# Patient Record
Sex: FEMALE | Race: BLACK OR AFRICAN AMERICAN | NOT HISPANIC OR LATINO | Employment: UNEMPLOYED | URBAN - METROPOLITAN AREA
[De-identification: names, ages, dates, MRNs, and addresses within clinical notes are randomized per-mention and may not be internally consistent; named-entity substitution may affect disease eponyms.]

---

## 2017-03-16 ENCOUNTER — LAB VISIT (OUTPATIENT)
Dept: LAB | Facility: OTHER | Age: 67
End: 2017-03-16
Attending: INTERNAL MEDICINE
Payer: MEDICARE

## 2017-03-16 DIAGNOSIS — E55.9 VITAMIN D DEFICIENCY: ICD-10-CM

## 2017-03-16 DIAGNOSIS — M85.80 OSTEOPENIA: ICD-10-CM

## 2017-03-16 LAB — 25(OH)D3+25(OH)D2 SERPL-MCNC: 30 NG/ML

## 2017-03-16 PROCEDURE — 36415 COLL VENOUS BLD VENIPUNCTURE: CPT

## 2017-03-16 PROCEDURE — 82306 VITAMIN D 25 HYDROXY: CPT

## 2017-04-27 RX ORDER — AMLODIPINE BESYLATE 5 MG/1
5 TABLET ORAL DAILY
Qty: 90 TABLET | Refills: 1 | Status: SHIPPED | OUTPATIENT
Start: 2017-04-27 | End: 2017-04-28 | Stop reason: SDUPTHER

## 2017-04-27 NOTE — TELEPHONE ENCOUNTER
----- Message from Kay Johns sent at 4/27/2017  9:34 AM CDT -----  Contact: Self  X  1st Request  _  2nd Request  _  3rd Request    Please refill the medication(s) listed below. Please call the patient when the prescription(s) is ready for  at the phone number [phone] .    Medication #1 amlodipine (NORVASC) 5 MG tablet    Preferred Pharmacy: [facility] at [phone]

## 2017-04-28 RX ORDER — AMLODIPINE BESYLATE 5 MG/1
5 TABLET ORAL DAILY
Qty: 90 TABLET | Refills: 1 | Status: SHIPPED | OUTPATIENT
Start: 2017-04-28 | End: 2017-10-20 | Stop reason: SDUPTHER

## 2017-05-15 ENCOUNTER — OFFICE VISIT (OUTPATIENT)
Dept: INTERNAL MEDICINE | Facility: CLINIC | Age: 67
End: 2017-05-15
Payer: MEDICARE

## 2017-05-15 ENCOUNTER — LAB VISIT (OUTPATIENT)
Dept: LAB | Facility: OTHER | Age: 67
End: 2017-05-15
Attending: INTERNAL MEDICINE
Payer: MEDICARE

## 2017-05-15 VITALS
HEART RATE: 93 BPM | OXYGEN SATURATION: 97 % | WEIGHT: 142.63 LBS | BODY MASS INDEX: 26.25 KG/M2 | HEIGHT: 62 IN | DIASTOLIC BLOOD PRESSURE: 80 MMHG | SYSTOLIC BLOOD PRESSURE: 132 MMHG

## 2017-05-15 DIAGNOSIS — I10 ESSENTIAL HYPERTENSION: Chronic | ICD-10-CM

## 2017-05-15 DIAGNOSIS — I10 ESSENTIAL HYPERTENSION: Primary | Chronic | ICD-10-CM

## 2017-05-15 LAB
ANION GAP SERPL CALC-SCNC: 12 MMOL/L
BUN SERPL-MCNC: 13 MG/DL
CALCIUM SERPL-MCNC: 9.7 MG/DL
CHLORIDE SERPL-SCNC: 108 MMOL/L
CO2 SERPL-SCNC: 24 MMOL/L
CREAT SERPL-MCNC: 0.8 MG/DL
EST. GFR  (AFRICAN AMERICAN): >60 ML/MIN/1.73 M^2
EST. GFR  (NON AFRICAN AMERICAN): >60 ML/MIN/1.73 M^2
GLUCOSE SERPL-MCNC: 89 MG/DL
POTASSIUM SERPL-SCNC: 3.5 MMOL/L
SODIUM SERPL-SCNC: 144 MMOL/L

## 2017-05-15 PROCEDURE — 80048 BASIC METABOLIC PNL TOTAL CA: CPT

## 2017-05-15 PROCEDURE — 36415 COLL VENOUS BLD VENIPUNCTURE: CPT

## 2017-05-15 PROCEDURE — 99999 PR PBB SHADOW E&M-EST. PATIENT-LVL III: CPT | Mod: PBBFAC,,, | Performed by: INTERNAL MEDICINE

## 2017-05-15 PROCEDURE — 99213 OFFICE O/P EST LOW 20 MIN: CPT | Mod: S$PBB,,, | Performed by: INTERNAL MEDICINE

## 2017-05-15 NOTE — MR AVS SNAPSHOT
"    [facility]  [address]  [place] [address]  Phone: [phone]  Fax: [phone]                  Jessica Yin Sherif   5/15/2017 9:00 AM   Office Visit    Description:  Female : 1950   Provider:  Isac Solares MD   Department:  [facility]           Reason for Visit     Hypertension           Diagnoses this Visit        Comments    Essential hypertension    -  Primary            To Do List           Future Appointments        Provider Department Dept Phone    5/15/2017 10:30 AM LAB, SAME DAY [facility] [phone]      Goals (5 Years of Data)     None      Follow-Up and Disposition     Return in about 1 year (around 5/15/2018).      [facility]     [facility] Nurse Care Line -  Assistance  Unless otherwise directed by your provider, please contact [facility], our nurse care line that is available for  assistance.     Registered nurses in the [facility] provide: appointment scheduling, clinical advisement, health education, and other advisory services.  Call: [phone] (toll free)               Medications                Verify that the below list of medications is an accurate representation of the medications you are currently taking.  If none reported, the list may be blank. If incorrect, please contact your healthcare provider. Carry this list with you in case of emergency.           Current Medications     amlodipine (NORVASC) 5 MG tablet Take 1 tablet (5 mg total) by mouth once daily.    multivitamin (ONE DAILY MULTIVITAMIN) per tablet Take 1 tablet by mouth once daily.    cholecalciferol, vitamin D3, 50,000 unit capsule Take 1 capsule (50,000 Units total) by mouth once a week.           Clinical Reference Information           Your Vitals Were     BP Pulse Height Weight SpO2 BMI    132/80 93 5' 2" (1.575 m) 64.7 kg (142 lb 10.2 oz) 97% 26.09 kg/m2      Blood Pressure          Most " Recent Value    BP  132/80      Allergies as of 5/15/2017     Gramicidin D    Hydrochlorothiazide    Lisinopril    Promethazine    Neosporin [Neomycin-bacitracnzn-polymyxnb]    Polymyxin [Bacitracin-polymyxin B]      Immunizations Administered on Date of Encounter - 5/15/2017     None      Orders Placed During Today's Visit     Future Labs/Procedures Expected by Expires    Basic metabolic panel  5/15/2017 7/14/2018      Language Assistance Services     ATTENTION: Language assistance services are available, free of charge. Please call [phone].      ATENCIÓN: Si habla español, tiene a gutierrez disposición servicios gratuitos de asistencia lingüística. Llame al [phone].     CHÚ Ý: N?u b?n nói Ti?ng Vi?t, có các d?ch v? h? tr? ngôn ng? mi?n phí dành cho b?n. G?i s? [phone].         [facility] complies with applicable Federal civil rights laws and does not discriminate on the basis of race, color, national origin, age, disability, or sex.

## 2017-05-15 NOTE — PROGRESS NOTES
"Subjective:       Patient ID: Jessica Gorman is a 66 y.o. female.    Chief Complaint: Hypertension    HPI Comments:   Pt here for f/u HTN.    She is feeling well.  No new c/o.    She is not monitoring her BP on her own.         Hypertension       Review of Systems   Constitutional: Negative.    HENT: Negative.    Eyes: Negative.    Respiratory: Negative.        Objective:       Vitals:    05/15/17 0906   BP: 132/80   Pulse: 93   SpO2: 97%   Weight: 64.7 kg (142 lb 10.2 oz)   Height: 5' 2" (1.575 m)     Physical Exam   Constitutional: She is oriented to person, place, and time. She appears well-developed and well-nourished. No distress.   HENT:   Head: Normocephalic and atraumatic.   Right Ear: Tympanic membrane, external ear and ear canal normal.   Left Ear: Tympanic membrane, external ear and ear canal normal.   Mouth/Throat: Uvula is midline, oropharynx is clear and moist and mucous membranes are normal. No oropharyngeal exudate or posterior oropharyngeal erythema.   Eyes: Conjunctivae and EOM are normal. Pupils are equal, round, and reactive to light.   Neck: Normal range of motion. Neck supple.   Cardiovascular: Normal rate, regular rhythm and normal heart sounds.  Exam reveals no gallop and no friction rub.    No murmur heard.  Pulmonary/Chest: Effort normal and breath sounds normal. No respiratory distress. She has no wheezes. She has no rhonchi. She has no rales.   Lymphadenopathy:     She has no cervical adenopathy.   Neurological: She is alert and oriented to person, place, and time.   Skin: She is not diaphoretic.       Assessment:       1. Essential hypertension        Plan:           HTN - At goal.  Cont current tx.  Chk Cr and lytes.    HM - Pt declined all vaccines.        "

## 2017-06-06 ENCOUNTER — LAB VISIT (OUTPATIENT)
Dept: LAB | Facility: HOSPITAL | Age: 67
End: 2017-06-06
Attending: OBSTETRICS & GYNECOLOGY
Payer: MEDICARE

## 2017-06-06 DIAGNOSIS — C54.1 ENDOMETRIAL CARCINOMA: ICD-10-CM

## 2017-06-06 LAB — CANCER AG125 SERPL-ACNC: 12 U/ML

## 2017-06-06 PROCEDURE — 86304 IMMUNOASSAY TUMOR CA 125: CPT

## 2017-06-06 PROCEDURE — 36415 COLL VENOUS BLD VENIPUNCTURE: CPT

## 2017-06-07 ENCOUNTER — OFFICE VISIT (OUTPATIENT)
Dept: GYNECOLOGIC ONCOLOGY | Facility: CLINIC | Age: 67
End: 2017-06-07
Payer: MEDICARE

## 2017-06-07 VITALS
WEIGHT: 145.06 LBS | BODY MASS INDEX: 26.69 KG/M2 | DIASTOLIC BLOOD PRESSURE: 80 MMHG | HEIGHT: 62 IN | HEART RATE: 101 BPM | SYSTOLIC BLOOD PRESSURE: 146 MMHG

## 2017-06-07 DIAGNOSIS — C54.1 ENDOMETRIAL CARCINOMA: Primary | ICD-10-CM

## 2017-06-07 DIAGNOSIS — Z12.31 SCREENING MAMMOGRAM, ENCOUNTER FOR: ICD-10-CM

## 2017-06-07 PROCEDURE — 1126F AMNT PAIN NOTED NONE PRSNT: CPT | Mod: ,,, | Performed by: OBSTETRICS & GYNECOLOGY

## 2017-06-07 PROCEDURE — 99214 OFFICE O/P EST MOD 30 MIN: CPT | Mod: S$PBB,,, | Performed by: OBSTETRICS & GYNECOLOGY

## 2017-06-07 PROCEDURE — 1159F MED LIST DOCD IN RCRD: CPT | Mod: ,,, | Performed by: OBSTETRICS & GYNECOLOGY

## 2017-06-07 PROCEDURE — 99999 PR PBB SHADOW E&M-EST. PATIENT-LVL III: CPT | Mod: PBBFAC,,, | Performed by: OBSTETRICS & GYNECOLOGY

## 2017-06-07 PROCEDURE — 99213 OFFICE O/P EST LOW 20 MIN: CPT | Mod: PBBFAC | Performed by: OBSTETRICS & GYNECOLOGY

## 2017-06-07 NOTE — PROGRESS NOTES
Subjective:       Patient ID: Jessica Gorman is a 66 y.o. female.    Chief Complaint: Endometrial Cancer (6 mth )    HPI     Patient comes in today for her WWE and follow up for UPSC of the uterus.     is 12.       Denies vaginal bleeding.      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    CT scan of the abdomen and pelvis after surgery showed: 1.Postsurgical changes seen in the pelvis from hysterectomy and bilateral salpingo-oophorectomy    2.A fluid collection is seen posterior to the cervix and likely represents a seroma. However, abscess cannot be ruled out.    3.Enlarged left inguinal lymph node identified    Ultrasound of this inguinal node was done and showed:    Limited sonographic imaging was performed in an area of a palpable abnormality within the patient's left inguinal region. Within this region, a lymph node is seen that measures 2.4 x 0.9 x 2.8 cm. While enlarged, this lymph node does   demonstrate a fatty hilum and is otherwise normal in morphology.    The patient reports that this palpable abnormality has been getting smaller over time. I discussed with the patient the need to biopsy this lesion to fully exclude any recurrent malignancy however at this time the patient deferred a biopsy attempt.    If this lesion does not resolve, a ultrasound guided biopsy could be rescheduled.        Patient completed vaginal cuff radiation.    She completed 6 cycles of taxol and carboplatin in May 2015.    June 2015: CT scan at completion of treatment shows no evidence for disease. No adenopathy.  was 7.  "   Strong family history of breast cancer on mother's side. Patient's daughter has been tested for BRCA and is negative.           Review of Systems   Constitutional: Negative for chills, fatigue and fever.   Respiratory: Negative for cough, shortness of breath and wheezing.    Cardiovascular: Negative for chest pain, palpitations and leg swelling.   Gastrointestinal: Negative for abdominal pain, constipation, diarrhea, nausea and vomiting.   Genitourinary: Negative for difficulty urinating, dysuria, frequency, genital sores, hematuria, urgency, vaginal bleeding, vaginal discharge and vaginal pain.   Neurological: Negative for weakness.   Hematological: Negative for adenopathy. Does not bruise/bleed easily.   Psychiatric/Behavioral: The patient is not nervous/anxious.        Objective:     BP (!) 146/80   Pulse 101   Ht 5' 2" (1.575 m)   Wt 65.8 kg (145 lb 1 oz)   BMI 26.53 kg/m²     Physical Exam   Constitutional: She is oriented to person, place, and time. She appears well-developed and well-nourished.   HENT:   Head: Normocephalic and atraumatic.   Eyes: No scleral icterus.   Neck: Neck supple. No tracheal deviation present. No thyroid mass and no thyromegaly present.   Cardiovascular: Normal rate and regular rhythm.    Pulmonary/Chest: Effort normal and breath sounds normal. She has no wheezes.   Abdominal: Soft. She exhibits no distension and no mass. There is no hepatosplenomegaly. There is no tenderness. There is no rebound and no guarding.   Genitourinary:   Genitourinary Comments: Bimanual exam:  Vulva: no lesions. Normal appearance  Urethra: Normal size and location. No lesions  Bladder: No masses or tenderness.  Vagina: normal mucosa. No lesion  Cervix: absent.   Uterus: absent.  Adnexa: no masses.  Rectovaginal: Not performed     Musculoskeletal: She exhibits no edema or tenderness.   Lymphadenopathy:     She has no cervical adenopathy.     She has no axillary adenopathy.        Right: No inguinal " and no supraclavicular adenopathy present.        Left: No inguinal and no supraclavicular adenopathy present.   Neurological: She is alert and oriented to person, place, and time.   Skin: Skin is warm and dry.   Psychiatric: She has a normal mood and affect. Her behavior is normal. Judgment and thought content normal.       Assessment:       1. Endometrial carcinoma, serous    2. Screening mammogram, encounter for        Plan:   Endometrial carcinoma, serous   YESENIA   RTC 6 months  -     ; Future; Expected date: 12/07/2017    Screening mammogram, encounter for  -     Mammo Digital Screening Bilat with CAD; Future; Expected date: 09/27/2017

## 2017-07-05 ENCOUNTER — OFFICE VISIT (OUTPATIENT)
Dept: INTERNAL MEDICINE | Facility: CLINIC | Age: 67
End: 2017-07-05
Payer: MEDICARE

## 2017-07-05 ENCOUNTER — HOSPITAL ENCOUNTER (OUTPATIENT)
Dept: RADIOLOGY | Facility: OTHER | Age: 67
Discharge: HOME | End: 2017-07-05
Attending: INTERNAL MEDICINE
Payer: MEDICARE

## 2017-07-05 VITALS
HEART RATE: 109 BPM | HEIGHT: 62 IN | WEIGHT: 144.19 LBS | BODY MASS INDEX: 26.53 KG/M2 | SYSTOLIC BLOOD PRESSURE: 126 MMHG | OXYGEN SATURATION: 98 % | DIASTOLIC BLOOD PRESSURE: 82 MMHG

## 2017-07-05 DIAGNOSIS — R06.02 SHORTNESS OF BREATH: Primary | ICD-10-CM

## 2017-07-05 DIAGNOSIS — R06.02 SHORTNESS OF BREATH: ICD-10-CM

## 2017-07-05 PROCEDURE — 1126F AMNT PAIN NOTED NONE PRSNT: CPT | Mod: ,,, | Performed by: INTERNAL MEDICINE

## 2017-07-05 PROCEDURE — 1159F MED LIST DOCD IN RCRD: CPT | Mod: ,,, | Performed by: INTERNAL MEDICINE

## 2017-07-05 PROCEDURE — 93010 ELECTROCARDIOGRAM REPORT: CPT | Mod: ,,, | Performed by: INTERNAL MEDICINE

## 2017-07-05 PROCEDURE — 93005 ELECTROCARDIOGRAM TRACING: CPT | Mod: PBBFAC | Performed by: INTERNAL MEDICINE

## 2017-07-05 PROCEDURE — 71020 XR CHEST PA AND LATERAL: CPT | Mod: 26,,, | Performed by: RADIOLOGY

## 2017-07-05 PROCEDURE — 99213 OFFICE O/P EST LOW 20 MIN: CPT | Mod: S$PBB,,, | Performed by: INTERNAL MEDICINE

## 2017-07-05 PROCEDURE — 99999 PR PBB SHADOW E&M-EST. PATIENT-LVL III: CPT | Mod: PBBFAC,,, | Performed by: INTERNAL MEDICINE

## 2017-07-05 PROCEDURE — 71020 XR CHEST PA AND LATERAL: CPT | Mod: TC

## 2017-07-05 NOTE — PROGRESS NOTES
Subjective:       Patient ID: Jessica Gorman is a 66 y.o. female.    Chief Complaint: Cough (x 30 years) and Shortness of Breath    Pt c/o difficulty catching breath last night that lasted about a minute. She was able to speak and grab her daughter but then it resolved. She was sitting at the time watching tv. No cp/palpitations. She has had a chronic cough for 30 years but was not having coughing episode at the time. This has happened in the past but she doesn't feel it has lasted as long as it did last night. She feels normal today (as she did after the short period last night) and has no other c/o. No leg swelling/pain. No h/o asthma. Lifelong non-smoker. She has been able to exert herself and go upstairs without issues.       Review of Systems   Constitutional: Negative for fever and unexpected weight change.   HENT: Negative for ear pain, rhinorrhea and sore throat.    Respiratory: Negative for cough and shortness of breath.    Cardiovascular: Negative for chest pain.       Objective:      Physical Exam   Constitutional: She is oriented to person, place, and time. She appears well-developed and well-nourished.   HENT:   Right Ear: Tympanic membrane, external ear and ear canal normal.   Left Ear: Tympanic membrane, external ear and ear canal normal.   Nose: No mucosal edema or rhinorrhea.   Mouth/Throat: No oropharyngeal exudate or posterior oropharyngeal erythema.   Neck: Neck supple. No thyromegaly present.   Cardiovascular: Normal rate, regular rhythm and normal heart sounds.    Pulmonary/Chest: Effort normal and breath sounds normal.   Lymphadenopathy:     She has no cervical adenopathy.   Neurological: She is alert and oriented to person, place, and time.   Psychiatric: She has a normal mood and affect.       Assessment:       1. Shortness of breath        Plan:       1. No apparent concerning cause but check CXR considering CA history and EKG  2. EKG--nsr  3. ED and rtc prompts d/w pt and she  understands

## 2017-07-10 DIAGNOSIS — Z12.11 COLON CANCER SCREENING: ICD-10-CM

## 2017-08-04 DIAGNOSIS — Z12.11 COLON CANCER SCREENING: ICD-10-CM

## 2017-10-02 ENCOUNTER — HOSPITAL ENCOUNTER (OUTPATIENT)
Dept: RADIOLOGY | Facility: HOSPITAL | Age: 67
Discharge: HOME | End: 2017-10-02
Attending: OBSTETRICS & GYNECOLOGY
Payer: MEDICARE

## 2017-10-02 DIAGNOSIS — Z12.31 SCREENING MAMMOGRAM, ENCOUNTER FOR: ICD-10-CM

## 2017-10-02 PROCEDURE — 77067 SCR MAMMO BI INCL CAD: CPT | Mod: TC

## 2017-10-02 PROCEDURE — 77067 SCR MAMMO BI INCL CAD: CPT | Mod: 26,,, | Performed by: RADIOLOGY

## 2017-10-20 RX ORDER — AMLODIPINE BESYLATE 5 MG/1
5 TABLET ORAL DAILY
Qty: 90 TABLET | Refills: 1 | Status: SHIPPED | OUTPATIENT
Start: 2017-10-20 | End: 2018-04-21 | Stop reason: SDUPTHER

## 2017-10-20 NOTE — TELEPHONE ENCOUNTER
----- Message from Delaney Ozuna sent at 10/20/2017  2:29 PM CDT -----      _  1st Request  _  2nd Request  _  3rd Request    Please refill the medication(s) listed below. Please call the patient when the prescription(s) is ready for  at this phone number          amlodipine (NORVASC) 5 MG tablet  Medication #1          Preferred Pharmacy:[facility]

## 2017-12-20 ENCOUNTER — LAB VISIT (OUTPATIENT)
Dept: LAB | Facility: HOSPITAL | Age: 67
End: 2017-12-20
Attending: OBSTETRICS & GYNECOLOGY
Payer: MEDICARE

## 2017-12-20 DIAGNOSIS — C54.1 ENDOMETRIAL CARCINOMA: ICD-10-CM

## 2017-12-20 LAB — CANCER AG125 SERPL-ACNC: 12 U/ML

## 2017-12-20 PROCEDURE — 36415 COLL VENOUS BLD VENIPUNCTURE: CPT

## 2017-12-20 PROCEDURE — 86304 IMMUNOASSAY TUMOR CA 125: CPT

## 2017-12-21 ENCOUNTER — OFFICE VISIT (OUTPATIENT)
Dept: GYNECOLOGIC ONCOLOGY | Facility: CLINIC | Age: 67
End: 2017-12-21
Payer: MEDICARE

## 2017-12-21 VITALS
HEART RATE: 104 BPM | SYSTOLIC BLOOD PRESSURE: 138 MMHG | BODY MASS INDEX: 25.79 KG/M2 | DIASTOLIC BLOOD PRESSURE: 72 MMHG | WEIGHT: 141 LBS

## 2017-12-21 DIAGNOSIS — C54.1 ENDOMETRIAL CARCINOMA: Primary | ICD-10-CM

## 2017-12-21 PROCEDURE — 99213 OFFICE O/P EST LOW 20 MIN: CPT | Mod: PBBFAC | Performed by: OBSTETRICS & GYNECOLOGY

## 2017-12-21 PROCEDURE — 99999 PR PBB SHADOW E&M-EST. PATIENT-LVL III: CPT | Mod: PBBFAC,,, | Performed by: OBSTETRICS & GYNECOLOGY

## 2017-12-21 PROCEDURE — 99214 OFFICE O/P EST MOD 30 MIN: CPT | Mod: S$PBB,,, | Performed by: OBSTETRICS & GYNECOLOGY

## 2017-12-21 NOTE — PROGRESS NOTES
Subjective:       Patient ID: Jessica Gorman is a 67 y.o. female.    Chief Complaint: Endometrial Cancer (follow up )    HPI     Patient comes in today for follow up for UPSC of the uterus.     is 12.       Denies vaginal bleeding.      Mammogram: Oct  2, 2017: normal   Colonscopy: 2004: normal   Sigmoidoscopy: 2015: normal.      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    CT scan of the abdomen and pelvis after surgery showed: 1.Postsurgical changes seen in the pelvis from hysterectomy and bilateral salpingo-oophorectomy    2.A fluid collection is seen posterior to the cervix and likely represents a seroma. However, abscess cannot be ruled out.    3.Enlarged left inguinal lymph node identified    Ultrasound of this inguinal node was done and showed:    Limited sonographic imaging was performed in an area of a palpable abnormality within the patient's left inguinal region. Within this region, a lymph node is seen that measures 2.4 x 0.9 x 2.8 cm. While enlarged, this lymph node does   demonstrate a fatty hilum and is otherwise normal in morphology.    The patient reports that this palpable abnormality has been getting smaller over time. I discussed with the patient the need to biopsy this lesion to fully exclude any recurrent malignancy however at this time the patient deferred a biopsy attempt.    If this lesion does not resolve, a ultrasound guided biopsy could be rescheduled.        Patient completed vaginal cuff radiation.    She completed 6 cycles of taxol and carboplatin in May 2015.    June 2015: CT scan at completion of treatment shows no  evidence for disease. No adenopathy.  was 7.    Strong family history of breast cancer on mother's side. Patient's daughter has been tested for BRCA and is negative.              Review of Systems   Constitutional: Negative for chills, fatigue and fever.   Respiratory: Negative for cough, shortness of breath and wheezing.    Cardiovascular: Negative for chest pain, palpitations and leg swelling.   Gastrointestinal: Negative for abdominal pain, constipation, diarrhea, nausea and vomiting.   Genitourinary: Negative for difficulty urinating, dysuria, frequency, genital sores, hematuria, urgency, vaginal bleeding, vaginal discharge and vaginal pain.   Neurological: Negative for weakness.   Hematological: Negative for adenopathy. Does not bruise/bleed easily.   Psychiatric/Behavioral: The patient is not nervous/anxious.        Objective:   /72   Pulse 104   Wt 64 kg (141 lb)   BMI 25.79 kg/m²      Physical Exam   Constitutional: She is oriented to person, place, and time. She appears well-developed and well-nourished.   HENT:   Head: Normocephalic and atraumatic.   Eyes: No scleral icterus.   Neck: Neck supple. No tracheal deviation present. No thyroid mass and no thyromegaly present.   Cardiovascular: Normal rate and regular rhythm.    Pulmonary/Chest: Effort normal and breath sounds normal. She has no wheezes.   Abdominal: Soft. She exhibits no distension and no mass. There is no hepatosplenomegaly. There is no tenderness. There is no rebound and no guarding.   Genitourinary:   Genitourinary Comments: Bimanual exam:  Vulva: no lesions. Normal appearance  Urethra: Normal size and location. No lesions  Bladder: No masses or tenderness.  Vagina: normal mucosa. No lesion  Cervix: absent.   Uterus: absent.  Adnexa: no masses.  Rectovaginal: Not performed     Musculoskeletal: She exhibits no edema or tenderness.   Lymphadenopathy:     She has no cervical adenopathy.     She has no axillary adenopathy.         Right: No inguinal and no supraclavicular adenopathy present.        Left: No inguinal and no supraclavicular adenopathy present.   Neurological: She is alert and oriented to person, place, and time.   Skin: Skin is warm and dry.   Psychiatric: She has a normal mood and affect. Her behavior is normal. Judgment and thought content normal.       Assessment:       1. Endometrial carcinoma, serous        Plan:   Endometrial carcinoma, serous   YESENIA   RTC 6 months  -     ; Future; Expected date: 06/21/2018

## 2018-04-20 RX ORDER — AMLODIPINE BESYLATE 5 MG/1
5 TABLET ORAL DAILY
Qty: 90 TABLET | Refills: 1 | OUTPATIENT
Start: 2018-04-20

## 2018-04-20 NOTE — TELEPHONE ENCOUNTER
----- Message from Joseline Roa sent at 4/20/2018 10:13 AM CDT -----  Contact: SARAH MAGANA [[id number]]  Can the clinic reply in [facility]: No      Please refill the medication(s) listed below. Please call the patient when the prescription(s) is ready for  at the phone number ([phone]) Needs authorization.    Medication #1  amlodipine (NORVASC) 5 MG tablet 90 tablet     Medication #2      Preferred Pharmacy:

## 2018-04-23 RX ORDER — AMLODIPINE BESYLATE 5 MG/1
TABLET ORAL
Qty: 90 TABLET | Refills: 0 | Status: SHIPPED | OUTPATIENT
Start: 2018-04-23 | End: 2018-04-27

## 2018-04-25 NOTE — PROGRESS NOTES
Subjective:       Patient ID: Jessica Gorman is a 67 y.o. female with a PMH significant for HTN, Abnormal PAP, Endometrial Cancer, Osteopenia, FH of Breast Cancer who presents today to establish care.  Patient previously followed by Dr. Solares and last seen on 5/15/2017.    Chief Complaint: Establish Care; Hypertension; and Arm Pain (under both arms)    HPI Complains of intermittent pain in her axillary areas - none for the past 2 weeks.  Occasional joint aches and pains, but nothing specific.  Patient denies f/c, n/v/d.  No chest pain or SOB.  No abdominal pain or dysuria.  No headaches or change in vision.  No dizziness.  No significant  weight gain or weight loss.  Remaining ROS negative.    Review of Systems   Constitutional: Negative for appetite change, chills, diaphoresis, fatigue, fever and unexpected weight change.   HENT: Negative for ear pain, hearing loss, sinus pain, tinnitus, trouble swallowing and voice change.    Eyes: Negative for photophobia, pain and visual disturbance.   Respiratory: Negative for chest tightness, shortness of breath and wheezing.    Cardiovascular: Negative for chest pain, palpitations and leg swelling.   Gastrointestinal: Negative for abdominal pain, blood in stool, constipation, diarrhea, nausea and vomiting.   Endocrine: Negative for cold intolerance, heat intolerance, polydipsia, polyphagia and polyuria.   Genitourinary: Negative for decreased urine volume, difficulty urinating, dysuria, flank pain, hematuria, pelvic pain, vaginal bleeding, vaginal discharge and vaginal pain.   Musculoskeletal: Positive for arthralgias. Negative for gait problem, joint swelling, myalgias and neck pain.   Skin: Negative for rash.   Neurological: Negative for dizziness, tremors, syncope, weakness, numbness and headaches.   Hematological: Does not bruise/bleed easily.   Psychiatric/Behavioral: Negative for agitation, confusion and sleep disturbance. The patient is not nervous/anxious.         Objective:      Physical Exam   Constitutional: She is oriented to person, place, and time. She appears well-developed and well-nourished. No distress.   HENT:   Head: Normocephalic and atraumatic.   Nose: Nose normal.   Mouth/Throat: Oropharynx is clear and moist.   Eyes: Conjunctivae and EOM are normal. Pupils are equal, round, and reactive to light. No scleral icterus.   Neck: Normal range of motion. Neck supple. No JVD present. No thyromegaly present.   Cardiovascular: Normal rate, regular rhythm and intact distal pulses.  Exam reveals no gallop and no friction rub.    No murmur heard.  Pulmonary/Chest: Effort normal and breath sounds normal. No respiratory distress. She has no wheezes. She has no rales.   Abdominal: Soft. Bowel sounds are normal. She exhibits no distension. There is no tenderness. There is no rebound and no guarding.   Musculoskeletal: Normal range of motion. She exhibits no edema.   Lymphadenopathy:     She has no cervical adenopathy.   Neurological: She is alert and oriented to person, place, and time. No cranial nerve deficit or sensory deficit.   Skin: Skin is warm and dry. No rash noted. No erythema.   Large left upper back lipoma - soft and nontender   Psychiatric: She has a normal mood and affect. Her behavior is normal. Thought content normal.       Assessment:       1. Encounter for wellness examination in adult    2. FH: breast cancer in relative when <45 years old    3. Endometrial carcinoma, serous    4. Essential hypertension    5. Colon cancer screening    6. Osteoporosis, unspecified osteoporosis type, unspecified pathological fracture presence        Plan:   -Adult Wellness Exam - blood pressure a little higher and exam was stable.  I will order routine fasting labs.    -Cards - HTN - on Amlodipine, but has not taken them yet this morning as she takes it at 11am.  She does not take her BP at home, but occasionally checks it at the pharmacy and it runs high.   I will increase  her Amlodipine from 5mg to 10mg (she will take two of the 5mg pills until her bottle is complete, as she just refilled a 90 day supply of the 5mg pills).  Last EKG done 7/2017 and was normal.  Last lipids done 5/2016 - , TG 42, HDL 92, LDL 98.6.  -Onc - Endometrial Cancer (12/2014 stage 1A) - S/p VILLA with BSO.  S/p Vaginal Cuff radiation and completed 6 cycles of Taxol and Carboplatin in 5/2014.  She was last seen by GYN Onc on 12/21/2017.  Follow up in 6/2018.  -Endocrine - Vitamin D Insufficient - level in 11/2016 was 24.  S/p Vitamin D treatment, but now on MVI with D3.  -GYN - Last Mammo was in 6/2017 and negative.  Last DXA was in 5/2016 and showed Osteopenia of Hips.    -Derm - Large Left Upper Back Lipoma - since 200 per patient.  Declining intervention.  No pain.  -HCM - We discussed Flu and Tdap (declined in 5/2017 vaccinations.  Doesn't want it again).  We discussed Shingles (declined today) and Pneumococcal (declined) vaccinations.  We discussed colon cancer screening - had it in 2004 and normal per patient.  Declining repeat at this time, as her sister had an adverse event with her procedure).  Agrees to a FITKIT today.  HCV Ab negative in 11/2014.

## 2018-04-27 ENCOUNTER — OFFICE VISIT (OUTPATIENT)
Dept: INTERNAL MEDICINE | Facility: CLINIC | Age: 68
End: 2018-04-27
Payer: MEDICARE

## 2018-04-27 ENCOUNTER — PATIENT MESSAGE (OUTPATIENT)
Dept: INTERNAL MEDICINE | Facility: CLINIC | Age: 68
End: 2018-04-27

## 2018-04-27 ENCOUNTER — HOSPITAL ENCOUNTER (OUTPATIENT)
Dept: RADIOLOGY | Facility: OTHER | Age: 68
Discharge: HOME | End: 2018-04-27
Attending: INTERNAL MEDICINE
Payer: MEDICARE

## 2018-04-27 VITALS
WEIGHT: 140.19 LBS | HEIGHT: 62 IN | SYSTOLIC BLOOD PRESSURE: 140 MMHG | BODY MASS INDEX: 25.8 KG/M2 | OXYGEN SATURATION: 99 % | TEMPERATURE: 98 F | DIASTOLIC BLOOD PRESSURE: 90 MMHG | HEART RATE: 90 BPM

## 2018-04-27 DIAGNOSIS — M81.0 OSTEOPOROSIS, UNSPECIFIED OSTEOPOROSIS TYPE, UNSPECIFIED PATHOLOGICAL FRACTURE PRESENCE: ICD-10-CM

## 2018-04-27 DIAGNOSIS — Z00.00 ENCOUNTER FOR WELLNESS EXAMINATION IN ADULT: Primary | ICD-10-CM

## 2018-04-27 DIAGNOSIS — C54.1 ENDOMETRIAL CARCINOMA: ICD-10-CM

## 2018-04-27 DIAGNOSIS — I10 ESSENTIAL HYPERTENSION: Chronic | ICD-10-CM

## 2018-04-27 DIAGNOSIS — Z80.3 FH: BREAST CANCER IN RELATIVE WHEN <45 YEARS OLD: ICD-10-CM

## 2018-04-27 DIAGNOSIS — Z12.11 COLON CANCER SCREENING: ICD-10-CM

## 2018-04-27 DIAGNOSIS — D17.79 LIPOMA OF OTHER SPECIFIED SITES: ICD-10-CM

## 2018-04-27 PROCEDURE — 99214 OFFICE O/P EST MOD 30 MIN: CPT | Mod: S$PBB,,, | Performed by: INTERNAL MEDICINE

## 2018-04-27 PROCEDURE — 99999 PR PBB SHADOW E&M-EST. PATIENT-LVL IV: CPT | Mod: PBBFAC,,, | Performed by: INTERNAL MEDICINE

## 2018-04-27 PROCEDURE — 77080 DXA BONE DENSITY AXIAL: CPT | Mod: 26,,, | Performed by: RADIOLOGY

## 2018-04-27 PROCEDURE — 77080 DXA BONE DENSITY AXIAL: CPT | Mod: TC

## 2018-04-27 PROCEDURE — 99214 OFFICE O/P EST MOD 30 MIN: CPT | Mod: PBBFAC,25 | Performed by: INTERNAL MEDICINE

## 2018-04-27 RX ORDER — AMLODIPINE BESYLATE 10 MG/1
10 TABLET ORAL DAILY
Qty: 90 TABLET | Refills: 1
Start: 2018-04-27 | End: 2018-06-08 | Stop reason: SDUPTHER

## 2018-04-27 NOTE — PATIENT INSTRUCTIONS
Your blood pressure is a little high today.  I will increase your Amlodipine from 5mg once a day to 10mg once a day.  You can take two of the 5mg pills until you finish your current bottle of medication, then I will send in the new prescription for the 10mg pills once you are ready for a refill.  Please monitor at home with a digital blood pressure cuff when sitting and rested for at least 15 minutes or longer.  Record your values and bring these with you on your return.  I would like you to see the nurse in 2 weeks for a blood pressure check.  Target blood pressure is less than 130/80.    Your exam was overall normal today.  I will order routine fasting labs today - at least 6-8 hours of fasting.  I will order a repeat Bone Density test.  We will talk about vaccinations again at your next follow up.  I will send you home with a FITKIT today.  Return in 3 months - sooner if needed.  Please come at least 15-20 minutes before your scheduled appointment time.

## 2018-04-30 ENCOUNTER — PATIENT MESSAGE (OUTPATIENT)
Dept: INTERNAL MEDICINE | Facility: CLINIC | Age: 68
End: 2018-04-30

## 2018-04-30 ENCOUNTER — TELEPHONE (OUTPATIENT)
Dept: INTERNAL MEDICINE | Facility: CLINIC | Age: 68
End: 2018-04-30

## 2018-04-30 NOTE — TELEPHONE ENCOUNTER
----- Message from Rico Amaro MD sent at 4/30/2018 11:22 AM CDT -----  Contact: patient  Can you ask her how many doses she took till now?  Did she take her blood pressure when she felt dizzy?  Would ask her to continue to take for now if if she feels light-headed again to check her blood pressure and heart rate at that time.  ----- Message -----  From: Cortez Matt MA  Sent: 4/30/2018   9:28 AM  To: Rico Amaro MD        ----- Message -----  From: Stef Dixon  Sent: 4/30/2018   9:09 AM  To: Prem Gómez Staff              Name of Who is Calling: Jessica      What is the request in detail: patient is requesting a call back in reference to a question she has about Rx amLODIPine (NORVASC) 10 MG tablet.  Patient stated she was feeling lightheaded after she started the increased dosage.      Can the clinic reply by [facility]: no      What Number to Call Back if not in [facility]: [phone]

## 2018-04-30 NOTE — TELEPHONE ENCOUNTER
Spoke with pt and she had a verbal understanding that Dr. Amaro doubled her doses from 5 mg to 10 mg amLODIPine (NORVASC). Dr Amaro asked: how many doses she took till now?  Did she take her blood pressure when she felt dizzy?  Would ask her to continue to take for now if if she feels light-headed again to check her blood pressure and heart rate at that time. Pt said she ate at 8 am and took her medicine later on that morning at 11 am, pt didn't take her pressure or pulse at that time. Dr. Amaro advised pt to continue to take 10 mg of the Norvasc and to take her pressure and pulse if the lighted ness continue. 4/30 JOSHUA

## 2018-05-03 ENCOUNTER — PATIENT MESSAGE (OUTPATIENT)
Dept: INTERNAL MEDICINE | Facility: CLINIC | Age: 68
End: 2018-05-03

## 2018-05-14 ENCOUNTER — CLINICAL SUPPORT (OUTPATIENT)
Dept: INTERNAL MEDICINE | Facility: CLINIC | Age: 68
End: 2018-05-14
Payer: MEDICARE

## 2018-05-14 ENCOUNTER — DOCUMENTATION ONLY (OUTPATIENT)
Dept: INTERNAL MEDICINE | Facility: CLINIC | Age: 68
End: 2018-05-14

## 2018-05-14 VITALS — OXYGEN SATURATION: 97 % | SYSTOLIC BLOOD PRESSURE: 126 MMHG | DIASTOLIC BLOOD PRESSURE: 68 MMHG | HEART RATE: 106 BPM

## 2018-05-14 PROCEDURE — 99212 OFFICE O/P EST SF 10 MIN: CPT | Mod: PBBFAC

## 2018-05-14 PROCEDURE — 99999 PR PBB SHADOW E&M-EST. PATIENT-LVL II: CPT | Mod: PBBFAC,,,

## 2018-05-14 NOTE — PROGRESS NOTES
Jessica Gorman 67 y.o. female is here today for Blood Pressure check.   History of HTN yes.    Review of patient's allergies indicates:   Allergen Reactions    Gramicidin d Shortness Of Breath    Hydrochlorothiazide Other (See Comments)     Burning and tingling sensation throughoutt her body    Lisinopril Shortness Of Breath    Promethazine Shortness Of Breath and Nausea And Vomiting    Neosporin [neomycin-bacitracnzn-polymyxnb] Swelling    Polymyxin [bacitracin-polymyxin b] Swelling     Creatinine   Date Value Ref Range Status   04/27/2018 0.8 0.5 - 1.4 mg/dL Final     Sodium   Date Value Ref Range Status   04/27/2018 140 136 - 145 mmol/L Final     Potassium   Date Value Ref Range Status   04/27/2018 3.7 3.5 - 5.1 mmol/L Final   ]  Patient verifies taking blood pressure medications on a regular bases at the same time of the day.     Current Outpatient Prescriptions:     amLODIPine (NORVASC) 10 MG tablet, Take 1 tablet (10 mg total) by mouth once daily., Disp: 90 tablet, Rfl: 1    multivitamin (ONE DAILY MULTIVITAMIN) per tablet, Take 1 tablet by mouth once daily., Disp: , Rfl:   Does patient have record of home blood pressure readings yes. Readings have been averaging 120-135/70-90's.   Last dose of blood pressure medication was taken at [facility].  Patient is asymptomatic.   Complains of no issues.    BP: 126/68 , Pulse: 106.  Dr. Amaro informed of pt concerns with elevated pulses. PCP advised, pt should continue monitoring BP and rtc in 2 wks with PCP.   Pt  States verbal understanding. Scheduled accordingly. Patient has no further questions or concerns.

## 2018-05-14 NOTE — PROGRESS NOTES
FitKit was given to patient on 5/14/2018 3:33 PM   Pt states she dropped previous kit in toilet.

## 2018-05-22 ENCOUNTER — PATIENT MESSAGE (OUTPATIENT)
Dept: INTERNAL MEDICINE | Facility: CLINIC | Age: 68
End: 2018-05-22

## 2018-05-22 ENCOUNTER — LAB VISIT (OUTPATIENT)
Dept: LAB | Facility: HOSPITAL | Age: 68
End: 2018-05-22
Attending: INTERNAL MEDICINE
Payer: MEDICARE

## 2018-05-22 DIAGNOSIS — Z12.11 COLON CANCER SCREENING: ICD-10-CM

## 2018-05-22 LAB
HEMOCCULT STL QL IA: NEGATIVE
HEMOCCULT STL QL IA: NEGATIVE

## 2018-05-22 PROCEDURE — 82274 ASSAY TEST FOR BLOOD FECAL: CPT

## 2018-05-30 NOTE — PROGRESS NOTES
Subjective:       Patient ID: Jessica Gorman is a 67 y.o. female with a PMH significant for HTN, Abnormal PAP, Endometrial Cancer, Osteopenia, FH of Breast Cancer who was seen initially by me on 4/27/2018.  Patient previously followed by Dr. Solares..    Chief Complaint: Follow-up and Hypertension    HPI     Patients presents for 2 week f/u HTN. Amlodipine was increased to 10mg at last visit. She is compliant with this medication and dosage and is without adverse effects. Her blood pressure is controlled. She has brought with her home BP logs: 110-128/70-83. She denies CP/SOB/dizziness/vision changes.     Patient denies f/c, n/v/d.  No chest pain or SOB.  No abdominal pain or dysuria.  No headaches or change in vision.  No dizziness.  No significant  weight gain or weight loss.  Remaining ROS negative.    Review of Systems   Constitutional: Negative for appetite change, chills, diaphoresis, fatigue, fever and unexpected weight change.   HENT: Negative for ear pain, hearing loss, sinus pain, tinnitus, trouble swallowing and voice change.    Eyes: Negative for photophobia, pain and visual disturbance.   Respiratory: Negative for chest tightness, shortness of breath and wheezing.    Cardiovascular: Negative for chest pain, palpitations and leg swelling.   Gastrointestinal: Negative for abdominal pain, blood in stool, constipation, diarrhea, nausea and vomiting.   Endocrine: Negative for cold intolerance, heat intolerance, polydipsia, polyphagia and polyuria.   Genitourinary: Negative for decreased urine volume, difficulty urinating, dysuria, flank pain, hematuria, pelvic pain, vaginal bleeding, vaginal discharge and vaginal pain.   Musculoskeletal: Positive for arthralgias. Negative for gait problem, joint swelling, myalgias and neck pain.   Skin: Negative for rash.   Neurological: Negative for dizziness, tremors, syncope, weakness, numbness and headaches.   Hematological: Does not bruise/bleed easily.    Psychiatric/Behavioral: Negative for agitation, confusion and sleep disturbance. The patient is not nervous/anxious.        Objective:      Physical Exam   Constitutional: She is oriented to person, place, and time. She appears well-developed and well-nourished. No distress.   HENT:   Head: Normocephalic and atraumatic.   Nose: Nose normal.   Mouth/Throat: Oropharynx is clear and moist.   Eyes: Conjunctivae and EOM are normal. Pupils are equal, round, and reactive to light. No scleral icterus.   Neck: Normal range of motion. Neck supple. No JVD present. No thyromegaly present.   Cardiovascular: Normal rate, regular rhythm and intact distal pulses.  Exam reveals no gallop and no friction rub.    No murmur heard.  Pulmonary/Chest: Effort normal and breath sounds normal. No respiratory distress. She has no wheezes. She has no rales.   Musculoskeletal: Normal range of motion. She exhibits no edema.   Lymphadenopathy:     She has no cervical adenopathy.   Neurological: She is alert and oriented to person, place, and time.   Skin: Skin is warm and dry. No rash noted. No erythema.   Large left upper back lipoma - soft and nontender   Psychiatric: She has a normal mood and affect. Her behavior is normal. Thought content normal.       Assessment:       1. Essential hypertension    2. Osteoporosis without current pathological fracture, unspecified osteoporosis type        Plan:   -Today's Visit -   -Cards - HTN - on Amlodipine, but has not taken last visit She does not take her BP at home, but occasionally checks it at the pharmacy and it runs high.   I increased her Amlodipine from 5mg to 10mg  Last visit (she will take two of the 5mg pills until her bottle is complete, as she just refilled a 90 day supply of the 5mg pills). She has home BP logs with her today and they are controlled (see above).  BP today is 130/80. Will continue current med-amlodipine 10mg daily. Last EKG done 7/2017 and was normal.  Last lipids in  4/2018 was , TG 72, HDL 89, .6.  10 year CHD risk is 7% with a comparative risk of 13%.  -Onc - Endometrial Cancer (12/2014 stage 1A) - S/p VILLA with BSO.  S/p Vaginal Cuff radiation and completed 6 cycles of Taxol and Carboplatin in 5/2014.  She was last seen by GYN Onc on 12/21/2017.  Follow up on 6/22/2018.  -Endocrine - Vitamin D Insufficient - level in 11/2016 was 24.  S/p Vitamin D treatment, but now on MVI with D3.  Repeat level in 4/2018 was 37.  TSH normal in 4/2018.  -GYN - Last Mammo was in 6/2017 and negative.  Last DXA was in 5/2016 and showed Osteopenia of Hips and repeat 4/27/2018 showed Osteoporosis - discussed treatment options and she is not sure she wants treatment.  Risks vs benefits of treatment discussed at length with her. Education from [facility] attached to the AVS.  -Derm - Large Left Upper Back Lipoma - since 200 per patient.  Declining intervention.  No pain.  -HCM - We discussed Flu and Tdap (declined in 5/2017 vaccinations.  Doesn't want it again).  We discussed Shingles (declined) and Pneumococcal (declined) vaccinations.  We discussed colon cancer screening - had it in 2004 and normal per patient.  Declining repeat at this time, as her sister had an adverse event with her procedure). FITKIT in 5/2018 was negative.  HCV Ab negative in 11/2014.  -Follow up in 6 months.    Addendum:  Patient seen and examined.  Discussed with Viviana Ng NP.  Agree with assessment and plan as above.  Patient  is a 67 y.o. female with a PMH significant for HTN, Abnormal PAP, Endometrial Cancer, Osteopenia, FH of Breast Cancer.  Home BP readings were good and will continue current medications.  We reviewed her recent DXA showing osteoporosis - she is nervous about taking a bisphosphonate and declining at this time.  Will discuss again further at follow up visit.  Follow up in 6 months.

## 2018-06-01 ENCOUNTER — OFFICE VISIT (OUTPATIENT)
Dept: INTERNAL MEDICINE | Facility: CLINIC | Age: 68
End: 2018-06-01
Payer: MEDICARE

## 2018-06-01 VITALS
HEIGHT: 62 IN | DIASTOLIC BLOOD PRESSURE: 80 MMHG | OXYGEN SATURATION: 99 % | WEIGHT: 140.19 LBS | HEART RATE: 89 BPM | BODY MASS INDEX: 25.8 KG/M2 | SYSTOLIC BLOOD PRESSURE: 130 MMHG | TEMPERATURE: 98 F

## 2018-06-01 DIAGNOSIS — M81.0 OSTEOPOROSIS WITHOUT CURRENT PATHOLOGICAL FRACTURE, UNSPECIFIED OSTEOPOROSIS TYPE: ICD-10-CM

## 2018-06-01 DIAGNOSIS — I10 ESSENTIAL HYPERTENSION: Primary | Chronic | ICD-10-CM

## 2018-06-01 PROCEDURE — 99999 PR PBB SHADOW E&M-EST. PATIENT-LVL III: CPT | Mod: PBBFAC,,, | Performed by: INTERNAL MEDICINE

## 2018-06-01 PROCEDURE — 99213 OFFICE O/P EST LOW 20 MIN: CPT | Mod: PBBFAC | Performed by: INTERNAL MEDICINE

## 2018-06-01 PROCEDURE — 99214 OFFICE O/P EST MOD 30 MIN: CPT | Mod: S$PBB,,, | Performed by: INTERNAL MEDICINE

## 2018-06-01 NOTE — PATIENT INSTRUCTIONS
What Is Osteoporosis?  Osteoporosis is a disease that weakens the bones. Weakened bones are more likely to fracture (break). Osteoporosis affects men and women, but postmenopausal women are most at risk. To help prevent osteoporosis, you need to exercise and nourish your bones throughout your life.    Childhood  The body builds the most bone during these years. That's why boys and girls need foods rich in calcium. They also need plenty of exercise. A healthy diet and exercise helps bones grow strong.  Young adulthood to age 30  During young adulthood, bones become their strongest. This is called peak bone mass. The same good habits that kept bones healthy in childhood help keep bone healthy in adulthood.  Age 30 to menopause  Bone mass declines slightly during these years. Your body makes just enough new bone to maintain peak bone mass. To keep your bones at their peak mass, be sure to exercise and get plenty of calcium.  After menopause  Menopause is when a woman stops having monthly periods. After menopause, the body makes less estrogen (female hormone). This increases bone loss. At this point, treatment may be needed to reduce the risk for fracture. Exercise and calcium can also help keep your bones strong.  Later in life  In later years, both men and women need to take extra care of their bones. By this point, the body loses more bone than it makes. If too much bone is lost, you may be at risk for fractures. With age, the quality and quantity of bone declines. You can lessen bone loss by staying active and increasing your calcium intake. Calcium supplements and other osteoporosis treatments do have risks, so talk to your healthcare provider if you have concerns. If you have osteoporosis, you can also learn ways to increase everyday safety.  Date Last Reviewed: 10/17/2015  ©2007 [employer] [address] [place] [address], [place] [address]. All Rights reserved. This information is not intended as a  substitute for professional medical care. Always follow your healthcare providers instructions.      Osteoporosis: Understanding Bone Loss     A balanced system keeps building and resorbing bone.   The body has a natural system for maintaining bone. Understanding this system can help you learn how to maintain your bones.  A balanced system supports the body  The body is always losing (resorbing) and making bone. This process is called remodeling. Bone-resorbing cells take bone apart. They do this so the minerals can be used to repair an injury or make new bone. Bone-making cells form new bone using calcium and other minerals. These minerals come from the food you eat. When this bone-making system is in balance, the same amount of bone is built and resorbed.        An unbalanced system cant give support     An unblalanced system builds too little bone and resorbs too much.   Changes in hormone levels, activity, medications, or diet can affect the bone-making system. When the system gets out of balance, the amount of bone lost is greater than the amount of bone made. This can cause osteopenia (when bone starts to become less dense). Left untreated, bone loss gets worse, leading to osteoporosis. Weak bones cant support the body. In fact, they can fracture just from the weight of your body. This often happens in vertebrae (bones of the spine). When vertebrae fracture, parts of the spine compress. This causes the back to bend or hump over, and it can also cause back pain.  Date Last Reviewed: 10/11/2015  © 0205-7328 [employer]. [address] [place] [address], [place] [address]. All rights reserved. This information is not intended as a substitute for professional medical care. Always follow your healthcare professional's instructions.                Osteoporosis Medications: Bisphosphonates  Depending on your needs, your healthcare provider may prescribe medicines to prevent or treat  osteoporosis.    Bisphosphonates  Several medicines make up the class of drugs known as bisphosphonates. Bisphosphonates are the most common type of medicine used to help prevent and treat bone loss. Bisphosphonates are given in pill or injectable form as an IV infusion. They must be taken exactly as directed. Benefits may include:  · Reducing bone loss  · Increasing bone density in the hip and spine  · Reducing risk of fractures in the spine, hip, and wrist  Side effects may include:  · Heartburn  · Nausea  · Abdominal pain  · Bone or muscle pain  Taking bisphosphonates pills  Always read medicine information closely. Certain bisphosphonates must be taken:  · On an empty stomach.  · With a full glass of water (8 oz) first thing in the morning.  · At least 30 minutes to one hour before any food, drink, or other medications.  · While sitting or standing. You should not lie down for at least 30 minutes after taking the medicine.  Newer medicines can be taken weekly or monthly. Talk with your doctor to find out which one is right for you.   Date Last Reviewed: 10/11/2015  © 9586-0696 [employer]. [address] [place] [address]. All rights reserved. This information is not intended as a substitute for professional medical care. Always follow your healthcare professional's instructions.        Living with Osteoporosis: Preventing Fractures  If you have osteoporosis, you can do a lot to reduce its effect on your life. Knowing how to prevent fractures and spinal curvature can help you live more comfortably and safely with this disease.    Reducing your risk for fractures  The most common fracture sites in people with osteoporosis are the wrist, spine, and hip. These fractures are often caused by accidents and falls. All fractures are painful and may limit what you can do. But hip fractures are very serious. They need surgery, and it can take months to recover. To reduce your risk for  fractures:  · Get regular exercise. Try walking, swimming, or weight training.  · Eat foods that are rich in calcium, or take calcium supplements.  · Make your home safe to help avoid accidents.  · Take extra precautions not to fall in risky areas, such as icy sidewalks.  Understanding spinal fractures  Your spine is made up of many bones called vertebrae. Osteoporosis can cause the vertebrae in your spine to collapse. As a result, your upper back may arch forward, creating a curvature. Spine fractures may also result from back strain and bad posture. You will also lose height. Your lower spine must then adjust to keep your body balanced. This can cause back pain. To prevent or lessen these spinal changes:  · Practice good posture.  · Use proper techniques if you need to lift heavy objects.  · Do back exercises to help your posture.  · Lie on your back when you have pain.  · Ask your healthcare provider about these and other ways to help your spine.  Date Last Reviewed: 10/17/2015  © 4324-5504 [employer]. [address] [place] [address], [place] [address]. All rights reserved. This information is not intended as a substitute for professional medical care. Always follow your healthcare professional's instructions.        Living with Osteoporosis: Regular Exercise  If you have osteoporosis, exercise is vital for your health. It can prevent bone fractures and spine changes. It will slow bone loss. Exercise will strengthen your body. It can also be fun. A variety of exercises is best. See below for exercises that can help you. Before you start, though, talk to your healthcare provider to be sure these exercises are right for you.    Resistance exercises. These build muscle strength and maintain bone mass. They also make you less prone to injury. Exercises include lifting small weights, doing push-ups and sit-ups, using elastic exercise bands, and using weight machines.  Weight-bearing activities. These help your  whole body. They also help you maintain bone mass. Activities include walking, dancing, and housework.  Nonweight-bearing exercises. These help prevent back strain and pain. They do this by building the trunk and leg muscles. Exercises that help with flexibility can prevent falls. Examples include swimming, water exercise, and stretching.  Staying safe  Here are tips to stay safe:   · Always check with your healthcare provider before starting any new exercise program.  · Use weights only as instructed.  · Stop any exercise that causes pain.   Date Last Reviewed: 10/17/2015  © 6653-7055 [employer]. [address] [place] [address]. All rights reserved. This information is not intended as a substitute for professional medical care. Always follow your healthcare professional's instructions.

## 2018-06-08 DIAGNOSIS — I10 ESSENTIAL HYPERTENSION: Chronic | ICD-10-CM

## 2018-06-08 RX ORDER — AMLODIPINE BESYLATE 10 MG/1
10 TABLET ORAL DAILY
Qty: 90 TABLET | Refills: 1
Start: 2018-06-08 | End: 2018-06-12 | Stop reason: SDUPTHER

## 2018-06-08 NOTE — TELEPHONE ENCOUNTER
----- Message from Crhisten Hill sent at 6/8/2018 10:41 AM CDT -----  Contact: pt  Can the clinic reply in [facility]: yes      Please refill the medication(s) listed below. The patient can be reached at this phone number [phone]) once it is called into the pharmacy.      Medication #1amLODIPine (NORVASC) 10 MG tablet       Medication #2      Preferred Pharmacy:[facility]

## 2018-06-11 RX ORDER — AMLODIPINE BESYLATE 5 MG/1
TABLET ORAL
Qty: 90 TABLET | Refills: 0 | Status: SHIPPED | OUTPATIENT
Start: 2018-06-11 | End: 2018-06-12

## 2018-06-12 ENCOUNTER — TELEPHONE (OUTPATIENT)
Dept: INTERNAL MEDICINE | Facility: CLINIC | Age: 68
End: 2018-06-12

## 2018-06-12 DIAGNOSIS — I10 ESSENTIAL HYPERTENSION: Chronic | ICD-10-CM

## 2018-06-12 RX ORDER — AMLODIPINE BESYLATE 10 MG/1
10 TABLET ORAL DAILY
Qty: 90 TABLET | Refills: 1
Start: 2018-06-12 | End: 2018-12-10 | Stop reason: SDUPTHER

## 2018-06-12 NOTE — TELEPHONE ENCOUNTER
Spoke with patient and she stated that pharmacy is wondering where all of her pills have gone because they have no documentation that she was supposed to start taking 2 of the 5mg Amlodipine to where she is taking 10mg daily. She is having trouble getting refill.      ----- Message from Joseline Roa sent at 6/12/2018 10:07 AM CDT -----  Contact: SARAH MAGANA [[id number]]            Name of Who is Calling: ASRAH MAGANA [[id number]]    What is the request in detail: Patient stated that the insurance will not authorize the dose change, because they need a new prescription with the changes. Please call if any questions.       Can the clinic reply by [facility]: No      [facility]: [phone]

## 2018-06-12 NOTE — TELEPHONE ENCOUNTER
On 4/27/2018 I advised patient to double her 5mg pills of Amlodipine to equal 10mg daily until her bottle was completed, then to start the 10mg pill of Amlodipine.

## 2018-06-14 ENCOUNTER — TELEPHONE (OUTPATIENT)
Dept: INTERNAL MEDICINE | Facility: CLINIC | Age: 68
End: 2018-06-14

## 2018-06-28 ENCOUNTER — LAB VISIT (OUTPATIENT)
Dept: LAB | Facility: HOSPITAL | Age: 68
End: 2018-06-28
Attending: OBSTETRICS & GYNECOLOGY
Payer: MEDICARE

## 2018-06-28 DIAGNOSIS — C54.1 ENDOMETRIAL CARCINOMA: ICD-10-CM

## 2018-06-28 LAB — CANCER AG125 SERPL-ACNC: 13 U/ML

## 2018-06-28 PROCEDURE — 36415 COLL VENOUS BLD VENIPUNCTURE: CPT

## 2018-06-28 PROCEDURE — 86304 IMMUNOASSAY TUMOR CA 125: CPT

## 2018-06-29 ENCOUNTER — OFFICE VISIT (OUTPATIENT)
Dept: GYNECOLOGIC ONCOLOGY | Facility: CLINIC | Age: 68
End: 2018-06-29
Payer: MEDICARE

## 2018-06-29 VITALS
DIASTOLIC BLOOD PRESSURE: 67 MMHG | SYSTOLIC BLOOD PRESSURE: 125 MMHG | HEIGHT: 62 IN | WEIGHT: 140.19 LBS | BODY MASS INDEX: 25.8 KG/M2 | HEART RATE: 100 BPM

## 2018-06-29 DIAGNOSIS — Z12.89 ENCOUNTER FOR PELVIC SCREENING FOR CANCER: ICD-10-CM

## 2018-06-29 DIAGNOSIS — C54.1 ENDOMETRIAL CARCINOMA: Primary | ICD-10-CM

## 2018-06-29 DIAGNOSIS — Z01.419 WELL WOMAN EXAM WITH ROUTINE GYNECOLOGICAL EXAM: ICD-10-CM

## 2018-06-29 DIAGNOSIS — Z12.31 SCREENING MAMMOGRAM, ENCOUNTER FOR: ICD-10-CM

## 2018-06-29 PROCEDURE — 99213 OFFICE O/P EST LOW 20 MIN: CPT | Mod: PBBFAC | Performed by: OBSTETRICS & GYNECOLOGY

## 2018-06-29 PROCEDURE — 99999 PR PBB SHADOW E&M-EST. PATIENT-LVL III: CPT | Mod: PBBFAC,,, | Performed by: OBSTETRICS & GYNECOLOGY

## 2018-06-29 PROCEDURE — G0101 CA SCREEN;PELVIC/BREAST EXAM: HCPCS | Mod: S$PBB,,, | Performed by: OBSTETRICS & GYNECOLOGY

## 2018-06-29 PROCEDURE — G0101 CA SCREEN;PELVIC/BREAST EXAM: HCPCS | Mod: PBBFAC | Performed by: OBSTETRICS & GYNECOLOGY

## 2018-06-29 NOTE — PROGRESS NOTES
Subjective:       Patient ID: Jessica Gorman is a 67 y.o. female.    Chief Complaint: Endometrial Cancer; Follow-up (6 mth); and Well Woman    HPI     Patient comes in today for her WWE and follow up for UPSC of the uterus.     is 13.       Denies vaginal bleeding.      Mammogram: Oct  2, 2017: normal   Colonscopy: 2004: normal   Sigmoidoscopy: 2015: normal.       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    CT scan of the abdomen and pelvis after surgery showed: 1.Postsurgical changes seen in the pelvis from hysterectomy and bilateral salpingo-oophorectomy    2.A fluid collection is seen posterior to the cervix and likely represents a seroma. However, abscess cannot be ruled out.    3.Enlarged left inguinal lymph node identified    Ultrasound of this inguinal node was done and showed:    Limited sonographic imaging was performed in an area of a palpable abnormality within the patient's left inguinal region. Within this region, a lymph node is seen that measures 2.4 x 0.9 x 2.8 cm. While enlarged, this lymph node does   demonstrate a fatty hilum and is otherwise normal in morphology.    The patient reports that this palpable abnormality has been getting smaller over time. I discussed with the patient the need to biopsy this lesion to fully exclude any recurrent malignancy however at this time the patient deferred a biopsy attempt.    If this lesion does not resolve, a ultrasound guided biopsy could be rescheduled.        Patient completed vaginal cuff radiation.    She completed 6 cycles of taxol and carboplatin in May 2015.    June 2015: CT scan at  "completion of treatment shows no evidence for disease. No adenopathy.  was 7.    Strong family history of breast cancer on mother's side. Patient's daughter has been tested for BRCA and is negative.                 Review of Systems   Constitutional: Negative for chills, fatigue and fever.   Respiratory: Negative for cough, shortness of breath and wheezing.    Cardiovascular: Negative for chest pain, palpitations and leg swelling.   Gastrointestinal: Negative for abdominal pain, constipation, diarrhea, nausea and vomiting.   Genitourinary: Negative for difficulty urinating, dysuria, frequency, genital sores, hematuria, urgency, vaginal bleeding, vaginal discharge and vaginal pain.   Neurological: Negative for weakness.   Hematological: Negative for adenopathy. Does not bruise/bleed easily.   Psychiatric/Behavioral: The patient is not nervous/anxious.        Objective:   /67   Pulse 100   Ht 5' 2" (1.575 m)   Wt 63.6 kg (140 lb 3.4 oz)   BMI 25.65 kg/m²      Physical Exam   Constitutional: She is oriented to person, place, and time. She appears well-developed and well-nourished.   HENT:   Head: Normocephalic and atraumatic.   Eyes: No scleral icterus.   Neck: No tracheal deviation present. No thyroid mass and no thyromegaly present.   Cardiovascular: Normal rate and regular rhythm.    Pulmonary/Chest: Effort normal and breath sounds normal. She has no wheezes. Right breast exhibits no mass, no nipple discharge, no skin change and no tenderness. Left breast exhibits no mass, no nipple discharge, no skin change and no tenderness.   Abdominal: She exhibits no distension and no mass. There is no hepatosplenomegaly. There is no tenderness. There is no rebound and no guarding.   Genitourinary:   Genitourinary Comments: Bimanual exam:  Vulva: no lesions. Normal appearance  Urethra: Normal size and location. No lesions  Bladder: No masses or tenderness.  Vagina: normal mucosa. No lesion  Cervix: absent. "   Uterus: absent.  Adnexa: no masses.  Rectovaginal: No posterior cul de sac thickening or nodularity.  Rectal: no masses. Nontender. Normal tone.      Musculoskeletal: She exhibits no edema or tenderness.   Lymphadenopathy:     She has no cervical adenopathy.     She has no axillary adenopathy.        Right: No inguinal and no supraclavicular adenopathy present.        Left: No inguinal and no supraclavicular adenopathy present.   Neurological: She is alert and oriented to person, place, and time.   Skin: Skin is warm and dry. No rash noted.   Psychiatric: She has a normal mood and affect. Her behavior is normal. Judgment and thought content normal.       Assessment:       1. Endometrial carcinoma, serous    2. Well woman exam with routine gynecological exam    3. Encounter for pelvic screening for cancer    4. Screening mammogram, encounter for        Plan:   Endometrial carcinoma, serous   YESENIA   RTC 6 months  -     ; Future; Expected date: 06/29/2018    Well woman exam with routine gynecological exam  Counseling time of 10 minutes discussing calcium, vitamin D and exercise. Questions answered.     Encounter for pelvic screening for cancer    Screening mammogram, encounter for  -     Mammo Digital Screening Bilat with CAD; Future; Expected date: 10/03/2018

## 2018-07-30 ENCOUNTER — TELEPHONE (OUTPATIENT)
Dept: PRIMARY CARE CLINIC | Facility: CLINIC | Age: 68
End: 2018-07-30

## 2018-08-27 ENCOUNTER — TELEPHONE (OUTPATIENT)
Dept: GYNECOLOGIC ONCOLOGY | Facility: CLINIC | Age: 68
End: 2018-08-27

## 2018-09-04 ENCOUNTER — TELEPHONE (OUTPATIENT)
Dept: GYNECOLOGIC ONCOLOGY | Facility: CLINIC | Age: 68
End: 2018-09-04

## 2018-09-05 ENCOUNTER — TELEPHONE (OUTPATIENT)
Dept: GYNECOLOGIC ONCOLOGY | Facility: CLINIC | Age: 68
End: 2018-09-05

## 2018-09-05 ENCOUNTER — LAB VISIT (OUTPATIENT)
Dept: LAB | Facility: HOSPITAL | Age: 68
End: 2018-09-05
Attending: OBSTETRICS & GYNECOLOGY
Payer: MEDICARE

## 2018-09-05 ENCOUNTER — OFFICE VISIT (OUTPATIENT)
Dept: GYNECOLOGIC ONCOLOGY | Facility: CLINIC | Age: 68
End: 2018-09-05
Payer: MEDICARE

## 2018-09-05 VITALS
DIASTOLIC BLOOD PRESSURE: 81 MMHG | SYSTOLIC BLOOD PRESSURE: 155 MMHG | BODY MASS INDEX: 26.16 KG/M2 | WEIGHT: 143 LBS | HEART RATE: 110 BPM

## 2018-09-05 DIAGNOSIS — C54.1 ENDOMETRIAL CARCINOMA: Primary | ICD-10-CM

## 2018-09-05 DIAGNOSIS — R59.0 LOCALIZED ENLARGED LYMPH NODES: ICD-10-CM

## 2018-09-05 DIAGNOSIS — R97.1 ELEVATED CANCER ANTIGEN 125 (CA-125): ICD-10-CM

## 2018-09-05 DIAGNOSIS — C54.1 ENDOMETRIAL CARCINOMA: ICD-10-CM

## 2018-09-05 LAB — CANCER AG125 SERPL-ACNC: 47 U/ML

## 2018-09-05 PROCEDURE — 99999 PR PBB SHADOW E&M-EST. PATIENT-LVL III: CPT | Mod: PBBFAC,,, | Performed by: OBSTETRICS & GYNECOLOGY

## 2018-09-05 PROCEDURE — 86304 IMMUNOASSAY TUMOR CA 125: CPT

## 2018-09-05 PROCEDURE — 99214 OFFICE O/P EST MOD 30 MIN: CPT | Mod: S$PBB,,, | Performed by: OBSTETRICS & GYNECOLOGY

## 2018-09-05 PROCEDURE — 99213 OFFICE O/P EST LOW 20 MIN: CPT | Mod: PBBFAC | Performed by: OBSTETRICS & GYNECOLOGY

## 2018-09-05 PROCEDURE — 36415 COLL VENOUS BLD VENIPUNCTURE: CPT

## 2018-09-05 NOTE — TELEPHONE ENCOUNTER
----- Message from Jair Watt MD sent at 9/5/2018 11:48 AM CDT -----  Please schedule for CT scan of CAP. Order placed.

## 2018-09-05 NOTE — PROGRESS NOTES
Subjective:       Patient ID: Jessica Gorman is a 68 y.o. female.    Chief Complaint: Endometrial Cancer (pt c/o swelling in groin)    HPI     Patient comes in today for intermittent/problem visit. UPSC of the uterus. For the last few weeks has noticed swollen area in L groin that comes and goes, not painful, noted when standing or moving around. Doesn't feel it today.  Last  in June was 13.       Denies vaginal bleeding.      Mammogram: Oct  2, 2017: normal CBE: June 2018.   Colonscopy: 2004: normal   Sigmoidoscopy: 2015: normal.       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    CT scan of the abdomen and pelvis after surgery showed: 1.Postsurgical changes seen in the pelvis from hysterectomy and bilateral salpingo-oophorectomy    2.A fluid collection is seen posterior to the cervix and likely represents a seroma. However, abscess cannot be ruled out.    3.Enlarged left inguinal lymph node identified    Ultrasound of this inguinal node was done and showed:    Limited sonographic imaging was performed in an area of a palpable abnormality within the patient's left inguinal region. Within this region, a lymph node is seen that measures 2.4 x 0.9 x 2.8 cm. While enlarged, this lymph node does   demonstrate a fatty hilum and is otherwise normal in morphology.    The patient reports that this palpable abnormality has been getting smaller over time. I discussed with the patient the need to biopsy this lesion to fully exclude any recurrent malignancy however at this time the patient deferred a biopsy attempt.    If this lesion does not resolve, a  ultrasound guided biopsy could be rescheduled.        Patient completed vaginal cuff radiation.    She completed 6 cycles of taxol and carboplatin in May 2015.    June 2015: CT scan at completion of treatment shows no evidence for disease. No adenopathy.  was 7.    Strong family history of breast cancer on mother's side. Patient's daughter has been tested for BRCA and is negative.              Review of Systems   Constitutional: Negative for appetite change, chills, diaphoresis, fatigue, fever and unexpected weight change.   HENT: Negative for mouth sores and tinnitus.    Respiratory: Negative for cough, chest tightness, shortness of breath and wheezing.    Cardiovascular: Negative for chest pain, palpitations and leg swelling.   Gastrointestinal: Negative for abdominal distention, abdominal pain, blood in stool, constipation, diarrhea, nausea and vomiting.   Genitourinary: Negative for difficulty urinating, dysuria, flank pain, frequency, hematuria, pelvic pain, vaginal bleeding, vaginal discharge and vaginal pain.   Musculoskeletal: Negative for arthralgias and back pain.   Skin: Negative for color change and rash.   Neurological: Negative for dizziness, weakness, numbness and headaches.   Hematological: Positive for adenopathy (? L groin).   Psychiatric/Behavioral: Negative for confusion and sleep disturbance. The patient is not nervous/anxious.        Objective:   BP (!) 155/81   Pulse 110   Wt 64.9 kg (143 lb)   BMI 26.16 kg/m²      Physical Exam   Constitutional: She is oriented to person, place, and time. She appears well-developed and well-nourished. No distress.   Neck: Normal range of motion.   Pulmonary/Chest: Effort normal. No respiratory distress.   Abdominal: Soft. She exhibits no distension. There is no tenderness. There is no guarding.   Musculoskeletal: Normal range of motion. She exhibits no edema.   Lymphadenopathy:        Right: No inguinal adenopathy present.        Left: No inguinal  adenopathy present.   Neurological: She is alert and oriented to person, place, and time.   Skin: Skin is warm and dry. No rash noted. She is not diaphoretic.   Psychiatric: She has a normal mood and affect. Her behavior is normal.   Nursing note and vitals reviewed.      Assessment:       1. Endometrial carcinoma, serous    2. Localized enlarged lymph nodes         Plan:   Endometrial carcinoma, serous  -     ; Future; Expected date: 09/05/2018  -     US Extremity Non Vascular Complete Left; Future; Expected date: 09/05/2018    Localized enlarged lymph nodes   -     US Extremity Non Vascular Complete Left; Future; Expected date: 09/05/2018    - suspect possible inguinal hernia due to what patient describes as intermittent swelling  - obtain  and L groin US as above  - f/u with results  - RTC as scheduled 12/2018 or sooner for worsening/persistent swelling/adenopathy/pain at site

## 2018-09-10 ENCOUNTER — HOSPITAL ENCOUNTER (OUTPATIENT)
Dept: RADIOLOGY | Facility: HOSPITAL | Age: 68
Discharge: HOME | End: 2018-09-10
Attending: OBSTETRICS & GYNECOLOGY
Payer: MEDICARE

## 2018-09-10 DIAGNOSIS — C54.1 ENDOMETRIAL CARCINOMA: ICD-10-CM

## 2018-09-10 DIAGNOSIS — R97.1 ELEVATED CANCER ANTIGEN 125 (CA-125): ICD-10-CM

## 2018-09-10 DIAGNOSIS — R59.0 LOCALIZED ENLARGED LYMPH NODES: ICD-10-CM

## 2018-09-10 PROCEDURE — 71260 CT THORAX DX C+: CPT | Mod: 26,,, | Performed by: INTERNAL MEDICINE

## 2018-09-10 PROCEDURE — 74177 CT ABD & PELVIS W/CONTRAST: CPT | Mod: TC

## 2018-09-10 PROCEDURE — 74177 CT ABD & PELVIS W/CONTRAST: CPT | Mod: 26,,, | Performed by: INTERNAL MEDICINE

## 2018-09-10 PROCEDURE — 76882 US LMTD JT/FCL EVL NVASC XTR: CPT | Mod: TC

## 2018-09-10 PROCEDURE — 76882 US LMTD JT/FCL EVL NVASC XTR: CPT | Mod: 26,,, | Performed by: RADIOLOGY

## 2018-09-10 PROCEDURE — 25500020 PHARM REV CODE 255: Performed by: OBSTETRICS & GYNECOLOGY

## 2018-09-10 PROCEDURE — 71260 CT THORAX DX C+: CPT | Mod: TC

## 2018-09-10 RX ADMIN — IOHEXOL 15 ML: 350 INJECTION, SOLUTION INTRAVENOUS at 04:09

## 2018-09-10 RX ADMIN — IOHEXOL 75 ML: 350 INJECTION, SOLUTION INTRAVENOUS at 04:09

## 2018-09-12 ENCOUNTER — TELEPHONE (OUTPATIENT)
Dept: GYNECOLOGIC ONCOLOGY | Facility: CLINIC | Age: 68
End: 2018-09-12

## 2018-09-12 DIAGNOSIS — C54.1 ENDOMETRIAL CARCINOMA: Primary | ICD-10-CM

## 2018-09-12 NOTE — TELEPHONE ENCOUNTER
Returned call to patients daughter and reviewed results of  and CT scan, need to resume chemotherapy and why. Questions answered but will review with Dr. Watt to follow up with family member when he returns.    ----- Message from Ella Macdonald MA sent at 9/12/2018  3:52 PM CDT -----  Contact: [phone]  appt set for 9/26 @9:45 am.  Thanks Ella   ----- Message -----  From: Kali Jeffrey  Sent: 9/12/2018   9:14 AM  To: Shukri LUEVANO Staff    Please call Alanna Gorman daughter she would like more information on the diagnoses that Dr. Watt gave her mother on yesterday. Thanks

## 2018-09-13 ENCOUNTER — TELEPHONE (OUTPATIENT)
Dept: GYNECOLOGIC ONCOLOGY | Facility: CLINIC | Age: 68
End: 2018-09-13

## 2018-09-13 DIAGNOSIS — C55 MALIGNANT NEOPLASM OF UTERUS, UNSPECIFIED SITE: ICD-10-CM

## 2018-09-13 DIAGNOSIS — Z85.42 HISTORY OF UTERINE CANCER: Primary | ICD-10-CM

## 2018-09-14 ENCOUNTER — TELEPHONE (OUTPATIENT)
Dept: GYNECOLOGIC ONCOLOGY | Facility: CLINIC | Age: 68
End: 2018-09-14

## 2018-09-14 NOTE — TELEPHONE ENCOUNTER
Called patient informing her that her chemo was approved I offered to scheduled patient to start chemotherapy on 9/26 after visit with Dr. Watt and signing consents. Patient states she would like to wait to start chemotherapy until she comes in to speak with Dr. Watt. Patient is scheduled on 9/24 for visit with Dr. Watt. Chemo treatment also mailed to patient. Patient voiced understanding. Ella

## 2018-09-25 ENCOUNTER — TELEPHONE (OUTPATIENT)
Dept: GYNECOLOGIC ONCOLOGY | Facility: CLINIC | Age: 68
End: 2018-09-25

## 2018-09-26 ENCOUNTER — OFFICE VISIT (OUTPATIENT)
Dept: GYNECOLOGIC ONCOLOGY | Facility: CLINIC | Age: 68
End: 2018-09-26
Payer: MEDICARE

## 2018-09-26 VITALS
DIASTOLIC BLOOD PRESSURE: 73 MMHG | HEIGHT: 62 IN | WEIGHT: 143.75 LBS | SYSTOLIC BLOOD PRESSURE: 135 MMHG | HEART RATE: 104 BPM | BODY MASS INDEX: 26.45 KG/M2

## 2018-09-26 DIAGNOSIS — C54.1 ENDOMETRIAL CARCINOMA: Primary | ICD-10-CM

## 2018-09-26 PROCEDURE — 99214 OFFICE O/P EST MOD 30 MIN: CPT | Mod: S$PBB,,, | Performed by: OBSTETRICS & GYNECOLOGY

## 2018-09-26 PROCEDURE — 99213 OFFICE O/P EST LOW 20 MIN: CPT | Mod: PBBFAC | Performed by: OBSTETRICS & GYNECOLOGY

## 2018-09-26 PROCEDURE — 99999 PR PBB SHADOW E&M-EST. PATIENT-LVL III: CPT | Mod: PBBFAC,,, | Performed by: OBSTETRICS & GYNECOLOGY

## 2018-09-26 NOTE — PROGRESS NOTES
Subjective:       Patient ID: Jessica Gorman is a 68 y.o. female.    Chief Complaint: Endometrial Cancer (Sign chemo consents/C1 Carbo/Taxol) and Chemotherapy    HPI   Patient comes in today to sign consent to start taxol and carboplatin for recurrent UPS of the uterus.   CT scan findings discussed with patient and daughter.       Mammogram: Oct  2, 2017: normal CBE: June 2018.   Colonscopy: 2004: normal   Sigmoidoscopy: 2015: normal.       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    CT scan of the abdomen and pelvis after surgery showed: 1.Postsurgical changes seen in the pelvis from hysterectomy and bilateral salpingo-oophorectomy    2.A fluid collection is seen posterior to the cervix and likely represents a seroma. However, abscess cannot be ruled out.    3.Enlarged left inguinal lymph node identified    Ultrasound of this inguinal node was done and showed:    Limited sonographic imaging was performed in an area of a palpable abnormality within the patient's left inguinal region. Within this region, a lymph node is seen that measures 2.4 x 0.9 x 2.8 cm. While enlarged, this lymph node does   demonstrate a fatty hilum and is otherwise normal in morphology.    The patient reports that this palpable abnormality has been getting smaller over time. I discussed with the patient the need to biopsy this lesion to fully exclude any recurrent malignancy however at this time the patient deferred a biopsy attempt.    If this lesion does not resolve, a ultrasound guided biopsy could be rescheduled.        Patient completed vaginal cuff radiation.    She completed  "6 cycles of taxol and carboplatin in May 2015.    June 2015: CT scan at completion of treatment shows no evidence for disease. No adenopathy.  was 7.      Sept 2018:  was 47. CT scan of CAP showed numerous bilateral pulmonary nodules and enlarged peripancreatic and gonzalo hepatis lymph nodes with soft tissue infiltration of the mesentery at the region.    3.4 cm left renal mass along the lower pole of the left kidney measures greater than water density.  Differential would include solid renal mass including primary renal neoplasm or metastatic lesion versus is a complex cyst.        Strong family history of breast cancer on mother's side. Patient's daughter has been tested for BRCA and is negative.              Review of Systems   Constitutional: Negative for chills, fatigue and fever.   Respiratory: Negative for cough, shortness of breath and wheezing.    Cardiovascular: Negative for chest pain, palpitations and leg swelling.   Gastrointestinal: Negative for abdominal pain, constipation, diarrhea, nausea and vomiting.   Genitourinary: Negative for difficulty urinating, dysuria, frequency, genital sores, hematuria, urgency, vaginal bleeding, vaginal discharge and vaginal pain.   Musculoskeletal: Negative for gait problem.   Neurological: Negative for weakness and numbness.   Hematological: Negative for adenopathy. Does not bruise/bleed easily.   Psychiatric/Behavioral: The patient is not nervous/anxious.        Objective:   /73   Pulse 104   Ht 5' 2" (1.575 m)   Wt 65.2 kg (143 lb 11.8 oz)   BMI 26.29 kg/m²      Physical Exam   Constitutional: She is oriented to person, place, and time. She appears well-developed and well-nourished.   HENT:   Head: Normocephalic and atraumatic.   Eyes: No scleral icterus.   Neck: No tracheal deviation present. No thyromegaly present.   Cardiovascular: Normal rate and regular rhythm.   Pulmonary/Chest: Effort normal and breath sounds normal.   Abdominal: Soft. " She exhibits no distension and no mass. There is no tenderness. There is no rebound and no guarding. No hernia.   Genitourinary:   Genitourinary Comments: Not performed.    Musculoskeletal: She exhibits no edema or tenderness.   Lymphadenopathy:     She has no cervical adenopathy.   Neurological: She is alert and oriented to person, place, and time.   Skin: Skin is warm and dry.   Psychiatric: She has a normal mood and affect. Her behavior is normal. Judgment and thought content normal.       Assessment:       1. Endometrial carcinoma, serous        Plan:   Endometrial carcinoma, serous  Proceed with cycle 1 of taxol and carboplatin.RTC in 21 days.

## 2018-09-27 ENCOUNTER — PATIENT MESSAGE (OUTPATIENT)
Dept: GYNECOLOGIC ONCOLOGY | Facility: CLINIC | Age: 68
End: 2018-09-27

## 2018-10-01 ENCOUNTER — LAB VISIT (OUTPATIENT)
Dept: LAB | Facility: HOSPITAL | Age: 68
End: 2018-10-01
Attending: OBSTETRICS & GYNECOLOGY
Payer: MEDICARE

## 2018-10-01 ENCOUNTER — TELEPHONE (OUTPATIENT)
Dept: GYNECOLOGIC ONCOLOGY | Facility: CLINIC | Age: 68
End: 2018-10-01

## 2018-10-01 DIAGNOSIS — C54.1 ENDOMETRIAL CARCINOMA: Primary | ICD-10-CM

## 2018-10-01 DIAGNOSIS — Z85.42 HISTORY OF UTERINE CANCER: ICD-10-CM

## 2018-10-01 LAB
ANION GAP SERPL CALC-SCNC: 9 MMOL/L
BUN SERPL-MCNC: 13 MG/DL
CALCIUM SERPL-MCNC: 10.1 MG/DL
CHLORIDE SERPL-SCNC: 108 MMOL/L
CO2 SERPL-SCNC: 25 MMOL/L
CREAT SERPL-MCNC: 0.8 MG/DL
ERYTHROCYTE [DISTWIDTH] IN BLOOD BY AUTOMATED COUNT: 14.6 %
EST. GFR  (AFRICAN AMERICAN): >60 ML/MIN/1.73 M^2
EST. GFR  (NON AFRICAN AMERICAN): >60 ML/MIN/1.73 M^2
GLUCOSE SERPL-MCNC: 91 MG/DL
HCT VFR BLD AUTO: 35.6 %
HGB BLD-MCNC: 11.5 G/DL
IMM GRANULOCYTES # BLD AUTO: 0.01 K/UL
MCH RBC QN AUTO: 28 PG
MCHC RBC AUTO-ENTMCNC: 32.3 G/DL
MCV RBC AUTO: 87 FL
NEUTROPHILS # BLD AUTO: 3.5 K/UL
PLATELET # BLD AUTO: 294 K/UL
PMV BLD AUTO: 10.7 FL
POTASSIUM SERPL-SCNC: 4 MMOL/L
RBC # BLD AUTO: 4.11 M/UL
SODIUM SERPL-SCNC: 142 MMOL/L
WBC # BLD AUTO: 5.45 K/UL

## 2018-10-01 PROCEDURE — 85027 COMPLETE CBC AUTOMATED: CPT

## 2018-10-01 PROCEDURE — 36415 COLL VENOUS BLD VENIPUNCTURE: CPT

## 2018-10-01 PROCEDURE — 80048 BASIC METABOLIC PNL TOTAL CA: CPT

## 2018-10-01 RX ORDER — HEPARIN 100 UNIT/ML
500 SYRINGE INTRAVENOUS
Status: CANCELLED | OUTPATIENT
Start: 2018-10-02

## 2018-10-01 RX ORDER — SODIUM CHLORIDE 0.9 % (FLUSH) 0.9 %
10 SYRINGE (ML) INJECTION
Status: CANCELLED | OUTPATIENT
Start: 2018-10-02

## 2018-10-01 RX ORDER — EPINEPHRINE 0.3 MG/.3ML
0.3 INJECTION SUBCUTANEOUS ONCE AS NEEDED
Status: CANCELLED | OUTPATIENT
Start: 2018-10-02 | End: 2018-10-02

## 2018-10-01 RX ORDER — DIPHENHYDRAMINE HYDROCHLORIDE 50 MG/ML
50 INJECTION INTRAMUSCULAR; INTRAVENOUS ONCE AS NEEDED
Status: CANCELLED | OUTPATIENT
Start: 2018-10-02 | End: 2018-10-02

## 2018-10-01 RX ORDER — FAMOTIDINE 10 MG/ML
20 INJECTION INTRAVENOUS
Status: CANCELLED | OUTPATIENT
Start: 2018-10-02

## 2018-10-02 ENCOUNTER — INFUSION (OUTPATIENT)
Dept: INFUSION THERAPY | Facility: HOSPITAL | Age: 68
End: 2018-10-02
Attending: OBSTETRICS & GYNECOLOGY
Payer: MEDICARE

## 2018-10-02 VITALS
WEIGHT: 143.75 LBS | HEIGHT: 62 IN | DIASTOLIC BLOOD PRESSURE: 64 MMHG | RESPIRATION RATE: 18 BRPM | SYSTOLIC BLOOD PRESSURE: 110 MMHG | HEART RATE: 92 BPM | BODY MASS INDEX: 26.45 KG/M2

## 2018-10-02 DIAGNOSIS — C54.1 ENDOMETRIAL CARCINOMA: Primary | ICD-10-CM

## 2018-10-02 PROCEDURE — 25000003 PHARM REV CODE 250: Performed by: OBSTETRICS & GYNECOLOGY

## 2018-10-02 PROCEDURE — S0028 INJECTION, FAMOTIDINE, 20 MG: HCPCS | Performed by: OBSTETRICS & GYNECOLOGY

## 2018-10-02 PROCEDURE — 96361 HYDRATE IV INFUSION ADD-ON: CPT

## 2018-10-02 PROCEDURE — 96413 CHEMO IV INFUSION 1 HR: CPT

## 2018-10-02 PROCEDURE — 96415 CHEMO IV INFUSION ADDL HR: CPT

## 2018-10-02 PROCEDURE — 96367 TX/PROPH/DG ADDL SEQ IV INF: CPT

## 2018-10-02 PROCEDURE — 63600175 PHARM REV CODE 636 W HCPCS: Performed by: OBSTETRICS & GYNECOLOGY

## 2018-10-02 PROCEDURE — 96375 TX/PRO/DX INJ NEW DRUG ADDON: CPT

## 2018-10-02 PROCEDURE — A4216 STERILE WATER/SALINE, 10 ML: HCPCS | Performed by: OBSTETRICS & GYNECOLOGY

## 2018-10-02 PROCEDURE — 96417 CHEMO IV INFUS EACH ADDL SEQ: CPT

## 2018-10-02 RX ORDER — HEPARIN 100 UNIT/ML
500 SYRINGE INTRAVENOUS
Status: DISCONTINUED | OUTPATIENT
Start: 2018-10-02 | End: 2018-10-02 | Stop reason: HOSPADM

## 2018-10-02 RX ORDER — SODIUM CHLORIDE 0.9 % (FLUSH) 0.9 %
10 SYRINGE (ML) INJECTION
Status: DISCONTINUED | OUTPATIENT
Start: 2018-10-02 | End: 2018-10-02 | Stop reason: HOSPADM

## 2018-10-02 RX ORDER — DIPHENHYDRAMINE HCL 50 MG
50 CAPSULE ORAL ONCE
Status: COMPLETED | OUTPATIENT
Start: 2018-10-02 | End: 2018-10-02

## 2018-10-02 RX ORDER — DIPHENHYDRAMINE HYDROCHLORIDE 50 MG/ML
50 INJECTION INTRAMUSCULAR; INTRAVENOUS ONCE AS NEEDED
Status: DISCONTINUED | OUTPATIENT
Start: 2018-10-02 | End: 2018-10-02 | Stop reason: HOSPADM

## 2018-10-02 RX ORDER — EPINEPHRINE 0.3 MG/.3ML
0.3 INJECTION SUBCUTANEOUS ONCE AS NEEDED
Status: DISCONTINUED | OUTPATIENT
Start: 2018-10-02 | End: 2018-10-02 | Stop reason: HOSPADM

## 2018-10-02 RX ORDER — FAMOTIDINE 10 MG/ML
20 INJECTION INTRAVENOUS
Status: COMPLETED | OUTPATIENT
Start: 2018-10-02 | End: 2018-10-02

## 2018-10-02 RX ADMIN — Medication 10 ML: at 04:10

## 2018-10-02 RX ADMIN — SODIUM CHLORIDE: 0.9 INJECTION, SOLUTION INTRAVENOUS at 03:10

## 2018-10-02 RX ADMIN — PACLITAXEL 228 MG: 6 INJECTION, SOLUTION INTRAVENOUS at 12:10

## 2018-10-02 RX ADMIN — DIPHENHYDRAMINE HYDROCHLORIDE 50 MG: 50 CAPSULE ORAL at 11:10

## 2018-10-02 RX ADMIN — CARBOPLATIN 470 MG: 10 INJECTION, SOLUTION INTRAVENOUS at 03:10

## 2018-10-02 RX ADMIN — DEXAMETHASONE SODIUM PHOSPHATE: 4 INJECTION, SOLUTION INTRA-ARTICULAR; INTRALESIONAL; INTRAMUSCULAR; INTRAVENOUS; SOFT TISSUE at 11:10

## 2018-10-02 RX ADMIN — FAMOTIDINE 20 MG: 10 INJECTION, SOLUTION INTRAVENOUS at 11:10

## 2018-10-02 NOTE — PLAN OF CARE
Problem: Patient Care Overview (Adult)  Goal: Plan of Care Review  Outcome: Ongoing (interventions implemented as appropriate)  Pt tolerated D1C1 Taxol/carbo/500cc NS without complications. VSS. No s/s of reaction. Instructed to contact MD with any questions. PIV removed and AVS given to patient.

## 2018-10-07 ENCOUNTER — HOSPITAL ENCOUNTER (EMERGENCY)
Facility: HOSPITAL | Age: 68
Discharge: HOME | End: 2018-10-07
Attending: EMERGENCY MEDICINE
Payer: MEDICARE

## 2018-10-07 VITALS
SYSTOLIC BLOOD PRESSURE: 133 MMHG | DIASTOLIC BLOOD PRESSURE: 71 MMHG | HEIGHT: 62 IN | TEMPERATURE: 98 F | HEART RATE: 112 BPM | BODY MASS INDEX: 25.76 KG/M2 | OXYGEN SATURATION: 95 % | WEIGHT: 140 LBS | RESPIRATION RATE: 18 BRPM

## 2018-10-07 DIAGNOSIS — K62.5 RECTAL BLEEDING: Primary | ICD-10-CM

## 2018-10-07 DIAGNOSIS — K64.9 HEMORRHOIDS, UNSPECIFIED HEMORRHOID TYPE: ICD-10-CM

## 2018-10-07 LAB
ABO + RH BLD: NORMAL
ANION GAP SERPL CALC-SCNC: 12 MMOL/L
BASOPHILS # BLD AUTO: 0.02 K/UL
BASOPHILS NFR BLD: 0.4 %
BLD GP AB SCN CELLS X3 SERPL QL: NORMAL
BUN SERPL-MCNC: 14 MG/DL
CALCIUM SERPL-MCNC: 10.2 MG/DL
CHLORIDE SERPL-SCNC: 104 MMOL/L
CO2 SERPL-SCNC: 22 MMOL/L
CREAT SERPL-MCNC: 0.8 MG/DL
DIFFERENTIAL METHOD: ABNORMAL
EOSINOPHIL # BLD AUTO: 0.1 K/UL
EOSINOPHIL NFR BLD: 1.8 %
ERYTHROCYTE [DISTWIDTH] IN BLOOD BY AUTOMATED COUNT: 14.2 %
EST. GFR  (AFRICAN AMERICAN): >60 ML/MIN/1.73 M^2
EST. GFR  (NON AFRICAN AMERICAN): >60 ML/MIN/1.73 M^2
GLUCOSE SERPL-MCNC: 93 MG/DL
HCT VFR BLD AUTO: 33.9 %
HGB BLD-MCNC: 11.1 G/DL
IMM GRANULOCYTES # BLD AUTO: 0.02 K/UL
IMM GRANULOCYTES NFR BLD AUTO: 0.4 %
LYMPHOCYTES # BLD AUTO: 1.3 K/UL
LYMPHOCYTES NFR BLD: 23.8 %
MCH RBC QN AUTO: 28.3 PG
MCHC RBC AUTO-ENTMCNC: 32.7 G/DL
MCV RBC AUTO: 87 FL
MONOCYTES # BLD AUTO: 0.2 K/UL
MONOCYTES NFR BLD: 4.4 %
NEUTROPHILS # BLD AUTO: 3.7 K/UL
NEUTROPHILS NFR BLD: 69.2 %
NRBC BLD-RTO: 0 /100 WBC
PLATELET # BLD AUTO: 230 K/UL
PMV BLD AUTO: 10.6 FL
POTASSIUM SERPL-SCNC: 3.8 MMOL/L
RBC # BLD AUTO: 3.92 M/UL
SODIUM SERPL-SCNC: 138 MMOL/L
WBC # BLD AUTO: 5.41 K/UL

## 2018-10-07 PROCEDURE — 99283 EMERGENCY DEPT VISIT LOW MDM: CPT | Mod: ,,, | Performed by: EMERGENCY MEDICINE

## 2018-10-07 PROCEDURE — 25000003 PHARM REV CODE 250: Performed by: EMERGENCY MEDICINE

## 2018-10-07 PROCEDURE — 99284 EMERGENCY DEPT VISIT MOD MDM: CPT | Mod: 25

## 2018-10-07 PROCEDURE — 86850 RBC ANTIBODY SCREEN: CPT

## 2018-10-07 PROCEDURE — 80048 BASIC METABOLIC PNL TOTAL CA: CPT

## 2018-10-07 PROCEDURE — 96360 HYDRATION IV INFUSION INIT: CPT

## 2018-10-07 PROCEDURE — 85025 COMPLETE CBC W/AUTO DIFF WBC: CPT

## 2018-10-07 PROCEDURE — 96361 HYDRATE IV INFUSION ADD-ON: CPT

## 2018-10-07 RX ADMIN — SODIUM CHLORIDE 1000 ML: 0.9 INJECTION, SOLUTION INTRAVENOUS at 02:10

## 2018-10-07 NOTE — ED NOTES
Patient resting in stretcher and is in NAD at this time. Pt is awake alert and oriented x4, VSS, respirations even and unlabored. Pt denies pain at this time. Pt updated on POC. Bed low and locked with side rails up x2, call bell in pt reach. Pt voices no needs at this time.

## 2018-10-07 NOTE — ED NOTES
Patient identifiers verified and correct for Jessica Gorman.   LOC: The patient is awake, alert and aware of environment with an appropriate affect, the patient is oriented x 3 and speaking appropriately.   APPEARANCE: Patient appears comfortable and in no acute distress, patient is clean and well groomed.  SKIN: The skin is warm and dry, color consistent with ethnicity, patient has normal skin turgor and moist mucus membranes, skin intact, no breakdown or bruising noted.   MUSCULOSKELETAL: Patient moving all extremities spontaneously, no swelling noted.   RESPIRATORY: Airway is open and patent, respirations are spontaneous, patient has a normal effort and rate, no accessory muscle use noted, pt placed on continuous pulse ox with O2 sats noted at 98% on room air.  CARDIAC: Pt placed on cardiac monitor. Patient has a normal rate and regular rhythm, no edema noted, capillary refill < 3 seconds.   GASTRO: Soft and non tender to palpation, no distention noted. Pt states bowel movements have been regular. Noticed bright red blood after BM today. Reports nausea   : Pt denies any pain or frequency with urination.  NEURO: Pt opens eyes spontaneously, behavior appropriate to situation, follows commands, facial expression symmetrical, bilateral hand grasp equal and even, purposeful motor response noted, normal sensation in all extremities when touched with a finger.

## 2018-10-07 NOTE — ED PROVIDER NOTES
Encounter Date: 10/7/2018       History     Chief Complaint   Patient presents with    Rectal Bleeding     States that she is a cancer patient who recently started chemo 10/2/20.  States that she had blood in the toilet after her BM today.     BRIGHT RED BLOOD AFTER WIPING ANUS, HISTORY EXTERNAL HEMORRHOIDS      The history is provided by the patient.   GI Problem   The other symptoms of the illness do not include fever or melena.   Associated with: NON DRINKER. The patient states that she believes she is currently not pregnant. Risk factors for an acute abdominal problem include immunosuppression and being elderly. Additional symptoms associated with the illness include constipation. Symptoms associated with the illness do not include back pain.     Review of patient's allergies indicates:   Allergen Reactions    Gramicidin d Shortness Of Breath    Hydrochlorothiazide Other (See Comments)     Burning and tingling sensation throughoutt her body    Lisinopril Shortness Of Breath    Promethazine Shortness Of Breath and Nausea And Vomiting    Neosporin [neomycin-bacitracnzn-polymyxnb] Swelling    Polymyxin [bacitracin-polymyxin b] Swelling     Past Medical History:   Diagnosis Date    Abnormal Pap smear     1984    Cancer     Chemotherapy-induced neutropenia 4/1/2015    Elevated cancer antigen 125 (CA-125) 9/5/2018    Encounter for blood transfusion     FH: breast cancer in relative when <45 years old 3/17/2016    Hypertension     Other screening mammogram 9/17/2015    Postmenopausal bleeding     Rash 9/17/2015    Uterine cancer     Weight loss 12/17/2015    Well woman exam with routine gynecological exam 9/14/2016     Past Surgical History:   Procedure Laterality Date    COLPOSCOPY      DILATION AND CURETTAGE OF UTERUS  10/17/14    DILATION-CURETTAGE OF UTERUS (D AND C)  10/17/2014    [facility]    EXPLORATORY-LAPAROTOMY N/A 12/4/2014    Performed by Jair LUEVANO  MD Shukri at [facility] OR    HYSTERECTOMY  12/4/2014    radiacl hysterectomy    HYSTERECTOMY-TOTAL-ABDOMINAL AND BSO N/A 12/4/2014    [facility]formed by [facility]att [facility] at [facility] OR    LYMPHADENECTOMY-PELVIC Bilateral 12/4/2014    [facility]formed [facility]att [facility] at [facility] OR    GA REMOVAL OF OVARY/TUBE(S)       Family History   Problem Relation Age of Onset    Hypertension Mother     Cataracts Mother     No Known Problems Father     Hypertension Brother     Hypertension Brother     Cancer Son     Cataracts Brother     Breast cancer Cousin         maternal cousin. early 30's when diagnosed.     Breast cancer Other         sister's child. diagnosed in her 30s.     Breast cancer Maternal Grandmother     Breast cancer Maternal Aunt     Cataracts Maternal Aunt     Lung cancer Maternal Uncle     Colon cancer Neg Hx     Ovarian cancer Neg Hx     Uterine cancer Neg Hx      Social History     Tobacco Use    Smoking status: Never Smoker    Smokeless tobacco: Never Used   Substance Use Topics    Alcohol use: No    Drug use: No     Review of Systems   Constitutional: Negative for fever.   Gastrointestinal: Positive for blood in stool and constipation. Negative for abdominal pain and melena.   Musculoskeletal: Negative for back pain.   All other systems reviewed and are negative.      Physical Exam     Initial Vitals [10/07/18 1331]   BP Pulse Resp Temp SpO2   (!) 140/72 (!) 119 18 98.2 °F (36.8 °C) 98 %      MAP       --         Physical Exam    Vitals reviewed.  Constitutional: She appears well-developed.   HENT:   Head: Atraumatic.   Eyes: EOM are normal.   Neck: Normal range of motion. Neck supple. No tracheal deviation present. No JVD present.   Cardiovascular:   TACHYCARDIA   Pulmonary/Chest: No respiratory distress.   Abdominal: Soft. Bowel sounds are normal. There is no tenderness. There is no rebound and no guarding.   NO PULSATILE MASS   Musculoskeletal: Normal range of motion.   Neurological: She is  alert and oriented to person, place, and time.   Skin: Skin is warm and dry.   Psychiatric: She has a normal mood and affect.         ED Course   Procedures  Labs Reviewed   CBC W/ AUTO DIFFERENTIAL   BASIC METABOLIC PANEL   TYPE & SCREEN          Imaging Results    None          Medical Decision Making:   Clinical Tests:   Lab Tests: Reviewed  ED Management:  1620 NO NEW COMPLAINTS RESTING COMFORTABLY.  TOLERATING P.O..  ACTING NORMAL PER FAMILY BEDSIDE.  WANTS TO GO HOME  NO EPISODES OF RECTAL BLEEDING EMERGENCY DEPARTMENT, LIKELY SECONDARY HISTORY OF HEMORRHOIDS.  SINUS 96    NO SIGNS OF ACUTE SURGICAL ABDOMEN                      Clinical Impression:    RECTAL BLEEDING      Disposition:   Disposition: Discharged  Condition: Stable                        Blade MELLYLaureen Soliman,   10/07/18 4611

## 2018-10-07 NOTE — ED TRIAGE NOTES
Pt reports rectal bleeding around noon today after having a BM. Pt states the bleeding was bright red. She denies straining while having the BM, but does have hemorrhoids. States that she is a cancer patient who recently started chemo 10/2/20. No abdominal pain but reports nausea and weakness. Pt also reports dizziness 2 days ago.

## 2018-10-08 ENCOUNTER — HOSPITAL ENCOUNTER (OUTPATIENT)
Dept: RADIOLOGY | Facility: HOSPITAL | Age: 68
Discharge: HOME | End: 2018-10-08
Attending: OBSTETRICS & GYNECOLOGY
Payer: MEDICARE

## 2018-10-08 DIAGNOSIS — Z12.31 SCREENING MAMMOGRAM, ENCOUNTER FOR: ICD-10-CM

## 2018-10-08 PROCEDURE — 77067 SCR MAMMO BI INCL CAD: CPT | Mod: TC

## 2018-10-08 PROCEDURE — 77063 BREAST TOMOSYNTHESIS BI: CPT | Mod: TC

## 2018-10-08 PROCEDURE — 77063 BREAST TOMOSYNTHESIS BI: CPT | Mod: 26,,, | Performed by: RADIOLOGY

## 2018-10-08 PROCEDURE — 77067 SCR MAMMO BI INCL CAD: CPT | Mod: 26,,, | Performed by: RADIOLOGY

## 2018-10-18 ENCOUNTER — TELEPHONE (OUTPATIENT)
Dept: GYNECOLOGIC ONCOLOGY | Facility: CLINIC | Age: 68
End: 2018-10-18

## 2018-10-19 ENCOUNTER — OFFICE VISIT (OUTPATIENT)
Dept: GYNECOLOGIC ONCOLOGY | Facility: CLINIC | Age: 68
End: 2018-10-19
Payer: MEDICARE

## 2018-10-19 ENCOUNTER — LAB VISIT (OUTPATIENT)
Dept: LAB | Facility: HOSPITAL | Age: 68
End: 2018-10-19
Attending: OBSTETRICS & GYNECOLOGY
Payer: MEDICARE

## 2018-10-19 VITALS
HEIGHT: 62 IN | DIASTOLIC BLOOD PRESSURE: 80 MMHG | WEIGHT: 143.94 LBS | HEART RATE: 119 BPM | SYSTOLIC BLOOD PRESSURE: 143 MMHG | BODY MASS INDEX: 26.49 KG/M2

## 2018-10-19 DIAGNOSIS — Z85.42 HISTORY OF UTERINE CANCER: ICD-10-CM

## 2018-10-19 DIAGNOSIS — C54.1 ENDOMETRIAL CARCINOMA: Primary | ICD-10-CM

## 2018-10-19 DIAGNOSIS — Z51.11 CHEMOTHERAPY MANAGEMENT, ENCOUNTER FOR: ICD-10-CM

## 2018-10-19 LAB
ANION GAP SERPL CALC-SCNC: 9 MMOL/L
BUN SERPL-MCNC: 17 MG/DL
CALCIUM SERPL-MCNC: 10.5 MG/DL
CHLORIDE SERPL-SCNC: 105 MMOL/L
CO2 SERPL-SCNC: 26 MMOL/L
CREAT SERPL-MCNC: 0.8 MG/DL
ERYTHROCYTE [DISTWIDTH] IN BLOOD BY AUTOMATED COUNT: 15.1 %
EST. GFR  (AFRICAN AMERICAN): >60 ML/MIN/1.73 M^2
EST. GFR  (NON AFRICAN AMERICAN): >60 ML/MIN/1.73 M^2
GLUCOSE SERPL-MCNC: 115 MG/DL
HCT VFR BLD AUTO: 36.3 %
HGB BLD-MCNC: 11.5 G/DL
IMM GRANULOCYTES # BLD AUTO: 0.01 K/UL
MCH RBC QN AUTO: 28.3 PG
MCHC RBC AUTO-ENTMCNC: 31.7 G/DL
MCV RBC AUTO: 89 FL
NEUTROPHILS # BLD AUTO: 2.2 K/UL
PLATELET # BLD AUTO: 265 K/UL
PMV BLD AUTO: 9.3 FL
POTASSIUM SERPL-SCNC: 4 MMOL/L
RBC # BLD AUTO: 4.06 M/UL
SODIUM SERPL-SCNC: 140 MMOL/L
WBC # BLD AUTO: 4.36 K/UL

## 2018-10-19 PROCEDURE — 99214 OFFICE O/P EST MOD 30 MIN: CPT | Mod: S$PBB,,, | Performed by: OBSTETRICS & GYNECOLOGY

## 2018-10-19 PROCEDURE — 85027 COMPLETE CBC AUTOMATED: CPT

## 2018-10-19 PROCEDURE — 99212 OFFICE O/P EST SF 10 MIN: CPT | Mod: PBBFAC | Performed by: OBSTETRICS & GYNECOLOGY

## 2018-10-19 PROCEDURE — 99999 PR PBB SHADOW E&M-EST. PATIENT-LVL II: CPT | Mod: PBBFAC,,, | Performed by: OBSTETRICS & GYNECOLOGY

## 2018-10-19 PROCEDURE — 36415 COLL VENOUS BLD VENIPUNCTURE: CPT

## 2018-10-19 PROCEDURE — 80048 BASIC METABOLIC PNL TOTAL CA: CPT

## 2018-10-19 RX ORDER — EPINEPHRINE 0.3 MG/.3ML
0.3 INJECTION SUBCUTANEOUS ONCE AS NEEDED
Status: CANCELLED | OUTPATIENT
Start: 2018-10-23 | End: 2018-10-23

## 2018-10-19 RX ORDER — HEPARIN 100 UNIT/ML
500 SYRINGE INTRAVENOUS
Status: CANCELLED | OUTPATIENT
Start: 2018-10-23

## 2018-10-19 RX ORDER — DIPHENHYDRAMINE HYDROCHLORIDE 50 MG/ML
50 INJECTION INTRAMUSCULAR; INTRAVENOUS ONCE AS NEEDED
Status: CANCELLED | OUTPATIENT
Start: 2018-10-23 | End: 2018-10-23

## 2018-10-19 RX ORDER — FAMOTIDINE 10 MG/ML
20 INJECTION INTRAVENOUS
Status: CANCELLED | OUTPATIENT
Start: 2018-10-23

## 2018-10-19 RX ORDER — SODIUM CHLORIDE 0.9 % (FLUSH) 0.9 %
10 SYRINGE (ML) INJECTION
Status: CANCELLED | OUTPATIENT
Start: 2018-10-23

## 2018-10-19 NOTE — PROGRESS NOTES
Subjective:       Patient ID: Jessica Gorman is a 68 y.o. female.    Chief Complaint: No chief complaint on file.    HPI   Patient comes in today for cycle 2 of taxol and carboplatin for recurrent USC.   Chemotherapy is scheduled for 10/23/2018.     Had hemorrhoidal bleeding. Went to the ER.     Had one day of bilateral jaw pain. And headache but each lasted only 5 minutes.     Chemotherapy labs have been reviewed and are appropriate for treatment.           Mammogram: Oct  2, 2017: normal CBE: June 2018.   Colonscopy: 2004: normal   Sigmoidoscopy: 2015: normal.       Her oncologic history is:    She was operated on by me at 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    CT scan of the abdomen and pelvis after surgery showed: 1.Postsurgical changes seen in the pelvis from hysterectomy and bilateral salpingo-oophorectomy    2.A fluid collection is seen posterior to the cervix and likely represents a seroma. However, abscess cannot be ruled out.    3.Enlarged left inguinal lymph node identified    Ultrasound of this inguinal node was done and showed:    Limited sonographic imaging was performed in an area of a palpable abnormality within the patient's left inguinal region. Within this region, a lymph node is seen that measures 2.4 x 0.9 x 2.8 cm. While enlarged, this lymph node does   demonstrate a fatty hilum and is otherwise normal in morphology.    The patient reports that this palpable abnormality has been getting smaller over time. I discussed with the patient the need to biopsy this lesion to fully exclude any recurrent malignancy however at this time the patient deferred a biopsy attempt.    If this lesion  "does not resolve, a ultrasound guided biopsy could be rescheduled.        Patient completed vaginal cuff radiation.    She completed 6 cycles of taxol and carboplatin in May 2015.    June 2015: CT scan at completion of treatment shows no evidence for disease. No adenopathy.  was 7.       Sept 2018:  was 47. CT scan of CAP showed numerous bilateral pulmonary nodules and enlarged peripancreatic and gonzalo hepatis lymph nodes with soft tissue infiltration of the mesentery at the region.    3.4 cm left renal mass along the lower pole of the left kidney measures greater than water density.  Differential would include solid renal mass including primary renal neoplasm or metastatic lesion versus is a complex cyst.         Strong family history of breast cancer on mother's side. Patient's daughter has been tested for BRCA and is negative.           Review of Systems   Constitutional: Negative for chills, fatigue and fever.   Respiratory: Negative for cough, shortness of breath and wheezing.    Cardiovascular: Negative for chest pain, palpitations and leg swelling.   Gastrointestinal: Negative for abdominal pain, constipation, diarrhea, nausea and vomiting.   Genitourinary: Negative for difficulty urinating, dysuria, frequency, genital sores, hematuria, urgency, vaginal bleeding, vaginal discharge and vaginal pain.   Musculoskeletal: Negative for gait problem.   Neurological: Negative for weakness and numbness.   Hematological: Negative for adenopathy. Does not bruise/bleed easily.   Psychiatric/Behavioral: The patient is not nervous/anxious.        Objective:   BP (!) 143/80   Pulse (!) 119   Ht 5' 2" (1.575 m)   Wt 65.3 kg (143 lb 15.4 oz)   BMI 26.33 kg/m²      Physical Exam   Constitutional: She is oriented to person, place, and time. She appears well-developed and well-nourished.   HENT:   Head: Normocephalic and atraumatic.   Eyes: No scleral icterus.   Neck: No tracheal deviation present. No thyromegaly " present.   Cardiovascular: Normal rate and regular rhythm.   Pulmonary/Chest: Effort normal and breath sounds normal.   Abdominal: Soft. She exhibits no distension and no mass. There is no tenderness. There is no rebound and no guarding. No hernia.   Genitourinary:   Genitourinary Comments: Not performed.    Musculoskeletal: She exhibits no edema or tenderness.   Lymphadenopathy:     She has no cervical adenopathy.   Neurological: She is alert and oriented to person, place, and time.   Skin: Skin is warm and dry.   Psychiatric: She has a normal mood and affect. Her behavior is normal. Judgment and thought content normal.       Assessment:       1. Endometrial carcinoma, serous    2. Chemotherapy management, encounter for        Plan:   Endometrial carcinoma, serous  Proceed with cycle 2  RTC in 21 days.   Chemotherapy management, encounter for

## 2018-10-23 ENCOUNTER — INFUSION (OUTPATIENT)
Dept: INFUSION THERAPY | Facility: HOSPITAL | Age: 68
End: 2018-10-23
Attending: OBSTETRICS & GYNECOLOGY
Payer: MEDICARE

## 2018-10-23 VITALS
DIASTOLIC BLOOD PRESSURE: 76 MMHG | BODY MASS INDEX: 26.31 KG/M2 | RESPIRATION RATE: 17 BRPM | HEART RATE: 90 BPM | SYSTOLIC BLOOD PRESSURE: 128 MMHG | TEMPERATURE: 98 F | WEIGHT: 143 LBS | HEIGHT: 62 IN

## 2018-10-23 DIAGNOSIS — C54.1 ENDOMETRIAL CARCINOMA: Primary | ICD-10-CM

## 2018-10-23 PROCEDURE — 96367 TX/PROPH/DG ADDL SEQ IV INF: CPT

## 2018-10-23 PROCEDURE — 96415 CHEMO IV INFUSION ADDL HR: CPT

## 2018-10-23 PROCEDURE — 25000003 PHARM REV CODE 250: Performed by: OBSTETRICS & GYNECOLOGY

## 2018-10-23 PROCEDURE — 96413 CHEMO IV INFUSION 1 HR: CPT

## 2018-10-23 PROCEDURE — S0028 INJECTION, FAMOTIDINE, 20 MG: HCPCS | Performed by: OBSTETRICS & GYNECOLOGY

## 2018-10-23 PROCEDURE — 96375 TX/PRO/DX INJ NEW DRUG ADDON: CPT

## 2018-10-23 PROCEDURE — 96417 CHEMO IV INFUS EACH ADDL SEQ: CPT

## 2018-10-23 PROCEDURE — 96361 HYDRATE IV INFUSION ADD-ON: CPT

## 2018-10-23 PROCEDURE — 63600175 PHARM REV CODE 636 W HCPCS: Performed by: OBSTETRICS & GYNECOLOGY

## 2018-10-23 RX ORDER — FAMOTIDINE 10 MG/ML
20 INJECTION INTRAVENOUS
Status: COMPLETED | OUTPATIENT
Start: 2018-10-23 | End: 2018-10-23

## 2018-10-23 RX ORDER — DIPHENHYDRAMINE HCL 50 MG
50 CAPSULE ORAL
Status: COMPLETED | OUTPATIENT
Start: 2018-10-23 | End: 2018-10-23

## 2018-10-23 RX ORDER — EPINEPHRINE 0.3 MG/.3ML
0.3 INJECTION SUBCUTANEOUS ONCE AS NEEDED
Status: DISCONTINUED | OUTPATIENT
Start: 2018-10-23 | End: 2018-10-23 | Stop reason: HOSPADM

## 2018-10-23 RX ORDER — DIPHENHYDRAMINE HYDROCHLORIDE 50 MG/ML
50 INJECTION INTRAMUSCULAR; INTRAVENOUS ONCE AS NEEDED
Status: DISCONTINUED | OUTPATIENT
Start: 2018-10-23 | End: 2018-10-23 | Stop reason: HOSPADM

## 2018-10-23 RX ORDER — HEPARIN 100 UNIT/ML
500 SYRINGE INTRAVENOUS
Status: DISCONTINUED | OUTPATIENT
Start: 2018-10-23 | End: 2018-10-23 | Stop reason: HOSPADM

## 2018-10-23 RX ORDER — SODIUM CHLORIDE 0.9 % (FLUSH) 0.9 %
10 SYRINGE (ML) INJECTION
Status: DISCONTINUED | OUTPATIENT
Start: 2018-10-23 | End: 2018-10-23 | Stop reason: HOSPADM

## 2018-10-23 RX ADMIN — DEXAMETHASONE SODIUM PHOSPHATE: 4 INJECTION, SOLUTION INTRA-ARTICULAR; INTRALESIONAL; INTRAMUSCULAR; INTRAVENOUS; SOFT TISSUE at 09:10

## 2018-10-23 RX ADMIN — DIPHENHYDRAMINE HYDROCHLORIDE 50 MG: 50 CAPSULE ORAL at 09:10

## 2018-10-23 RX ADMIN — FAMOTIDINE 20 MG: 10 INJECTION, SOLUTION INTRAVENOUS at 09:10

## 2018-10-23 RX ADMIN — HYDROCORTISONE SODIUM SUCCINATE 100 MG: 100 INJECTION, POWDER, FOR SOLUTION INTRAMUSCULAR; INTRAVENOUS at 10:10

## 2018-10-23 RX ADMIN — CARBOPLATIN 470 MG: 10 INJECTION, SOLUTION INTRAVENOUS at 01:10

## 2018-10-23 RX ADMIN — PACLITAXEL 252 MG: 6 INJECTION, SOLUTION INTRAVENOUS at 09:10

## 2018-10-23 RX ADMIN — SODIUM CHLORIDE: 0.9 INJECTION, SOLUTION INTRAVENOUS at 02:10

## 2018-10-23 NOTE — NURSING
1045 pt noted to be coughing and has funny feeling in throat, Taxol stopped, solucortef 100 mg IVP given, BP= 158/84 FH=455 pt with no other symtpoms    1050 pt states feeling better    1055 notified dr perry of symptoms and meds. Given, will rechallenge in 30 minutes

## 2018-10-23 NOTE — PLAN OF CARE
Problem: Chemotherapy Effects (Adult)  Goal: Signs and Symptoms of Listed Potential Problems Will be Absent, Minimized or Managed (Chemotherapy Effects)  Signs and symptoms of listed potential problems will be absent, minimized or managed by discharge/transition of care (reference Chemotherapy Effects (Adult) CPG).  Outcome: Ongoing (interventions implemented as appropriate)  Pt here for taxol/carbo infusion, labs, hx, meds, allergies reviewed, pt with no complaints at this time, reclined in chair, warm blanket provided, continue to monitor

## 2018-10-23 NOTE — PLAN OF CARE
Problem: Patient Care Overview  Goal: Plan of Care Review  Outcome: Ongoing (interventions implemented as appropriate)  Pt tolerated taxol(once restarted see notes) and carbo without issue, no distress noted upon d/c to home with daughter, pt to rtc 11/13/18

## 2018-11-01 ENCOUNTER — TELEPHONE (OUTPATIENT)
Dept: GYNECOLOGIC ONCOLOGY | Facility: CLINIC | Age: 68
End: 2018-11-01

## 2018-11-08 NOTE — PROGRESS NOTES
"Subjective:       Patient ID: Jessica Gorman is a 68 y.o. female.    Chief Complaint: endometrial carcinoma, serous and Chemotherapy (carbo/ taxol C3)    HPI     Patient comes in today for cycle 3 of taxol and carboplatin for recurrent USC.     Developed a cough and "odd sensation" in throat when taxol started. Stopped and given solucortef and symtoms resolved. Taxol restarted with out difficulty.        Chemotherapy labs have been reviewed and are appropriate for treatment.               Mammogram: Oct  2, 2017: normal CBE: June 2018.   Colonscopy: 2004: normal   Sigmoidoscopy: 2015: normal.       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    CT scan of the abdomen and pelvis after surgery showed: 1.Postsurgical changes seen in the pelvis from hysterectomy and bilateral salpingo-oophorectomy    2.A fluid collection is seen posterior to the cervix and likely represents a seroma. However, abscess cannot be ruled out.    3.Enlarged left inguinal lymph node identified    Ultrasound of this inguinal node was done and showed:    Limited sonographic imaging was performed in an area of a palpable abnormality within the patient's left inguinal region. Within this region, a lymph node is seen that measures 2.4 x 0.9 x 2.8 cm. While enlarged, this lymph node does   demonstrate a fatty hilum and is otherwise normal in morphology.    The patient reports that this palpable abnormality has been getting smaller over time. I discussed with the patient the need to biopsy this lesion to fully exclude any recurrent malignancy however at this time the patient deferred a biopsy " "attempt.    If this lesion does not resolve, a ultrasound guided biopsy could be rescheduled.        Patient completed vaginal cuff radiation.    She completed 6 cycles of taxol and carboplatin in May 2015.    June 2015: CT scan at completion of treatment shows no evidence for disease. No adenopathy.  was 7.       Sept 2018:  was 47. CT scan of CAP showed numerous bilateral pulmonary nodules and enlarged peripancreatic and gonzalo hepatis lymph nodes with soft tissue infiltration of the mesentery at the region.    3.4 cm left renal mass along the lower pole of the left kidney measures greater than water density.  Differential would include solid renal mass including primary renal neoplasm or metastatic lesion versus is a complex cyst.         Strong family history of breast cancer on mother's side. Patient's daughter has been tested for BRCA and is negative.           Review of Systems   Constitutional: Negative for chills, fatigue and fever.   Respiratory: Negative for cough, shortness of breath and wheezing.    Cardiovascular: Negative for chest pain, palpitations and leg swelling.   Gastrointestinal: Negative for abdominal pain, constipation, diarrhea, nausea and vomiting.   Genitourinary: Negative for difficulty urinating, dysuria, frequency, genital sores, hematuria, urgency, vaginal bleeding, vaginal discharge and vaginal pain.   Musculoskeletal: Negative for gait problem.   Neurological: Negative for weakness and numbness.   Hematological: Negative for adenopathy. Does not bruise/bleed easily.   Psychiatric/Behavioral: The patient is not nervous/anxious.        Objective:   /78   Pulse 106   Ht 5' 2" (1.575 m)   Wt 63.5 kg (139 lb 15.9 oz)   BMI 25.60 kg/m²      Physical Exam   Constitutional: She is oriented to person, place, and time. She appears well-developed and well-nourished.   HENT:   Head: Normocephalic and atraumatic.   Eyes: No scleral icterus.   Neck: No tracheal deviation " present. No thyromegaly present.   Cardiovascular: Normal rate and regular rhythm.   Pulmonary/Chest: Effort normal and breath sounds normal.   Abdominal: Soft. She exhibits no distension and no mass. There is no tenderness. There is no rebound and no guarding. No hernia.   Genitourinary:   Genitourinary Comments: Not performed.    Musculoskeletal: She exhibits no edema or tenderness.   Lymphadenopathy:     She has no cervical adenopathy.   Neurological: She is alert and oriented to person, place, and time.   Skin: Skin is warm and dry.   Psychiatric: She has a normal mood and affect. Her behavior is normal. Judgment and thought content normal.       Assessment:       1. Endometrial carcinoma, serous    2. Chemotherapy management, encounter for        Plan:   Endometrial carcinoma, serous  Check labs on 11/12 and if appropriate then chemo on 11/13.   pretreat with additional steroids for 24 hours before.     -     ; Future; Expected date: 11/09/2018  -     CBC auto differential; Future; Expected date: 11/09/2018  -     Basic metabolic panel; Future; Expected date: 11/09/2018  -     dexamethasone (DECADRON) 4 MG Tab; Take 5 tablets every 6 hours for 24 hours prior to chemotherapy  Dispense: 20 tablet; Refill: 4    Chemotherapy management, encounter for

## 2018-11-09 ENCOUNTER — OFFICE VISIT (OUTPATIENT)
Dept: GYNECOLOGIC ONCOLOGY | Facility: CLINIC | Age: 68
End: 2018-11-09
Payer: MEDICARE

## 2018-11-09 VITALS
HEIGHT: 62 IN | BODY MASS INDEX: 25.76 KG/M2 | SYSTOLIC BLOOD PRESSURE: 129 MMHG | DIASTOLIC BLOOD PRESSURE: 78 MMHG | HEART RATE: 106 BPM | WEIGHT: 140 LBS

## 2018-11-09 DIAGNOSIS — Z51.11 CHEMOTHERAPY MANAGEMENT, ENCOUNTER FOR: ICD-10-CM

## 2018-11-09 DIAGNOSIS — C54.1 ENDOMETRIAL CARCINOMA: Primary | ICD-10-CM

## 2018-11-09 PROCEDURE — 99212 OFFICE O/P EST SF 10 MIN: CPT | Mod: PBBFAC | Performed by: OBSTETRICS & GYNECOLOGY

## 2018-11-09 PROCEDURE — 99999 PR PBB SHADOW E&M-EST. PATIENT-LVL II: CPT | Mod: PBBFAC,,, | Performed by: OBSTETRICS & GYNECOLOGY

## 2018-11-09 PROCEDURE — 99214 OFFICE O/P EST MOD 30 MIN: CPT | Mod: S$PBB,,, | Performed by: OBSTETRICS & GYNECOLOGY

## 2018-11-09 RX ORDER — DEXAMETHASONE 4 MG/1
TABLET ORAL
Qty: 20 TABLET | Refills: 4 | Status: SHIPPED | OUTPATIENT
Start: 2018-11-09 | End: 2019-02-27 | Stop reason: SDUPTHER

## 2018-11-12 ENCOUNTER — LAB VISIT (OUTPATIENT)
Dept: LAB | Facility: HOSPITAL | Age: 68
End: 2018-11-12
Attending: OBSTETRICS & GYNECOLOGY
Payer: MEDICARE

## 2018-11-12 DIAGNOSIS — Z85.42 HISTORY OF UTERINE CANCER: ICD-10-CM

## 2018-11-12 LAB
ANION GAP SERPL CALC-SCNC: 10 MMOL/L
BUN SERPL-MCNC: 16 MG/DL
CALCIUM SERPL-MCNC: 9.9 MG/DL
CHLORIDE SERPL-SCNC: 108 MMOL/L
CO2 SERPL-SCNC: 24 MMOL/L
CREAT SERPL-MCNC: 0.8 MG/DL
ERYTHROCYTE [DISTWIDTH] IN BLOOD BY AUTOMATED COUNT: 16.1 %
EST. GFR  (AFRICAN AMERICAN): >60 ML/MIN/1.73 M^2
EST. GFR  (NON AFRICAN AMERICAN): >60 ML/MIN/1.73 M^2
GLUCOSE SERPL-MCNC: 104 MG/DL
HCT VFR BLD AUTO: 32.3 %
HGB BLD-MCNC: 10.5 G/DL
IMM GRANULOCYTES # BLD AUTO: 0.01 K/UL
MCH RBC QN AUTO: 28.8 PG
MCHC RBC AUTO-ENTMCNC: 32.5 G/DL
MCV RBC AUTO: 89 FL
NEUTROPHILS # BLD AUTO: 1.8 K/UL
PLATELET # BLD AUTO: 235 K/UL
PMV BLD AUTO: 10 FL
POTASSIUM SERPL-SCNC: 3.9 MMOL/L
RBC # BLD AUTO: 3.64 M/UL
SODIUM SERPL-SCNC: 142 MMOL/L
WBC # BLD AUTO: 3.71 K/UL

## 2018-11-12 PROCEDURE — 85027 COMPLETE CBC AUTOMATED: CPT

## 2018-11-12 PROCEDURE — 80048 BASIC METABOLIC PNL TOTAL CA: CPT

## 2018-11-12 PROCEDURE — 36415 COLL VENOUS BLD VENIPUNCTURE: CPT

## 2018-11-12 RX ORDER — EPINEPHRINE 0.3 MG/.3ML
0.3 INJECTION SUBCUTANEOUS ONCE AS NEEDED
Status: CANCELLED | OUTPATIENT
Start: 2018-11-13 | End: 2018-11-13

## 2018-11-12 RX ORDER — FAMOTIDINE 10 MG/ML
20 INJECTION INTRAVENOUS
Status: CANCELLED | OUTPATIENT
Start: 2018-11-13

## 2018-11-12 RX ORDER — HEPARIN 100 UNIT/ML
500 SYRINGE INTRAVENOUS
Status: CANCELLED | OUTPATIENT
Start: 2018-11-13

## 2018-11-12 RX ORDER — SODIUM CHLORIDE 0.9 % (FLUSH) 0.9 %
10 SYRINGE (ML) INJECTION
Status: CANCELLED | OUTPATIENT
Start: 2018-11-13

## 2018-11-12 RX ORDER — DIPHENHYDRAMINE HYDROCHLORIDE 50 MG/ML
50 INJECTION INTRAMUSCULAR; INTRAVENOUS ONCE AS NEEDED
Status: CANCELLED | OUTPATIENT
Start: 2018-11-13 | End: 2018-11-13

## 2018-11-13 ENCOUNTER — INFUSION (OUTPATIENT)
Dept: INFUSION THERAPY | Facility: HOSPITAL | Age: 68
End: 2018-11-13
Attending: OBSTETRICS & GYNECOLOGY
Payer: MEDICARE

## 2018-11-13 VITALS
HEART RATE: 100 BPM | HEIGHT: 62 IN | RESPIRATION RATE: 18 BRPM | BODY MASS INDEX: 26.29 KG/M2 | TEMPERATURE: 98 F | SYSTOLIC BLOOD PRESSURE: 136 MMHG | OXYGEN SATURATION: 100 % | DIASTOLIC BLOOD PRESSURE: 78 MMHG | WEIGHT: 142.88 LBS

## 2018-11-13 DIAGNOSIS — C54.1 ENDOMETRIAL CARCINOMA: Primary | ICD-10-CM

## 2018-11-13 PROCEDURE — 96413 CHEMO IV INFUSION 1 HR: CPT

## 2018-11-13 PROCEDURE — 63600175 PHARM REV CODE 636 W HCPCS: Performed by: OBSTETRICS & GYNECOLOGY

## 2018-11-13 PROCEDURE — 96375 TX/PRO/DX INJ NEW DRUG ADDON: CPT

## 2018-11-13 PROCEDURE — 96367 TX/PROPH/DG ADDL SEQ IV INF: CPT

## 2018-11-13 PROCEDURE — 96417 CHEMO IV INFUS EACH ADDL SEQ: CPT

## 2018-11-13 PROCEDURE — 96415 CHEMO IV INFUSION ADDL HR: CPT

## 2018-11-13 PROCEDURE — S0028 INJECTION, FAMOTIDINE, 20 MG: HCPCS | Performed by: OBSTETRICS & GYNECOLOGY

## 2018-11-13 PROCEDURE — 96360 HYDRATION IV INFUSION INIT: CPT

## 2018-11-13 PROCEDURE — 25000003 PHARM REV CODE 250: Performed by: OBSTETRICS & GYNECOLOGY

## 2018-11-13 PROCEDURE — 96361 HYDRATE IV INFUSION ADD-ON: CPT

## 2018-11-13 RX ORDER — SODIUM CHLORIDE 0.9 % (FLUSH) 0.9 %
10 SYRINGE (ML) INJECTION
Status: DISCONTINUED | OUTPATIENT
Start: 2018-11-13 | End: 2018-11-13 | Stop reason: HOSPADM

## 2018-11-13 RX ORDER — HEPARIN 100 UNIT/ML
500 SYRINGE INTRAVENOUS
Status: DISCONTINUED | OUTPATIENT
Start: 2018-11-13 | End: 2018-11-13 | Stop reason: HOSPADM

## 2018-11-13 RX ORDER — EPINEPHRINE 0.3 MG/.3ML
0.3 INJECTION SUBCUTANEOUS ONCE AS NEEDED
Status: DISCONTINUED | OUTPATIENT
Start: 2018-11-13 | End: 2018-11-13 | Stop reason: HOSPADM

## 2018-11-13 RX ORDER — DIPHENHYDRAMINE HYDROCHLORIDE 50 MG/ML
50 INJECTION INTRAMUSCULAR; INTRAVENOUS ONCE AS NEEDED
Status: DISCONTINUED | OUTPATIENT
Start: 2018-11-13 | End: 2018-11-13 | Stop reason: HOSPADM

## 2018-11-13 RX ORDER — FAMOTIDINE 10 MG/ML
20 INJECTION INTRAVENOUS
Status: COMPLETED | OUTPATIENT
Start: 2018-11-13 | End: 2018-11-13

## 2018-11-13 RX ADMIN — PACLITAXEL 252 MG: 6 INJECTION, SOLUTION INTRAVENOUS at 11:11

## 2018-11-13 RX ADMIN — FAMOTIDINE 20 MG: 10 INJECTION, SOLUTION INTRAVENOUS at 10:11

## 2018-11-13 RX ADMIN — CARBOPLATIN 470 MG: 10 INJECTION, SOLUTION INTRAVENOUS at 02:11

## 2018-11-13 RX ADMIN — SODIUM CHLORIDE: 0.9 INJECTION, SOLUTION INTRAVENOUS at 03:11

## 2018-11-13 RX ADMIN — DIPHENHYDRAMINE HYDROCHLORIDE 50 MG: 50 INJECTION, SOLUTION INTRAMUSCULAR; INTRAVENOUS at 10:11

## 2018-11-13 RX ADMIN — DEXAMETHASONE SODIUM PHOSPHATE: 4 INJECTION, SOLUTION INTRA-ARTICULAR; INTRALESIONAL; INTRAMUSCULAR; INTRAVENOUS; SOFT TISSUE at 10:11

## 2018-11-13 NOTE — PLAN OF CARE
Problem: Patient Care Overview  Goal: Plan of Care Review  Outcome: Ongoing (interventions implemented as appropriate)  Pt completed chemo, tolerated well. Plan of care reviewed with Pt and Pt instructed to contact MD with any further concerns or questions,she verbalized understanding.Pt discharged home, AVS given to  Pt., and she ambulated off unit, accompanied by daughter

## 2018-11-13 NOTE — PLAN OF CARE
Problem: Chemotherapy Effects (Adult)  Goal: Signs and Symptoms of Listed Potential Problems Will be Absent, Minimized or Managed (Chemotherapy Effects)  Signs and symptoms of listed potential problems will be absent, minimized or managed by discharge/transition of care (reference Chemotherapy Effects (Adult) CPG).  Outcome: Ongoing (interventions implemented as appropriate)  Pt admitted for 3 of 6 cycles of Taxol/Carboplatin, ambulated onto uit unassisted, accompanied by daughter. Side effects and self-care tips reviewed. Pt stated  she experienced a scratchy throat last Taxol infusion and was administered Solu-cortef. Dr. Watt had pt take decadron 20mg q 6 hours 24 hours prior to chemo, which Pt stated she followed. Will monitor closely during infusion.

## 2018-11-26 ENCOUNTER — TELEPHONE (OUTPATIENT)
Dept: GYNECOLOGIC ONCOLOGY | Facility: CLINIC | Age: 68
End: 2018-11-26

## 2018-11-26 NOTE — TELEPHONE ENCOUNTER
Return call to patient inform her per Dr. Watt he will order a CT scan close to the end of her Chemo cycles. Inform patient that Dr. Watt has ordered a  with next chemo cycle on 12/4. Patient voiced understanding. KJ

## 2018-11-26 NOTE — TELEPHONE ENCOUNTER
Explained that I am using  to monitor for response.     ----- Message from Ella Macdonald MA sent at 11/26/2018 11:26 AM CST -----  Contact: SARAH MAGANA [[id number]]  Return call to patient, patient would like for you to call her today. Patient would like disclose on with the reason for call states she have numerous of questions. Please advise KJ   ----- Message -----  From: Glenis Clark  Sent: 11/26/2018  11:10 AM  To: Shukri LUEVANO Staff              Name of Who is Calling: SARAH MAGANA [[id number]]      What is the request in detail:  Patient called requesting a call. Patient refused to disclose as to what her call pertains to.  Please give a call back at your earliest convenience.       THANKS!       Can the clinic reply by [facility]:  No      Number to Call Back: SARAH MAGANA / ph# [phone]

## 2018-11-26 NOTE — TELEPHONE ENCOUNTER
----- Message from Jair Watt MD sent at 11/26/2018 10:33 AM CST -----  Contact: SARAH MAGANA [[id number]]  That is correct. Should have a  with next chemo labs.   ----- Message -----  From: Ella Macdonald MA  Sent: 11/26/2018   9:13 AM  To: Jair Watt MD    Spoke with patient regarding CT scan. No order placed for CT to be on patient. I inform patient that CT scans are normally done after the 6th cycle. Please advise thanks Ella   ----- Message -----  From: Tony Ford  Sent: 11/26/2018   8:34 AM  To: Shukri LUEVANO Staff              Name of Who is Calling: SARAH MAGANA [[id number]]      What is the request in detail: Patient would like to speak with staff in regards to a CT Scan she is suppose to have before her third treatment. Please advise and schedule      Can the clinic reply by [facility]: no      What Number [facility] Back [facility]: [phone]

## 2018-12-03 ENCOUNTER — LAB VISIT (OUTPATIENT)
Dept: LAB | Facility: HOSPITAL | Age: 68
End: 2018-12-03
Attending: OBSTETRICS & GYNECOLOGY
Payer: MEDICARE

## 2018-12-03 DIAGNOSIS — C54.1 ENDOMETRIAL CARCINOMA: ICD-10-CM

## 2018-12-03 LAB
ANION GAP SERPL CALC-SCNC: 8 MMOL/L
BASOPHILS # BLD AUTO: 0.03 K/UL
BASOPHILS NFR BLD: 0.6 %
BUN SERPL-MCNC: 15 MG/DL
CALCIUM SERPL-MCNC: 9.9 MG/DL
CANCER AG125 SERPL-ACNC: 21 U/ML
CHLORIDE SERPL-SCNC: 105 MMOL/L
CO2 SERPL-SCNC: 27 MMOL/L
CREAT SERPL-MCNC: 0.9 MG/DL
DIFFERENTIAL METHOD: ABNORMAL
EOSINOPHIL # BLD AUTO: 0.1 K/UL
EOSINOPHIL NFR BLD: 1.3 %
ERYTHROCYTE [DISTWIDTH] IN BLOOD BY AUTOMATED COUNT: 17 %
EST. GFR  (AFRICAN AMERICAN): >60 ML/MIN/1.73 M^2
EST. GFR  (NON AFRICAN AMERICAN): >60 ML/MIN/1.73 M^2
GLUCOSE SERPL-MCNC: 101 MG/DL
HCT VFR BLD AUTO: 35.1 %
HGB BLD-MCNC: 11.5 G/DL
IMM GRANULOCYTES # BLD AUTO: 0.03 K/UL
IMM GRANULOCYTES NFR BLD AUTO: 0.6 %
LYMPHOCYTES # BLD AUTO: 2 K/UL
LYMPHOCYTES NFR BLD: 41.9 %
MCH RBC QN AUTO: 28.8 PG
MCHC RBC AUTO-ENTMCNC: 32.8 G/DL
MCV RBC AUTO: 88 FL
MONOCYTES # BLD AUTO: 0.6 K/UL
MONOCYTES NFR BLD: 11.9 %
NEUTROPHILS # BLD AUTO: 2.1 K/UL
NEUTROPHILS NFR BLD: 43.7 %
NRBC BLD-RTO: 0 /100 WBC
PLATELET # BLD AUTO: 275 K/UL
PMV BLD AUTO: 9.6 FL
POTASSIUM SERPL-SCNC: 3.8 MMOL/L
RBC # BLD AUTO: 4 M/UL
SODIUM SERPL-SCNC: 140 MMOL/L
WBC # BLD AUTO: 4.8 K/UL

## 2018-12-03 PROCEDURE — 86304 IMMUNOASSAY TUMOR CA 125: CPT

## 2018-12-03 PROCEDURE — 80048 BASIC METABOLIC PNL TOTAL CA: CPT

## 2018-12-03 PROCEDURE — 36415 COLL VENOUS BLD VENIPUNCTURE: CPT

## 2018-12-03 PROCEDURE — 85025 COMPLETE CBC W/AUTO DIFF WBC: CPT

## 2018-12-03 NOTE — PROGRESS NOTES
"Subjective:       Patient ID: Jessica Gorman is a 68 y.o. female.    Chief Complaint: endometrial carcinoma, serous and Chemotherapy (Carbo/ Taxol C4)    HPI     Patient comes in today for cycle 4 of taxol and carboplatin for recurrent USC.      47>21(p 3rd cycle).     Developed a cough and "odd sensation" in throat when taxol started. Stopped and given solucortef and symtoms resolved. Taxol restarted with out difficulty. No problems with taxol since starting steroids.         Chemotherapy labs have been reviewed and are appropriate for treatment.               Mammogram: Oct  2, 2017: normal CBE: June 2018.   Colonscopy: 2004: normal   Sigmoidoscopy: 2015: normal.       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    CT scan of the abdomen and pelvis after surgery showed: 1.Postsurgical changes seen in the pelvis from hysterectomy and bilateral salpingo-oophorectomy    2.A fluid collection is seen posterior to the cervix and likely represents a seroma. However, abscess cannot be ruled out.    3.Enlarged left inguinal lymph node identified    Ultrasound of this inguinal node was done and showed:    Limited sonographic imaging was performed in an area of a palpable abnormality within the patient's left inguinal region. Within this region, a lymph node is seen that measures 2.4 x 0.9 x 2.8 cm. While enlarged, this lymph node does   demonstrate a fatty hilum and is otherwise normal in morphology.    The patient reports that this palpable abnormality has been getting smaller over time. I discussed with the patient the need to biopsy this lesion to fully " "exclude any recurrent malignancy however at this time the patient deferred a biopsy attempt.    If this lesion does not resolve, a ultrasound guided biopsy could be rescheduled.        Patient completed vaginal cuff radiation.    She completed 6 cycles of taxol and carboplatin in May 2015.    June 2015: CT scan at completion of treatment shows no evidence for disease. No adenopathy.  was 7.       Sept 2018:  was 47. CT scan of CAP showed numerous bilateral pulmonary nodules and enlarged peripancreatic and gonzalo hepatis lymph nodes with soft tissue infiltration of the mesentery at the region.    3.4 cm left renal mass along the lower pole of the left kidney measures greater than water density.  Differential would include solid renal mass including primary renal neoplasm or metastatic lesion versus is a complex cyst.         Strong family history of breast cancer on mother's side. Patient's daughter has been tested for BRCA and is negative. Patient has been tested and is BRCA negative.        Review of Systems   Constitutional: Negative for chills, fatigue and fever.   Respiratory: Negative for cough, shortness of breath and wheezing.    Cardiovascular: Negative for chest pain, palpitations and leg swelling.   Gastrointestinal: Negative for abdominal pain, constipation, diarrhea, nausea and vomiting.   Genitourinary: Negative for difficulty urinating, dysuria, frequency, genital sores, hematuria, urgency, vaginal bleeding, vaginal discharge and vaginal pain.   Musculoskeletal: Negative for gait problem.   Neurological: Negative for weakness and numbness.   Hematological: Negative for adenopathy. Does not bruise/bleed easily.   Psychiatric/Behavioral: The patient is not nervous/anxious.        Objective:   BP (!) 143/73   Pulse (!) 123   Ht 5' 2" (1.575 m)   Wt 64.3 kg (141 lb 12.1 oz)   BMI 25.93 kg/m²      Physical Exam   Constitutional: She is oriented to person, place, and time. She appears " well-developed and well-nourished.   HENT:   Head: Normocephalic and atraumatic.   Eyes: No scleral icterus.   Neck: No tracheal deviation present. No thyromegaly present.   Cardiovascular: Normal rate and regular rhythm.   Pulmonary/Chest: Effort normal and breath sounds normal.   Abdominal: Soft. She exhibits no distension and no mass. There is no tenderness. There is no rebound and no guarding. No hernia.   Genitourinary:   Genitourinary Comments: Not performed.    Musculoskeletal: She exhibits no edema or tenderness.   Lymphadenopathy:     She has no cervical adenopathy.   Neurological: She is alert and oriented to person, place, and time.   Skin: Skin is warm and dry.   Psychiatric: She has a normal mood and affect. Her behavior is normal. Judgment and thought content normal.       Assessment:       1. Endometrial carcinoma, serous    2. Chemotherapy management, encounter for        Plan:   Endometrial carcinoma, serous  Proceed with cycle 4   RTC in 28 days due to [place]   Chemotherapy management, encounter for

## 2018-12-04 ENCOUNTER — INFUSION (OUTPATIENT)
Dept: INFUSION THERAPY | Facility: HOSPITAL | Age: 68
End: 2018-12-04
Attending: OBSTETRICS & GYNECOLOGY
Payer: MEDICARE

## 2018-12-04 ENCOUNTER — OFFICE VISIT (OUTPATIENT)
Dept: GYNECOLOGIC ONCOLOGY | Facility: CLINIC | Age: 68
End: 2018-12-04
Payer: MEDICARE

## 2018-12-04 VITALS
BODY MASS INDEX: 25.95 KG/M2 | WEIGHT: 141 LBS | HEIGHT: 62 IN | HEART RATE: 88 BPM | DIASTOLIC BLOOD PRESSURE: 78 MMHG | SYSTOLIC BLOOD PRESSURE: 116 MMHG | RESPIRATION RATE: 17 BRPM | TEMPERATURE: 98 F

## 2018-12-04 VITALS
HEART RATE: 123 BPM | WEIGHT: 141.75 LBS | HEIGHT: 62 IN | SYSTOLIC BLOOD PRESSURE: 143 MMHG | BODY MASS INDEX: 26.09 KG/M2 | DIASTOLIC BLOOD PRESSURE: 73 MMHG

## 2018-12-04 DIAGNOSIS — C54.1 ENDOMETRIAL CARCINOMA: Primary | ICD-10-CM

## 2018-12-04 DIAGNOSIS — Z51.11 CHEMOTHERAPY MANAGEMENT, ENCOUNTER FOR: ICD-10-CM

## 2018-12-04 PROCEDURE — S0028 INJECTION, FAMOTIDINE, 20 MG: HCPCS | Performed by: OBSTETRICS & GYNECOLOGY

## 2018-12-04 PROCEDURE — 96415 CHEMO IV INFUSION ADDL HR: CPT

## 2018-12-04 PROCEDURE — 25000003 PHARM REV CODE 250: Performed by: OBSTETRICS & GYNECOLOGY

## 2018-12-04 PROCEDURE — 63600175 PHARM REV CODE 636 W HCPCS: Performed by: OBSTETRICS & GYNECOLOGY

## 2018-12-04 PROCEDURE — 99213 OFFICE O/P EST LOW 20 MIN: CPT | Mod: PBBFAC,25 | Performed by: OBSTETRICS & GYNECOLOGY

## 2018-12-04 PROCEDURE — 99214 OFFICE O/P EST MOD 30 MIN: CPT | Mod: S$PBB,,, | Performed by: OBSTETRICS & GYNECOLOGY

## 2018-12-04 PROCEDURE — 96413 CHEMO IV INFUSION 1 HR: CPT

## 2018-12-04 PROCEDURE — 96367 TX/PROPH/DG ADDL SEQ IV INF: CPT

## 2018-12-04 PROCEDURE — 96417 CHEMO IV INFUS EACH ADDL SEQ: CPT

## 2018-12-04 PROCEDURE — 99999 PR PBB SHADOW E&M-EST. PATIENT-LVL III: CPT | Mod: PBBFAC,,, | Performed by: OBSTETRICS & GYNECOLOGY

## 2018-12-04 PROCEDURE — 96375 TX/PRO/DX INJ NEW DRUG ADDON: CPT

## 2018-12-04 RX ORDER — DIPHENHYDRAMINE HYDROCHLORIDE 50 MG/ML
50 INJECTION INTRAMUSCULAR; INTRAVENOUS ONCE AS NEEDED
Status: CANCELLED | OUTPATIENT
Start: 2018-12-04 | End: 2018-12-04

## 2018-12-04 RX ORDER — EPINEPHRINE 0.3 MG/.3ML
0.3 INJECTION SUBCUTANEOUS ONCE AS NEEDED
Status: DISCONTINUED | OUTPATIENT
Start: 2018-12-04 | End: 2018-12-04 | Stop reason: HOSPADM

## 2018-12-04 RX ORDER — SODIUM CHLORIDE 0.9 % (FLUSH) 0.9 %
10 SYRINGE (ML) INJECTION
Status: DISCONTINUED | OUTPATIENT
Start: 2018-12-04 | End: 2018-12-04 | Stop reason: HOSPADM

## 2018-12-04 RX ORDER — FAMOTIDINE 10 MG/ML
20 INJECTION INTRAVENOUS
Status: COMPLETED | OUTPATIENT
Start: 2018-12-04 | End: 2018-12-04

## 2018-12-04 RX ORDER — HEPARIN 100 UNIT/ML
500 SYRINGE INTRAVENOUS
Status: DISCONTINUED | OUTPATIENT
Start: 2018-12-04 | End: 2018-12-04 | Stop reason: HOSPADM

## 2018-12-04 RX ORDER — DIPHENHYDRAMINE HYDROCHLORIDE 50 MG/ML
50 INJECTION INTRAMUSCULAR; INTRAVENOUS ONCE AS NEEDED
Status: COMPLETED | OUTPATIENT
Start: 2018-12-04 | End: 2018-12-04

## 2018-12-04 RX ORDER — SODIUM CHLORIDE 0.9 % (FLUSH) 0.9 %
10 SYRINGE (ML) INJECTION
Status: CANCELLED | OUTPATIENT
Start: 2018-12-04

## 2018-12-04 RX ORDER — EPINEPHRINE 0.3 MG/.3ML
0.3 INJECTION SUBCUTANEOUS ONCE AS NEEDED
Status: CANCELLED | OUTPATIENT
Start: 2018-12-04 | End: 2018-12-04

## 2018-12-04 RX ORDER — FAMOTIDINE 10 MG/ML
20 INJECTION INTRAVENOUS
Status: CANCELLED | OUTPATIENT
Start: 2018-12-04

## 2018-12-04 RX ORDER — HEPARIN 100 UNIT/ML
500 SYRINGE INTRAVENOUS
Status: CANCELLED | OUTPATIENT
Start: 2018-12-04

## 2018-12-04 RX ADMIN — FAMOTIDINE 20 MG: 10 INJECTION, SOLUTION INTRAVENOUS at 09:12

## 2018-12-04 RX ADMIN — DIPHENHYDRAMINE HYDROCHLORIDE 50 MG: 50 INJECTION, SOLUTION INTRAMUSCULAR; INTRAVENOUS at 10:12

## 2018-12-04 RX ADMIN — SODIUM CHLORIDE: 0.9 INJECTION, SOLUTION INTRAVENOUS at 09:12

## 2018-12-04 RX ADMIN — PACLITAXEL 252 MG: 6 INJECTION, SOLUTION INTRAVENOUS at 10:12

## 2018-12-04 RX ADMIN — DIPHENHYDRAMINE HYDROCHLORIDE 50 MG: 50 INJECTION, SOLUTION INTRAMUSCULAR; INTRAVENOUS at 01:12

## 2018-12-04 RX ADMIN — DEXAMETHASONE SODIUM PHOSPHATE: 4 INJECTION, SOLUTION INTRA-ARTICULAR; INTRALESIONAL; INTRAMUSCULAR; INTRAVENOUS; SOFT TISSUE at 09:12

## 2018-12-04 RX ADMIN — CARBOPLATIN 430 MG: 10 INJECTION, SOLUTION INTRAVENOUS at 01:12

## 2018-12-04 NOTE — NURSING
1345 pt c/o some itchng to arms and back no hives noted, carbo stopped, /74 HR 90 R 18   Benadryl 25 mg ivp given, will monitor

## 2018-12-04 NOTE — PLAN OF CARE
Problem: Patient Care Overview  Goal: Plan of Care Review  Outcome: Ongoing (interventions implemented as appropriate)  Pt tolerated taxol/carbo infusion without issue, pt to rtc 1/2/19, no distress noted upon d/c to home

## 2018-12-04 NOTE — PLAN OF CARE
Problem: Chemotherapy Effects (Adult)  Goal: Signs and Symptoms of Listed Potential Problems Will be Absent, Minimized or Managed (Chemotherapy Effects)  Signs and symptoms of listed potential problems will be absent, minimized or managed by discharge/transition of care (reference Chemotherapy Effects (Adult) CPG).  Outcome: Ongoing (interventions implemented as appropriate)  Pt here for taxol/carbo infusion D1C4, labs, hx, meds, allergies reviewed, pt with no complaints at this time, reclined in chair, continue to monitor

## 2018-12-07 NOTE — PROGRESS NOTES
Subjective:       Patient ID: Jessica Gorman is a 68 y.o. female with a PMH significant for HTN, Abnormal PAP, Endometrial Cancer, Osteopenia, FH of Breast Cancer who was seen initially by me on 4/27/2018 and for follow up on 6/12/2018.  Patient previously followed by Dr. Solares..    Chief Complaint: Follow-up    HPI  Patient is overall stable.  She was concerned that her Amlodipine was recalled - discussed thaat it is only in combination with Valsartan.  She is undergoing treatment for recurrent Endometrial Cancer.  Patient denies f/c, n/v/d.  No chest pain or SOB.  No abdominal pain or dysuria.  No headaches or change in vision.  No dizziness.  No significant  weight gain or weight loss.  Remaining ROS negative.    Review of Systems   Constitutional: Negative for appetite change, chills, diaphoresis, fatigue, fever and unexpected weight change.   HENT: Negative for ear pain, hearing loss, sinus pain, tinnitus, trouble swallowing and voice change.    Eyes: Negative for photophobia, pain and visual disturbance.   Respiratory: Negative for chest tightness, shortness of breath and wheezing.    Cardiovascular: Negative for chest pain, palpitations and leg swelling.   Gastrointestinal: Negative for abdominal pain, blood in stool, constipation, diarrhea, nausea and vomiting.   Endocrine: Negative for cold intolerance, heat intolerance, polydipsia, polyphagia and polyuria.   Genitourinary: Negative for decreased urine volume, difficulty urinating, dysuria, flank pain, hematuria, pelvic pain, vaginal bleeding, vaginal discharge and vaginal pain.   Musculoskeletal: Negative for arthralgias, gait problem, joint swelling, myalgias and neck pain.   Skin: Negative for rash.   Neurological: Negative for dizziness, tremors, syncope, weakness, numbness and headaches.   Hematological: Does not bruise/bleed easily.   Psychiatric/Behavioral: Negative for agitation, confusion and sleep disturbance. The patient is not  nervous/anxious.        Objective:      Physical Exam   Constitutional: She is oriented to person, place, and time. She appears well-developed and well-nourished. No distress.   HENT:   Head: Normocephalic and atraumatic.   Nose: Nose normal.   Mouth/Throat: Oropharynx is clear and moist.   Eyes: Conjunctivae and EOM are normal. Pupils are equal, round, and reactive to light. No scleral icterus.   Neck: Normal range of motion. Neck supple. No JVD present. No thyromegaly present.   Cardiovascular: Normal rate, regular rhythm and intact distal pulses. Exam reveals no gallop and no friction rub.   No murmur heard.  Pulmonary/Chest: Effort normal and breath sounds normal. No respiratory distress. She has no wheezes. She has no rales.   Musculoskeletal: Normal range of motion. She exhibits no edema.   Lymphadenopathy:     She has no cervical adenopathy.   Neurological: She is alert and oriented to person, place, and time.   Skin: Skin is warm and dry. No rash noted. No erythema.   Large left upper back lipoma - soft and nontender   Psychiatric: She has a normal mood and affect. Her behavior is normal. Thought content normal.       Assessment:       1. Essential hypertension    2. Endometrial carcinoma, serous    3. Pure hypercholesterolemia    4. Influenza vaccination declined    5. Lipoma of back    6. Vitamin D insufficiency        Plan:   -Today's Visit - patient is awake and alert.      -Cards - HTN - on Amlodipine, but has not taken last visit She does not take her BP at home, but occasionally checks it at the pharmacy and it runs high.   I increased her Amlodipine from 5mg to 10mg in 4/2018.  BP in 6/2018 was 130/80 and today is stable at 134/73.    Last EKG done 7/2017 and was normal.      Last lipids in 4/2018 was , TG 72, HDL 89, WUU335.6.  10 year CHD risk is 7% with a comparative risk of 13%.    -Onc - Endometrial Cancer (12/2014 stage 1A) - S/p VILLA with BSO.  S/p Vaginal Cuff radiation and completed 6  "cycles of Taxol and Carboplatin in 5/2014.     She was last seen by GYN Onc on 12/4/2018 - "Sept 2018:  was 47. CT scan of CAP showed numerous bilateral pulmonary nodules and enlarged peripancreatic and gonzalo hepatis lymph nodes with soft tissue infiltration of the mesentery at the region". "Patient comes in today for cycle 4 of taxol and carboplatin for recurrent USC.    47>21(p 3rd cycle).  Developed a cough and "odd sensation" in throat when taxol started. Stopped and given solucortef and symtoms resolved. Taxol restarted with out difficulty. No problems with taxol since starting steroids". Has GYN Onc follow up on 12/28/2018.    -Endocrine - Vitamin D Insufficient - level in 11/2016 was 24.  S/p Vitamin D treatment, but now on MVI with D3.  Repeat level in 4/2018 was 37.  TSH normal in 4/2018.    -GYN - Last Mammo was in 10/2018 and negative.  Last DXA was in 5/2016 and showed Osteopenia of Hips and repeat 4/27/2018 showed Osteoporosis - discussed treatment options and she is not sure she wants treatment.  Risks vs benefits of treatment discussed at length with her. Education from [facility] attached to the AVS.    -Derm - Large Left Upper Back Lipoma - since 2000 per patient.  Declining intervention.  No pain.    -HCM - We discussed Flu (declined) and Tdap (declined in 5/2017 vaccinations.  Doesn't want it again).  We discussed Shingles (declined) and Pneumococcal (declined) vaccinations.      We discussed colon cancer screening - had it in 2004 and normal per patient.  Declining repeat at this time, as her sister had an adverse event with her procedure). FITKIT in 5/2018 was negative.  HCV Ab negative in 11/2014.    -Follow up in 4 months and will check fasting labs at that time.    "

## 2018-12-10 ENCOUNTER — OFFICE VISIT (OUTPATIENT)
Dept: PRIMARY CARE CLINIC | Facility: CLINIC | Age: 68
End: 2018-12-10
Payer: MEDICARE

## 2018-12-10 VITALS
BODY MASS INDEX: 26.13 KG/M2 | HEART RATE: 90 BPM | DIASTOLIC BLOOD PRESSURE: 73 MMHG | OXYGEN SATURATION: 98 % | SYSTOLIC BLOOD PRESSURE: 134 MMHG | HEIGHT: 62 IN | WEIGHT: 142 LBS

## 2018-12-10 DIAGNOSIS — D17.1 LIPOMA OF BACK: ICD-10-CM

## 2018-12-10 DIAGNOSIS — Z28.21 INFLUENZA VACCINATION DECLINED: ICD-10-CM

## 2018-12-10 DIAGNOSIS — C54.1 ENDOMETRIAL CARCINOMA: ICD-10-CM

## 2018-12-10 DIAGNOSIS — E55.9 VITAMIN D INSUFFICIENCY: ICD-10-CM

## 2018-12-10 DIAGNOSIS — I10 ESSENTIAL HYPERTENSION: Primary | Chronic | ICD-10-CM

## 2018-12-10 DIAGNOSIS — I10 ESSENTIAL HYPERTENSION: Chronic | ICD-10-CM

## 2018-12-10 DIAGNOSIS — E78.00 PURE HYPERCHOLESTEROLEMIA: ICD-10-CM

## 2018-12-10 PROCEDURE — 99213 OFFICE O/P EST LOW 20 MIN: CPT | Mod: PBBFAC,PN | Performed by: INTERNAL MEDICINE

## 2018-12-10 PROCEDURE — 99999 PR PBB SHADOW E&M-EST. PATIENT-LVL III: CPT | Mod: PBBFAC,,, | Performed by: INTERNAL MEDICINE

## 2018-12-10 PROCEDURE — 99214 OFFICE O/P EST MOD 30 MIN: CPT | Mod: S$PBB,,, | Performed by: INTERNAL MEDICINE

## 2018-12-10 RX ORDER — AMLODIPINE BESYLATE 10 MG/1
TABLET ORAL
Qty: 90 TABLET | Refills: 0 | Status: SHIPPED | OUTPATIENT
Start: 2018-12-10 | End: 2019-03-10 | Stop reason: SDUPTHER

## 2018-12-10 NOTE — PATIENT INSTRUCTIONS
Your exam was overall normal today.    Your blood pressure was good.    Return in 4 months - sooner if needed.  Please come at least 15-20 minutes before your scheduled appointment time.  We will check fasting labs next visit.

## 2019-01-02 ENCOUNTER — INFUSION (OUTPATIENT)
Dept: INFUSION THERAPY | Facility: HOSPITAL | Age: 69
End: 2019-01-02
Attending: OBSTETRICS & GYNECOLOGY
Payer: MEDICARE

## 2019-01-02 ENCOUNTER — OFFICE VISIT (OUTPATIENT)
Dept: GYNECOLOGIC ONCOLOGY | Facility: CLINIC | Age: 69
End: 2019-01-02
Payer: MEDICARE

## 2019-01-02 VITALS
RESPIRATION RATE: 17 BRPM | HEART RATE: 87 BPM | WEIGHT: 142.63 LBS | DIASTOLIC BLOOD PRESSURE: 68 MMHG | TEMPERATURE: 98 F | SYSTOLIC BLOOD PRESSURE: 136 MMHG | BODY MASS INDEX: 26.25 KG/M2 | HEIGHT: 62 IN

## 2019-01-02 VITALS
HEART RATE: 113 BPM | DIASTOLIC BLOOD PRESSURE: 64 MMHG | BODY MASS INDEX: 26.25 KG/M2 | HEIGHT: 62 IN | WEIGHT: 142.63 LBS | SYSTOLIC BLOOD PRESSURE: 134 MMHG

## 2019-01-02 DIAGNOSIS — Z51.11 CHEMOTHERAPY MANAGEMENT, ENCOUNTER FOR: ICD-10-CM

## 2019-01-02 DIAGNOSIS — C54.1 ENDOMETRIAL CARCINOMA: Primary | ICD-10-CM

## 2019-01-02 PROCEDURE — 99999 PR PBB SHADOW E&M-EST. PATIENT-LVL III: CPT | Mod: PBBFAC,,, | Performed by: OBSTETRICS & GYNECOLOGY

## 2019-01-02 PROCEDURE — 25000003 PHARM REV CODE 250: Performed by: OBSTETRICS & GYNECOLOGY

## 2019-01-02 PROCEDURE — 99214 OFFICE O/P EST MOD 30 MIN: CPT | Mod: S$PBB,,, | Performed by: OBSTETRICS & GYNECOLOGY

## 2019-01-02 PROCEDURE — 96417 CHEMO IV INFUS EACH ADDL SEQ: CPT

## 2019-01-02 PROCEDURE — 99213 OFFICE O/P EST LOW 20 MIN: CPT | Mod: PBBFAC | Performed by: OBSTETRICS & GYNECOLOGY

## 2019-01-02 PROCEDURE — 63600175 PHARM REV CODE 636 W HCPCS: Performed by: OBSTETRICS & GYNECOLOGY

## 2019-01-02 PROCEDURE — S0028 INJECTION, FAMOTIDINE, 20 MG: HCPCS | Performed by: OBSTETRICS & GYNECOLOGY

## 2019-01-02 PROCEDURE — 99214 PR OFFICE/OUTPT VISIT, EST, LEVL IV, 30-39 MIN: ICD-10-PCS | Mod: S$PBB,,, | Performed by: OBSTETRICS & GYNECOLOGY

## 2019-01-02 PROCEDURE — 96413 CHEMO IV INFUSION 1 HR: CPT

## 2019-01-02 PROCEDURE — 96367 TX/PROPH/DG ADDL SEQ IV INF: CPT

## 2019-01-02 PROCEDURE — 99999 PR PBB SHADOW E&M-EST. PATIENT-LVL III: ICD-10-PCS | Mod: PBBFAC,,, | Performed by: OBSTETRICS & GYNECOLOGY

## 2019-01-02 PROCEDURE — 96415 CHEMO IV INFUSION ADDL HR: CPT

## 2019-01-02 PROCEDURE — 96375 TX/PRO/DX INJ NEW DRUG ADDON: CPT

## 2019-01-02 RX ORDER — EPINEPHRINE 0.3 MG/.3ML
0.3 INJECTION SUBCUTANEOUS ONCE AS NEEDED
Status: CANCELLED | OUTPATIENT
Start: 2019-01-02 | End: 2019-01-02

## 2019-01-02 RX ORDER — SODIUM CHLORIDE 0.9 % (FLUSH) 0.9 %
10 SYRINGE (ML) INJECTION
Status: DISCONTINUED | OUTPATIENT
Start: 2019-01-02 | End: 2019-01-02 | Stop reason: HOSPADM

## 2019-01-02 RX ORDER — FAMOTIDINE 10 MG/ML
20 INJECTION INTRAVENOUS
Status: COMPLETED | OUTPATIENT
Start: 2019-01-02 | End: 2019-01-02

## 2019-01-02 RX ORDER — HEPARIN 100 UNIT/ML
500 SYRINGE INTRAVENOUS
Status: DISCONTINUED | OUTPATIENT
Start: 2019-01-02 | End: 2019-01-02 | Stop reason: HOSPADM

## 2019-01-02 RX ORDER — FAMOTIDINE 10 MG/ML
20 INJECTION INTRAVENOUS
Status: CANCELLED | OUTPATIENT
Start: 2019-01-02

## 2019-01-02 RX ORDER — EPINEPHRINE 0.3 MG/.3ML
0.3 INJECTION SUBCUTANEOUS ONCE AS NEEDED
Status: DISCONTINUED | OUTPATIENT
Start: 2019-01-02 | End: 2019-01-02 | Stop reason: HOSPADM

## 2019-01-02 RX ORDER — DIPHENHYDRAMINE HYDROCHLORIDE 50 MG/ML
50 INJECTION INTRAMUSCULAR; INTRAVENOUS ONCE AS NEEDED
Status: DISCONTINUED | OUTPATIENT
Start: 2019-01-02 | End: 2019-01-02 | Stop reason: HOSPADM

## 2019-01-02 RX ORDER — DIPHENHYDRAMINE HYDROCHLORIDE 50 MG/ML
50 INJECTION INTRAMUSCULAR; INTRAVENOUS ONCE AS NEEDED
Status: CANCELLED | OUTPATIENT
Start: 2019-01-02 | End: 2019-01-02

## 2019-01-02 RX ORDER — SODIUM CHLORIDE 0.9 % (FLUSH) 0.9 %
10 SYRINGE (ML) INJECTION
Status: CANCELLED | OUTPATIENT
Start: 2019-01-02

## 2019-01-02 RX ORDER — HEPARIN 100 UNIT/ML
500 SYRINGE INTRAVENOUS
Status: CANCELLED | OUTPATIENT
Start: 2019-01-02

## 2019-01-02 RX ADMIN — DIPHENHYDRAMINE HYDROCHLORIDE 50 MG: 50 INJECTION, SOLUTION INTRAMUSCULAR; INTRAVENOUS at 10:01

## 2019-01-02 RX ADMIN — SODIUM CHLORIDE: 9 INJECTION, SOLUTION INTRAVENOUS at 10:01

## 2019-01-02 RX ADMIN — FAMOTIDINE 20 MG: 10 INJECTION, SOLUTION INTRAVENOUS at 10:01

## 2019-01-02 RX ADMIN — PACLITAXEL 252 MG: 6 INJECTION, SOLUTION INTRAVENOUS at 10:01

## 2019-01-02 RX ADMIN — DEXAMETHASONE SODIUM PHOSPHATE: 4 INJECTION, SOLUTION INTRA-ARTICULAR; INTRALESIONAL; INTRAMUSCULAR; INTRAVENOUS; SOFT TISSUE at 10:01

## 2019-01-02 RX ADMIN — CARBOPLATIN 470 MG: 10 INJECTION, SOLUTION INTRAVENOUS at 01:01

## 2019-01-02 NOTE — PLAN OF CARE
Problem: Adult Inpatient Plan of Care  Goal: Plan of Care Review  Outcome: Ongoing (interventions implemented as appropriate)  Pt tolerated Taxol and Carboplatin infusions well, with no complications. VS stable throughout infusions. Right hand PIV positive for blood return, SL and removed prior to discharge. Catheter tip intact. Pt discharged with no distress noted and ambulated independently out of clinic accompanied by daughter. RTC in three weeks.

## 2019-01-02 NOTE — PROGRESS NOTES
Subjective:       Patient ID: Jessica Gorman is a 68 y.o. female.    Chief Complaint: Endometrial carcinoma, serous and Chemotherapy (Carbo/Taxol C5)    HPI     Patient comes in today for cycle 5 of taxol and carboplatin for recurrent USC.       47>21(p 3rd cycle).    Had itching at end of carboplatin. Given additional benadryl and had no further problems.              Chemotherapy labs have been reviewed and are appropriate for treatment.               Mammogram: Oct  8, 2018: normal CBE: June 2018.   Colonscopy: 2004: normal   Sigmoidoscopy: 2015: normal.       Her oncologic history is:    She was operated on by me at 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    CT scan of the abdomen and pelvis after surgery showed: 1.Postsurgical changes seen in the pelvis from hysterectomy and bilateral salpingo-oophorectomy    2.A fluid collection is seen posterior to the cervix and likely represents a seroma. However, abscess cannot be ruled out.    3.Enlarged left inguinal lymph node identified    Ultrasound of this inguinal node was done and showed:    Limited sonographic imaging was performed in an area of a palpable abnormality within the patient's left inguinal region. Within this region, a lymph node is seen that measures 2.4 x 0.9 x 2.8 cm. While enlarged, this lymph node does   demonstrate a fatty hilum and is otherwise normal in morphology.    The patient reports that this palpable abnormality has been getting smaller over time. I discussed with the patient the need to biopsy this lesion to fully exclude any recurrent malignancy however at this time the patient deferred a biopsy attempt.    If this lesion  "does not resolve, a ultrasound guided biopsy could be rescheduled.        Patient completed vaginal cuff radiation.    She completed 6 cycles of taxol and carboplatin in May 2015.    June 2015: CT scan at completion of treatment shows no evidence for disease. No adenopathy.  was 7.       Sept 2018:  was 47. CT scan of CAP showed numerous bilateral pulmonary nodules and enlarged peripancreatic and gonzalo hepatis lymph nodes with soft tissue infiltration of the mesentery at the region.    3.4 cm left renal mass along the lower pole of the left kidney measures greater than water density.  Differential would include solid renal mass including primary renal neoplasm or metastatic lesion versus is a complex cyst.         Strong family history of breast cancer on mother's side. Patient's daughter has been tested for BRCA and is negative. Patient has been tested and is BRCA negative.           Review of Systems   Constitutional: Negative for chills, fatigue and fever.   Respiratory: Negative for cough, shortness of breath and wheezing.    Cardiovascular: Negative for chest pain, palpitations and leg swelling.   Gastrointestinal: Negative for abdominal pain, constipation, diarrhea, nausea and vomiting.   Genitourinary: Negative for difficulty urinating, dysuria, frequency, genital sores, hematuria, urgency, vaginal bleeding, vaginal discharge and vaginal pain.   Musculoskeletal: Negative for gait problem.   Neurological: Negative for weakness and numbness.   Hematological: Negative for adenopathy. Does not bruise/bleed easily.   Psychiatric/Behavioral: The patient is not nervous/anxious.        Objective:   /64   Pulse (!) 113   Ht 5' 2" (1.575 m)   Wt 64.7 kg (142 lb 10.2 oz)   BMI 26.09 kg/m²      Physical Exam   Constitutional: She is oriented to person, place, and time. She appears well-developed and well-nourished.   HENT:   Head: Normocephalic and atraumatic.   Eyes: No scleral icterus.   Neck: No " tracheal deviation present. No thyromegaly present.   Cardiovascular: Normal rate and regular rhythm.   Pulmonary/Chest: Effort normal and breath sounds normal.   Abdominal: Soft. She exhibits no distension and no mass. There is no tenderness. There is no rebound and no guarding. No hernia.   Genitourinary:   Genitourinary Comments: Not performed.    Musculoskeletal: She exhibits no edema or tenderness.   Lymphadenopathy:     She has no cervical adenopathy.   Neurological: She is alert and oriented to person, place, and time.   Skin: Skin is warm and dry.   Psychiatric: She has a normal mood and affect. Her behavior is normal. Judgment and thought content normal.       Assessment:       1. Endometrial carcinoma, serous    2. Chemotherapy management, encounter for        Plan:   Endometrial carcinoma, serous  Proceed with cycle 5.   Chemotherapy management, encounter for

## 2019-01-21 ENCOUNTER — LAB VISIT (OUTPATIENT)
Dept: LAB | Facility: HOSPITAL | Age: 69
End: 2019-01-21
Attending: OBSTETRICS & GYNECOLOGY
Payer: MEDICARE

## 2019-01-21 DIAGNOSIS — Z85.42 HISTORY OF UTERINE CANCER: ICD-10-CM

## 2019-01-21 LAB
ANION GAP SERPL CALC-SCNC: 8 MMOL/L
BUN SERPL-MCNC: 21 MG/DL
CALCIUM SERPL-MCNC: 8.9 MG/DL
CHLORIDE SERPL-SCNC: 106 MMOL/L
CO2 SERPL-SCNC: 25 MMOL/L
CREAT SERPL-MCNC: 0.8 MG/DL
ERYTHROCYTE [DISTWIDTH] IN BLOOD BY AUTOMATED COUNT: 16.1 %
EST. GFR  (AFRICAN AMERICAN): >60 ML/MIN/1.73 M^2
EST. GFR  (NON AFRICAN AMERICAN): >60 ML/MIN/1.73 M^2
GLUCOSE SERPL-MCNC: 94 MG/DL
HCT VFR BLD AUTO: 33.5 %
HGB BLD-MCNC: 10.6 G/DL
IMM GRANULOCYTES # BLD AUTO: 0.01 K/UL
MCH RBC QN AUTO: 29.4 PG
MCHC RBC AUTO-ENTMCNC: 31.6 G/DL
MCV RBC AUTO: 93 FL
NEUTROPHILS # BLD AUTO: 1.4 K/UL
PLATELET # BLD AUTO: 225 K/UL
PMV BLD AUTO: 9.8 FL
POTASSIUM SERPL-SCNC: 4.3 MMOL/L
RBC # BLD AUTO: 3.61 M/UL
SODIUM SERPL-SCNC: 139 MMOL/L
WBC # BLD AUTO: 4.02 K/UL

## 2019-01-21 PROCEDURE — 85027 COMPLETE CBC AUTOMATED: CPT

## 2019-01-21 PROCEDURE — 80048 BASIC METABOLIC PNL TOTAL CA: CPT

## 2019-01-21 PROCEDURE — 36415 COLL VENOUS BLD VENIPUNCTURE: CPT

## 2019-01-21 NOTE — PROGRESS NOTES
Subjective:       Patient ID: Jessica Gorman is a 68 y.o. female.    Chief Complaint: Endometrial carcinoma, serous and Chemotherapy (Carbo/ Taxol C6)    HPI   Patient comes in today for cycle 6 of taxol and carboplatin for recurrent USC.       47>21(p 3rd cycle).     Had itching at end of carboplatin with cycle 4. Given additional benadryl and had no further problems.       Chemotherapy labs have been reviewed and are appropriate for treatment. She has no complaints today. Reports occas nausea mostly controlled with diet.      Mammogram: Oct  8, 2018: normal CBE: June 2018.   Colonscopy: 2004: normal   Sigmoidoscopy: 2015: normal.       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    CT scan of the abdomen and pelvis after surgery showed: 1.Postsurgical changes seen in the pelvis from hysterectomy and bilateral salpingo-oophorectomy    2.A fluid collection is seen posterior to the cervix and likely represents a seroma. However, abscess cannot be ruled out.    3.Enlarged left inguinal lymph node identified    Ultrasound of this inguinal node was done and showed:    Limited sonographic imaging was performed in an area of a palpable abnormality within the patient's left inguinal region. Within this region, a lymph node is seen that measures 2.4 x 0.9 x 2.8 cm. While enlarged, this lymph node does   demonstrate a fatty hilum and is otherwise normal in morphology.    The patient reports that this palpable abnormality has been getting smaller over time. I discussed with the patient the need to biopsy this lesion to fully exclude any recurrent malignancy  "however at this time the patient deferred a biopsy attempt.    If this lesion does not resolve, a ultrasound guided biopsy could be rescheduled.        Patient completed vaginal cuff radiation.    She completed 6 cycles of taxol and carboplatin in May 2015.    June 2015: CT scan at completion of treatment shows no evidence for disease. No adenopathy.  was 7.       Sept 2018:  was 47. CT scan of CAP showed numerous bilateral pulmonary nodules and enlarged peripancreatic and gonzalo hepatis lymph nodes with soft tissue infiltration of the mesentery at the region.    3.4 cm left renal mass along the lower pole of the left kidney measures greater than water density.  Differential would include solid renal mass including primary renal neoplasm or metastatic lesion versus is a complex cyst.         Strong family history of breast cancer on mother's side. Patient's daughter has been tested for BRCA and is negative. Patient has been tested and is BRCA negative.           Review of Systems   Constitutional: Negative for chills, fatigue, fever and unexpected weight change.   Respiratory: Negative for cough, shortness of breath and wheezing.    Cardiovascular: Negative for chest pain, palpitations and leg swelling.   Gastrointestinal: Negative for abdominal pain, constipation, diarrhea, nausea (occas) and vomiting.   Genitourinary: Negative for difficulty urinating, dysuria, frequency, genital sores, hematuria, urgency, vaginal bleeding, vaginal discharge and vaginal pain.   Musculoskeletal: Negative for gait problem.   Skin: Negative for pallor.   Neurological: Negative for weakness and numbness.   Hematological: Negative for adenopathy. Does not bruise/bleed easily.   Psychiatric/Behavioral: The patient is not nervous/anxious.        Objective:   /74   Pulse (!) 127   Ht 5' 2" (1.575 m)   Wt 65.8 kg (145 lb 1 oz)   BMI 26.53 kg/m²      Physical Exam   Constitutional: She is oriented to person, place, and " time. She appears well-developed and well-nourished. No distress.   HENT:   Head: Normocephalic and atraumatic.   Eyes: No scleral icterus.   Neck: Normal range of motion. No tracheal deviation present. No thyromegaly present.   Cardiovascular: Regular rhythm and normal heart sounds.   tachycardic   Pulmonary/Chest: Effort normal and breath sounds normal. No respiratory distress. She has no wheezes.   Abdominal: Soft. She exhibits no distension. There is no tenderness.   Genitourinary:   Genitourinary Comments: Not performed.    Musculoskeletal: Normal range of motion. She exhibits no edema or tenderness.   Lymphadenopathy:     She has no cervical adenopathy.   Neurological: She is alert and oriented to person, place, and time.   Skin: Skin is warm and dry. No rash noted. No pallor.   Psychiatric: She has a normal mood and affect. Her behavior is normal. Judgment and thought content normal.       Assessment:       1. Endometrial carcinoma, serous    2. Chemotherapy management, encounter for    3. Chemotherapy-induced nausea        Plan:   Endometrial carcinoma, serous  Proceed with cycle 6     -     Basic metabolic panel; Future; Expected date: 01/22/2019  -     ; Future; Expected date: 01/22/2019  -     CBC Oncology; Standing  -     CT Abdomen Pelvis With Contrast; Future; Expected date: 01/22/2019  -     CT Chest With Contrast; Future; Expected date: 01/22/2019    Chemotherapy management, encounter for    Chemotherapy-induced nausea  -     ondansetron (ZOFRAN-ODT) 8 MG TbDL; Take 1 tablet (8 mg total) by mouth every 12 (twelve) hours as needed.  Dispense: 30 tablet; Refill: 1

## 2019-01-22 ENCOUNTER — OFFICE VISIT (OUTPATIENT)
Dept: GYNECOLOGIC ONCOLOGY | Facility: CLINIC | Age: 69
End: 2019-01-22
Payer: MEDICARE

## 2019-01-22 ENCOUNTER — INFUSION (OUTPATIENT)
Dept: INFUSION THERAPY | Facility: HOSPITAL | Age: 69
End: 2019-01-22
Attending: OBSTETRICS & GYNECOLOGY
Payer: MEDICARE

## 2019-01-22 VITALS
HEART RATE: 127 BPM | SYSTOLIC BLOOD PRESSURE: 135 MMHG | HEIGHT: 62 IN | BODY MASS INDEX: 26.69 KG/M2 | DIASTOLIC BLOOD PRESSURE: 74 MMHG | WEIGHT: 145.06 LBS

## 2019-01-22 VITALS
HEART RATE: 92 BPM | WEIGHT: 145.06 LBS | HEIGHT: 62 IN | TEMPERATURE: 98 F | DIASTOLIC BLOOD PRESSURE: 55 MMHG | BODY MASS INDEX: 26.69 KG/M2 | SYSTOLIC BLOOD PRESSURE: 114 MMHG | RESPIRATION RATE: 18 BRPM

## 2019-01-22 DIAGNOSIS — C54.1 ENDOMETRIAL CARCINOMA: Primary | ICD-10-CM

## 2019-01-22 DIAGNOSIS — R11.0 CHEMOTHERAPY-INDUCED NAUSEA: ICD-10-CM

## 2019-01-22 DIAGNOSIS — Z51.11 CHEMOTHERAPY MANAGEMENT, ENCOUNTER FOR: ICD-10-CM

## 2019-01-22 DIAGNOSIS — T45.1X5A CHEMOTHERAPY-INDUCED NAUSEA: ICD-10-CM

## 2019-01-22 PROCEDURE — 96367 TX/PROPH/DG ADDL SEQ IV INF: CPT

## 2019-01-22 PROCEDURE — 96413 CHEMO IV INFUSION 1 HR: CPT

## 2019-01-22 PROCEDURE — 96361 HYDRATE IV INFUSION ADD-ON: CPT

## 2019-01-22 PROCEDURE — 96360 HYDRATION IV INFUSION INIT: CPT

## 2019-01-22 PROCEDURE — 96417 CHEMO IV INFUS EACH ADDL SEQ: CPT

## 2019-01-22 PROCEDURE — 96375 TX/PRO/DX INJ NEW DRUG ADDON: CPT

## 2019-01-22 PROCEDURE — 25000003 PHARM REV CODE 250: Performed by: OBSTETRICS & GYNECOLOGY

## 2019-01-22 PROCEDURE — 99214 PR OFFICE/OUTPT VISIT, EST, LEVL IV, 30-39 MIN: ICD-10-PCS | Mod: S$PBB,,, | Performed by: OBSTETRICS & GYNECOLOGY

## 2019-01-22 PROCEDURE — 96415 CHEMO IV INFUSION ADDL HR: CPT

## 2019-01-22 PROCEDURE — 99999 PR PBB SHADOW E&M-EST. PATIENT-LVL III: ICD-10-PCS | Mod: PBBFAC,,, | Performed by: OBSTETRICS & GYNECOLOGY

## 2019-01-22 PROCEDURE — 63600175 PHARM REV CODE 636 W HCPCS: Mod: JG | Performed by: OBSTETRICS & GYNECOLOGY

## 2019-01-22 PROCEDURE — 99214 OFFICE O/P EST MOD 30 MIN: CPT | Mod: S$PBB,,, | Performed by: OBSTETRICS & GYNECOLOGY

## 2019-01-22 PROCEDURE — S0028 INJECTION, FAMOTIDINE, 20 MG: HCPCS | Performed by: OBSTETRICS & GYNECOLOGY

## 2019-01-22 PROCEDURE — 99999 PR PBB SHADOW E&M-EST. PATIENT-LVL III: CPT | Mod: PBBFAC,,, | Performed by: OBSTETRICS & GYNECOLOGY

## 2019-01-22 PROCEDURE — 99213 OFFICE O/P EST LOW 20 MIN: CPT | Mod: PBBFAC,25 | Performed by: OBSTETRICS & GYNECOLOGY

## 2019-01-22 RX ORDER — SODIUM CHLORIDE 0.9 % (FLUSH) 0.9 %
10 SYRINGE (ML) INJECTION
Status: CANCELLED | OUTPATIENT
Start: 2019-01-22

## 2019-01-22 RX ORDER — HEPARIN 100 UNIT/ML
500 SYRINGE INTRAVENOUS
Status: CANCELLED | OUTPATIENT
Start: 2019-01-22

## 2019-01-22 RX ORDER — DIPHENHYDRAMINE HYDROCHLORIDE 50 MG/ML
50 INJECTION INTRAMUSCULAR; INTRAVENOUS ONCE AS NEEDED
Status: CANCELLED | OUTPATIENT
Start: 2019-01-22 | End: 2019-01-22

## 2019-01-22 RX ORDER — EPINEPHRINE 0.3 MG/.3ML
0.3 INJECTION SUBCUTANEOUS ONCE AS NEEDED
Status: DISCONTINUED | OUTPATIENT
Start: 2019-01-22 | End: 2019-01-22 | Stop reason: HOSPADM

## 2019-01-22 RX ORDER — FAMOTIDINE 10 MG/ML
20 INJECTION INTRAVENOUS
Status: COMPLETED | OUTPATIENT
Start: 2019-01-22 | End: 2019-01-22

## 2019-01-22 RX ORDER — DIPHENHYDRAMINE HYDROCHLORIDE 50 MG/ML
50 INJECTION INTRAMUSCULAR; INTRAVENOUS ONCE AS NEEDED
Status: DISCONTINUED | OUTPATIENT
Start: 2019-01-22 | End: 2019-01-22 | Stop reason: HOSPADM

## 2019-01-22 RX ORDER — EPINEPHRINE 0.3 MG/.3ML
0.3 INJECTION SUBCUTANEOUS ONCE AS NEEDED
Status: CANCELLED | OUTPATIENT
Start: 2019-01-22 | End: 2019-01-22

## 2019-01-22 RX ORDER — ONDANSETRON 8 MG/1
8 TABLET, ORALLY DISINTEGRATING ORAL EVERY 12 HOURS PRN
Qty: 30 TABLET | Refills: 1 | Status: ON HOLD | OUTPATIENT
Start: 2019-01-22 | End: 2019-10-23 | Stop reason: SDUPTHER

## 2019-01-22 RX ORDER — FAMOTIDINE 10 MG/ML
20 INJECTION INTRAVENOUS
Status: CANCELLED | OUTPATIENT
Start: 2019-01-22

## 2019-01-22 RX ADMIN — SODIUM CHLORIDE: 0.9 INJECTION, SOLUTION INTRAVENOUS at 09:01

## 2019-01-22 RX ADMIN — DEXAMETHASONE SODIUM PHOSPHATE: 4 INJECTION, SOLUTION INTRA-ARTICULAR; INTRALESIONAL; INTRAMUSCULAR; INTRAVENOUS; SOFT TISSUE at 09:01

## 2019-01-22 RX ADMIN — FAMOTIDINE 20 MG: 10 INJECTION, SOLUTION INTRAVENOUS at 10:01

## 2019-01-22 RX ADMIN — DIPHENHYDRAMINE HYDROCHLORIDE 50 MG: 50 INJECTION, SOLUTION INTRAMUSCULAR; INTRAVENOUS at 09:01

## 2019-01-22 RX ADMIN — CARBOPLATIN 470 MG: 10 INJECTION, SOLUTION INTRAVENOUS at 01:01

## 2019-01-22 RX ADMIN — PACLITAXEL 252 MG: 6 INJECTION, SOLUTION INTRAVENOUS at 10:01

## 2019-01-22 NOTE — PLAN OF CARE
Problem: Adult Inpatient Plan of Care  Goal: Plan of Care Review  Outcome: Ongoing (interventions implemented as appropriate)  Tolerated infusion well.  No reactions noted.  Encouraged to increase fluid intake, verbalized understanding.  No questions or concerns at this time.  Ambulated of unit unassisted.

## 2019-01-22 NOTE — PLAN OF CARE
Problem: Nausea and Vomiting (Chemotherapy Effects)  Goal: Fluid and Electrolyte Balance  Outcome: Ongoing (interventions implemented as appropriate)  Patient here for Taxol/Carbo.  Assessment completed and labs reviewed.  VSS.  No needs at this time.  Chair reclined and blanket offered.  Will continue to monitor.

## 2019-02-12 ENCOUNTER — HOSPITAL ENCOUNTER (OUTPATIENT)
Dept: RADIOLOGY | Facility: HOSPITAL | Age: 69
Discharge: HOME | End: 2019-02-12
Attending: OBSTETRICS & GYNECOLOGY
Payer: MEDICARE

## 2019-02-12 ENCOUNTER — TELEPHONE (OUTPATIENT)
Dept: GYNECOLOGIC ONCOLOGY | Facility: CLINIC | Age: 69
End: 2019-02-12

## 2019-02-12 DIAGNOSIS — C54.1 ENDOMETRIAL CARCINOMA: ICD-10-CM

## 2019-02-12 PROCEDURE — 71260 CT CHEST ABDOMEN PELVIS WITH CONTRAST (XPD): ICD-10-PCS | Mod: 26,,, | Performed by: RADIOLOGY

## 2019-02-12 PROCEDURE — 74177 CT ABD & PELVIS W/CONTRAST: CPT | Mod: TC

## 2019-02-12 PROCEDURE — 71260 CT THORAX DX C+: CPT | Mod: 26,,, | Performed by: RADIOLOGY

## 2019-02-12 PROCEDURE — 74177 CT CHEST ABDOMEN PELVIS WITH CONTRAST (XPD): ICD-10-PCS | Mod: 26,,, | Performed by: RADIOLOGY

## 2019-02-12 PROCEDURE — 25500020 PHARM REV CODE 255: Performed by: OBSTETRICS & GYNECOLOGY

## 2019-02-12 PROCEDURE — 74177 CT ABD & PELVIS W/CONTRAST: CPT | Mod: 26,,, | Performed by: RADIOLOGY

## 2019-02-12 RX ADMIN — IOHEXOL 75 ML: 350 INJECTION, SOLUTION INTRAVENOUS at 01:02

## 2019-02-13 ENCOUNTER — OFFICE VISIT (OUTPATIENT)
Dept: GYNECOLOGIC ONCOLOGY | Facility: CLINIC | Age: 69
End: 2019-02-13
Payer: MEDICARE

## 2019-02-13 VITALS
SYSTOLIC BLOOD PRESSURE: 135 MMHG | BODY MASS INDEX: 25.84 KG/M2 | DIASTOLIC BLOOD PRESSURE: 65 MMHG | WEIGHT: 140.44 LBS | HEART RATE: 97 BPM | HEIGHT: 62 IN

## 2019-02-13 DIAGNOSIS — C54.1 ENDOMETRIAL CARCINOMA: Primary | ICD-10-CM

## 2019-02-13 DIAGNOSIS — Z51.11 CHEMOTHERAPY MANAGEMENT, ENCOUNTER FOR: ICD-10-CM

## 2019-02-13 PROCEDURE — 99214 OFFICE O/P EST MOD 30 MIN: CPT | Mod: S$PBB,,, | Performed by: OBSTETRICS & GYNECOLOGY

## 2019-02-13 PROCEDURE — 99213 OFFICE O/P EST LOW 20 MIN: CPT | Mod: PBBFAC | Performed by: OBSTETRICS & GYNECOLOGY

## 2019-02-13 PROCEDURE — 99999 PR PBB SHADOW E&M-EST. PATIENT-LVL III: CPT | Mod: PBBFAC,,, | Performed by: OBSTETRICS & GYNECOLOGY

## 2019-02-13 PROCEDURE — 99999 PR PBB SHADOW E&M-EST. PATIENT-LVL III: ICD-10-PCS | Mod: PBBFAC,,, | Performed by: OBSTETRICS & GYNECOLOGY

## 2019-02-13 PROCEDURE — 99214 PR OFFICE/OUTPT VISIT, EST, LEVL IV, 30-39 MIN: ICD-10-PCS | Mod: S$PBB,,, | Performed by: OBSTETRICS & GYNECOLOGY

## 2019-02-13 RX ORDER — FAMOTIDINE 10 MG/ML
20 INJECTION INTRAVENOUS
Status: CANCELLED | OUTPATIENT
Start: 2019-02-15

## 2019-02-13 RX ORDER — DIPHENHYDRAMINE HYDROCHLORIDE 50 MG/ML
50 INJECTION INTRAMUSCULAR; INTRAVENOUS ONCE AS NEEDED
Status: CANCELLED | OUTPATIENT
Start: 2019-02-15 | End: 2019-02-15

## 2019-02-13 RX ORDER — HEPARIN 100 UNIT/ML
500 SYRINGE INTRAVENOUS
Status: CANCELLED | OUTPATIENT
Start: 2019-02-15

## 2019-02-13 RX ORDER — SODIUM CHLORIDE 0.9 % (FLUSH) 0.9 %
10 SYRINGE (ML) INJECTION
Status: CANCELLED | OUTPATIENT
Start: 2019-02-15

## 2019-02-13 RX ORDER — EPINEPHRINE 0.3 MG/.3ML
0.3 INJECTION SUBCUTANEOUS ONCE AS NEEDED
Status: CANCELLED | OUTPATIENT
Start: 2019-02-15 | End: 2019-02-15

## 2019-02-13 NOTE — PROGRESS NOTES
Subjective:       Patient ID: Jessica Gorman is a 68 y.o. female.    Chief Complaint: Endometrial carcinoma, serous and Follow-up (CT results)    HPI   Patient comes in today after completing 6 cycles of taxol and carboplatin for recurrent UPS carcinoma.   Completion :16.  Completion CT scan:   Date of prior examination for comparison:   Lesion 1: Right lower lobe pulmonary nodule.  Resolved.  Prior measurement 1.6 cm.    Lesion 2: Left lower lobe pulmonary nodule.  Resolved.  Prior measurement 1.7 cm.    Lesion 3: Trevin hepatic lymph node.  1 cm. Series 2 image 99.  Prior measurement 4.4 cm.      Mammogram: Oct  8, 2018: normal CBE: June 2018.   Colonscopy: 2004: normal   Sigmoidoscopy: 2015: normal.       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    CT scan of the abdomen and pelvis after surgery showed: 1.Postsurgical changes seen in the pelvis from hysterectomy and bilateral salpingo-oophorectomy    2.A fluid collection is seen posterior to the cervix and likely represents a seroma. However, abscess cannot be ruled out.    3.Enlarged left inguinal lymph node identified    Ultrasound of this inguinal node was done and showed:    Limited sonographic imaging was performed in an area of a palpable abnormality within the patient's left inguinal region. Within this region, a lymph node is seen that measures 2.4 x 0.9 x 2.8 cm. While enlarged, this lymph node does   demonstrate a fatty hilum and is otherwise normal in morphology.    The patient reports that this palpable abnormality has been getting smaller over time. I discussed with the  patient the need to biopsy this lesion to fully exclude any recurrent malignancy however at this time the patient deferred a biopsy attempt.    If this lesion does not resolve, a ultrasound guided biopsy could be rescheduled.        Patient completed vaginal cuff radiation.    She completed 6 cycles of taxol and carboplatin in May 2015.    June 2015: CT scan at completion of treatment shows no evidence for disease. No adenopathy.  was 7.       Sept 2018:  was 47. CT scan of CAP showed numerous bilateral pulmonary nodules and enlarged peripancreatic and trevin hepatis lymph nodes with soft tissue infiltration of the mesentery at the region.    3.4 cm left renal mass along the lower pole of the left kidney measures greater than water density.  Differential would include solid renal mass including primary renal neoplasm or metastatic lesion versus is a complex cyst.     Jan 2019: completed 6 cycles of taxol and carboplatin.     Feb 2019:  Completion :16.  Completion CT scan:   Date of prior examination for comparison:   Lesion 1: Right lower lobe pulmonary nodule.  Resolved.  Prior measurement 1.6 cm.    Lesion 2: Left lower lobe pulmonary nodule.  Resolved.  Prior measurement 1.7 cm.    Lesion 3: Trevin hepatic lymph node.  1 cm. Series 2 image 99.  Prior measurement 4.4 cm.          Strong family history of breast cancer on mother's side. Patient's daughter has been tested for BRCA and is negative. Patient has been tested and is BRCA negative.              Review of Systems   Constitutional: Negative for chills, fatigue and fever.   Respiratory: Negative for cough, shortness of breath and wheezing.    Cardiovascular: Negative for chest pain, palpitations and leg swelling.   Gastrointestinal: Negative for abdominal pain, constipation, diarrhea, nausea and vomiting.   Genitourinary: Negative for difficulty urinating, dysuria, frequency, genital sores, hematuria, urgency, vaginal bleeding, vaginal  "discharge and vaginal pain.   Musculoskeletal: Negative for gait problem.   Neurological: Negative for weakness and numbness.   Hematological: Negative for adenopathy. Does not bruise/bleed easily.   Psychiatric/Behavioral: The patient is not nervous/anxious.        Objective:   /65   Pulse 97   Ht 5' 2" (1.575 m)   Wt 63.7 kg (140 lb 6.9 oz)   BMI 25.69 kg/m²      Physical Exam   Constitutional: She is oriented to person, place, and time. She appears well-developed and well-nourished.   HENT:   Head: Normocephalic and atraumatic.   Eyes: No scleral icterus.   Neck: No tracheal deviation present. No thyromegaly present.   Cardiovascular: Normal rate and regular rhythm.   Pulmonary/Chest: Effort normal and breath sounds normal.   Abdominal: Soft. She exhibits no distension and no mass. There is no tenderness. There is no rebound and no guarding. No hernia.   Genitourinary:   Genitourinary Comments: Not performed.    Musculoskeletal: She exhibits no edema or tenderness.   Lymphadenopathy:     She has no cervical adenopathy.   Neurological: She is alert and oriented to person, place, and time.   Skin: Skin is warm and dry.   Psychiatric: She has a normal mood and affect. Her behavior is normal. Judgment and thought content normal.       Assessment:       1. Endometrial carcinoma, serous    2. Chemotherapy management, encounter for        Plan:   Endometrial carcinoma, serous  Plan for 3 more cycles of taxol and carboplatin given that the gonzalo hepatitis node in 1 cm.   Will reassess after 3 more cycles and if node is 1 cm or less then will stop.   Discussed with patient and her daughters.   They voiced understanding.   Chemotherapy management, encounter for        "

## 2019-02-15 ENCOUNTER — INFUSION (OUTPATIENT)
Dept: INFUSION THERAPY | Facility: HOSPITAL | Age: 69
End: 2019-02-15
Attending: OBSTETRICS & GYNECOLOGY
Payer: MEDICARE

## 2019-02-15 VITALS
RESPIRATION RATE: 18 BRPM | HEIGHT: 62 IN | DIASTOLIC BLOOD PRESSURE: 70 MMHG | HEART RATE: 98 BPM | TEMPERATURE: 98 F | SYSTOLIC BLOOD PRESSURE: 115 MMHG | WEIGHT: 144.63 LBS | BODY MASS INDEX: 26.61 KG/M2

## 2019-02-15 DIAGNOSIS — C54.1 ENDOMETRIAL CARCINOMA: Primary | ICD-10-CM

## 2019-02-15 PROCEDURE — 96417 CHEMO IV INFUS EACH ADDL SEQ: CPT

## 2019-02-15 PROCEDURE — 63600175 PHARM REV CODE 636 W HCPCS: Performed by: OBSTETRICS & GYNECOLOGY

## 2019-02-15 PROCEDURE — 96413 CHEMO IV INFUSION 1 HR: CPT

## 2019-02-15 PROCEDURE — 96367 TX/PROPH/DG ADDL SEQ IV INF: CPT

## 2019-02-15 PROCEDURE — S0028 INJECTION, FAMOTIDINE, 20 MG: HCPCS | Performed by: OBSTETRICS & GYNECOLOGY

## 2019-02-15 PROCEDURE — 96375 TX/PRO/DX INJ NEW DRUG ADDON: CPT

## 2019-02-15 PROCEDURE — 25000003 PHARM REV CODE 250: Performed by: OBSTETRICS & GYNECOLOGY

## 2019-02-15 PROCEDURE — 96415 CHEMO IV INFUSION ADDL HR: CPT

## 2019-02-15 RX ORDER — EPINEPHRINE 0.3 MG/.3ML
0.3 INJECTION SUBCUTANEOUS ONCE AS NEEDED
Status: DISCONTINUED | OUTPATIENT
Start: 2019-02-15 | End: 2019-02-15 | Stop reason: HOSPADM

## 2019-02-15 RX ORDER — FAMOTIDINE 10 MG/ML
20 INJECTION INTRAVENOUS
Status: COMPLETED | OUTPATIENT
Start: 2019-02-15 | End: 2019-02-15

## 2019-02-15 RX ORDER — DIPHENHYDRAMINE HYDROCHLORIDE 50 MG/ML
50 INJECTION INTRAMUSCULAR; INTRAVENOUS ONCE AS NEEDED
Status: DISCONTINUED | OUTPATIENT
Start: 2019-02-15 | End: 2019-02-15 | Stop reason: HOSPADM

## 2019-02-15 RX ADMIN — FAMOTIDINE 20 MG: 10 INJECTION, SOLUTION INTRAVENOUS at 09:02

## 2019-02-15 RX ADMIN — DIPHENHYDRAMINE HYDROCHLORIDE 50 MG: 50 INJECTION, SOLUTION INTRAMUSCULAR; INTRAVENOUS at 09:02

## 2019-02-15 RX ADMIN — DEXAMETHASONE SODIUM PHOSPHATE: 4 INJECTION, SOLUTION INTRA-ARTICULAR; INTRALESIONAL; INTRAMUSCULAR; INTRAVENOUS; SOFT TISSUE at 10:02

## 2019-02-15 RX ADMIN — SODIUM CHLORIDE: 0.9 INJECTION, SOLUTION INTRAVENOUS at 09:02

## 2019-02-15 RX ADMIN — PACLITAXEL 252 MG: 6 INJECTION, SOLUTION INTRAVENOUS at 10:02

## 2019-02-15 RX ADMIN — CARBOPLATIN 470 MG: 10 INJECTION, SOLUTION INTRAVENOUS at 01:02

## 2019-02-15 NOTE — PLAN OF CARE
Problem: Adult Inpatient Plan of Care  Goal: Plan of Care Review  Outcome: Ongoing (interventions implemented as appropriate)  Pt tolerated  C7 taxol/carbo without adverse effects. VSS. Verbalized understanding of RTC date. DC ambulating independently.

## 2019-02-15 NOTE — PLAN OF CARE
Problem: Nausea and Vomiting (Chemotherapy Effects)  Goal: Fluid and Electrolyte Balance  Outcome: Ongoing (interventions implemented as appropriate)  Labs , hx, and medications reviewed. Assessment completed. Discussed plan of care with patient. Patient in agreement. VSS.  Chair reclined and warm blanket and snack offered. Will cont to monitor

## 2019-02-27 DIAGNOSIS — C54.1 ENDOMETRIAL CARCINOMA: ICD-10-CM

## 2019-02-27 RX ORDER — DEXAMETHASONE 4 MG/1
TABLET ORAL
Qty: 20 TABLET | Refills: 4 | Status: SHIPPED | OUTPATIENT
Start: 2019-02-27 | End: 2019-05-29

## 2019-03-06 ENCOUNTER — TELEPHONE (OUTPATIENT)
Dept: GYNECOLOGIC ONCOLOGY | Facility: CLINIC | Age: 69
End: 2019-03-06

## 2019-03-06 ENCOUNTER — LAB VISIT (OUTPATIENT)
Dept: LAB | Facility: HOSPITAL | Age: 69
End: 2019-03-06
Attending: OBSTETRICS & GYNECOLOGY
Payer: MEDICARE

## 2019-03-06 DIAGNOSIS — C54.1 ENDOMETRIAL CARCINOMA: ICD-10-CM

## 2019-03-06 LAB
ANION GAP SERPL CALC-SCNC: 8 MMOL/L
BUN SERPL-MCNC: 12 MG/DL
CALCIUM SERPL-MCNC: 9.8 MG/DL
CHLORIDE SERPL-SCNC: 105 MMOL/L
CO2 SERPL-SCNC: 27 MMOL/L
CREAT SERPL-MCNC: 0.8 MG/DL
ERYTHROCYTE [DISTWIDTH] IN BLOOD BY AUTOMATED COUNT: 15.3 %
EST. GFR  (AFRICAN AMERICAN): >60 ML/MIN/1.73 M^2
EST. GFR  (NON AFRICAN AMERICAN): >60 ML/MIN/1.73 M^2
GLUCOSE SERPL-MCNC: 89 MG/DL
HCT VFR BLD AUTO: 32.4 %
HGB BLD-MCNC: 10.3 G/DL
IMM GRANULOCYTES # BLD AUTO: 0.01 K/UL
MCH RBC QN AUTO: 30.4 PG
MCHC RBC AUTO-ENTMCNC: 31.8 G/DL
MCV RBC AUTO: 96 FL
NEUTROPHILS # BLD AUTO: 1.4 K/UL
PLATELET # BLD AUTO: 233 K/UL
PMV BLD AUTO: 9.3 FL
POTASSIUM SERPL-SCNC: 3.8 MMOL/L
RBC # BLD AUTO: 3.39 M/UL
SODIUM SERPL-SCNC: 140 MMOL/L
WBC # BLD AUTO: 3.47 K/UL

## 2019-03-06 PROCEDURE — 85027 COMPLETE CBC AUTOMATED: CPT

## 2019-03-06 PROCEDURE — 80048 BASIC METABOLIC PNL TOTAL CA: CPT

## 2019-03-06 PROCEDURE — 36415 COLL VENOUS BLD VENIPUNCTURE: CPT

## 2019-03-06 NOTE — TELEPHONE ENCOUNTER
Spoke with pt. Pt informed per Vesna Bales, CBC will need to be repeated Friday 3/8 at 8:30 before doctor appt. Pt voiced understanding

## 2019-03-06 NOTE — TELEPHONE ENCOUNTER
Spoke with pt. Pt informed she will receive chemo Friday 3/8/19 if counts are at levels to receive treatment. Pt voiced understanding and  confirmed if she is to take decadron as normal before chemo.

## 2019-03-06 NOTE — TELEPHONE ENCOUNTER
----- Message from Essence Goncalves sent at 3/6/2019  1:03 PM CST -----  Contact: pt   Name of Who is Calling: SARAH MAGANA [[id number]]    What is the request in detail: Patient is requesting a call back in regards to some question she have in regards to medication and Chemo....Please contact to further discuss and advise      Can the clinic reply by [facility]: No     What Number to Call Back if not in [facility]: [phone]

## 2019-03-06 NOTE — TELEPHONE ENCOUNTER
----- Message from Vesna Bales NP sent at 3/6/2019 12:08 PM CST -----  ANC 1400. Please schedule repeat CBC a.m. Fri 3/8 before she sees Dr. Watt

## 2019-03-07 ENCOUNTER — TELEPHONE (OUTPATIENT)
Dept: GYNECOLOGIC ONCOLOGY | Facility: CLINIC | Age: 69
End: 2019-03-07

## 2019-03-07 DIAGNOSIS — C54.1 ENDOMETRIAL CARCINOMA: Primary | ICD-10-CM

## 2019-03-08 ENCOUNTER — OFFICE VISIT (OUTPATIENT)
Dept: GYNECOLOGIC ONCOLOGY | Facility: CLINIC | Age: 69
End: 2019-03-08
Payer: MEDICARE

## 2019-03-08 ENCOUNTER — INFUSION (OUTPATIENT)
Dept: INFUSION THERAPY | Facility: HOSPITAL | Age: 69
End: 2019-03-08
Attending: OBSTETRICS & GYNECOLOGY
Payer: MEDICARE

## 2019-03-08 VITALS
HEART RATE: 102 BPM | DIASTOLIC BLOOD PRESSURE: 58 MMHG | SYSTOLIC BLOOD PRESSURE: 106 MMHG | RESPIRATION RATE: 18 BRPM | TEMPERATURE: 99 F

## 2019-03-08 VITALS
HEIGHT: 62 IN | DIASTOLIC BLOOD PRESSURE: 83 MMHG | BODY MASS INDEX: 25.4 KG/M2 | SYSTOLIC BLOOD PRESSURE: 140 MMHG | WEIGHT: 138 LBS | HEART RATE: 121 BPM

## 2019-03-08 DIAGNOSIS — C54.1 ENDOMETRIAL CARCINOMA: Primary | ICD-10-CM

## 2019-03-08 DIAGNOSIS — Z51.11 CHEMOTHERAPY MANAGEMENT, ENCOUNTER FOR: ICD-10-CM

## 2019-03-08 PROCEDURE — S0028 INJECTION, FAMOTIDINE, 20 MG: HCPCS | Performed by: OBSTETRICS & GYNECOLOGY

## 2019-03-08 PROCEDURE — 96413 CHEMO IV INFUSION 1 HR: CPT

## 2019-03-08 PROCEDURE — 96417 CHEMO IV INFUS EACH ADDL SEQ: CPT

## 2019-03-08 PROCEDURE — 96415 CHEMO IV INFUSION ADDL HR: CPT

## 2019-03-08 PROCEDURE — 96375 TX/PRO/DX INJ NEW DRUG ADDON: CPT

## 2019-03-08 PROCEDURE — 99999 PR PBB SHADOW E&M-EST. PATIENT-LVL III: CPT | Mod: PBBFAC,,, | Performed by: OBSTETRICS & GYNECOLOGY

## 2019-03-08 PROCEDURE — 63600175 PHARM REV CODE 636 W HCPCS: Performed by: OBSTETRICS & GYNECOLOGY

## 2019-03-08 PROCEDURE — 99214 OFFICE O/P EST MOD 30 MIN: CPT | Mod: S$PBB,,, | Performed by: OBSTETRICS & GYNECOLOGY

## 2019-03-08 PROCEDURE — 99999 PR PBB SHADOW E&M-EST. PATIENT-LVL III: ICD-10-PCS | Mod: PBBFAC,,, | Performed by: OBSTETRICS & GYNECOLOGY

## 2019-03-08 PROCEDURE — 25000003 PHARM REV CODE 250: Performed by: OBSTETRICS & GYNECOLOGY

## 2019-03-08 PROCEDURE — 96367 TX/PROPH/DG ADDL SEQ IV INF: CPT

## 2019-03-08 PROCEDURE — 99213 OFFICE O/P EST LOW 20 MIN: CPT | Mod: PBBFAC,25 | Performed by: OBSTETRICS & GYNECOLOGY

## 2019-03-08 PROCEDURE — 99214 PR OFFICE/OUTPT VISIT, EST, LEVL IV, 30-39 MIN: ICD-10-PCS | Mod: S$PBB,,, | Performed by: OBSTETRICS & GYNECOLOGY

## 2019-03-08 RX ORDER — DIPHENHYDRAMINE HYDROCHLORIDE 50 MG/ML
50 INJECTION INTRAMUSCULAR; INTRAVENOUS ONCE AS NEEDED
Status: DISCONTINUED | OUTPATIENT
Start: 2019-03-08 | End: 2019-03-08 | Stop reason: HOSPADM

## 2019-03-08 RX ORDER — FAMOTIDINE 10 MG/ML
20 INJECTION INTRAVENOUS
Status: COMPLETED | OUTPATIENT
Start: 2019-03-08 | End: 2019-03-08

## 2019-03-08 RX ORDER — EPINEPHRINE 0.3 MG/.3ML
0.3 INJECTION SUBCUTANEOUS ONCE AS NEEDED
Status: DISCONTINUED | OUTPATIENT
Start: 2019-03-08 | End: 2019-03-08 | Stop reason: HOSPADM

## 2019-03-08 RX ORDER — FAMOTIDINE 10 MG/ML
20 INJECTION INTRAVENOUS
Status: CANCELLED | OUTPATIENT
Start: 2019-03-08

## 2019-03-08 RX ORDER — HEPARIN 100 UNIT/ML
500 SYRINGE INTRAVENOUS
Status: CANCELLED | OUTPATIENT
Start: 2019-03-08

## 2019-03-08 RX ORDER — EPINEPHRINE 0.3 MG/.3ML
0.3 INJECTION SUBCUTANEOUS ONCE AS NEEDED
Status: CANCELLED | OUTPATIENT
Start: 2019-03-08 | End: 2019-03-08

## 2019-03-08 RX ORDER — SODIUM CHLORIDE 0.9 % (FLUSH) 0.9 %
10 SYRINGE (ML) INJECTION
Status: CANCELLED | OUTPATIENT
Start: 2019-03-08

## 2019-03-08 RX ORDER — DIPHENHYDRAMINE HYDROCHLORIDE 50 MG/ML
50 INJECTION INTRAMUSCULAR; INTRAVENOUS ONCE AS NEEDED
Status: CANCELLED | OUTPATIENT
Start: 2019-03-08 | End: 2019-03-08

## 2019-03-08 RX ADMIN — PACLITAXEL 252 MG: 6 INJECTION, SOLUTION INTRAVENOUS at 12:03

## 2019-03-08 RX ADMIN — CARBOPLATIN 470 MG: 10 INJECTION, SOLUTION INTRAVENOUS at 03:03

## 2019-03-08 RX ADMIN — DEXAMETHASONE SODIUM PHOSPHATE: 4 INJECTION, SOLUTION INTRA-ARTICULAR; INTRALESIONAL; INTRAMUSCULAR; INTRAVENOUS; SOFT TISSUE at 11:03

## 2019-03-08 RX ADMIN — SODIUM CHLORIDE: 0.9 INJECTION, SOLUTION INTRAVENOUS at 11:03

## 2019-03-08 RX ADMIN — DIPHENHYDRAMINE HYDROCHLORIDE 50 MG: 50 INJECTION, SOLUTION INTRAMUSCULAR; INTRAVENOUS at 11:03

## 2019-03-08 RX ADMIN — FAMOTIDINE 20 MG: 10 INJECTION, SOLUTION INTRAVENOUS at 11:03

## 2019-03-08 NOTE — PROGRESS NOTES
Detail Level: Generalized Subjective:       Patient ID: Jessica Gorman is a 68 y.o. female.    Chief Complaint: Endometrial carcinoma, serous (chemo)    HPI   Patient comes in today for  Cycle 8 of 9  of taxol and carboplatin for recurrent UPS carcinoma. She reports fatigue and weakness on days 3-6 and some numbness/tingling to her R toes but that has resolved. No other complaints. Labs have been reviewed and are appropriate for treatment.      Mammogram: Oct  8, 2018: normal CBE: June 2018.   Colonscopy: 2004: normal   Sigmoidoscopy: 2015: normal.       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    CT scan of the abdomen and pelvis after surgery showed: 1.Postsurgical changes seen in the pelvis from hysterectomy and bilateral salpingo-oophorectomy    2.A fluid collection is seen posterior to the cervix and likely represents a seroma. However, abscess cannot be ruled out.    3.Enlarged left inguinal lymph node identified    Ultrasound of this inguinal node was done and showed:    Limited sonographic imaging was performed in an area of a palpable abnormality within the patient's left inguinal region. Within this region, a lymph node is seen that measures 2.4 x 0.9 x 2.8 cm. While enlarged, this lymph node does   demonstrate a fatty hilum and is otherwise normal in morphology.    The patient reports that this palpable abnormality has been getting smaller over time. I discussed with the patient the need to biopsy this lesion to fully exclude any recurrent malignancy however at this time the patient deferred a biopsy attempt.    If this lesion does not resolve, a ultrasound  guided biopsy could be rescheduled.        Patient completed vaginal cuff radiation.    She completed 6 cycles of taxol and carboplatin in May 2015.    June 2015: CT scan at completion of treatment shows no evidence for disease. No adenopathy.  was 7.       Sept 2018:  was 47. CT scan of CAP showed numerous bilateral pulmonary nodules and enlarged peripancreatic and trevin hepatis lymph nodes with soft tissue infiltration of the mesentery at the region.    3.4 cm left renal mass along the lower pole of the left kidney measures greater than water density.  Differential would include solid renal mass including primary renal neoplasm or metastatic lesion versus is a complex cyst.      Jan 2019: completed 6 cycles of taxol and carboplatin.      Feb 2019:  Completion :16.  Completion CT scan:   Date of prior examination for comparison:   Lesion 1: Right lower lobe pulmonary nodule.  Resolved.  Prior measurement 1.6 cm.    Lesion 2: Left lower lobe pulmonary nodule.  Resolved.  Prior measurement 1.7 cm.    Lesion 3: Trevin hepatic lymph node.  1 cm. Series 2 image 99.  Prior measurement 4.4 cm.           Strong family history of breast cancer on mother's side. Patient's daughter has been tested for BRCA and is negative. Patient has been tested and is BRCA negative.           Review of Systems   Constitutional: Positive for fatigue. Negative for appetite change, chills, diaphoresis, fever and unexpected weight change.   HENT: Negative for mouth sores and tinnitus.    Respiratory: Negative for cough, chest tightness, shortness of breath and wheezing.    Cardiovascular: Negative for chest pain, palpitations and leg swelling.   Gastrointestinal: Negative for abdominal distention, abdominal pain, blood in stool, constipation, diarrhea, nausea and vomiting.   Genitourinary: Negative for difficulty urinating, dyspareunia, dysuria, flank pain, frequency, hematuria, pelvic pain, vaginal bleeding, vaginal discharge  "and vaginal pain.   Musculoskeletal: Negative for arthralgias and back pain.   Skin: Negative for color change and rash.   Neurological: Positive for weakness and numbness (R toes resolved). Negative for dizziness and headaches.   Hematological: Negative for adenopathy.   Psychiatric/Behavioral: Negative for confusion and sleep disturbance. The patient is nervous/anxious.        Objective:   BP (!) 140/83   Pulse (!) 121   Ht 5' 2" (1.575 m)   Wt 62.6 kg (138 lb)   BMI 25.24 kg/m²      Physical Exam   Constitutional: She is oriented to person, place, and time. She appears well-developed and well-nourished. No distress.   HENT:   Head: Normocephalic and atraumatic.   Eyes: No scleral icterus.   Neck: Normal range of motion.   Cardiovascular: Normal heart sounds.   tachycardic   Pulmonary/Chest: Effort normal and breath sounds normal. No respiratory distress.   Abdominal: Soft. She exhibits no distension.   Musculoskeletal: Normal range of motion. She exhibits no edema.   Neurological: She is alert and oriented to person, place, and time.   Skin: Skin is warm and dry. No rash noted. She is not diaphoretic. No pallor.   Psychiatric: She has a normal mood and affect. Her behavior is normal.   Nursing note and vitals reviewed.      Assessment:       1. Endometrial carcinoma, serous    2. Chemotherapy management, encounter for        Plan:   Endometrial carcinoma, serous      Chemotherapy management, encounter for        ok to proceed with C8 of treatment  RTC in 3 weeks. Will repeat imaging after C9.    " Detail Level: Detailed

## 2019-03-08 NOTE — PLAN OF CARE
Problem: Adult Inpatient Plan of Care  Goal: Plan of Care Review  Outcome: Ongoing (interventions implemented as appropriate)  1640-Patient tolerated treatment well. Discharged without complaints or S/S of adverse event. AVS given.  Instructed to call provider for any questions or concerns.

## 2019-03-08 NOTE — PLAN OF CARE
Problem: Adult Inpatient Plan of Care  Goal: Patient-Specific Goal (Individualization)  Outcome: Ongoing (interventions implemented as appropriate)  1100-Labs , hx, and medications reviewed, patient was seen by Md prior to arrival. Assessment completed. Discussed plan of care with patient. Patient in agreement. Chair reclined and warm blanket and snack offered.

## 2019-03-10 DIAGNOSIS — I10 ESSENTIAL HYPERTENSION: Chronic | ICD-10-CM

## 2019-03-11 ENCOUNTER — TELEPHONE (OUTPATIENT)
Dept: GYNECOLOGIC ONCOLOGY | Facility: CLINIC | Age: 69
End: 2019-03-11

## 2019-03-11 RX ORDER — AMLODIPINE BESYLATE 10 MG/1
TABLET ORAL
Qty: 90 TABLET | Refills: 0 | Status: SHIPPED | OUTPATIENT
Start: 2019-03-11 | End: 2019-06-17 | Stop reason: SDUPTHER

## 2019-03-26 NOTE — PROGRESS NOTES
Subjective:       Patient ID: Jessica Gorman is a 68 y.o. female.    Chief Complaint: Endometrial Cancer (chemo)    HPI     Patient comes in today for  Cycle 9 of 9  of taxol and carboplatin for recurrent UPS carcinoma.   No complaints.     Labs have been reviewed and are appropriate for treatment.       Mammogram: Oct  8, 2018: normal CBE: June 2018.   Colonscopy: 2004: normal   Sigmoidoscopy: 2015: normal.       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    CT scan of the abdomen and pelvis after surgery showed: 1.Postsurgical changes seen in the pelvis from hysterectomy and bilateral salpingo-oophorectomy    2.A fluid collection is seen posterior to the cervix and likely represents a seroma. However, abscess cannot be ruled out.    3.Enlarged left inguinal lymph node identified    Ultrasound of this inguinal node was done and showed:    Limited sonographic imaging was performed in an area of a palpable abnormality within the patient's left inguinal region. Within this region, a lymph node is seen that measures 2.4 x 0.9 x 2.8 cm. While enlarged, this lymph node does   demonstrate a fatty hilum and is otherwise normal in morphology.    The patient reports that this palpable abnormality has been getting smaller over time. I discussed with the patient the need to biopsy this lesion to fully exclude any recurrent malignancy however at this time the patient deferred a biopsy attempt.    If this lesion does not resolve, a ultrasound guided biopsy could be rescheduled.        Patient completed vaginal cuff radiation.    She completed 6 cycles of taxol  and carboplatin in May 2015.    June 2015: CT scan at completion of treatment shows no evidence for disease. No adenopathy.  was 7.       Sept 2018:  was 47. CT scan of CAP showed numerous bilateral pulmonary nodules and enlarged peripancreatic and trevin hepatis lymph nodes with soft tissue infiltration of the mesentery at the region.    3.4 cm left renal mass along the lower pole of the left kidney measures greater than water density.  Differential would include solid renal mass including primary renal neoplasm or metastatic lesion versus is a complex cyst.      Jan 2019: completed 6 cycles of taxol and carboplatin.      Feb 2019:  Completion :16.  Completion CT scan:   Date of prior examination for comparison:   Lesion 1: Right lower lobe pulmonary nodule.  Resolved.  Prior measurement 1.6 cm.    Lesion 2: Left lower lobe pulmonary nodule.  Resolved.  Prior measurement 1.7 cm.    Lesion 3: Trevin hepatic lymph node.  1 cm. Series 2 image 99.  Prior measurement 4.4 cm.           Strong family history of breast cancer on mother's side. Patient's daughter has been tested for BRCA and is negative. Patient has been tested and is BRCA negative.           Review of Systems   Constitutional: Negative for chills, fatigue and fever.   Respiratory: Negative for cough, shortness of breath and wheezing.    Cardiovascular: Negative for chest pain, palpitations and leg swelling.   Gastrointestinal: Negative for abdominal pain, constipation, diarrhea, nausea and vomiting.   Genitourinary: Negative for difficulty urinating, dysuria, frequency, genital sores, hematuria, urgency, vaginal bleeding, vaginal discharge and vaginal pain.   Musculoskeletal: Negative for gait problem.   Neurological: Negative for weakness and numbness.   Hematological: Negative for adenopathy. Does not bruise/bleed easily.   Psychiatric/Behavioral: The patient is not nervous/anxious.        Objective:   /76   Pulse 99   Wt 62.6 kg  (138 lb)   BMI 25.24 kg/m²      Physical Exam   Constitutional: She is oriented to person, place, and time. She appears well-developed and well-nourished.   HENT:   Head: Normocephalic and atraumatic.   Eyes: No scleral icterus.   Neck: No tracheal deviation present. No thyromegaly present.   Cardiovascular: Normal rate and regular rhythm.   Pulmonary/Chest: Effort normal and breath sounds normal.   Abdominal: Soft. She exhibits no distension and no mass. There is no tenderness. There is no rebound and no guarding. No hernia.   Genitourinary:   Genitourinary Comments: Not performed.    Musculoskeletal: She exhibits no edema or tenderness.   Lymphadenopathy:     She has no cervical adenopathy.   Neurological: She is alert and oriented to person, place, and time.   Skin: Skin is warm and dry.   Psychiatric: She has a normal mood and affect. Her behavior is normal. Judgment and thought content normal.       Assessment:       1. Endometrial carcinoma, serous    2. Chemotherapy management, encounter for        Plan:   Endometrial carcinoma, serous  Proceed with cycle 9  RTC in 3 weeks with labs and CT scan.   Chemotherapy management, encounter for

## 2019-03-28 ENCOUNTER — OFFICE VISIT (OUTPATIENT)
Dept: GYNECOLOGIC ONCOLOGY | Facility: CLINIC | Age: 69
End: 2019-03-28
Payer: MEDICARE

## 2019-03-28 VITALS
BODY MASS INDEX: 25.24 KG/M2 | DIASTOLIC BLOOD PRESSURE: 76 MMHG | SYSTOLIC BLOOD PRESSURE: 128 MMHG | WEIGHT: 138 LBS | HEART RATE: 99 BPM

## 2019-03-28 DIAGNOSIS — C54.1 ENDOMETRIAL CARCINOMA: Primary | ICD-10-CM

## 2019-03-28 DIAGNOSIS — Z51.11 CHEMOTHERAPY MANAGEMENT, ENCOUNTER FOR: ICD-10-CM

## 2019-03-28 PROCEDURE — 99214 OFFICE O/P EST MOD 30 MIN: CPT | Mod: S$PBB,,, | Performed by: OBSTETRICS & GYNECOLOGY

## 2019-03-28 PROCEDURE — 99213 OFFICE O/P EST LOW 20 MIN: CPT | Mod: PBBFAC | Performed by: OBSTETRICS & GYNECOLOGY

## 2019-03-28 PROCEDURE — 99999 PR PBB SHADOW E&M-EST. PATIENT-LVL III: ICD-10-PCS | Mod: PBBFAC,,, | Performed by: OBSTETRICS & GYNECOLOGY

## 2019-03-28 PROCEDURE — 99214 PR OFFICE/OUTPT VISIT, EST, LEVL IV, 30-39 MIN: ICD-10-PCS | Mod: S$PBB,,, | Performed by: OBSTETRICS & GYNECOLOGY

## 2019-03-28 PROCEDURE — 99999 PR PBB SHADOW E&M-EST. PATIENT-LVL III: CPT | Mod: PBBFAC,,, | Performed by: OBSTETRICS & GYNECOLOGY

## 2019-03-28 RX ORDER — SODIUM CHLORIDE 0.9 % (FLUSH) 0.9 %
10 SYRINGE (ML) INJECTION
Status: CANCELLED | OUTPATIENT
Start: 2019-03-29

## 2019-03-28 RX ORDER — HEPARIN 100 UNIT/ML
500 SYRINGE INTRAVENOUS
Status: CANCELLED | OUTPATIENT
Start: 2019-03-29

## 2019-03-28 RX ORDER — EPINEPHRINE 0.3 MG/.3ML
0.3 INJECTION SUBCUTANEOUS ONCE AS NEEDED
Status: CANCELLED | OUTPATIENT
Start: 2019-03-29

## 2019-03-28 RX ORDER — FAMOTIDINE 10 MG/ML
20 INJECTION INTRAVENOUS
Status: CANCELLED | OUTPATIENT
Start: 2019-03-29

## 2019-03-28 RX ORDER — DIPHENHYDRAMINE HYDROCHLORIDE 50 MG/ML
50 INJECTION INTRAMUSCULAR; INTRAVENOUS ONCE AS NEEDED
Status: CANCELLED | OUTPATIENT
Start: 2019-03-29

## 2019-03-29 ENCOUNTER — INFUSION (OUTPATIENT)
Dept: INFUSION THERAPY | Facility: HOSPITAL | Age: 69
End: 2019-03-29
Attending: OBSTETRICS & GYNECOLOGY
Payer: MEDICARE

## 2019-03-29 VITALS
WEIGHT: 138 LBS | HEIGHT: 62 IN | RESPIRATION RATE: 18 BRPM | BODY MASS INDEX: 25.4 KG/M2 | DIASTOLIC BLOOD PRESSURE: 61 MMHG | HEART RATE: 96 BPM | SYSTOLIC BLOOD PRESSURE: 119 MMHG | TEMPERATURE: 98 F

## 2019-03-29 DIAGNOSIS — C54.1 ENDOMETRIAL CARCINOMA: Primary | ICD-10-CM

## 2019-03-29 PROCEDURE — 96367 TX/PROPH/DG ADDL SEQ IV INF: CPT

## 2019-03-29 PROCEDURE — 96365 THER/PROPH/DIAG IV INF INIT: CPT

## 2019-03-29 PROCEDURE — 96415 CHEMO IV INFUSION ADDL HR: CPT

## 2019-03-29 PROCEDURE — 96413 CHEMO IV INFUSION 1 HR: CPT

## 2019-03-29 PROCEDURE — 63600175 PHARM REV CODE 636 W HCPCS: Performed by: OBSTETRICS & GYNECOLOGY

## 2019-03-29 PROCEDURE — S0028 INJECTION, FAMOTIDINE, 20 MG: HCPCS | Performed by: OBSTETRICS & GYNECOLOGY

## 2019-03-29 PROCEDURE — 25000003 PHARM REV CODE 250: Performed by: OBSTETRICS & GYNECOLOGY

## 2019-03-29 PROCEDURE — 96417 CHEMO IV INFUS EACH ADDL SEQ: CPT

## 2019-03-29 PROCEDURE — 96375 TX/PRO/DX INJ NEW DRUG ADDON: CPT

## 2019-03-29 RX ORDER — FAMOTIDINE 10 MG/ML
20 INJECTION INTRAVENOUS
Status: COMPLETED | OUTPATIENT
Start: 2019-03-29 | End: 2019-03-29

## 2019-03-29 RX ORDER — DIPHENHYDRAMINE HYDROCHLORIDE 50 MG/ML
50 INJECTION INTRAMUSCULAR; INTRAVENOUS ONCE AS NEEDED
Status: DISCONTINUED | OUTPATIENT
Start: 2019-03-29 | End: 2019-03-29 | Stop reason: HOSPADM

## 2019-03-29 RX ORDER — SODIUM CHLORIDE 0.9 % (FLUSH) 0.9 %
10 SYRINGE (ML) INJECTION
Status: DISCONTINUED | OUTPATIENT
Start: 2019-03-29 | End: 2019-03-29 | Stop reason: HOSPADM

## 2019-03-29 RX ORDER — HEPARIN 100 UNIT/ML
500 SYRINGE INTRAVENOUS
Status: DISCONTINUED | OUTPATIENT
Start: 2019-03-29 | End: 2019-03-29 | Stop reason: HOSPADM

## 2019-03-29 RX ORDER — EPINEPHRINE 0.3 MG/.3ML
0.3 INJECTION SUBCUTANEOUS ONCE AS NEEDED
Status: DISCONTINUED | OUTPATIENT
Start: 2019-03-29 | End: 2019-03-29 | Stop reason: HOSPADM

## 2019-03-29 RX ADMIN — CARBOPLATIN 470 MG: 10 INJECTION INTRAVENOUS at 02:03

## 2019-03-29 RX ADMIN — DIPHENHYDRAMINE HYDROCHLORIDE 50 MG: 50 INJECTION, SOLUTION INTRAMUSCULAR; INTRAVENOUS at 10:03

## 2019-03-29 RX ADMIN — SODIUM CHLORIDE: 0.9 INJECTION, SOLUTION INTRAVENOUS at 02:03

## 2019-03-29 RX ADMIN — PACLITAXEL 252 MG: 6 INJECTION, SOLUTION INTRAVENOUS at 11:03

## 2019-03-29 RX ADMIN — FAMOTIDINE 20 MG: 10 INJECTION, SOLUTION INTRAVENOUS at 11:03

## 2019-03-29 RX ADMIN — PALONOSETRON: 0.25 INJECTION, SOLUTION INTRAVENOUS at 11:03

## 2019-03-29 RX ADMIN — SODIUM CHLORIDE: 0.9 INJECTION, SOLUTION INTRAVENOUS at 10:03

## 2019-03-29 NOTE — PLAN OF CARE
Problem: Adult Inpatient Plan of Care  Goal: Plan of Care Review  Outcome: Ongoing (interventions implemented as appropriate)  Infusion completed, pt tolerated well; pt instructed to remain well hydrated; reviewed when to contact MD, when to report to ER; pt declined printed AVS, verbalized understanding of all discussed and when to report next

## 2019-03-29 NOTE — NURSING
1244  OK per MD Watt to run 500mL NS post hydration concurrent with Carbo; pt verbalized understanding and agrees to same

## 2019-03-29 NOTE — NURSING
1020  Pt here for Taxol, Carbo infusion, accompanied by daughter, no new complaints or concerns at present; discussed treatment plan for today, all questions answered and pt agrees to proceed

## 2019-04-02 NOTE — PROGRESS NOTES
Subjective:       Patient ID: Jessica Gorman is a 68 y.o. female with a PMH significant for HTN, Abnormal PAP, Endometrial Cancer, Osteopenia, FH of Breast Cancer who was seen initially by me on 4/27/2018 and for follow up on 6/12/2018 and 12/10/2018.  Patient previously followed by Dr. Solares.    Chief Complaint: Follow-up    HPI  No complaints today.  Patient denies f/c, n/v/d.  No chest pain or SOB.  No abdominal pain or dysuria.  No headaches or change in vision.  No dizziness.  No significant  weight gain or weight loss.  Remaining ROS negative.    Review of Systems   Constitutional: Negative for appetite change, chills, diaphoresis, fatigue, fever and unexpected weight change.   HENT: Negative for ear pain, hearing loss, sinus pain, tinnitus, trouble swallowing and voice change.    Eyes: Negative for photophobia, pain and visual disturbance.   Respiratory: Negative for chest tightness, shortness of breath and wheezing.    Cardiovascular: Negative for chest pain, palpitations and leg swelling.   Gastrointestinal: Negative for abdominal pain, blood in stool, constipation, diarrhea, nausea and vomiting.   Endocrine: Negative for cold intolerance, heat intolerance, polydipsia, polyphagia and polyuria.   Genitourinary: Negative for decreased urine volume, difficulty urinating, dysuria, flank pain, hematuria, pelvic pain, vaginal bleeding, vaginal discharge and vaginal pain.   Musculoskeletal: Negative for arthralgias, gait problem, joint swelling, myalgias and neck pain.   Skin: Negative for rash.   Neurological: Negative for dizziness, tremors, syncope, weakness, numbness and headaches.   Hematological: Does not bruise/bleed easily.   Psychiatric/Behavioral: Negative for agitation, confusion and sleep disturbance. The patient is not nervous/anxious.        Objective:      Physical Exam   Constitutional: She is oriented to person, place, and time. She appears well-developed and well-nourished. No distress.  "  HENT:   Head: Normocephalic and atraumatic.   Nose: Nose normal.   Mouth/Throat: Oropharynx is clear and moist.   Eyes: Pupils are equal, round, and reactive to light. Conjunctivae and EOM are normal. No scleral icterus.   Neck: Normal range of motion. Neck supple. No JVD present. No thyromegaly present.   Cardiovascular: Normal rate, regular rhythm and intact distal pulses. Exam reveals no gallop and no friction rub.   No murmur heard.  Pulmonary/Chest: Effort normal and breath sounds normal. No respiratory distress. She has no wheezes. She has no rales.   Musculoskeletal: Normal range of motion. She exhibits no edema.   Lymphadenopathy:     She has no cervical adenopathy.   Neurological: She is alert and oriented to person, place, and time.   Skin: Skin is warm and dry. No rash noted. No erythema.   Large left upper back lipoma - soft and nontender   Psychiatric: She has a normal mood and affect. Her behavior is normal. Thought content normal.       Assessment:       1. Essential hypertension    2. Pure hypercholesterolemia    3. Endometrial carcinoma, serous    4. Osteoporosis without current pathological fracture, unspecified osteoporosis type    5. Colon cancer screening        Plan:   -Today's Visit - patient is awake and alert.      -Cards - HTN - on Amlodipine, but has not taken last visit. She does not take her BP at home, but occasionally checks it at the pharmacy and it runs high.   I increased her Amlodipine from 5mg to 10mg in 4/2018.  BP in 6/2018 was 130/80 and in 12/2018 was stable at 134/73.  BP today is 116/78.    Last EKG done 7/2017 and was normal.      Last lipids in 4/2018 was , TG 72, HDL 89, EGV228.6.  10 year CHD risk is 7% with a comparative risk of 13%.  Repeat Lipids.    -Onc - Endometrial Cancer (12/2014 stage 1A) - S/p VILLA with BSO.  S/p Vaginal Cuff radiation and completed 6 cycles of Taxol and Carboplatin in 5/2014.     She was last seen by GYN Onc on 3/28/2019 - "Patient " "comes in today for  Cycle 9 of 9  of taxol and carboplatin for recurrent UPS carcinoma.   No complaints.  Labs have been reviewed and are appropriate for treatment.  Mammogram: Oct  8, 2018: normal CBE: June 2018.   Colonscopy: 2004: normal   Sigmoidoscopy: 2015: normal".    Has follow up on 4/25/2019.    -Endocrine - Vitamin D Insufficient - level in 11/2016 was 24.  S/p Vitamin D treatment, but now on MVI with D3.  Repeat level in 4/2018 was 37.  Repeat level.    TSH normal in 4/2018.    -GI - We discussed colon cancer screening - had it in 2004 and normal per patient.  Declining repeat at this time, as her sister had an adverse event with her procedure). FITKIT in 5/2018 was negative.  Repeat FIT test.    HCV Ab negative in 11/2014.    -GYN - Last Mammo was in 10/2018 and negative.      DEXA in 5/2016 and showed Osteopenia of Hips and repeat 4/27/2018 showed Osteoporosis - discussed treatment options and she is not sure she wants treatment - declines bisphosphonates again.  Risks vs benefits of treatment discussed at length with her. Continue with her MVI and calcium thru diet.  Advised exercise with resistance training.    -Derm - Large Left Upper Back Lipoma - since 2000 per patient.  Declining intervention.  No pain.    -HCM - We discussed Flu (declined) and Tdap (declined in 5/2017 vaccinations.  Doesn't want it again).  We discussed Shingles (declined) and Pneumococcal (declined) vaccinations.      -Follow up in 6 months    "

## 2019-04-15 ENCOUNTER — OFFICE VISIT (OUTPATIENT)
Dept: PRIMARY CARE CLINIC | Facility: CLINIC | Age: 69
End: 2019-04-15
Payer: MEDICARE

## 2019-04-15 VITALS
HEART RATE: 100 BPM | HEIGHT: 62 IN | SYSTOLIC BLOOD PRESSURE: 116 MMHG | OXYGEN SATURATION: 98 % | WEIGHT: 139.75 LBS | DIASTOLIC BLOOD PRESSURE: 78 MMHG | BODY MASS INDEX: 25.72 KG/M2

## 2019-04-15 DIAGNOSIS — M81.0 OSTEOPOROSIS WITHOUT CURRENT PATHOLOGICAL FRACTURE, UNSPECIFIED OSTEOPOROSIS TYPE: ICD-10-CM

## 2019-04-15 DIAGNOSIS — Z12.11 COLON CANCER SCREENING: ICD-10-CM

## 2019-04-15 DIAGNOSIS — I10 ESSENTIAL HYPERTENSION: Primary | Chronic | ICD-10-CM

## 2019-04-15 DIAGNOSIS — C54.1 ENDOMETRIAL CARCINOMA: ICD-10-CM

## 2019-04-15 DIAGNOSIS — E78.00 PURE HYPERCHOLESTEROLEMIA: ICD-10-CM

## 2019-04-15 PROCEDURE — 99213 OFFICE O/P EST LOW 20 MIN: CPT | Mod: PBBFAC,PN | Performed by: INTERNAL MEDICINE

## 2019-04-15 PROCEDURE — 99999 PR PBB SHADOW E&M-EST. PATIENT-LVL III: ICD-10-PCS | Mod: PBBFAC,,, | Performed by: INTERNAL MEDICINE

## 2019-04-15 PROCEDURE — 99214 OFFICE O/P EST MOD 30 MIN: CPT | Mod: S$PBB,,, | Performed by: INTERNAL MEDICINE

## 2019-04-15 PROCEDURE — 99214 PR OFFICE/OUTPT VISIT, EST, LEVL IV, 30-39 MIN: ICD-10-PCS | Mod: S$PBB,,, | Performed by: INTERNAL MEDICINE

## 2019-04-15 PROCEDURE — 99999 PR PBB SHADOW E&M-EST. PATIENT-LVL III: CPT | Mod: PBBFAC,,, | Performed by: INTERNAL MEDICINE

## 2019-04-22 ENCOUNTER — HOSPITAL ENCOUNTER (OUTPATIENT)
Dept: RADIOLOGY | Facility: HOSPITAL | Age: 69
Discharge: HOME | End: 2019-04-22
Attending: OBSTETRICS & GYNECOLOGY
Payer: MEDICARE

## 2019-04-22 DIAGNOSIS — C54.1 ENDOMETRIAL CARCINOMA: ICD-10-CM

## 2019-04-22 PROCEDURE — 71250 CT THORAX DX C-: CPT | Mod: 26,,, | Performed by: RADIOLOGY

## 2019-04-22 PROCEDURE — 71250 CT CHEST WITHOUT CONTRAST: ICD-10-PCS | Mod: 26,,, | Performed by: RADIOLOGY

## 2019-04-22 PROCEDURE — 74176 CT ABD & PELVIS W/O CONTRAST: CPT | Mod: 26,,, | Performed by: RADIOLOGY

## 2019-04-22 PROCEDURE — 74176 CT ABD & PELVIS W/O CONTRAST: CPT | Mod: TC

## 2019-04-22 PROCEDURE — 74176 CT ABDOMEN PELVIS WITHOUT CONTRAST: ICD-10-PCS | Mod: 26,,, | Performed by: RADIOLOGY

## 2019-04-22 PROCEDURE — 71250 CT THORAX DX C-: CPT | Mod: TC

## 2019-04-22 NOTE — PROGRESS NOTES
Multiple people made attempts to start an iv on Ms. Gorman, but no one was successful today.  She decided not to have the IV contrast due to the multiple attempts.  The Radiologist contacted Dr. Watt in regards to patient request.  Approved per Radiologist.

## 2019-04-22 NOTE — PROGRESS NOTES
Patient arrived to the outpatient imaging center for a CT CAP with contrast today. Multiple attempts were made to start an IV on patient without success before patient decided she did not want anymore attempts to be made.  Spoke via an OR nurse with Dr. Watt who said to proceed with the CT without the administration of contrast.

## 2019-04-23 NOTE — PROGRESS NOTES
Subjective:       Patient ID: Jessica Gorman is a 68 y.o. female.    Chief Complaint: Follow-up (ct results)    HPI   Patient comes in today for follow-up after completing 9 cycles of Taxol and carboplatin for recurrent uterine papillary serous carcinoma.    CT scan shows persistent pulmonary nodules.      Mammogram: Oct  8, 2018: normal CBE: June 2018.   Colonscopy: 2004: normal   Sigmoidoscopy: 2015: normal.       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    CT scan of the abdomen and pelvis after surgery showed: 1.Postsurgical changes seen in the pelvis from hysterectomy and bilateral salpingo-oophorectomy    2.A fluid collection is seen posterior to the cervix and likely represents a seroma. However, abscess cannot be ruled out.    3.Enlarged left inguinal lymph node identified    Ultrasound of this inguinal node was done and showed:    Limited sonographic imaging was performed in an area of a palpable abnormality within the patient's left inguinal region. Within this region, a lymph node is seen that measures 2.4 x 0.9 x 2.8 cm. While enlarged, this lymph node does   demonstrate a fatty hilum and is otherwise normal in morphology.    The patient reports that this palpable abnormality has been getting smaller over time. I discussed with the patient the need to biopsy this lesion to fully exclude any recurrent malignancy however at this time the patient deferred a biopsy attempt.    If this lesion does not resolve, a ultrasound guided biopsy could be rescheduled.        Patient completed vaginal cuff radiation.    She completed 6 cycles of  taxol and carboplatin in May 2015.    June 2015: CT scan at completion of treatment shows no evidence for disease. No adenopathy.  was 7.       Sept 2018:  was 47. CT scan of CAP showed numerous bilateral pulmonary nodules and enlarged peripancreatic and trevin hepatis lymph nodes with soft tissue infiltration of the mesentery at the region.    3.4 cm left renal mass along the lower pole of the left kidney measures greater than water density.  Differential would include solid renal mass including primary renal neoplasm or metastatic lesion versus is a complex cyst.      Jan 2019: completed 6 cycles of taxol and carboplatin.      Feb 2019:  Completion :16.  Completion CT scan:   Date of prior examination for comparison:   Lesion 1: Right lower lobe pulmonary nodule.  Resolved.  Prior measurement 1.6 cm.    Lesion 2: Left lower lobe pulmonary nodule.  Resolved.  Prior measurement 1.7 cm.    Lesion 3: Trevin hepatic lymph node.  1 cm. Series 2 image 99.  Prior measurement 4.4 cm.      April 2019:  Completed 9 cycles of Taxol and carboplatin.  CT scan with persistent pulmonary nodules.     Strong family history of breast cancer on mother's side. Patient's daughter has been tested for BRCA and is negative. Patient has been tested and is BRCA negative.           Review of Systems   Constitutional: Negative for chills, fatigue and fever.   Respiratory: Negative for cough, shortness of breath and wheezing.    Cardiovascular: Negative for chest pain, palpitations and leg swelling.   Gastrointestinal: Negative for abdominal pain, constipation, diarrhea, nausea and vomiting.   Genitourinary: Negative for difficulty urinating, dysuria, frequency, genital sores, hematuria, urgency, vaginal bleeding, vaginal discharge and vaginal pain.   Musculoskeletal: Negative for gait problem.   Neurological: Negative for weakness and numbness.   Hematological: Negative for adenopathy. Does not bruise/bleed easily.  "  Psychiatric/Behavioral: The patient is not nervous/anxious.        Objective:   /74   Pulse 104   Ht 5' 2" (1.575 m)   Wt 63.3 kg (139 lb 8.8 oz)   BMI 25.52 kg/m²      Physical Exam      Not performed.       Assessment:       1. Endometrial carcinoma, serous    2. Pulmonary nodules/lesions, multiple        Plan:   Endometrial carcinoma, serous  Will get PET scan to determine if active disease vs scarring.   If PET positive then will begin Avastin      -     NM PET CT Routine Skull to Mid Thigh; Future; Expected date: 04/25/2019    Pulmonary nodules/lesions, multiple  -     NM PET CT Routine Skull to Mid Thigh; Future; Expected date: 04/25/2019        "

## 2019-04-24 ENCOUNTER — TELEPHONE (OUTPATIENT)
Dept: GYNECOLOGIC ONCOLOGY | Facility: CLINIC | Age: 69
End: 2019-04-24

## 2019-04-25 ENCOUNTER — OFFICE VISIT (OUTPATIENT)
Dept: GYNECOLOGIC ONCOLOGY | Facility: CLINIC | Age: 69
End: 2019-04-25
Payer: MEDICARE

## 2019-04-25 VITALS
HEIGHT: 62 IN | SYSTOLIC BLOOD PRESSURE: 132 MMHG | WEIGHT: 139.56 LBS | BODY MASS INDEX: 25.68 KG/M2 | DIASTOLIC BLOOD PRESSURE: 74 MMHG | HEART RATE: 104 BPM

## 2019-04-25 DIAGNOSIS — R91.8 PULMONARY NODULES/LESIONS, MULTIPLE: ICD-10-CM

## 2019-04-25 DIAGNOSIS — C54.1 ENDOMETRIAL CARCINOMA: Primary | ICD-10-CM

## 2019-04-25 PROCEDURE — 99214 PR OFFICE/OUTPT VISIT, EST, LEVL IV, 30-39 MIN: ICD-10-PCS | Mod: S$PBB,,, | Performed by: OBSTETRICS & GYNECOLOGY

## 2019-04-25 PROCEDURE — 99213 OFFICE O/P EST LOW 20 MIN: CPT | Mod: PBBFAC | Performed by: OBSTETRICS & GYNECOLOGY

## 2019-04-25 PROCEDURE — 99999 PR PBB SHADOW E&M-EST. PATIENT-LVL III: CPT | Mod: PBBFAC,,, | Performed by: OBSTETRICS & GYNECOLOGY

## 2019-04-25 PROCEDURE — 99999 PR PBB SHADOW E&M-EST. PATIENT-LVL III: ICD-10-PCS | Mod: PBBFAC,,, | Performed by: OBSTETRICS & GYNECOLOGY

## 2019-04-25 PROCEDURE — 99214 OFFICE O/P EST MOD 30 MIN: CPT | Mod: S$PBB,,, | Performed by: OBSTETRICS & GYNECOLOGY

## 2019-04-29 ENCOUNTER — LAB VISIT (OUTPATIENT)
Dept: LAB | Facility: HOSPITAL | Age: 69
End: 2019-04-29
Attending: INTERNAL MEDICINE
Payer: MEDICARE

## 2019-04-29 ENCOUNTER — PATIENT MESSAGE (OUTPATIENT)
Dept: PRIMARY CARE CLINIC | Facility: CLINIC | Age: 69
End: 2019-04-29

## 2019-04-29 DIAGNOSIS — E78.00 PURE HYPERCHOLESTEROLEMIA: ICD-10-CM

## 2019-04-29 DIAGNOSIS — M81.0 OSTEOPOROSIS WITHOUT CURRENT PATHOLOGICAL FRACTURE, UNSPECIFIED OSTEOPOROSIS TYPE: ICD-10-CM

## 2019-04-29 DIAGNOSIS — I10 ESSENTIAL HYPERTENSION: Chronic | ICD-10-CM

## 2019-04-29 DIAGNOSIS — C54.1 ENDOMETRIAL CARCINOMA: ICD-10-CM

## 2019-04-29 LAB — 25(OH)D3+25(OH)D2 SERPL-MCNC: 35 NG/ML (ref 30–96)

## 2019-04-29 PROCEDURE — 82306 VITAMIN D 25 HYDROXY: CPT

## 2019-04-29 PROCEDURE — 36415 COLL VENOUS BLD VENIPUNCTURE: CPT | Mod: PN

## 2019-04-30 ENCOUNTER — TELEPHONE (OUTPATIENT)
Dept: GYNECOLOGIC ONCOLOGY | Facility: CLINIC | Age: 69
End: 2019-04-30

## 2019-04-30 NOTE — TELEPHONE ENCOUNTER
----- Message from Tony Ford sent at 4/30/2019  8:21 AM CDT -----  Contact: SARAH MAGANA [[id number]]  Name of Who is Calling: SARAH MAGANA [[id number]]      What is the request in detail: Would like to speak with staff in regards to questions about upcoming treatment. Please advise      Can the clinic reply by [facility]: no      What [facility]: [phone]

## 2019-04-30 NOTE — TELEPHONE ENCOUNTER
Patient and patient daughter called with questions about PET scan that is scheduled on 5/8.  They wanted to know what size needle they will be using for the scan and why the patient will have a glucose test. I inform patient and her daughter they can contact Nuclear medicine regarding any questions they might have number given to patient [phone]. Patient voiced understanding. KJ

## 2019-05-08 ENCOUNTER — HOSPITAL ENCOUNTER (OUTPATIENT)
Dept: RADIOLOGY | Facility: HOSPITAL | Age: 69
Discharge: HOME | End: 2019-05-08
Attending: OBSTETRICS & GYNECOLOGY
Payer: MEDICARE

## 2019-05-08 DIAGNOSIS — C54.1 ENDOMETRIAL CARCINOMA: ICD-10-CM

## 2019-05-08 DIAGNOSIS — R91.8 PULMONARY NODULES/LESIONS, MULTIPLE: ICD-10-CM

## 2019-05-08 LAB — POCT GLUCOSE: 93 MG/DL (ref 70–110)

## 2019-05-08 PROCEDURE — A9552 F18 FDG: HCPCS

## 2019-05-08 PROCEDURE — 78815 PET IMAGE W/CT SKULL-THIGH: CPT | Mod: TC

## 2019-05-08 PROCEDURE — 78815 PET IMAGE W/CT SKULL-THIGH: CPT | Mod: 26,PI,, | Performed by: RADIOLOGY

## 2019-05-08 PROCEDURE — 78815 NM PET CT ROUTINE: ICD-10-PCS | Mod: 26,PI,, | Performed by: RADIOLOGY

## 2019-05-23 ENCOUNTER — TELEPHONE (OUTPATIENT)
Dept: GYNECOLOGIC ONCOLOGY | Facility: CLINIC | Age: 69
End: 2019-05-23

## 2019-05-23 NOTE — TELEPHONE ENCOUNTER
----- Message from Ellen Campbell sent at 5/23/2019 10:27 AM CDT -----  Contact: pt   Name of Who is Calling: SARAH MAGANA [[id number]]       What is the request in detail: Patient is requesting a call from staff in regards to scheduling an appointment for a consultation ..... Please contact to further discuss and advise.     Can the clinic reply by [facility]: yes     What Number to Call Back if not in [facility]: [phone]

## 2019-05-23 NOTE — TELEPHONE ENCOUNTER
Return patient call. Patient would like to come in on next week to discuss new Chemo drug. Patient scheduled for 5/29 at 9 am. Patient voiced understanding. KJ

## 2019-05-28 ENCOUNTER — TELEPHONE (OUTPATIENT)
Dept: GYNECOLOGIC ONCOLOGY | Facility: CLINIC | Age: 69
End: 2019-05-28

## 2019-05-29 ENCOUNTER — OFFICE VISIT (OUTPATIENT)
Dept: GYNECOLOGIC ONCOLOGY | Facility: CLINIC | Age: 69
End: 2019-05-29
Payer: MEDICARE

## 2019-05-29 VITALS
SYSTOLIC BLOOD PRESSURE: 124 MMHG | DIASTOLIC BLOOD PRESSURE: 74 MMHG | WEIGHT: 138.69 LBS | HEART RATE: 97 BPM | HEIGHT: 62 IN | BODY MASS INDEX: 25.52 KG/M2

## 2019-05-29 DIAGNOSIS — C54.1 ENDOMETRIAL CARCINOMA: Primary | ICD-10-CM

## 2019-05-29 PROCEDURE — 99999 PR PBB SHADOW E&M-EST. PATIENT-LVL III: CPT | Mod: PBBFAC,,, | Performed by: OBSTETRICS & GYNECOLOGY

## 2019-05-29 PROCEDURE — 99214 PR OFFICE/OUTPT VISIT, EST, LEVL IV, 30-39 MIN: ICD-10-PCS | Mod: S$PBB,,, | Performed by: OBSTETRICS & GYNECOLOGY

## 2019-05-29 PROCEDURE — 99999 PR PBB SHADOW E&M-EST. PATIENT-LVL III: ICD-10-PCS | Mod: PBBFAC,,, | Performed by: OBSTETRICS & GYNECOLOGY

## 2019-05-29 PROCEDURE — 99213 OFFICE O/P EST LOW 20 MIN: CPT | Mod: PBBFAC | Performed by: OBSTETRICS & GYNECOLOGY

## 2019-05-29 PROCEDURE — 99214 OFFICE O/P EST MOD 30 MIN: CPT | Mod: S$PBB,,, | Performed by: OBSTETRICS & GYNECOLOGY

## 2019-05-29 NOTE — PROGRESS NOTES
Subjective:       Patient ID: Jessica Gorman is a 68 y.o. female.    Chief Complaint: endometrial carcinoma, serous (sign chemo consents )    HPI   Patient comes in today to discuss starting Avastin for persistent UPSC after 9 cycles of reinduction taxol and carboplatin.  She is here with 1 daughter and another daughter is on her cell phone.    Mammogram: Oct  8, 2018: normal CBE: June 2018.   Colonscopy: 2004: normal   Sigmoidoscopy: 2015: normal.       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    CT scan of the abdomen and pelvis after surgery showed: 1.Postsurgical changes seen in the pelvis from hysterectomy and bilateral salpingo-oophorectomy    2.A fluid collection is seen posterior to the cervix and likely represents a seroma. However, abscess cannot be ruled out.    3.Enlarged left inguinal lymph node identified    Ultrasound of this inguinal node was done and showed:    Limited sonographic imaging was performed in an area of a palpable abnormality within the patient's left inguinal region. Within this region, a lymph node is seen that measures 2.4 x 0.9 x 2.8 cm. While enlarged, this lymph node does   demonstrate a fatty hilum and is otherwise normal in morphology.    The patient reports that this palpable abnormality has been getting smaller over time. I discussed with the patient the need to biopsy this lesion to fully exclude any recurrent malignancy however at this time the patient deferred a biopsy attempt.    If this lesion does not resolve, a ultrasound guided biopsy could be rescheduled.        Patient completed vaginal cuff  "radiation.    She completed 6 cycles of taxol and carboplatin in May 2015.    June 2015: CT scan at completion of treatment shows no evidence for disease. No adenopathy.  was 7.       Sept 2018:  was 47. CT scan of CAP showed numerous bilateral pulmonary nodules and enlarged peripancreatic and gonzalo hepatis lymph nodes with soft tissue infiltration of the mesentery at the region.    3.4 cm left renal mass along the lower pole of the left kidney measures greater than water density.  Differential would include solid renal mass including primary renal neoplasm or metastatic lesion versus is a complex cyst.      Jan 2019: completed 6 cycles of taxol and carboplatin.      Feb 2019:  Completion :16.  Completion CT scan: partial respose      April 2019:  Completed 9 cycles of Taxol and carboplatin.  CT scan with persistent pulmonary nodules. PET: multiple hypermetabolic mesenteric/retroperitoneal lymph nodes and a hypermetabolic left lung nodule concerning for metastatic disease      Strong family history of breast cancer on mother's side. Patient's daughter has been tested for BRCA and is negative. Patient has been tested and is BRCA negative.              Review of Systems   Constitutional: Negative for chills, fatigue and fever.   Respiratory: Negative for cough, shortness of breath and wheezing.    Cardiovascular: Negative for chest pain, palpitations and leg swelling.   Gastrointestinal: Negative for abdominal pain, constipation, diarrhea, nausea and vomiting.   Genitourinary: Negative for difficulty urinating, dysuria, frequency, genital sores, hematuria, urgency, vaginal bleeding, vaginal discharge and vaginal pain.   Neurological: Negative for weakness.   Hematological: Negative for adenopathy. Does not bruise/bleed easily.   Psychiatric/Behavioral: The patient is not nervous/anxious.        Objective:   /74   Pulse 97   Ht 5' 2" (1.575 m)   Wt 62.9 kg (138 lb 10.7 oz)   BMI 25.36 kg/m² "      Physical Exam   Constitutional: She is oriented to person, place, and time. She appears well-developed and well-nourished.   Neurological: She is alert and oriented to person, place, and time.   Psychiatric: She has a normal mood and affect. Her behavior is normal. Judgment and thought content normal.       Assessment:       1. Endometrial carcinoma, serous        Plan:   Endometrial carcinoma, serous    I discussed with the patient and her daughters my recommendation for Avastin.  I went over the risk for complications including bowel perforation.  I told them that I thought that she was at a low risk for this given that this is pulmonary and retroperitoneal disease and no indication for peritoneal disease.    Patient was still uncertain as to whether she wanted to proceed with Avastin.  She asked about other options and I discussed the risk and side effects of Doxil and gemcitabine.  I would not plan to continue with Taxol or carboplatin as she has neuropathy.    Patient and daughters need additional time to talk.  However, she did sign the consent form for Avastin and if she decides to proceed with this she will call and let me know and we can schedule chemo.    We also discussed an observation route which she inquired about.  I explained to them that the PET scan shows active disease.    If she decides to start Avastin then I will plan to repeat a PET scan after 3 cycles.    Patient is to get back with me as to what her decision is.    Total encounter time of 30 min all of which was spent reviewing side effects and answering questions.

## 2019-06-03 ENCOUNTER — TELEPHONE (OUTPATIENT)
Dept: GYNECOLOGIC ONCOLOGY | Facility: CLINIC | Age: 69
End: 2019-06-03

## 2019-06-03 DIAGNOSIS — C55 MALIGNANT NEOPLASM OF UTERUS, UNSPECIFIED SITE: Primary | ICD-10-CM

## 2019-06-03 NOTE — TELEPHONE ENCOUNTER
----- Message from Patito Kiran sent at 6/3/2019 11:18 AM CDT -----  Name of Who is Calling: SARAH WRIGHT [[id number]]    What is the request in detail: Pt would like to schedule chemo      Can the clinic reply by [facility]:   No       What [facility]: #[phone]

## 2019-06-03 NOTE — TELEPHONE ENCOUNTER
Spoke with patient Labs scheduled 6/6 @ 10:10 and chemo scheduled 6/7 @ 8:30 am. Appointment letters mailed to patient and also given to patient as well. Patient voiced understanding. OBED

## 2019-06-06 ENCOUNTER — LAB VISIT (OUTPATIENT)
Dept: LAB | Facility: HOSPITAL | Age: 69
End: 2019-06-06
Payer: MEDICARE

## 2019-06-06 DIAGNOSIS — C55 MALIGNANT NEOPLASM OF UTERUS, UNSPECIFIED SITE: ICD-10-CM

## 2019-06-06 DIAGNOSIS — C54.1 ENDOMETRIAL CARCINOMA: ICD-10-CM

## 2019-06-06 LAB
ANION GAP SERPL CALC-SCNC: 9 MMOL/L (ref 8–16)
BUN SERPL-MCNC: 14 MG/DL (ref 8–23)
CALCIUM SERPL-MCNC: 9.6 MG/DL (ref 8.7–10.5)
CHLORIDE SERPL-SCNC: 106 MMOL/L (ref 95–110)
CO2 SERPL-SCNC: 24 MMOL/L (ref 23–29)
CREAT SERPL-MCNC: 0.8 MG/DL (ref 0.5–1.4)
ERYTHROCYTE [DISTWIDTH] IN BLOOD BY AUTOMATED COUNT: 13.7 % (ref 11.5–14.5)
EST. GFR  (AFRICAN AMERICAN): >60 ML/MIN/1.73 M^2
EST. GFR  (NON AFRICAN AMERICAN): >60 ML/MIN/1.73 M^2
GLUCOSE SERPL-MCNC: 96 MG/DL (ref 70–110)
HCT VFR BLD AUTO: 32.7 % (ref 37–48.5)
HGB BLD-MCNC: 10.6 G/DL (ref 12–16)
IMM GRANULOCYTES # BLD AUTO: 0 K/UL (ref 0–0.04)
MCH RBC QN AUTO: 30.5 PG (ref 27–31)
MCHC RBC AUTO-ENTMCNC: 32.4 G/DL (ref 32–36)
MCV RBC AUTO: 94 FL (ref 82–98)
NEUTROPHILS # BLD AUTO: 2.1 K/UL (ref 1.8–7.7)
PLATELET # BLD AUTO: 262 K/UL (ref 150–350)
PMV BLD AUTO: 9.4 FL (ref 9.2–12.9)
POTASSIUM SERPL-SCNC: 3.6 MMOL/L (ref 3.5–5.1)
RBC # BLD AUTO: 3.48 M/UL (ref 4–5.4)
SODIUM SERPL-SCNC: 139 MMOL/L (ref 136–145)
WBC # BLD AUTO: 4.23 K/UL (ref 3.9–12.7)

## 2019-06-06 PROCEDURE — 80048 BASIC METABOLIC PNL TOTAL CA: CPT

## 2019-06-06 PROCEDURE — 36415 COLL VENOUS BLD VENIPUNCTURE: CPT

## 2019-06-06 PROCEDURE — 85027 COMPLETE CBC AUTOMATED: CPT

## 2019-06-07 ENCOUNTER — TELEPHONE (OUTPATIENT)
Dept: INTERNAL MEDICINE | Facility: CLINIC | Age: 69
End: 2019-06-07

## 2019-06-07 ENCOUNTER — INFUSION (OUTPATIENT)
Dept: INFUSION THERAPY | Facility: HOSPITAL | Age: 69
End: 2019-06-07
Attending: OBSTETRICS & GYNECOLOGY
Payer: MEDICARE

## 2019-06-07 VITALS
BODY MASS INDEX: 25.64 KG/M2 | HEIGHT: 62 IN | WEIGHT: 139.31 LBS | SYSTOLIC BLOOD PRESSURE: 112 MMHG | DIASTOLIC BLOOD PRESSURE: 61 MMHG | OXYGEN SATURATION: 100 % | TEMPERATURE: 98 F | HEART RATE: 85 BPM | RESPIRATION RATE: 18 BRPM

## 2019-06-07 DIAGNOSIS — C54.1 ENDOMETRIAL CARCINOMA: Primary | ICD-10-CM

## 2019-06-07 PROCEDURE — 63600175 PHARM REV CODE 636 W HCPCS: Performed by: OBSTETRICS & GYNECOLOGY

## 2019-06-07 PROCEDURE — 96415 CHEMO IV INFUSION ADDL HR: CPT

## 2019-06-07 PROCEDURE — 96413 CHEMO IV INFUSION 1 HR: CPT

## 2019-06-07 PROCEDURE — 25000003 PHARM REV CODE 250: Performed by: OBSTETRICS & GYNECOLOGY

## 2019-06-07 PROCEDURE — 96375 TX/PRO/DX INJ NEW DRUG ADDON: CPT

## 2019-06-07 RX ORDER — DIPHENHYDRAMINE HYDROCHLORIDE 50 MG/ML
25 INJECTION INTRAMUSCULAR; INTRAVENOUS ONCE
Status: COMPLETED | OUTPATIENT
Start: 2019-06-07 | End: 2019-06-07

## 2019-06-07 RX ORDER — MEPERIDINE HYDROCHLORIDE 50 MG/ML
25 INJECTION INTRAMUSCULAR; INTRAVENOUS; SUBCUTANEOUS ONCE
Status: COMPLETED | OUTPATIENT
Start: 2019-06-07 | End: 2019-06-07

## 2019-06-07 RX ADMIN — MEPERIDINE HYDROCHLORIDE 25 MG: 50 INJECTION INTRAMUSCULAR; INTRAVENOUS; SUBCUTANEOUS at 10:06

## 2019-06-07 RX ADMIN — DIPHENHYDRAMINE HYDROCHLORIDE 25 MG: 50 INJECTION INTRAMUSCULAR; INTRAVENOUS at 10:06

## 2019-06-07 RX ADMIN — BEVACIZUMAB 950 MG: 100 INJECTION, SOLUTION INTRAVENOUS at 09:06

## 2019-06-07 NOTE — NURSING
"1002  Pt c/o "tickle in throat", paused infusion, VSS.  Immediately restarting infusion pt started coughing and tickle in throat getting worse, infusion paused, benadryl 25mg IV given. Will continue to monitor.    1020 pt c/o "spasms to thighs, teeth chattering", per Dr. De Leon Demerol 25mg IV given.    1055  Pt stated "feeling much better", per dr. De Leon rechallenge.  "

## 2019-06-07 NOTE — TELEPHONE ENCOUNTER
The 10-year ASCVD risk score (Melissa BOYCE Jr., et al., 2013) is: 8.5%    Values used to calculate the score:      Age: 68 years      Sex: Female      Is Non- : Yes      Diabetic: No      Tobacco smoker: No      Systolic Blood Pressure: 112 mmHg      Is BP treated: Yes      HDL Cholesterol: 89 mg/dL      Total Cholesterol: 213 mg/dL

## 2019-06-17 DIAGNOSIS — I10 ESSENTIAL HYPERTENSION: Chronic | ICD-10-CM

## 2019-06-17 RX ORDER — AMLODIPINE BESYLATE 10 MG/1
10 TABLET ORAL DAILY
Qty: 90 TABLET | Refills: 0 | Status: ON HOLD | OUTPATIENT
Start: 2019-06-17 | End: 2019-10-23 | Stop reason: HOSPADM

## 2019-06-19 ENCOUNTER — TELEPHONE (OUTPATIENT)
Dept: GYNECOLOGIC ONCOLOGY | Facility: CLINIC | Age: 69
End: 2019-06-19

## 2019-06-19 NOTE — TELEPHONE ENCOUNTER
----- Message from Glenis Clark sent at 6/19/2019  8:58 AM CDT -----  Contact: SARAH MAGANA [[id number]]  Name of Who is Calling:  SARAH MAGANA [[id number]]    What is the request in detail:  Patient called requesting to schedule her treatment and return visit. Please give a call back at your earliest convenience.      THANKS!      Can the clinic reply by [facility]:  No    Number to Call Back: SARAH MAGANA // ph# [phone]

## 2019-06-19 NOTE — TELEPHONE ENCOUNTER
Return call to patient informing her that her next cycle of chemo has been scheduled and mailed. Patient voiced understanding. OBED

## 2019-06-27 ENCOUNTER — LAB VISIT (OUTPATIENT)
Dept: LAB | Facility: HOSPITAL | Age: 69
End: 2019-06-27
Attending: OBSTETRICS & GYNECOLOGY
Payer: MEDICARE

## 2019-06-27 ENCOUNTER — TELEPHONE (OUTPATIENT)
Dept: GYNECOLOGIC ONCOLOGY | Facility: CLINIC | Age: 69
End: 2019-06-27

## 2019-06-27 DIAGNOSIS — C54.1 ENDOMETRIAL CARCINOMA: ICD-10-CM

## 2019-06-27 DIAGNOSIS — C55 MALIGNANT NEOPLASM OF UTERUS, UNSPECIFIED SITE: ICD-10-CM

## 2019-06-27 LAB
ANION GAP SERPL CALC-SCNC: 9 MMOL/L (ref 8–16)
BUN SERPL-MCNC: 16 MG/DL (ref 8–23)
CALCIUM SERPL-MCNC: 10.1 MG/DL (ref 8.7–10.5)
CHLORIDE SERPL-SCNC: 104 MMOL/L (ref 95–110)
CO2 SERPL-SCNC: 25 MMOL/L (ref 23–29)
CREAT SERPL-MCNC: 0.8 MG/DL (ref 0.5–1.4)
ERYTHROCYTE [DISTWIDTH] IN BLOOD BY AUTOMATED COUNT: 13.2 % (ref 11.5–14.5)
EST. GFR  (AFRICAN AMERICAN): >60 ML/MIN/1.73 M^2
EST. GFR  (NON AFRICAN AMERICAN): >60 ML/MIN/1.73 M^2
GLUCOSE SERPL-MCNC: 86 MG/DL (ref 70–110)
HCT VFR BLD AUTO: 34.8 % (ref 37–48.5)
HGB BLD-MCNC: 11.2 G/DL (ref 12–16)
IMM GRANULOCYTES # BLD AUTO: 0.01 K/UL (ref 0–0.04)
MCH RBC QN AUTO: 29.9 PG (ref 27–31)
MCHC RBC AUTO-ENTMCNC: 32.2 G/DL (ref 32–36)
MCV RBC AUTO: 93 FL (ref 82–98)
NEUTROPHILS # BLD AUTO: 2.2 K/UL (ref 1.8–7.7)
PLATELET # BLD AUTO: 261 K/UL (ref 150–350)
PMV BLD AUTO: 9.6 FL (ref 9.2–12.9)
POTASSIUM SERPL-SCNC: 3.8 MMOL/L (ref 3.5–5.1)
RBC # BLD AUTO: 3.74 M/UL (ref 4–5.4)
SODIUM SERPL-SCNC: 138 MMOL/L (ref 136–145)
WBC # BLD AUTO: 4.26 K/UL (ref 3.9–12.7)

## 2019-06-27 PROCEDURE — 80048 BASIC METABOLIC PNL TOTAL CA: CPT

## 2019-06-27 PROCEDURE — 36415 COLL VENOUS BLD VENIPUNCTURE: CPT

## 2019-06-27 PROCEDURE — 85027 COMPLETE CBC AUTOMATED: CPT

## 2019-06-27 NOTE — PROGRESS NOTES
Subjective:       Patient ID: Jessica Gorman is a 68 y.o. female.    Chief Complaint: endometrial carcinoma, serous and Chemotherapy (Avastin C2)    HPI     Patient comes in today for cycle 2 of Avastin for persistent UPSC after 9 cycles of reinduction taxol and carboplatin.      Had jumpy leg with cycle 1 and given Benadryl and Demerol and it resolved.    Chemotherapy labs have been reviewed and are appropriate for treatment.        Mammogram: Oct  8, 2018: normal CBE: June 2018.   Colonscopy: 2004: normal   Sigmoidoscopy: 2015: normal.       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    CT scan of the abdomen and pelvis after surgery showed: 1.Postsurgical changes seen in the pelvis from hysterectomy and bilateral salpingo-oophorectomy    2.A fluid collection is seen posterior to the cervix and likely represents a seroma. However, abscess cannot be ruled out.    3.Enlarged left inguinal lymph node identified    Ultrasound of this inguinal node was done and showed:    Limited sonographic imaging was performed in an area of a palpable abnormality within the patient's left inguinal region. Within this region, a lymph node is seen that measures 2.4 x 0.9 x 2.8 cm. While enlarged, this lymph node does   demonstrate a fatty hilum and is otherwise normal in morphology.    The patient reports that this palpable abnormality has been getting smaller over time. I discussed with the patient the need to biopsy this lesion to fully exclude any recurrent malignancy however at this time the patient deferred a biopsy attempt.    If this lesion does not resolve,  a ultrasound guided biopsy could be rescheduled.        Patient completed vaginal cuff radiation.    She completed 6 cycles of taxol and carboplatin in May 2015.    June 2015: CT scan at completion of treatment shows no evidence for disease. No adenopathy.  was 7.       Sept 2018:  was 47. CT scan of CAP showed numerous bilateral pulmonary nodules and enlarged peripancreatic and gonzalo hepatis lymph nodes with soft tissue infiltration of the mesentery at the region.    3.4 cm left renal mass along the lower pole of the left kidney measures greater than water density.  Differential would include solid renal mass including primary renal neoplasm or metastatic lesion versus is a complex cyst.      Jan 2019: completed 6 cycles of taxol and carboplatin.      Feb 2019:  Completion :16.  Completion CT scan: partial respose      April 2019:  Completed 9 cycles of Taxol and carboplatin.  CT scan with persistent pulmonary nodules. PET: multiple hypermetabolic mesenteric/retroperitoneal lymph nodes and a hypermetabolic left lung nodule concerning for metastatic disease      Strong family history of breast cancer on mother's side. Patient's daughter has been tested for BRCA and is negative. Patient has been tested and is BRCA negative.           Review of Systems   Constitutional: Negative for chills, fatigue and fever.   Respiratory: Negative for cough, shortness of breath and wheezing.    Cardiovascular: Negative for chest pain, palpitations and leg swelling.   Gastrointestinal: Negative for abdominal pain, constipation, diarrhea, nausea and vomiting.   Genitourinary: Negative for difficulty urinating, dysuria, frequency, genital sores, hematuria, urgency, vaginal bleeding, vaginal discharge and vaginal pain.   Musculoskeletal: Negative for gait problem.   Neurological: Negative for weakness and numbness.   Hematological: Negative for adenopathy. Does not bruise/bleed easily.   Psychiatric/Behavioral: The  "patient is not nervous/anxious.        Objective:   /75   Pulse (!) 115   Ht 5' 2" (1.575 m)   Wt 62.7 kg (138 lb 3.7 oz)   BMI 25.28 kg/m²      Physical Exam   Constitutional: She is oriented to person, place, and time. She appears well-developed and well-nourished.   HENT:   Head: Normocephalic and atraumatic.   Eyes: No scleral icterus.   Neck: No tracheal deviation present. No thyromegaly present.   Cardiovascular: Normal rate and regular rhythm.   Pulmonary/Chest: Effort normal and breath sounds normal.   Abdominal: Soft. She exhibits no distension and no mass. There is no tenderness. There is no rebound and no guarding. No hernia.   Genitourinary:   Genitourinary Comments: Not performed.    Musculoskeletal: She exhibits no edema or tenderness.   Lymphadenopathy:     She has no cervical adenopathy.   Neurological: She is alert and oriented to person, place, and time.   Skin: Skin is warm and dry.   Psychiatric: She has a normal mood and affect. Her behavior is normal. Judgment and thought content normal.       Assessment:       1. Endometrial carcinoma, serous    2. Chemotherapy management, encounter for        Plan:   Endometrial carcinoma, serous  Proceed with cycle 2   Will plan for 3 cycles and then reassessment with PET  Chemotherapy management, encounter for        "

## 2019-06-28 ENCOUNTER — INFUSION (OUTPATIENT)
Dept: INFUSION THERAPY | Facility: HOSPITAL | Age: 69
End: 2019-06-28
Attending: OBSTETRICS & GYNECOLOGY
Payer: MEDICARE

## 2019-06-28 ENCOUNTER — OFFICE VISIT (OUTPATIENT)
Dept: GYNECOLOGIC ONCOLOGY | Facility: CLINIC | Age: 69
End: 2019-06-28
Payer: MEDICARE

## 2019-06-28 ENCOUNTER — TELEPHONE (OUTPATIENT)
Dept: GYNECOLOGIC ONCOLOGY | Facility: CLINIC | Age: 69
End: 2019-06-28

## 2019-06-28 VITALS — HEART RATE: 100 BPM | RESPIRATION RATE: 18 BRPM | DIASTOLIC BLOOD PRESSURE: 66 MMHG | SYSTOLIC BLOOD PRESSURE: 130 MMHG

## 2019-06-28 VITALS
SYSTOLIC BLOOD PRESSURE: 137 MMHG | HEART RATE: 115 BPM | HEIGHT: 62 IN | BODY MASS INDEX: 25.44 KG/M2 | DIASTOLIC BLOOD PRESSURE: 75 MMHG | WEIGHT: 138.25 LBS

## 2019-06-28 DIAGNOSIS — Z51.11 CHEMOTHERAPY MANAGEMENT, ENCOUNTER FOR: ICD-10-CM

## 2019-06-28 DIAGNOSIS — C54.1 ENDOMETRIAL CARCINOMA: Primary | ICD-10-CM

## 2019-06-28 DIAGNOSIS — C54.1 ENDOMETRIAL CARCINOMA: ICD-10-CM

## 2019-06-28 LAB
BILIRUB SERPL-MCNC: NORMAL MG/DL
BLOOD URINE, POC: ABNORMAL
COLOR, POC UA: ABNORMAL
GLUCOSE UR QL STRIP: ABNORMAL
KETONES UR QL STRIP: ABNORMAL
LEUKOCYTE ESTERASE URINE, POC: ABNORMAL
NITRITE, POC UA: ABNORMAL
PH, POC UA: 5
PROTEIN, POC: ABNORMAL
SPECIFIC GRAVITY, POC UA: 1
UROBILINOGEN, POC UA: NORMAL

## 2019-06-28 PROCEDURE — 99214 PR OFFICE/OUTPT VISIT, EST, LEVL IV, 30-39 MIN: ICD-10-PCS | Mod: S$PBB,,, | Performed by: OBSTETRICS & GYNECOLOGY

## 2019-06-28 PROCEDURE — 99999 PR PBB SHADOW E&M-EST. PATIENT-LVL III: CPT | Mod: PBBFAC,,, | Performed by: OBSTETRICS & GYNECOLOGY

## 2019-06-28 PROCEDURE — 99214 OFFICE O/P EST MOD 30 MIN: CPT | Mod: S$PBB,,, | Performed by: OBSTETRICS & GYNECOLOGY

## 2019-06-28 PROCEDURE — 99999 PR PBB SHADOW E&M-EST. PATIENT-LVL III: ICD-10-PCS | Mod: PBBFAC,,, | Performed by: OBSTETRICS & GYNECOLOGY

## 2019-06-28 PROCEDURE — 96413 CHEMO IV INFUSION 1 HR: CPT

## 2019-06-28 PROCEDURE — 96375 TX/PRO/DX INJ NEW DRUG ADDON: CPT

## 2019-06-28 PROCEDURE — 25000003 PHARM REV CODE 250: Performed by: OBSTETRICS & GYNECOLOGY

## 2019-06-28 PROCEDURE — 99213 OFFICE O/P EST LOW 20 MIN: CPT | Mod: PBBFAC,25 | Performed by: OBSTETRICS & GYNECOLOGY

## 2019-06-28 PROCEDURE — 63600175 PHARM REV CODE 636 W HCPCS: Mod: JG | Performed by: OBSTETRICS & GYNECOLOGY

## 2019-06-28 PROCEDURE — 81002 URINALYSIS NONAUTO W/O SCOPE: CPT | Mod: PBBFAC | Performed by: OBSTETRICS & GYNECOLOGY

## 2019-06-28 RX ORDER — SODIUM CHLORIDE 0.9 % (FLUSH) 0.9 %
10 SYRINGE (ML) INJECTION
Status: CANCELLED | OUTPATIENT
Start: 2019-06-28

## 2019-06-28 RX ORDER — DIPHENHYDRAMINE HYDROCHLORIDE 50 MG/ML
25 INJECTION INTRAMUSCULAR; INTRAVENOUS
Status: CANCELLED
Start: 2019-06-28

## 2019-06-28 RX ORDER — DIPHENHYDRAMINE HYDROCHLORIDE 50 MG/ML
25 INJECTION INTRAMUSCULAR; INTRAVENOUS
Status: COMPLETED | OUTPATIENT
Start: 2019-06-28 | End: 2019-06-28

## 2019-06-28 RX ORDER — HEPARIN 100 UNIT/ML
500 SYRINGE INTRAVENOUS
Status: DISCONTINUED | OUTPATIENT
Start: 2019-06-28 | End: 2019-06-28 | Stop reason: HOSPADM

## 2019-06-28 RX ORDER — SODIUM CHLORIDE 0.9 % (FLUSH) 0.9 %
10 SYRINGE (ML) INJECTION
Status: DISCONTINUED | OUTPATIENT
Start: 2019-06-28 | End: 2019-06-28 | Stop reason: HOSPADM

## 2019-06-28 RX ORDER — HEPARIN 100 UNIT/ML
500 SYRINGE INTRAVENOUS
Status: CANCELLED | OUTPATIENT
Start: 2019-06-28

## 2019-06-28 RX ADMIN — DIPHENHYDRAMINE HYDROCHLORIDE 25 MG: 50 INJECTION INTRAMUSCULAR; INTRAVENOUS at 12:06

## 2019-06-28 RX ADMIN — BEVACIZUMAB 950 MG: 100 INJECTION, SOLUTION INTRAVENOUS at 01:06

## 2019-06-28 NOTE — PLAN OF CARE
Problem: Neutropenia (Chemotherapy Effects)  Goal: Absence of Infection    Intervention: Prevent Infection and Maximize Resistance  Tolerated C2 of avastin well infused over 60 min no adverse reactions noted. PIV removed post infusion, cath tip intact no redness swelling covered with 2x2 gauze secured with co wrap. 10 min post vitals stable pt leaves clinic ambulatory accompanied by daughter no distress. Instructed to contact providers clinic with questions or concerns.

## 2019-07-16 NOTE — PROGRESS NOTES
Subjective:       Patient ID: Jessica Gorman is a 68 y.o. female.    Chief Complaint: Endometrial Carcinoma, serous and Chemotherapy (Avastin C3)    HPI     Patient comes in today for cycle 3 of Avastin for persistent UPSC after 9 cycles of reinduction taxol and carboplatin.       Chronic headaches. Not any worse since starting avastin.      Chemotherapy labs have been reviewed and are appropriate for treatment.         Mammogram: Oct  8, 2018: normal CBE: June 2018.   Colonscopy: 2004: normal   Sigmoidoscopy: 2015: normal.       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    CT scan of the abdomen and pelvis after surgery showed: 1.Postsurgical changes seen in the pelvis from hysterectomy and bilateral salpingo-oophorectomy    2.A fluid collection is seen posterior to the cervix and likely represents a seroma. However, abscess cannot be ruled out.    3.Enlarged left inguinal lymph node identified    Ultrasound of this inguinal node was done and showed:    Limited sonographic imaging was performed in an area of a palpable abnormality within the patient's left inguinal region. Within this region, a lymph node is seen that measures 2.4 x 0.9 x 2.8 cm. While enlarged, this lymph node does   demonstrate a fatty hilum and is otherwise normal in morphology.    The patient reports that this palpable abnormality has been getting smaller over time. I discussed with the patient the need to biopsy this lesion to fully exclude any recurrent malignancy however at this time the patient deferred a biopsy attempt.    If this lesion does not resolve, a ultrasound  guided biopsy could be rescheduled.        Patient completed vaginal cuff radiation.    She completed 6 cycles of taxol and carboplatin in May 2015.    June 2015: CT scan at completion of treatment shows no evidence for disease. No adenopathy.  was 7.       Sept 2018:  was 47. CT scan of CAP showed numerous bilateral pulmonary nodules and enlarged peripancreatic and gonzalo hepatis lymph nodes with soft tissue infiltration of the mesentery at the region.    3.4 cm left renal mass along the lower pole of the left kidney measures greater than water density.  Differential would include solid renal mass including primary renal neoplasm or metastatic lesion versus is a complex cyst.      Jan 2019: completed 6 cycles of taxol and carboplatin.      Feb 2019:  Completion :16.  Completion CT scan: partial respose      April 2019:  Completed 9 cycles of Taxol and carboplatin.  CT scan with persistent pulmonary nodules. PET: multiple hypermetabolic mesenteric/retroperitoneal lymph nodes and a hypermetabolic left lung nodule concerning for metastatic disease      Strong family history of breast cancer on mother's side. Patient's daughter has been tested for BRCA and is negative. Patient has been tested and is BRCA negative.           Review of Systems   Constitutional: Negative for chills, fatigue and fever.   Respiratory: Negative for cough, shortness of breath and wheezing.    Cardiovascular: Negative for chest pain, palpitations and leg swelling.   Gastrointestinal: Negative for abdominal pain, constipation, diarrhea, nausea and vomiting.   Genitourinary: Negative for difficulty urinating, dysuria, frequency, genital sores, hematuria, urgency, vaginal bleeding, vaginal discharge and vaginal pain.   Musculoskeletal: Negative for gait problem.   Neurological: Positive for numbness (bilat feet. ). Negative for weakness.   Hematological: Negative for adenopathy. Does not bruise/bleed easily.  "  Psychiatric/Behavioral: The patient is not nervous/anxious.        Objective:   /74   Pulse 104   Ht 5' 2" (1.575 m)   Wt 62.1 kg (136 lb 14.5 oz)   BMI 25.04 kg/m²      Physical Exam   Constitutional: She is oriented to person, place, and time. She appears well-developed and well-nourished.   HENT:   Head: Normocephalic and atraumatic.   Eyes: No scleral icterus.   Neck: No tracheal deviation present. No thyromegaly present.   Cardiovascular: Normal rate and regular rhythm.   Pulmonary/Chest: Effort normal and breath sounds normal.   Abdominal: Soft. She exhibits no distension and no mass. There is no tenderness. There is no rebound and no guarding. No hernia.   Genitourinary:   Genitourinary Comments: Not performed.    Musculoskeletal: She exhibits no edema or tenderness.   Lymphadenopathy:     She has no cervical adenopathy.   Neurological: She is alert and oriented to person, place, and time.   Skin: Skin is warm and dry.   Psychiatric: She has a normal mood and affect. Her behavior is normal. Judgment and thought content normal.       Assessment:       1. Endometrial carcinoma, serous    2. Chemotherapy management, encounter for        Plan:   Endometrial carcinoma, serous  Proceed with cycle 3  Labs and PET prior to next appt.   Chemotherapy management, encounter for        "

## 2019-07-18 ENCOUNTER — LAB VISIT (OUTPATIENT)
Dept: LAB | Facility: HOSPITAL | Age: 69
End: 2019-07-18
Attending: OBSTETRICS & GYNECOLOGY
Payer: MEDICARE

## 2019-07-18 DIAGNOSIS — C55 MALIGNANT NEOPLASM OF UTERUS, UNSPECIFIED SITE: ICD-10-CM

## 2019-07-18 DIAGNOSIS — C54.1 ENDOMETRIAL CARCINOMA: ICD-10-CM

## 2019-07-18 LAB
ANION GAP SERPL CALC-SCNC: 10 MMOL/L (ref 8–16)
BUN SERPL-MCNC: 15 MG/DL (ref 8–23)
CALCIUM SERPL-MCNC: 10.2 MG/DL (ref 8.7–10.5)
CHLORIDE SERPL-SCNC: 105 MMOL/L (ref 95–110)
CO2 SERPL-SCNC: 24 MMOL/L (ref 23–29)
CREAT SERPL-MCNC: 0.8 MG/DL (ref 0.5–1.4)
ERYTHROCYTE [DISTWIDTH] IN BLOOD BY AUTOMATED COUNT: 13 % (ref 11.5–14.5)
EST. GFR  (AFRICAN AMERICAN): >60 ML/MIN/1.73 M^2
EST. GFR  (NON AFRICAN AMERICAN): >60 ML/MIN/1.73 M^2
GLUCOSE SERPL-MCNC: 85 MG/DL (ref 70–110)
HCT VFR BLD AUTO: 37.1 % (ref 37–48.5)
HGB BLD-MCNC: 11.6 G/DL (ref 12–16)
IMM GRANULOCYTES # BLD AUTO: 0.01 K/UL (ref 0–0.04)
MCH RBC QN AUTO: 29.4 PG (ref 27–31)
MCHC RBC AUTO-ENTMCNC: 31.3 G/DL (ref 32–36)
MCV RBC AUTO: 94 FL (ref 82–98)
NEUTROPHILS # BLD AUTO: 1.8 K/UL (ref 1.8–7.7)
PLATELET # BLD AUTO: 241 K/UL (ref 150–350)
PMV BLD AUTO: 10.2 FL (ref 9.2–12.9)
POTASSIUM SERPL-SCNC: 3.9 MMOL/L (ref 3.5–5.1)
RBC # BLD AUTO: 3.95 M/UL (ref 4–5.4)
SODIUM SERPL-SCNC: 139 MMOL/L (ref 136–145)
WBC # BLD AUTO: 3.92 K/UL (ref 3.9–12.7)

## 2019-07-18 PROCEDURE — 36415 COLL VENOUS BLD VENIPUNCTURE: CPT

## 2019-07-18 PROCEDURE — 85027 COMPLETE CBC AUTOMATED: CPT

## 2019-07-18 PROCEDURE — 80048 BASIC METABOLIC PNL TOTAL CA: CPT

## 2019-07-19 ENCOUNTER — OFFICE VISIT (OUTPATIENT)
Dept: GYNECOLOGIC ONCOLOGY | Facility: CLINIC | Age: 69
End: 2019-07-19
Payer: MEDICARE

## 2019-07-19 ENCOUNTER — INFUSION (OUTPATIENT)
Dept: INFUSION THERAPY | Facility: HOSPITAL | Age: 69
End: 2019-07-19
Attending: OBSTETRICS & GYNECOLOGY
Payer: MEDICARE

## 2019-07-19 VITALS
WEIGHT: 136.88 LBS | HEART RATE: 104 BPM | HEIGHT: 62 IN | DIASTOLIC BLOOD PRESSURE: 74 MMHG | BODY MASS INDEX: 25.19 KG/M2 | SYSTOLIC BLOOD PRESSURE: 129 MMHG

## 2019-07-19 VITALS
DIASTOLIC BLOOD PRESSURE: 73 MMHG | HEART RATE: 95 BPM | WEIGHT: 136.88 LBS | HEIGHT: 62 IN | SYSTOLIC BLOOD PRESSURE: 135 MMHG | BODY MASS INDEX: 25.19 KG/M2 | RESPIRATION RATE: 16 BRPM

## 2019-07-19 DIAGNOSIS — Z51.11 CHEMOTHERAPY MANAGEMENT, ENCOUNTER FOR: ICD-10-CM

## 2019-07-19 DIAGNOSIS — C54.1 ENDOMETRIAL CARCINOMA: Primary | ICD-10-CM

## 2019-07-19 PROCEDURE — 96375 TX/PRO/DX INJ NEW DRUG ADDON: CPT

## 2019-07-19 PROCEDURE — 63600175 PHARM REV CODE 636 W HCPCS: Performed by: OBSTETRICS & GYNECOLOGY

## 2019-07-19 PROCEDURE — 25000003 PHARM REV CODE 250: Performed by: OBSTETRICS & GYNECOLOGY

## 2019-07-19 PROCEDURE — 99213 OFFICE O/P EST LOW 20 MIN: CPT | Mod: PBBFAC,25 | Performed by: OBSTETRICS & GYNECOLOGY

## 2019-07-19 PROCEDURE — 99999 PR PBB SHADOW E&M-EST. PATIENT-LVL III: ICD-10-PCS | Mod: PBBFAC,,, | Performed by: OBSTETRICS & GYNECOLOGY

## 2019-07-19 PROCEDURE — 99214 PR OFFICE/OUTPT VISIT, EST, LEVL IV, 30-39 MIN: ICD-10-PCS | Mod: S$PBB,,, | Performed by: OBSTETRICS & GYNECOLOGY

## 2019-07-19 PROCEDURE — A4216 STERILE WATER/SALINE, 10 ML: HCPCS | Performed by: OBSTETRICS & GYNECOLOGY

## 2019-07-19 PROCEDURE — 99214 OFFICE O/P EST MOD 30 MIN: CPT | Mod: S$PBB,,, | Performed by: OBSTETRICS & GYNECOLOGY

## 2019-07-19 PROCEDURE — 99999 PR PBB SHADOW E&M-EST. PATIENT-LVL III: CPT | Mod: PBBFAC,,, | Performed by: OBSTETRICS & GYNECOLOGY

## 2019-07-19 PROCEDURE — 96413 CHEMO IV INFUSION 1 HR: CPT

## 2019-07-19 RX ORDER — HEPARIN 100 UNIT/ML
500 SYRINGE INTRAVENOUS
Status: CANCELLED | OUTPATIENT
Start: 2019-07-19

## 2019-07-19 RX ORDER — SODIUM CHLORIDE 0.9 % (FLUSH) 0.9 %
10 SYRINGE (ML) INJECTION
Status: CANCELLED | OUTPATIENT
Start: 2019-07-19

## 2019-07-19 RX ORDER — DIPHENHYDRAMINE HYDROCHLORIDE 50 MG/ML
25 INJECTION INTRAMUSCULAR; INTRAVENOUS
Status: COMPLETED | OUTPATIENT
Start: 2019-07-19 | End: 2019-07-19

## 2019-07-19 RX ORDER — SODIUM CHLORIDE 0.9 % (FLUSH) 0.9 %
10 SYRINGE (ML) INJECTION
Status: DISCONTINUED | OUTPATIENT
Start: 2019-07-19 | End: 2019-07-19 | Stop reason: HOSPADM

## 2019-07-19 RX ORDER — HEPARIN 100 UNIT/ML
500 SYRINGE INTRAVENOUS
Status: DISCONTINUED | OUTPATIENT
Start: 2019-07-19 | End: 2019-07-19 | Stop reason: HOSPADM

## 2019-07-19 RX ORDER — DIPHENHYDRAMINE HYDROCHLORIDE 50 MG/ML
25 INJECTION INTRAMUSCULAR; INTRAVENOUS
Status: CANCELLED
Start: 2019-07-19

## 2019-07-19 RX ADMIN — Medication 10 ML: at 12:07

## 2019-07-19 RX ADMIN — BEVACIZUMAB 950 MG: 100 INJECTION, SOLUTION INTRAVENOUS at 12:07

## 2019-07-19 RX ADMIN — DIPHENHYDRAMINE HYDROCHLORIDE 25 MG: 50 INJECTION INTRAMUSCULAR; INTRAVENOUS at 11:07

## 2019-07-19 NOTE — PLAN OF CARE
Problem: Adult Inpatient Plan of Care  Goal: Plan of Care Review  Outcome: Ongoing (interventions implemented as appropriate)  Pt tolerated avastin without complications. VSS. No s/s of reaction. Instructed to contact MD with any questions. PIV removed and AVS given to patient.

## 2019-08-07 ENCOUNTER — HOSPITAL ENCOUNTER (OUTPATIENT)
Dept: RADIOLOGY | Facility: HOSPITAL | Age: 69
Discharge: HOME | End: 2019-08-07
Attending: OBSTETRICS & GYNECOLOGY
Payer: MEDICARE

## 2019-08-07 DIAGNOSIS — C54.1 ENDOMETRIAL CARCINOMA: ICD-10-CM

## 2019-08-07 LAB — POCT GLUCOSE: 72 MG/DL (ref 70–110)

## 2019-08-07 PROCEDURE — 78815 PET IMAGE W/CT SKULL-THIGH: CPT | Mod: 26,PS,, | Performed by: RADIOLOGY

## 2019-08-07 PROCEDURE — 78815 PET IMAGE W/CT SKULL-THIGH: CPT | Mod: TC

## 2019-08-07 PROCEDURE — 78815 NM PET CT ROUTINE: ICD-10-PCS | Mod: 26,PS,, | Performed by: RADIOLOGY

## 2019-08-07 PROCEDURE — A9552 F18 FDG: HCPCS

## 2019-08-08 ENCOUNTER — LAB VISIT (OUTPATIENT)
Dept: LAB | Facility: HOSPITAL | Age: 69
End: 2019-08-08
Attending: OBSTETRICS & GYNECOLOGY
Payer: MEDICARE

## 2019-08-08 ENCOUNTER — OFFICE VISIT (OUTPATIENT)
Dept: GYNECOLOGIC ONCOLOGY | Facility: CLINIC | Age: 69
End: 2019-08-08
Payer: MEDICARE

## 2019-08-08 VITALS — BODY MASS INDEX: 25.76 KG/M2 | WEIGHT: 140 LBS | HEIGHT: 62 IN

## 2019-08-08 DIAGNOSIS — C54.1 ENDOMETRIAL CARCINOMA: Primary | ICD-10-CM

## 2019-08-08 DIAGNOSIS — C54.1 ENDOMETRIAL CARCINOMA: ICD-10-CM

## 2019-08-08 DIAGNOSIS — Z51.11 CHEMOTHERAPY MANAGEMENT, ENCOUNTER FOR: ICD-10-CM

## 2019-08-08 DIAGNOSIS — C55 MALIGNANT NEOPLASM OF UTERUS, UNSPECIFIED SITE: ICD-10-CM

## 2019-08-08 LAB
ANION GAP SERPL CALC-SCNC: 10 MMOL/L (ref 8–16)
BUN SERPL-MCNC: 17 MG/DL (ref 8–23)
CALCIUM SERPL-MCNC: 10.1 MG/DL (ref 8.7–10.5)
CANCER AG125 SERPL-ACNC: 155 U/ML (ref 0–30)
CHLORIDE SERPL-SCNC: 105 MMOL/L (ref 95–110)
CO2 SERPL-SCNC: 25 MMOL/L (ref 23–29)
CREAT SERPL-MCNC: 0.8 MG/DL (ref 0.5–1.4)
ERYTHROCYTE [DISTWIDTH] IN BLOOD BY AUTOMATED COUNT: 13.8 % (ref 11.5–14.5)
EST. GFR  (AFRICAN AMERICAN): >60 ML/MIN/1.73 M^2
EST. GFR  (NON AFRICAN AMERICAN): >60 ML/MIN/1.73 M^2
GLUCOSE SERPL-MCNC: 88 MG/DL (ref 70–110)
HCT VFR BLD AUTO: 37.5 % (ref 37–48.5)
HGB BLD-MCNC: 11.7 G/DL (ref 12–16)
IMM GRANULOCYTES # BLD AUTO: 0.01 K/UL (ref 0–0.04)
MCH RBC QN AUTO: 28.7 PG (ref 27–31)
MCHC RBC AUTO-ENTMCNC: 31.2 G/DL (ref 32–36)
MCV RBC AUTO: 92 FL (ref 82–98)
NEUTROPHILS # BLD AUTO: 3.3 K/UL (ref 1.8–7.7)
PLATELET # BLD AUTO: 271 K/UL (ref 150–350)
PMV BLD AUTO: 9.9 FL (ref 9.2–12.9)
POTASSIUM SERPL-SCNC: 4 MMOL/L (ref 3.5–5.1)
RBC # BLD AUTO: 4.08 M/UL (ref 4–5.4)
SODIUM SERPL-SCNC: 140 MMOL/L (ref 136–145)
WBC # BLD AUTO: 5.18 K/UL (ref 3.9–12.7)

## 2019-08-08 PROCEDURE — 80048 BASIC METABOLIC PNL TOTAL CA: CPT

## 2019-08-08 PROCEDURE — 99214 PR OFFICE/OUTPT VISIT, EST, LEVL IV, 30-39 MIN: ICD-10-PCS | Mod: S$PBB,,, | Performed by: OBSTETRICS & GYNECOLOGY

## 2019-08-08 PROCEDURE — 85027 COMPLETE CBC AUTOMATED: CPT

## 2019-08-08 PROCEDURE — 99999 PR PBB SHADOW E&M-EST. PATIENT-LVL II: ICD-10-PCS | Mod: PBBFAC,,, | Performed by: OBSTETRICS & GYNECOLOGY

## 2019-08-08 PROCEDURE — 99214 OFFICE O/P EST MOD 30 MIN: CPT | Mod: S$PBB,,, | Performed by: OBSTETRICS & GYNECOLOGY

## 2019-08-08 PROCEDURE — 99999 PR PBB SHADOW E&M-EST. PATIENT-LVL II: CPT | Mod: PBBFAC,,, | Performed by: OBSTETRICS & GYNECOLOGY

## 2019-08-08 PROCEDURE — 99212 OFFICE O/P EST SF 10 MIN: CPT | Mod: PBBFAC | Performed by: OBSTETRICS & GYNECOLOGY

## 2019-08-08 PROCEDURE — 86304 IMMUNOASSAY TUMOR CA 125: CPT

## 2019-08-08 PROCEDURE — 36415 COLL VENOUS BLD VENIPUNCTURE: CPT

## 2019-08-08 NOTE — PROGRESS NOTES
Subjective:       Patient ID: Jessica Gorman is a 69 y.o. female.    Chief Complaint: Endometrial Carcinoma, serous; Chemotherapy (Avastin C4); and Pain (left arm X 1 week )    HPI     Patient comes in today for cycle 4 of maintenance  Avastin for persistent UPSC after 9 cycles of reinduction taxol and carboplatin.      One day of back pain last week.     Aug 2019: : 155 (up from 20).   PET scan: progressive disease          Mammogram: Oct  8, 2018: normal CBE: June 2018.   Colonscopy: 2004: normal   Sigmoidoscopy: 2015: normal.       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    CT scan of the abdomen and pelvis after surgery showed: 1.Postsurgical changes seen in the pelvis from hysterectomy and bilateral salpingo-oophorectomy    2.A fluid collection is seen posterior to the cervix and likely represents a seroma. However, abscess cannot be ruled out.    3.Enlarged left inguinal lymph node identified    Ultrasound of this inguinal node was done and showed:    Limited sonographic imaging was performed in an area of a palpable abnormality within the patient's left inguinal region. Within this region, a lymph node is seen that measures 2.4 x 0.9 x 2.8 cm. While enlarged, this lymph node does   demonstrate a fatty hilum and is otherwise normal in morphology.    The patient reports that this palpable abnormality has been getting smaller over time. I discussed with the patient the need to biopsy this lesion to fully exclude any recurrent malignancy however at this time the patient deferred a biopsy attempt.    If this lesion does not resolve, a  ultrasound guided biopsy could be rescheduled.        Patient completed vaginal cuff radiation.    She completed 6 cycles of taxol and carboplatin in May 2015.    June 2015: CT scan at completion of treatment shows no evidence for disease. No adenopathy.  was 7.       Sept 2018:  was 47. CT scan of CAP showed numerous bilateral pulmonary nodules and enlarged peripancreatic and gonzalo hepatis lymph nodes with soft tissue infiltration of the mesentery at the region.    3.4 cm left renal mass along the lower pole of the left kidney measures greater than water density.  Differential would include solid renal mass including primary renal neoplasm or metastatic lesion versus is a complex cyst.      Jan 2019: completed 6 cycles of taxol and carboplatin.      Feb 2019:  Completion :16.  Completion CT scan: partial respose      April 2019:  Completed 9 cycles of Taxol and carboplatin.  CT scan with persistent pulmonary nodules. PET: multiple  hypermetabolic mesenteric/retroperitoneal lymph nodes and a hypermetabolic left lung nodule concerning for metastatic disease     June 2019: started Avastin maintenance.     Strong family history of breast cancer on mother's side. Patient's daughter has been tested for BRCA and is negative. Patient has been tested and is BRCA negative.              Review of Systems   Constitutional: Negative for chills, fatigue and fever.   Respiratory: Negative for cough, shortness of breath and wheezing.    Cardiovascular: Negative for chest pain, palpitations and leg swelling.   Gastrointestinal: Positive for abdominal pain (llq intermittent). Negative for constipation, diarrhea, nausea and vomiting.   Genitourinary: Negative for difficulty urinating, dysuria, frequency, genital sores, hematuria, urgency, vaginal bleeding, vaginal discharge and vaginal pain.   Musculoskeletal: Positive for back pain. Negative for gait problem.   Neurological: Positive for numbness. Negative for  "weakness.   Hematological: Negative for adenopathy. Does not bruise/bleed easily.   Psychiatric/Behavioral: The patient is not nervous/anxious.        Objective:   Ht 5' 2" (1.575 m)   Wt 63.5 kg (139 lb 15.9 oz)   BMI 25.60 kg/m²      Physical Exam    Assessment:       1. Endometrial carcinoma, serous    2. Chemotherapy management, encounter for        Plan:   Endometrial carcinoma, serous  Progressive disease   Will stop avastin  She is not interested in a clinical trial   Will resume taxol and carboplatin  Neuropathy will need to be monitored.   Discussed taxotere instead but she and daughter would prefer to restart taxol/carbo.   Chemotherapy management, encounter for        "

## 2019-08-15 DIAGNOSIS — C54.1 ENDOMETRIAL CARCINOMA: ICD-10-CM

## 2019-08-15 RX ORDER — DEXAMETHASONE 4 MG/1
TABLET ORAL
Qty: 20 TABLET | Refills: 4 | Status: SHIPPED | OUTPATIENT
Start: 2019-08-15

## 2019-08-16 ENCOUNTER — TELEPHONE (OUTPATIENT)
Dept: GYNECOLOGIC ONCOLOGY | Facility: CLINIC | Age: 69
End: 2019-08-16

## 2019-08-16 NOTE — TELEPHONE ENCOUNTER
Called patient informing her that medication was sent to her pharmacy. Patient voiced understanding. OBED

## 2019-08-16 NOTE — TELEPHONE ENCOUNTER
----- Message from Vesna Bales NP sent at 8/15/2019  4:59 PM CDT -----  Contact: SARAH MAGANA [[id number]]  I went back in her history from when she got Taxol and CArbo before Avastin earlier in the year and looks like he was pre treating her with Decadron so I reordered it if you could let her know.  Thanks    ----- Message -----  From: Ella Macdonald MA  Sent: 8/15/2019   8:21 AM  To: Vesna Bales NP    Patient called stating she would like Decadron called in to pharmacy. Patient states she was taking it before while on Carbo/ Taxol. Patient state date is 8/20. Please advise  ----- Message -----  From: Jose Ramon Garber  Sent: 8/15/2019   8:13 AM  To: Shukri LUEVANO Staff    Name of Who is Calling: SARAH MAGANA [[id number]]      What is the request in detail: Pt is calling in regards to her chemo treatment.  Please contact to further discuss and advise.       Can the clinic reply by [facility]:       What [facility] Back [facility][phone]

## 2019-08-19 ENCOUNTER — LAB VISIT (OUTPATIENT)
Dept: LAB | Facility: HOSPITAL | Age: 69
End: 2019-08-19
Attending: OBSTETRICS & GYNECOLOGY
Payer: MEDICARE

## 2019-08-19 DIAGNOSIS — C55 MALIGNANT NEOPLASM OF UTERUS, UNSPECIFIED SITE: ICD-10-CM

## 2019-08-19 DIAGNOSIS — C54.1 ENDOMETRIAL CARCINOMA: ICD-10-CM

## 2019-08-19 LAB
ANION GAP SERPL CALC-SCNC: 13 MMOL/L (ref 8–16)
BUN SERPL-MCNC: 11 MG/DL (ref 8–23)
CALCIUM SERPL-MCNC: 10.1 MG/DL (ref 8.7–10.5)
CHLORIDE SERPL-SCNC: 102 MMOL/L (ref 95–110)
CO2 SERPL-SCNC: 23 MMOL/L (ref 23–29)
CREAT SERPL-MCNC: 0.9 MG/DL (ref 0.5–1.4)
ERYTHROCYTE [DISTWIDTH] IN BLOOD BY AUTOMATED COUNT: 13.4 % (ref 11.5–14.5)
EST. GFR  (AFRICAN AMERICAN): >60 ML/MIN/1.73 M^2
EST. GFR  (NON AFRICAN AMERICAN): >60 ML/MIN/1.73 M^2
GLUCOSE SERPL-MCNC: 82 MG/DL (ref 70–110)
HCT VFR BLD AUTO: 38.5 % (ref 37–48.5)
HGB BLD-MCNC: 12 G/DL (ref 12–16)
IMM GRANULOCYTES # BLD AUTO: 0.02 K/UL (ref 0–0.04)
MCH RBC QN AUTO: 28.4 PG (ref 27–31)
MCHC RBC AUTO-ENTMCNC: 31.2 G/DL (ref 32–36)
MCV RBC AUTO: 91 FL (ref 82–98)
NEUTROPHILS # BLD AUTO: 5 K/UL (ref 1.8–7.7)
PLATELET # BLD AUTO: 372 K/UL (ref 150–350)
PMV BLD AUTO: 10 FL (ref 9.2–12.9)
POTASSIUM SERPL-SCNC: 4.1 MMOL/L (ref 3.5–5.1)
RBC # BLD AUTO: 4.22 M/UL (ref 4–5.4)
SODIUM SERPL-SCNC: 138 MMOL/L (ref 136–145)
WBC # BLD AUTO: 7.04 K/UL (ref 3.9–12.7)

## 2019-08-19 PROCEDURE — 80048 BASIC METABOLIC PNL TOTAL CA: CPT

## 2019-08-19 PROCEDURE — 85027 COMPLETE CBC AUTOMATED: CPT

## 2019-08-19 PROCEDURE — 36415 COLL VENOUS BLD VENIPUNCTURE: CPT

## 2019-08-20 ENCOUNTER — INFUSION (OUTPATIENT)
Dept: INFUSION THERAPY | Facility: HOSPITAL | Age: 69
End: 2019-08-20
Attending: OBSTETRICS & GYNECOLOGY
Payer: MEDICARE

## 2019-08-20 VITALS
HEIGHT: 62 IN | WEIGHT: 143.5 LBS | RESPIRATION RATE: 20 BRPM | DIASTOLIC BLOOD PRESSURE: 84 MMHG | BODY MASS INDEX: 26.41 KG/M2 | SYSTOLIC BLOOD PRESSURE: 152 MMHG | TEMPERATURE: 98 F | HEART RATE: 117 BPM

## 2019-08-20 DIAGNOSIS — C54.1 ENDOMETRIAL CARCINOMA: Primary | ICD-10-CM

## 2019-08-20 PROCEDURE — 25000003 PHARM REV CODE 250: Performed by: OBSTETRICS & GYNECOLOGY

## 2019-08-20 PROCEDURE — S0028 INJECTION, FAMOTIDINE, 20 MG: HCPCS | Performed by: OBSTETRICS & GYNECOLOGY

## 2019-08-20 PROCEDURE — 96361 HYDRATE IV INFUSION ADD-ON: CPT

## 2019-08-20 PROCEDURE — 96415 CHEMO IV INFUSION ADDL HR: CPT

## 2019-08-20 PROCEDURE — 96375 TX/PRO/DX INJ NEW DRUG ADDON: CPT

## 2019-08-20 PROCEDURE — 96417 CHEMO IV INFUS EACH ADDL SEQ: CPT

## 2019-08-20 PROCEDURE — 96413 CHEMO IV INFUSION 1 HR: CPT

## 2019-08-20 PROCEDURE — 63600175 PHARM REV CODE 636 W HCPCS: Mod: JG | Performed by: OBSTETRICS & GYNECOLOGY

## 2019-08-20 PROCEDURE — 96367 TX/PROPH/DG ADDL SEQ IV INF: CPT

## 2019-08-20 RX ORDER — HEPARIN 100 UNIT/ML
500 SYRINGE INTRAVENOUS
Status: DISCONTINUED | OUTPATIENT
Start: 2019-08-20 | End: 2019-08-20 | Stop reason: HOSPADM

## 2019-08-20 RX ORDER — SODIUM CHLORIDE 0.9 % (FLUSH) 0.9 %
10 SYRINGE (ML) INJECTION
Status: CANCELLED | OUTPATIENT
Start: 2019-08-20

## 2019-08-20 RX ORDER — FAMOTIDINE 10 MG/ML
20 INJECTION INTRAVENOUS
Status: CANCELLED | OUTPATIENT
Start: 2019-08-20

## 2019-08-20 RX ORDER — HEPARIN 100 UNIT/ML
500 SYRINGE INTRAVENOUS
Status: CANCELLED | OUTPATIENT
Start: 2019-08-20

## 2019-08-20 RX ORDER — DIPHENHYDRAMINE HYDROCHLORIDE 50 MG/ML
50 INJECTION INTRAMUSCULAR; INTRAVENOUS ONCE AS NEEDED
Status: CANCELLED | OUTPATIENT
Start: 2019-08-20

## 2019-08-20 RX ORDER — SODIUM CHLORIDE 0.9 % (FLUSH) 0.9 %
10 SYRINGE (ML) INJECTION
Status: DISCONTINUED | OUTPATIENT
Start: 2019-08-20 | End: 2019-08-20 | Stop reason: HOSPADM

## 2019-08-20 RX ORDER — FAMOTIDINE 10 MG/ML
20 INJECTION INTRAVENOUS
Status: COMPLETED | OUTPATIENT
Start: 2019-08-20 | End: 2019-08-20

## 2019-08-20 RX ORDER — EPINEPHRINE 0.3 MG/.3ML
0.3 INJECTION SUBCUTANEOUS ONCE AS NEEDED
Status: CANCELLED | OUTPATIENT
Start: 2019-08-20

## 2019-08-20 RX ORDER — DIPHENHYDRAMINE HYDROCHLORIDE 50 MG/ML
50 INJECTION INTRAMUSCULAR; INTRAVENOUS ONCE AS NEEDED
Status: DISCONTINUED | OUTPATIENT
Start: 2019-08-20 | End: 2019-08-20 | Stop reason: HOSPADM

## 2019-08-20 RX ORDER — EPINEPHRINE 0.3 MG/.3ML
0.3 INJECTION SUBCUTANEOUS ONCE AS NEEDED
Status: DISCONTINUED | OUTPATIENT
Start: 2019-08-20 | End: 2019-08-20 | Stop reason: HOSPADM

## 2019-08-20 RX ADMIN — DIPHENHYDRAMINE HYDROCHLORIDE 50 MG: 50 INJECTION, SOLUTION INTRAMUSCULAR; INTRAVENOUS at 11:08

## 2019-08-20 RX ADMIN — CARBOPLATIN 420 MG: 10 INJECTION, SOLUTION INTRAVENOUS at 03:08

## 2019-08-20 RX ADMIN — FAMOTIDINE 20 MG: 10 INJECTION INTRAVENOUS at 11:08

## 2019-08-20 RX ADMIN — PACLITAXEL 294 MG: 6 INJECTION, SOLUTION INTRAVENOUS at 12:08

## 2019-08-20 RX ADMIN — DEXAMETHASONE SODIUM PHOSPHATE: 4 INJECTION, SOLUTION INTRA-ARTICULAR; INTRALESIONAL; INTRAMUSCULAR; INTRAVENOUS; SOFT TISSUE at 11:08

## 2019-08-20 NOTE — PLAN OF CARE
Problem: Adult Inpatient Plan of Care  Goal: Plan of Care Review  Outcome: Ongoing (interventions implemented as appropriate)  1720:  Patient tolerated Taxol/Carbo well, NAD noted, denies any new issues.  PIV removed intact, AVS given.  Released in stable condition, ambulated off unit accompanied by family member.

## 2019-08-29 ENCOUNTER — PATIENT MESSAGE (OUTPATIENT)
Dept: GYNECOLOGIC ONCOLOGY | Facility: CLINIC | Age: 69
End: 2019-08-29

## 2019-08-30 DIAGNOSIS — R63.0 ANOREXIA: ICD-10-CM

## 2019-08-30 RX ORDER — MEGESTROL ACETATE 125 MG/ML
625 SUSPENSION ORAL DAILY
Qty: 150 ML | Refills: 11 | Status: SHIPPED | OUTPATIENT
Start: 2019-08-30 | End: 2020-08-29

## 2019-09-09 ENCOUNTER — TELEPHONE (OUTPATIENT)
Dept: ADMINISTRATIVE | Facility: OTHER | Age: 69
End: 2019-09-09

## 2019-09-09 DIAGNOSIS — E87.6 HYPOKALEMIA: ICD-10-CM

## 2019-09-09 NOTE — PROGRESS NOTES
Subjective:       Patient ID: Jessica Gorman is a 69 y.o. female.    Chief Complaint: Endomatrial carcinoma, serous and Chemotherapy (Carbo/ Taxol C2)    HPI     Patient comes in today for cycle 2 of maintenance  taxol and carboplatin for recurrent  UPSC after 3 cycles of avastin maintenance.     STRATA testing requested.     Fatigue with cycle 1 lasted for 10 days.     Chemotherapy labs have been reviewed and are appropriate for treatment.                 Mammogram: Oct  8, 2018: normal CBE: June 2018.   Colonscopy: 2004: normal   Sigmoidoscopy: 2015: normal.       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    CT scan of the abdomen and pelvis after surgery showed: 1.Postsurgical changes seen in the pelvis from hysterectomy and bilateral salpingo-oophorectomy    2.A fluid collection is seen posterior to the cervix and likely represents a seroma. However, abscess cannot be ruled out.    3.Enlarged left inguinal lymph node identified    Ultrasound of this inguinal node was done and showed:    Limited sonographic imaging was performed in an area of a palpable abnormality within the patient's left inguinal region. Within this region, a lymph node is seen that measures 2.4 x 0.9 x 2.8 cm. While enlarged, this lymph node does   demonstrate a fatty hilum and is otherwise normal in morphology.    The patient reports that this palpable abnormality has been getting smaller over time. I discussed with the patient the need to biopsy this lesion to fully exclude any recurrent malignancy however at this time the patient deferred a biopsy attempt.    If this  lesion does not resolve, a ultrasound guided biopsy could be rescheduled.        Patient completed vaginal cuff radiation.    She completed 6 cycles of taxol and carboplatin in May 2015.    June 2015: CT scan at completion of treatment shows no evidence for disease. No adenopathy.  was 7.       Sept 2018:  was 47. CT scan of CAP showed numerous bilateral pulmonary nodules and enlarged peripancreatic and gonzalo hepatis lymph nodes with soft tissue infiltration of the mesentery at the region.    3.4 cm left renal mass along the lower pole of the left kidney measures greater than water density.  Differential would include solid renal mass including primary renal neoplasm or metastatic lesion versus is a complex cyst.      Jan 2019: completed 6 cycles of taxol and carboplatin.      Feb 2019:  Completion :16.  Completion CT scan: partial response      April 2019:  Completed 9 cycles of Taxol and carboplatin.  CT scan with persistent pulmonary nodules. PET: multiple  hypermetabolic mesenteric/retroperitoneal lymph nodes and a hypermetabolic left lung nodule concerning for metastatic disease      June 2019: started Avastin maintenance.    August 2019: completed 3 cycles of Avastin. PET showed progressive disease in lung, liver and abdomen. Aug 2019: : 155 (up from 20). Not interested in clinical trial. Wanted to resume taxol and carboplatin.      Strong family history of breast cancer on mother's side. Patient's daughter has been tested for BRCA and is negative. Patient has been tested and is BRCA negative.           Review of Systems   Constitutional: Negative for chills, fatigue and fever.   Respiratory: Negative for cough, shortness of breath and wheezing.    Cardiovascular: Negative for chest pain, palpitations and leg swelling.   Gastrointestinal: Negative for abdominal pain, constipation, diarrhea, nausea and vomiting.   Genitourinary: Negative for difficulty urinating, dysuria, frequency,  "genital sores, hematuria, urgency, vaginal bleeding, vaginal discharge and vaginal pain.   Musculoskeletal: Negative for gait problem.   Neurological: Positive for numbness (right foot. ). Negative for weakness.   Hematological: Negative for adenopathy. Does not bruise/bleed easily.   Psychiatric/Behavioral: The patient is not nervous/anxious.        Objective:   /83   Pulse (!) 129   Ht 5' 2" (1.575 m)   Wt 64.5 kg (142 lb 3.2 oz)   BMI 26.01 kg/m²      Physical Exam   Constitutional: She is oriented to person, place, and time. She appears well-developed and well-nourished.   HENT:   Head: Normocephalic and atraumatic.   Eyes: No scleral icterus.   Neck: No tracheal deviation present. No thyromegaly present.   Cardiovascular: Normal rate and regular rhythm.   Pulmonary/Chest: Effort normal and breath sounds normal.   Abdominal: Soft. She exhibits distension (due to ascites ). She exhibits no mass. There is no tenderness. There is no rebound and no guarding. No hernia.   Genitourinary:   Genitourinary Comments: Not performed.    Musculoskeletal: She exhibits no edema or tenderness.   Lymphadenopathy:     She has no cervical adenopathy.   Neurological: She is alert and oriented to person, place, and time.   Skin: Skin is warm and dry.   Psychiatric: She has a normal mood and affect. Her behavior is normal. Judgment and thought content normal.       Assessment:       1. Endometrial carcinoma, serous    2. Chemotherapy management, encounter for        Plan:   Endometrial carcinoma, serous  Proceed with cycle 2 of taxol and carboplatin.   RTC in 21 days   Chemotherapy management, encounter for        "

## 2019-09-10 ENCOUNTER — OFFICE VISIT (OUTPATIENT)
Dept: GYNECOLOGIC ONCOLOGY | Facility: CLINIC | Age: 69
DRG: 392 | End: 2019-09-10
Payer: MEDICARE

## 2019-09-10 ENCOUNTER — INFUSION (OUTPATIENT)
Dept: INFUSION THERAPY | Facility: HOSPITAL | Age: 69
DRG: 392 | End: 2019-09-10
Attending: OBSTETRICS & GYNECOLOGY
Payer: MEDICARE

## 2019-09-10 VITALS
HEIGHT: 62 IN | DIASTOLIC BLOOD PRESSURE: 83 MMHG | SYSTOLIC BLOOD PRESSURE: 132 MMHG | WEIGHT: 142.19 LBS | HEART RATE: 129 BPM | BODY MASS INDEX: 26.17 KG/M2

## 2019-09-10 VITALS
DIASTOLIC BLOOD PRESSURE: 65 MMHG | RESPIRATION RATE: 18 BRPM | SYSTOLIC BLOOD PRESSURE: 110 MMHG | HEART RATE: 107 BPM | TEMPERATURE: 98 F

## 2019-09-10 DIAGNOSIS — C54.1 ENDOMETRIAL CARCINOMA: Primary | ICD-10-CM

## 2019-09-10 DIAGNOSIS — Z51.11 CHEMOTHERAPY MANAGEMENT, ENCOUNTER FOR: ICD-10-CM

## 2019-09-10 PROCEDURE — 25000003 PHARM REV CODE 250: Performed by: OBSTETRICS & GYNECOLOGY

## 2019-09-10 PROCEDURE — 99999 PR PBB SHADOW E&M-EST. PATIENT-LVL III: ICD-10-PCS | Mod: PBBFAC,,, | Performed by: OBSTETRICS & GYNECOLOGY

## 2019-09-10 PROCEDURE — 96413 CHEMO IV INFUSION 1 HR: CPT

## 2019-09-10 PROCEDURE — 96417 CHEMO IV INFUS EACH ADDL SEQ: CPT

## 2019-09-10 PROCEDURE — S0028 INJECTION, FAMOTIDINE, 20 MG: HCPCS | Performed by: OBSTETRICS & GYNECOLOGY

## 2019-09-10 PROCEDURE — 96415 CHEMO IV INFUSION ADDL HR: CPT

## 2019-09-10 PROCEDURE — 63600175 PHARM REV CODE 636 W HCPCS: Performed by: OBSTETRICS & GYNECOLOGY

## 2019-09-10 PROCEDURE — 99999 PR PBB SHADOW E&M-EST. PATIENT-LVL III: CPT | Mod: PBBFAC,,, | Performed by: OBSTETRICS & GYNECOLOGY

## 2019-09-10 PROCEDURE — 99214 PR OFFICE/OUTPT VISIT, EST, LEVL IV, 30-39 MIN: ICD-10-PCS | Mod: S$PBB,,, | Performed by: OBSTETRICS & GYNECOLOGY

## 2019-09-10 PROCEDURE — 99213 OFFICE O/P EST LOW 20 MIN: CPT | Mod: PBBFAC,25 | Performed by: OBSTETRICS & GYNECOLOGY

## 2019-09-10 PROCEDURE — 99214 OFFICE O/P EST MOD 30 MIN: CPT | Mod: S$PBB,,, | Performed by: OBSTETRICS & GYNECOLOGY

## 2019-09-10 PROCEDURE — 96375 TX/PRO/DX INJ NEW DRUG ADDON: CPT

## 2019-09-10 PROCEDURE — 96367 TX/PROPH/DG ADDL SEQ IV INF: CPT

## 2019-09-10 RX ORDER — EPINEPHRINE 0.3 MG/.3ML
0.3 INJECTION SUBCUTANEOUS ONCE AS NEEDED
Status: DISCONTINUED | OUTPATIENT
Start: 2019-09-10 | End: 2019-09-10 | Stop reason: HOSPADM

## 2019-09-10 RX ORDER — EPINEPHRINE 0.3 MG/.3ML
0.3 INJECTION SUBCUTANEOUS ONCE AS NEEDED
Status: CANCELLED | OUTPATIENT
Start: 2019-09-10

## 2019-09-10 RX ORDER — DIPHENHYDRAMINE HYDROCHLORIDE 50 MG/ML
50 INJECTION INTRAMUSCULAR; INTRAVENOUS ONCE AS NEEDED
Status: CANCELLED | OUTPATIENT
Start: 2019-09-10

## 2019-09-10 RX ORDER — HEPARIN 100 UNIT/ML
500 SYRINGE INTRAVENOUS
Status: CANCELLED | OUTPATIENT
Start: 2019-09-10

## 2019-09-10 RX ORDER — DIPHENHYDRAMINE HYDROCHLORIDE 50 MG/ML
50 INJECTION INTRAMUSCULAR; INTRAVENOUS ONCE AS NEEDED
Status: DISCONTINUED | OUTPATIENT
Start: 2019-09-10 | End: 2019-09-10 | Stop reason: HOSPADM

## 2019-09-10 RX ORDER — FAMOTIDINE 10 MG/ML
20 INJECTION INTRAVENOUS
Status: COMPLETED | OUTPATIENT
Start: 2019-09-10 | End: 2019-09-10

## 2019-09-10 RX ORDER — FAMOTIDINE 10 MG/ML
20 INJECTION INTRAVENOUS
Status: CANCELLED | OUTPATIENT
Start: 2019-09-10

## 2019-09-10 RX ORDER — SODIUM CHLORIDE 0.9 % (FLUSH) 0.9 %
10 SYRINGE (ML) INJECTION
Status: CANCELLED | OUTPATIENT
Start: 2019-09-10

## 2019-09-10 RX ADMIN — FAMOTIDINE 20 MG: 10 INJECTION INTRAVENOUS at 10:09

## 2019-09-10 RX ADMIN — DIPHENHYDRAMINE HYDROCHLORIDE 50 MG: 50 INJECTION, SOLUTION INTRAMUSCULAR; INTRAVENOUS at 10:09

## 2019-09-10 RX ADMIN — DEXAMETHASONE SODIUM PHOSPHATE: 4 INJECTION, SOLUTION INTRAMUSCULAR; INTRAVENOUS at 10:09

## 2019-09-10 RX ADMIN — PACLITAXEL 294 MG: 6 INJECTION, SOLUTION INTRAVENOUS at 11:09

## 2019-09-10 RX ADMIN — SODIUM CHLORIDE 500 ML: 0.9 INJECTION, SOLUTION INTRAVENOUS at 11:09

## 2019-09-10 RX ADMIN — CARBOPLATIN 460 MG: 10 INJECTION, SOLUTION INTRAVENOUS at 02:09

## 2019-09-10 NOTE — PLAN OF CARE
Problem: Adult Inpatient Plan of Care  Goal: Plan of Care Review  Outcome: Ongoing (interventions implemented as appropriate)  1545-Patient tolerated treatment well. Discharged without complaints or S/S of adverse event. AVS given.  Instructed to call provider for any questions or concerns.

## 2019-09-10 NOTE — PLAN OF CARE
Problem: Adult Inpatient Plan of Care  Goal: Patient-Specific Goal (Individualization)  Outcome: Ongoing (interventions implemented as appropriate)  1000-Labs , hx, and medications reviewed, patient was seen by MD prior to arrival. Assessment completed. Patient verbalized she took po steroid prior to treatment as instructed.  MD office contacted regarding elevated heart rate- okay to proceed, no new orders received. Discussed plan of care with patient. Patient in agreement. Chair reclined and warm blanket and snack offered.

## 2019-09-12 DIAGNOSIS — E87.6 HYPOKALEMIA: ICD-10-CM

## 2019-09-13 ENCOUNTER — TELEPHONE (OUTPATIENT)
Dept: GYNECOLOGIC ONCOLOGY | Facility: CLINIC | Age: 69
End: 2019-09-13

## 2019-09-13 ENCOUNTER — NURSE TRIAGE (OUTPATIENT)
Dept: ADMINISTRATIVE | Facility: CLINIC | Age: 69
End: 2019-09-13

## 2019-09-13 ENCOUNTER — HOSPITAL ENCOUNTER (INPATIENT)
Facility: HOSPITAL | Age: 69
LOS: 2 days | Discharge: HOME | DRG: 392 | End: 2019-09-15
Attending: EMERGENCY MEDICINE | Admitting: OBSTETRICS & GYNECOLOGY
Payer: MEDICARE

## 2019-09-13 DIAGNOSIS — R00.0 TACHYCARDIA: ICD-10-CM

## 2019-09-13 DIAGNOSIS — R11.10 VOMITING: ICD-10-CM

## 2019-09-13 DIAGNOSIS — R11.2 NAUSEA AND VOMITING: Primary | ICD-10-CM

## 2019-09-13 PROBLEM — E83.42 HYPOMAGNESEMIA: Status: ACTIVE | Noted: 2019-09-13

## 2019-09-13 LAB
ALBUMIN SERPL BCP-MCNC: 2.6 G/DL (ref 3.5–5.2)
ALP SERPL-CCNC: 221 U/L (ref 55–135)
ALT SERPL W/O P-5'-P-CCNC: 87 U/L (ref 10–44)
ANION GAP SERPL CALC-SCNC: 13 MMOL/L (ref 8–16)
AST SERPL-CCNC: 150 U/L (ref 10–40)
BASOPHILS # BLD AUTO: 0 K/UL (ref 0–0.2)
BASOPHILS NFR BLD: 0 % (ref 0–1.9)
BILIRUB SERPL-MCNC: 0.6 MG/DL (ref 0.1–1)
BILIRUB UR QL STRIP: NEGATIVE
BUN SERPL-MCNC: 14 MG/DL (ref 8–23)
CALCIUM SERPL-MCNC: 8.2 MG/DL (ref 8.7–10.5)
CHLORIDE SERPL-SCNC: 100 MMOL/L (ref 95–110)
CLARITY UR REFRACT.AUTO: CLEAR
CO2 SERPL-SCNC: 21 MMOL/L (ref 23–29)
COLOR UR AUTO: YELLOW
CREAT SERPL-MCNC: 0.7 MG/DL (ref 0.5–1.4)
DIFFERENTIAL METHOD: ABNORMAL
EOSINOPHIL # BLD AUTO: 0 K/UL (ref 0–0.5)
EOSINOPHIL NFR BLD: 0.3 % (ref 0–8)
ERYTHROCYTE [DISTWIDTH] IN BLOOD BY AUTOMATED COUNT: 16.7 % (ref 11.5–14.5)
EST. GFR  (AFRICAN AMERICAN): >60 ML/MIN/1.73 M^2
EST. GFR  (NON AFRICAN AMERICAN): >60 ML/MIN/1.73 M^2
GLUCOSE SERPL-MCNC: 87 MG/DL (ref 70–110)
GLUCOSE UR QL STRIP: NEGATIVE
HCT VFR BLD AUTO: 37.3 % (ref 37–48.5)
HGB BLD-MCNC: 12.3 G/DL (ref 12–16)
HGB UR QL STRIP: NEGATIVE
IMM GRANULOCYTES # BLD AUTO: 0.01 K/UL (ref 0–0.04)
IMM GRANULOCYTES NFR BLD AUTO: 0.3 % (ref 0–0.5)
KETONES UR QL STRIP: NEGATIVE
LACTATE SERPL-SCNC: 1 MMOL/L (ref 0.5–2.2)
LEUKOCYTE ESTERASE UR QL STRIP: NEGATIVE
LIPASE SERPL-CCNC: 14 U/L (ref 4–60)
LYMPHOCYTES # BLD AUTO: 0.4 K/UL (ref 1–4.8)
LYMPHOCYTES NFR BLD: 11.2 % (ref 18–48)
MAGNESIUM SERPL-MCNC: 1.3 MG/DL (ref 1.6–2.6)
MCH RBC QN AUTO: 28 PG (ref 27–31)
MCHC RBC AUTO-ENTMCNC: 33 G/DL (ref 32–36)
MCV RBC AUTO: 85 FL (ref 82–98)
MONOCYTES # BLD AUTO: 0.1 K/UL (ref 0.3–1)
MONOCYTES NFR BLD: 3.2 % (ref 4–15)
NEUTROPHILS # BLD AUTO: 3 K/UL (ref 1.8–7.7)
NEUTROPHILS NFR BLD: 85 % (ref 38–73)
NITRITE UR QL STRIP: NEGATIVE
NRBC BLD-RTO: 0 /100 WBC
PH UR STRIP: 6 [PH] (ref 5–8)
PHOSPHATE SERPL-MCNC: 3.3 MG/DL (ref 2.7–4.5)
PLATELET # BLD AUTO: 283 K/UL (ref 150–350)
PMV BLD AUTO: 12.3 FL (ref 9.2–12.9)
POTASSIUM SERPL-SCNC: 3.5 MMOL/L (ref 3.5–5.1)
PROT SERPL-MCNC: 6.1 G/DL (ref 6–8.4)
PROT UR QL STRIP: NEGATIVE
RBC # BLD AUTO: 4.4 M/UL (ref 4–5.4)
SODIUM SERPL-SCNC: 134 MMOL/L (ref 136–145)
SP GR UR STRIP: 1 (ref 1–1.03)
TROPONIN I SERPL DL<=0.01 NG/ML-MCNC: 0.02 NG/ML (ref 0–0.03)
URN SPEC COLLECT METH UR: NORMAL
WBC # BLD AUTO: 3.48 K/UL (ref 3.9–12.7)

## 2019-09-13 PROCEDURE — 93010 ELECTROCARDIOGRAM REPORT: CPT | Mod: ,,, | Performed by: INTERNAL MEDICINE

## 2019-09-13 PROCEDURE — 83605 ASSAY OF LACTIC ACID: CPT

## 2019-09-13 PROCEDURE — 83735 ASSAY OF MAGNESIUM: CPT

## 2019-09-13 PROCEDURE — 63600175 PHARM REV CODE 636 W HCPCS: Performed by: STUDENT IN AN ORGANIZED HEALTH CARE EDUCATION/TRAINING PROGRAM

## 2019-09-13 PROCEDURE — 84484 ASSAY OF TROPONIN QUANT: CPT

## 2019-09-13 PROCEDURE — 99285 EMERGENCY DEPT VISIT HI MDM: CPT | Mod: ,,, | Performed by: EMERGENCY MEDICINE

## 2019-09-13 PROCEDURE — S0028 INJECTION, FAMOTIDINE, 20 MG: HCPCS | Performed by: STUDENT IN AN ORGANIZED HEALTH CARE EDUCATION/TRAINING PROGRAM

## 2019-09-13 PROCEDURE — 93005 ELECTROCARDIOGRAM TRACING: CPT

## 2019-09-13 PROCEDURE — 25500020 PHARM REV CODE 255: Performed by: EMERGENCY MEDICINE

## 2019-09-13 PROCEDURE — 83690 ASSAY OF LIPASE: CPT

## 2019-09-13 PROCEDURE — 93010 EKG 12-LEAD: ICD-10-PCS | Mod: ,,, | Performed by: INTERNAL MEDICINE

## 2019-09-13 PROCEDURE — 87040 BLOOD CULTURE FOR BACTERIA: CPT

## 2019-09-13 PROCEDURE — 80053 COMPREHEN METABOLIC PANEL: CPT

## 2019-09-13 PROCEDURE — 96366 THER/PROPH/DIAG IV INF ADDON: CPT

## 2019-09-13 PROCEDURE — 99285 PR EMERGENCY DEPT VISIT,LEVEL V: ICD-10-PCS | Mod: ,,, | Performed by: EMERGENCY MEDICINE

## 2019-09-13 PROCEDURE — 20600001 HC STEP DOWN PRIVATE ROOM

## 2019-09-13 PROCEDURE — 85025 COMPLETE CBC W/AUTO DIFF WBC: CPT

## 2019-09-13 PROCEDURE — 96375 TX/PRO/DX INJ NEW DRUG ADDON: CPT

## 2019-09-13 PROCEDURE — 81003 URINALYSIS AUTO W/O SCOPE: CPT

## 2019-09-13 PROCEDURE — 96365 THER/PROPH/DIAG IV INF INIT: CPT

## 2019-09-13 PROCEDURE — 84100 ASSAY OF PHOSPHORUS: CPT

## 2019-09-13 PROCEDURE — 25000003 PHARM REV CODE 250: Performed by: STUDENT IN AN ORGANIZED HEALTH CARE EDUCATION/TRAINING PROGRAM

## 2019-09-13 PROCEDURE — 96361 HYDRATE IV INFUSION ADD-ON: CPT

## 2019-09-13 PROCEDURE — 63600175 PHARM REV CODE 636 W HCPCS: Performed by: EMERGENCY MEDICINE

## 2019-09-13 PROCEDURE — 99285 EMERGENCY DEPT VISIT HI MDM: CPT | Mod: 25

## 2019-09-13 RX ORDER — HYDROCODONE BITARTRATE AND ACETAMINOPHEN 5; 325 MG/1; MG/1
1 TABLET ORAL EVERY 4 HOURS PRN
Status: DISCONTINUED | OUTPATIENT
Start: 2019-09-13 | End: 2019-09-15 | Stop reason: HOSPADM

## 2019-09-13 RX ORDER — POTASSIUM CHLORIDE 7.45 MG/ML
40 INJECTION INTRAVENOUS ONCE
Status: DISCONTINUED | OUTPATIENT
Start: 2019-09-13 | End: 2019-09-13

## 2019-09-13 RX ORDER — ONDANSETRON 2 MG/ML
4 INJECTION INTRAMUSCULAR; INTRAVENOUS EVERY 6 HOURS
Status: DISCONTINUED | OUTPATIENT
Start: 2019-09-13 | End: 2019-09-14

## 2019-09-13 RX ORDER — ONDANSETRON 2 MG/ML
8 INJECTION INTRAMUSCULAR; INTRAVENOUS EVERY 6 HOURS PRN
Status: DISCONTINUED | OUTPATIENT
Start: 2019-09-13 | End: 2019-09-14

## 2019-09-13 RX ORDER — DEXTROSE MONOHYDRATE AND SODIUM CHLORIDE 5; .9 G/100ML; G/100ML
INJECTION, SOLUTION INTRAVENOUS CONTINUOUS
Status: DISCONTINUED | OUTPATIENT
Start: 2019-09-13 | End: 2019-09-14

## 2019-09-13 RX ORDER — MAGNESIUM SULFATE HEPTAHYDRATE 40 MG/ML
2 INJECTION, SOLUTION INTRAVENOUS
Status: COMPLETED | OUTPATIENT
Start: 2019-09-13 | End: 2019-09-13

## 2019-09-13 RX ORDER — IBUPROFEN 600 MG/1
600 TABLET ORAL EVERY 6 HOURS PRN
Status: DISCONTINUED | OUTPATIENT
Start: 2019-09-13 | End: 2019-09-15 | Stop reason: HOSPADM

## 2019-09-13 RX ORDER — AMLODIPINE BESYLATE 10 MG/1
10 TABLET ORAL DAILY
Status: DISCONTINUED | OUTPATIENT
Start: 2019-09-14 | End: 2019-09-15 | Stop reason: HOSPADM

## 2019-09-13 RX ORDER — SODIUM CHLORIDE 0.9 % (FLUSH) 0.9 %
10 SYRINGE (ML) INJECTION
Status: DISCONTINUED | OUTPATIENT
Start: 2019-09-13 | End: 2019-09-15 | Stop reason: HOSPADM

## 2019-09-13 RX ORDER — HYDROCODONE BITARTRATE AND ACETAMINOPHEN 10; 325 MG/1; MG/1
1 TABLET ORAL EVERY 4 HOURS PRN
Status: DISCONTINUED | OUTPATIENT
Start: 2019-09-13 | End: 2019-09-15 | Stop reason: HOSPADM

## 2019-09-13 RX ORDER — POTASSIUM CHLORIDE 7.45 MG/ML
10 INJECTION INTRAVENOUS
Status: DISCONTINUED | OUTPATIENT
Start: 2019-09-13 | End: 2019-09-13

## 2019-09-13 RX ORDER — FAMOTIDINE 10 MG/ML
20 INJECTION INTRAVENOUS EVERY 12 HOURS
Status: DISCONTINUED | OUTPATIENT
Start: 2019-09-13 | End: 2019-09-14

## 2019-09-13 RX ORDER — ONDANSETRON 2 MG/ML
4 INJECTION INTRAMUSCULAR; INTRAVENOUS
Status: COMPLETED | OUTPATIENT
Start: 2019-09-13 | End: 2019-09-13

## 2019-09-13 RX ADMIN — ONDANSETRON 4 MG: 2 INJECTION INTRAMUSCULAR; INTRAVENOUS at 11:09

## 2019-09-13 RX ADMIN — SODIUM CHLORIDE 1000 ML: 0.9 INJECTION, SOLUTION INTRAVENOUS at 05:09

## 2019-09-13 RX ADMIN — MAGNESIUM SULFATE HEPTAHYDRATE 1 G: 500 INJECTION, SOLUTION INTRAMUSCULAR; INTRAVENOUS at 06:09

## 2019-09-13 RX ADMIN — MAGNESIUM SULFATE IN WATER 2 G: 40 INJECTION, SOLUTION INTRAVENOUS at 03:09

## 2019-09-13 RX ADMIN — SODIUM CHLORIDE 1000 ML: 0.9 INJECTION, SOLUTION INTRAVENOUS at 12:09

## 2019-09-13 RX ADMIN — ONDANSETRON 4 MG: 2 INJECTION INTRAMUSCULAR; INTRAVENOUS at 07:09

## 2019-09-13 RX ADMIN — FAMOTIDINE 20 MG: 10 INJECTION, SOLUTION INTRAVENOUS at 10:09

## 2019-09-13 RX ADMIN — ONDANSETRON 4 MG: 2 INJECTION INTRAMUSCULAR; INTRAVENOUS at 12:09

## 2019-09-13 RX ADMIN — DEXTROSE AND SODIUM CHLORIDE: 5; .9 INJECTION, SOLUTION INTRAVENOUS at 10:09

## 2019-09-13 RX ADMIN — IOHEXOL 75 ML: 350 INJECTION, SOLUTION INTRAVENOUS at 05:09

## 2019-09-13 NOTE — TELEPHONE ENCOUNTER
Called and spoke with pt daughter. States she checked her mothers blood pressure this morning and her pulse was 131 and 134 a second time. Pt couldn't keep any water down yesterday and was vomiting. States pt took a Zofran last night and it helped a little. Pt last vomited at 0800 this morning. Pt denies any fever, chills, or body aches. States her chemo treatment was on Tuesday. Pt daughter states Dr Watt wrote a prescription for a potassium supplement that the pharmacy can't fill until Monday. Informed Patricia I would forward this message to Dr Watt for review. Patricia verbalized understanding and denies any further questions at this time.

## 2019-09-13 NOTE — HOSPITAL COURSE
09/13/2019: Admitted to GYN onc service for IV fluid hydration and electrolyte repletion.   09/14/2019: NAEON. No episodes of emesis in >24 hrs. Advance to reg diet. Blood culture #1 positive for gram pos cocci resembling staph, vancomycin started.   09/15/2019: NAEON. Continues to tolerate PO. Blood culture consistent with contaminant, vanc discontinued. Discharge home today.

## 2019-09-13 NOTE — SUBJECTIVE & OBJECTIVE
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    CT scan of the abdomen and pelvis after surgery showed: 1.Postsurgical changes seen in the pelvis from hysterectomy and bilateral salpingo-oophorectomy    2.A fluid collection is seen posterior to the cervix and likely represents a seroma. However, abscess cannot be ruled out.    3.Enlarged left inguinal lymph node identified    Ultrasound of this inguinal node was done and showed:    Limited sonographic imaging was performed in an area of a palpable abnormality within the patient's left inguinal region. Within this region, a lymph node is seen that measures 2.4 x 0.9 x 2.8 cm. While enlarged, this lymph node does   demonstrate a fatty hilum and is otherwise normal in morphology.    The patient reports that this palpable abnormality has been getting smaller over time. I discussed with the patient the need to biopsy this lesion to fully exclude any recurrent malignancy however at this time the patient deferred a biopsy attempt.    If this lesion does not resolve, a ultrasound guided biopsy could be rescheduled.        Patient completed vaginal cuff radiation.    She completed 6 cycles of taxol and carboplatin in May 2015.    June 2015: CT scan at completion of treatment shows no evidence for disease. No adenopathy.  was 7.       Sept 2018:  was 47. CT scan of CAP showed numerous bilateral pulmonary nodules and enlarged peripancreatic and gonzalo hepatis lymph nodes with soft tissue infiltration of the mesentery at the region.    3.4  cm left renal mass along the lower pole of the left kidney measures greater than water density.  Differential would include solid renal mass including primary renal neoplasm or metastatic lesion versus is a complex cyst.      Jan 2019: completed 6 cycles of taxol and carboplatin.      Feb 2019:  Completion :16.  Completion CT scan: partial response      April 2019:  Completed 9 cycles of Taxol and carboplatin.  CT scan with persistent pulmonary nodules. PET: multiple  hypermetabolic mesenteric/retroperitoneal lymph nodes and a hypermetabolic left lung nodule concerning for metastatic disease      June 2019: started Avastin maintenance.     August 2019: completed 3 cycles of Avastin. PET showed progressive disease in lung, liver and abdomen. Aug 2019: : 155 (up from 20). Not interested in clinical trial. Wanted to resume taxol and carboplatin.      Past Medical History:   Diagnosis Date    Abnormal Pap smear     1984    Anorexia 8/30/2019    Cancer     Chemotherapy-induced neutropenia 4/1/2015    Elevated cancer antigen 125 (CA-125) 9/5/2018    Encounter for blood transfusion     FH: breast cancer in relative when <45 years old 3/17/2016    Hypertension     Hypokalemia 9/9/2019    Other screening mammogram 9/17/2015    Postmenopausal bleeding     Pulmonary nodules/lesions, multiple 4/25/2019    Rash 9/17/2015    Uterine cancer     Weight loss 12/17/2015    Well woman exam with routine gynecological exam 9/14/2016     Past Surgical History:   Procedure Laterality Date    COLPOSCOPY      DILATION AND CURETTAGE OF UTERUS  10/17/14    DILATION-CURETTAGE OF UTERUS (D AND C)  10/17/2014    [facility]    EXPLORATORY-LAPAROTOMY N/A 12/4/2014    Performed by Jair Watt [facility] at [facility] OR    HYSTERECTOMY  12/4/2014    radiacl hysterectomy    HYSTERECTOMY-TOTAL-ABDOMINAL AND BSO N/A 12/4/2014    Performed by [facility] Watt [facility] at [facility] OR     LYMPHADENECTOMY-PELVIC Bilateral 12/4/2014    [facility]    MD REMOVAL OF OVARY/TUBE(S)       Family History     Problem Relation (Age of Onset)    Breast cancer Cousin, Other, Maternal Grandmother, Maternal Aunt    Cancer Son    Cataracts Mother, Brother, Maternal Aunt    Hypertension Mother, Brother, Brother    Lung cancer Maternal Uncle    No Known Problems Father        Tobacco Use    Smoking status: Never Smoker    Smokeless tobacco: Never Used   Substance and Sexual Activity    Alcohol use: No    Drug use: No    Sexual activity: Never     Birth control/protection: Post-menopausal         (Not in a hospital admission)    Review of patient's allergies indicates:   Allergen Reactions    Gramicidin d Shortness Of Breath    Hydrochlorothiazide Other (See Comments)     Burning and tingling sensation throughoutt her body    Lisinopril Shortness Of Breath    Promethazine Shortness Of Breath and Nausea And Vomiting    Neosporin [neomycin-bacitracnzn-polymyxnb] Swelling    Polymyxin [bacitracin-polymyxin b] Swelling       Review of Systems   Constitutional: Negative for chills and fever.   Respiratory: Negative for shortness of breath.    Cardiovascular: Negative for chest pain.   Gastrointestinal: Positive for nausea and vomiting. Negative for abdominal pain and constipation.   Endocrine: Negative for diabetes.   Genitourinary: Negative for dysuria and pelvic pain.   Musculoskeletal: Negative for back pain.   Integumentary:  Negative for rash.   Neurological: Negative for headaches.   Psychiatric/Behavioral: The patient is not nervous/anxious.       Objective:     Vital Signs (Most Recent):  Temp: 97.9 °F (36.6 °C) (09/13/19 1115)  Pulse: (!) 118 (09/13/19 1700)  Resp: 20 (09/13/19 1700)  BP: 104/63 (09/13/19 1700)  SpO2: 95 % (09/13/19 1700) Vital Signs (24h Range):  Temp:  [97.9 °F (36.6 °C)] 97.9 °F (36.6 °C)  Pulse:  [108-143] 118  Resp:  [17-20] 20  SpO2:  [95 %-97 %]  95 %  BP: (104-118)/(63-80) 104/63     Weight: 62.6 kg (138 lb)  Body mass index is 25.24 kg/m².    Physical Exam:   Constitutional: She is oriented to person, place, and time. She appears well-developed and well-nourished. No distress.    HENT:   Head: Normocephalic and atraumatic.      Cardiovascular: Normal rate and regular rhythm.     Pulmonary/Chest: Effort normal and breath sounds normal. No respiratory distress.        Abdominal: Soft. She exhibits distension. There is no tenderness. There is no rebound and no guarding.             Musculoskeletal: Normal range of motion. She exhibits no edema or tenderness.       Neurological: She is alert and oriented to person, place, and time.    Skin: Skin is warm and dry.    Psychiatric: She has a normal mood and affect. Her behavior is normal. Judgment and thought content normal.       Laboratory:  CBC:   Recent Labs   Lab 09/13/19  1206   WBC 3.48*   HGB 12.3   HCT 37.3      , CMP:   Recent Labs   Lab 09/13/19  1343   *   K 3.5      CO2 21*   GLU 87   BUN 14   CREATININE 0.7   CALCIUM 8.2*   PROT 6.1   ALBUMIN 2.6*   BILITOT 0.6   ALKPHOS 221*   *   ALT 87*   ANIONGAP 13   EGFRNONAA >60.0    and All pertinent labs from the last 24 hours have been reviewed.    Diagnostic Results:  CT Abdomen/Pelvic pending

## 2019-09-13 NOTE — HPI
Jessica Gorman is a 69 year old female with history of recurrent serous endometrial carcinoma who presented to the ED with nausea/vomiting and inability to tolerate PO. Patient received cycle 2 of maintenance taxol and carboplatin on 9/10/19. The following afternoon, she developed persistent nausea and was unable to keep down her lunch and dinner. She took zofran last night with some relief. This morning she had another episode of emesis after attempting to drink water. Her daughter checked her pulse at home and noticed that it was in the 130s and brought her to the ED. She last had a normal bowel movement this morning. She endorses weakness and denies pain, fever, chills, CP, SOB, and light-headedness.    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    CT scan of the abdomen and pelvis after surgery showed: 1.Postsurgical changes seen in the pelvis from hysterectomy and bilateral salpingo-oophorectomy    2.A fluid collection is seen posterior to the cervix and likely represents a seroma. However, abscess cannot be ruled out.    3.Enlarged left inguinal lymph node identified    Ultrasound of this inguinal node was done and showed:    Limited sonographic imaging was performed in an area of a palpable abnormality within the patient's left inguinal region. Within this region, a lymph node is seen that measures 2.4 x 0.9 x 2.8 cm. While enlarged, this lymph node does   demonstrate a fatty hilum and is otherwise normal in morphology.    The patient reports that this palpable abnormality  has been getting smaller over time. I discussed with the patient the need to biopsy this lesion to fully exclude any recurrent malignancy however at this time the patient deferred a biopsy attempt.    If this lesion does not resolve, a ultrasound guided biopsy could be rescheduled.        Patient completed vaginal cuff radiation.    She completed 6 cycles of taxol and carboplatin in May 2015.    June 2015: CT scan at completion of treatment shows no evidence for disease. No adenopathy.  was 7.       Sept 2018:  was 47. CT scan of CAP showed numerous bilateral pulmonary nodules and enlarged peripancreatic and gonzalo hepatis lymph nodes with soft tissue infiltration of the mesentery at the region.    3.4 cm left renal mass along the lower pole of the left kidney measures greater than water density.  Differential would include solid renal mass including primary renal neoplasm or metastatic lesion versus is a complex cyst.      Jan 2019: completed 6 cycles of taxol and carboplatin.      Feb 2019:  Completion :16.  Completion CT scan: partial response      April 2019:  Completed 9 cycles of Taxol and carboplatin.  CT scan with persistent pulmonary nodules. PET: multiple  hypermetabolic mesenteric/retroperitoneal lymph nodes and a hypermetabolic left lung nodule concerning for metastatic disease      June 2019: started Avastin maintenance.     August 2019: completed 3 cycles of Avastin. PET showed progressive disease in lung, liver and abdomen. Aug 2019: : 155 (up from 20). Not interested in clinical trial. Wanted to resume taxol and carboplatin.

## 2019-09-13 NOTE — ED NOTES
Pt lying in Ed stretcher, talking with daughter. AAOx4. Breathing equal and non-labored, in no acute distress. Sinus Tach on heart monitor. Updated on current POC. No new needs voiced at this time. Will continue to monitor.

## 2019-09-13 NOTE — ED PROVIDER NOTES
CC: Tachycardia (chemo on tue)      History provided by:   Patient   Family:  Daughter    HPI: Jessica Gorman is a 69 y.o. year old female who presents to the ED complaining of nausea and vomiting, multiple episodes,NBNB, inability to keep p.o. for the last 2 days.  History of endometrial cancer status post total hysterectomy and oophorectomy in 2014 currently on chemotherapy last chemotherapy treatment 3 days ago (on Tuesday), she developed nausea and vomiting on Wednesday unable to tolerate p.o., she tried Zofran last night with good results however today she started vomiting again.  She reports abdominal distention for the last few weeks no pain, had a bowel movement earlier today, no flatus.  No fever or chills, no chest pain or shortness of breath, she reports feeling generalized weakness no focal weakness or numbness, no headache  No cough, no diarrhea, no skin rash        Past Medical History:   Diagnosis Date    Abnormal Pap smear     1984    Anorexia 8/30/2019    Cancer     Chemotherapy-induced neutropenia 4/1/2015    Elevated cancer antigen 125 (CA-125) 9/5/2018    Encounter for blood transfusion     FH: breast cancer in relative when <45 years old 3/17/2016    Hypertension     Hypokalemia 9/9/2019    Other screening mammogram 9/17/2015    Postmenopausal bleeding     Pulmonary nodules/lesions, multiple 4/25/2019    Rash 9/17/2015    Uterine cancer     Weight loss 12/17/2015    Well woman exam with routine gynecological exam 9/14/2016     Past Surgical History:   Procedure Laterality Date    COLPOSCOPY      DILATION AND CURETTAGE OF UTERUS  10/17/14    DILATION-CURETTAGE OF UTERUS (D AND C)  10/17/2014    [facility] OR    EXPLORATORY-LAPAROTOMY N/A 12/4/2014    [facility]    HYSTERECTOMY  12/4/2014    radiacl hysterectomy    HYSTERECTOMY-TOTAL-ABDOMINAL AND BSO N/A 12/4/2014    [facility]  OR    LYMPHADENECTOMY-PELVIC Bilateral 12/4/2014    [facility] OR    ND REMOVAL OF OVARY/TUBE(S)       Family History   Problem Relation Age of Onset    Hypertension Mother     Cataracts Mother     No Known Problems Father     Hypertension Brother     Hypertension Brother     Cancer Son     Cataracts Brother     Breast cancer Cousin         maternal cousin. early 30's when diagnosed.     Breast cancer Other         sister's child. diagnosed in her 30s.     Breast cancer Maternal Grandmother     Breast cancer Maternal Aunt     Cataracts Maternal Aunt     Lung cancer Maternal Uncle     Colon cancer Neg Hx     Ovarian cancer Neg Hx     Uterine cancer Neg Hx      No current facility-administered medications on file prior to encounter.      Current Outpatient Medications on File Prior to Encounter   Medication Sig Dispense Refill    amLODIPine (NORVASC) 10 MG tablet Take 1 tablet (10 mg total) by mouth once daily. 90 tablet 0    dexAMETHasone (DECADRON) 4 MG Tab Take 5 tablets every 6 hours for 24 hours prior to chemotherapy 20 tablet 4    megestrol (MEGACE ES) 625 mg/5 mL Susp Take 5 mLs (625 mg total) by mouth once daily. 150 mL 11    multivitamin (ONE DAILY MULTIVITAMIN) per tablet Take 1 tablet by mouth once daily.      ondansetron (ZOFRAN-ODT) 8 MG TbDL Take 1 tablet (8 mg total) by mouth every 12 (twelve) hours as needed. 30 tablet 1    potassium bicarbonate & potassium chloride (K-LYTE CL) 25 mEq TbEF Take 1 tablet (25 mEq total) by mouth once daily. 30 tablet 2     Gramicidin d; Hydrochlorothiazide; Lisinopril; Promethazine; Neosporin [neomycin-bacitracnzn-polymyxnb]; and Polymyxin [bacitracin-polymyxin b]  Social History     Socioeconomic History    Marital status:      Spouse name: Not on file    Number of children: Not on file    Years of education: Not on file    Highest education level: Not on file   Occupational History    Occupation:  Unemployed   Social Needs    Financial resource strain: Not on file    Food insecurity:     Worry: Not on file     Inability: Not on file    Transportation needs:     Medical: Not on file     Non-medical: Not on file   Tobacco Use    Smoking status: Never Smoker    Smokeless tobacco: Never Used   Substance and Sexual Activity    Alcohol use: No    Drug use: No    Sexual activity: Never     Birth control/protection: Post-menopausal   Lifestyle    Physical activity:     Days per week: Not on file     Minutes per session: Not on file    Stress: Not on file   Relationships    Social connections:     Talks on phone: Not on file     Gets together: Not on file     Attends [employer] service: Not on file     Active member of club or organization: Not on file     Attends meetings of clubs or organizations: Not on file     Relationship status: Not on file   Other Topics Concern    Not on file   Social History Narrative    Pt lives w/her daughter.  Pt has 5 living children.  One .         ROS:     Constitutional :  Positive for generalized weakness, negative for fever  HENT neg for head injury, neg for sore throat  Eyes: neg for visual changes, neg for eye pain  Resp neg for SOB, neg for cough  Cardiac  neg for chest pain, positive for palpitations  GI positive for nausea and vomiting, negative for abdominal pain, positive for abdominal distention   neg for urinary changes  Neuro neg for focal weakness or numbness  Skin neg for skin rash  MSK: neg for joint pain, neg for joint swelling  ALL: Gramicidin d; Hydrochlorothiazide; Lisinopril; Promethazine; Neosporin [neomycin-bacitracnzn-polymyxnb]; and Polymyxin [bacitracin-polymyxin b]    PHYSICAL EXAM:  Vitals:    19 1130   BP: 118/80   Pulse: (!) 136   Resp: 17   Temp:          PHYSICAL EXAM:     general: comfortable, in no acute distress, pleasant, well nourished  VS: triage VS reviewed  HENT: NC/AT  Eyes: PERRL, EOMI  CV:  Tachycardic regular, no   murmurs, no rubs, no gallops, no LE edema  Resp: comfortable breathing, speaks in full sentences, CTAB, no wheezing, no crackles, no ronchi  ABD:  soft, distended, + normal BS, NT  Renal: No CVAT  Neuro: AAO x 3, 5/5 muscle strength in upper and lower extremities, sensation grossly intact, face symmetric, speech normal  MSK: no deformity, no edema  Skin with left posterior thorax large lipoma no edema no erythema no tenderness to palpation          DATA & INTERVENTIONS:    LABS reviewed:  Labs Reviewed   CULTURE, BLOOD   CULTURE, BLOOD   COMPREHENSIVE METABOLIC PANEL   CBC W/ AUTO DIFFERENTIAL   LIPASE   URINALYSIS, REFLEX TO URINE CULTURE   MAGNESIUM   LACTIC ACID, PLASMA       RADIOLOGY reviewed:  Imaging Results    None         MEDICATIONS/FLUIDS:  Medications   sodium chloride 0.9% bolus 1,000 mL (has no administration in time range)   ondansetron injection 4 mg (has no administration in time range)         MDM:  Jessica Gorman is a 69 y.o. year old female who presents to the ED complaining of nausea and vomiting x3 days inability to tolerate p.o.  Symptoms started the day after chemotherapy    DDX includes but not limited to:  Nausea and vomiting possibly secondary to chemotherapy; tachycardic on presentation likely secondary to dehydration however possibly sepsis no clear infectious etiology on the history or physical exam.  Small-bowel obstruction:  Abdomen is distended, vomiting however no abdominal pain and she had a bowel movement earlier today    Labs ordered and reviewed:   Blood cultures  Lactate within normal limits  CMP with normal GFR, elevated AST at 1:50 a.m. and ALT at 87  CBC with mild leukopenia at 3.48, normal hemoglobin, normal platelets  Lipase within normal limits  Magnesium 1.3  UA negative for blood, nitrites and leukocyte  Troponin within normal limits    Medication given in the ED:  IV fluids, IV Zofran    CXR an abdominal x-ray (ordered and reviewed):  No infiltrate or pulmonary  edema, small bilateral pleural effusions  Abdominal x-ray with mid abdomen to distended bowel loops no other findings concerning for small bowel obstruction  Imagings independently visualized:  Yes    EKG (independantly reviewed):  Ventricular rate 128, sinus tachycardia no acute ischemic changes    Old records obtained and reviewed:   Patient is followed by Dr. Watt (gyn Onc) for endometrial cancer diagnosed in 2014 status post total abdominal hysterectomy and oophorectomy in 2014, reoccurrence of the cancer in 2018 started chemotherapy again in January 2019 last chemotherapy treatment 3 days ago    2:32 PM   at the bedside, patient feels well, no pain/sob, no nausea  2:57 PM  Magnesium 1.3, will give magnesium sulfate 2 g IV  Kidney function within normal limits will obtain CT abdomen and pelvis rule out small-bowel obstruction  Will add a 2nd L of IV fluids  Previous vitals reviewed with patient tachycardic since June 2019    Case discussed with the consultant:  Gynecology Oncology.  Patient was admitted to guide Onc prior to the CT abdomen and pelvis being performed    IMPRESSION:  1.) nausea, vomiting  2.) tachycardia  3. transiminitis  4. hypoMg    Dispo:  Admission    Critical Care Time: N/A       Kendra Liz MD  09/13/19 2127

## 2019-09-13 NOTE — TELEPHONE ENCOUNTER
Spoke with patricia pt daughter. Informed her that I spoke with Dr Watt and her mother needs to go to the ER for fluids. Patricia verbalized understanding and agrees to take her mom to the ER. Denies any further questions at this time.   ----- Message from Ellen Campbell sent at 9/13/2019  9:16 AM CDT -----  Contact: Arti ( [facility] on call RN )  Name of Who is Calling : Arti ( [facility] on call RN )    What is the request in detail :     Arti ( [facility] on call RN ) is requesting a call from staff she states the patient has high levels of potassium and needs to call Patricia to speak with family member in regards to patient .....Please contact to further discuss and advise.    Can the clinic reply by [facility] :  Phone call only    What Number to Call Back : [phone]

## 2019-09-13 NOTE — H&P
[facility]  Gynecologic Oncology  H&P    Patient Name: Jessica Gorman  MRN: [id number]  Admission Date: 9/13/2019  Primary Care Provider: Rico Amaro MD   Principal Problem: Nausea and vomiting    Subjective:     Chief Complaint/Reason for Admission: Nausea and vomiting    History of Present Illness:  Jessica Gorman is a 69 year old female with history of recurrent serous endometrial carcinoma who presented to the ED with nausea/vomiting and inability to tolerate PO. Patient received cycle 2 of maintenance taxol and carboplatin on 9/10/19. The following afternoon, she developed persistent nausea and was unable to keep down her lunch and dinner. She took zofran last night with some relief. This morning she had another episode of emesis after attempting to drink water. Her daughter checked her pulse at home and noticed that it was in the 130s and brought her to the ED. She last had a normal bowel movement this morning. She endorses weakness and denies pain, fever, chills, CP, SOB, and light-headedness.    Hospital Course:  09/13/2019: Admitted to GYN onc service for IV fluid hydration and electrolyte repletion.     Oncology Treatment Plan:   OP GYN PACLITAXEL CARBOPLATIN (AUC 6) Q3W    Oncology History:     Dec 2014: She was operated on by me at [facility] on Dec 4, 2014 for uterine papillary serous carcinoma. 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    CT scan of the abdomen and pelvis after surgery showed: 1.Postsurgical changes seen in the pelvis from hysterectomy and bilateral salpingo-oophorectomy    2.A fluid collection is seen posterior to the cervix and likely represents a seroma. However, abscess cannot be ruled out.    3.Enlarged left inguinal  lymph node identified    Ultrasound of this inguinal node was done and showed:    Limited sonographic imaging was performed in an area of a palpable abnormality within the patient's left inguinal region. Within this region, a lymph node is seen that measures 2.4 x 0.9 x 2.8 cm. While enlarged, this lymph node does   demonstrate a fatty hilum and is otherwise normal in morphology.    The patient reports that this palpable abnormality has been getting smaller over time. I discussed with the patient the need to biopsy this lesion to fully exclude any recurrent malignancy however at this time the patient deferred a biopsy attempt.    If this lesion does not resolve, a ultrasound guided biopsy could be rescheduled.        Patient completed vaginal cuff radiation.    She completed 6 cycles of taxol and carboplatin in May 2015.    June 2015: CT scan at completion of treatment shows no evidence for disease. No adenopathy.  was 7.       Sept 2018:  was 47. CT scan of CAP showed numerous bilateral pulmonary nodules and enlarged peripancreatic and gonzalo hepatis lymph nodes with soft tissue infiltration of the mesentery at the region.    3.4 cm left renal mass along the lower pole of the left kidney measures greater than water density.  Differential would include solid renal mass including primary renal neoplasm or metastatic lesion versus is a complex cyst.      Jan 2019: completed 6 cycles of taxol and carboplatin.      Feb 2019:  Completion :16.  Completion CT scan: partial response      April 2019:  Completed 9 cycles of Taxol and carboplatin.  CT scan with persistent pulmonary nodules. PET: multiple  hypermetabolic mesenteric/retroperitoneal lymph nodes and a hypermetabolic left lung nodule concerning for metastatic disease      June 2019: started Avastin maintenance.     August 2019: completed 3 cycles of Avastin. PET showed progressive disease in lung, liver and abdomen. Aug 2019: : 155 (up from  20). Not interested in clinical trial. Wanted to resume taxol and carboplatin.      Past Medical History:   Diagnosis Date    Abnormal Pap smear     1984    Anorexia 8/30/2019    Cancer     Chemotherapy-induced neutropenia 4/1/2015    Elevated cancer antigen 125 (CA-125) 9/5/2018    Encounter for blood transfusion     FH: breast cancer in relative when <45 years old 3/17/2016    Hypertension     Hypokalemia 9/9/2019    Other screening mammogram 9/17/2015    Postmenopausal bleeding     Pulmonary nodules/lesions, multiple 4/25/2019    Rash 9/17/2015    Uterine cancer     Weight loss 12/17/2015    Well woman exam with routine gynecological exam 9/14/2016     Past Surgical History:   Procedure Laterality Date    COLPOSCOPY      DILATION AND CURETTAGE OF UTERUS  10/17/14    DILATION-CURETTAGE OF UTERUS (D AND C)  10/17/2014    [facility]    EXPLORATORY-LAPAROTOMY N/A 12/4/2014    [facility] OR    HYSTERECTOMY  12/4/2014    radiacl hysterectomy    HYSTERECTOMY-TOTAL-ABDOMINAL AND BSO N/A 12/4/2014    [facility] OR    LYMPHADENECTOMY-PELVIC Bilateral 12/4/2014    [facility] at [facility] OR    MI REMOVAL OF OVARY/TUBE(S)       Family History     Problem Relation (Age of Onset)    Breast cancer Cousin, Other, Maternal Grandmother, Maternal Aunt    Cancer Son    Cataracts Mother, Brother, Maternal Aunt    Hypertension Mother, Brother, Brother    Lung cancer Maternal Uncle    No Known Problems Father        Tobacco Use    Smoking status: Never Smoker    Smokeless tobacco: Never Used   Substance and Sexual Activity    Alcohol use: No    Drug use: No    Sexual activity: Never     Birth control/protection: Post-menopausal         (Not in a hospital admission)    Review of patient's allergies indicates:   Allergen Reactions    Gramicidin d Shortness Of Breath    Hydrochlorothiazide Other  (See Comments)     Burning and tingling sensation throughoutt her body    Lisinopril Shortness Of Breath    Promethazine Shortness Of Breath and Nausea And Vomiting    Neosporin [neomycin-bacitracnzn-polymyxnb] Swelling    Polymyxin [bacitracin-polymyxin b] Swelling       Review of Systems   Constitutional: Negative for chills and fever.   Respiratory: Negative for shortness of breath.    Cardiovascular: Negative for chest pain.   Gastrointestinal: Positive for nausea and vomiting. Negative for abdominal pain and constipation.   Endocrine: Negative for diabetes.   Genitourinary: Negative for dysuria and pelvic pain.   Musculoskeletal: Negative for back pain.   Integumentary:  Negative for rash.   Neurological: Negative for headaches.   Psychiatric/Behavioral: The patient is not nervous/anxious.       Objective:     Vital Signs (Most Recent):  Temp: 97.9 °F (36.6 °C) (09/13/19 1115)  Pulse: (!) 118 (09/13/19 1700)  Resp: 20 (09/13/19 1700)  BP: 104/63 (09/13/19 1700)  SpO2: 95 % (09/13/19 1700) Vital Signs (24h Range):  Temp:  [97.9 °F (36.6 °C)] 97.9 °F (36.6 °C)  Pulse:  [108-143] 118  Resp:  [17-20] 20  SpO2:  [95 %-97 %] 95 %  BP: (104-118)/(63-80) 104/63     Weight: 62.6 kg (138 lb)  Body mass index is 25.24 kg/m².    Physical Exam:   Constitutional: She is oriented to person, place, and time. She appears well-developed and well-nourished. No distress.    HENT:   Head: Normocephalic and atraumatic.      Cardiovascular: Normal rate and regular rhythm.     Pulmonary/Chest: Effort normal and breath sounds normal. No respiratory distress.        Abdominal: Soft. She exhibits distension. There is no tenderness. There is no rebound and no guarding.             Musculoskeletal: Normal range of motion. She exhibits no edema or tenderness.       Neurological: She is alert and oriented to person, place, and time.    Skin: Skin is warm and dry.    Psychiatric: She has a normal mood and affect. Her behavior is normal.  Judgment and thought content normal.       Laboratory:  CBC:   Recent Labs   Lab 09/13/19  1206   WBC 3.48*   HGB 12.3   HCT 37.3      , CMP:   Recent Labs   Lab 09/13/19  1343   *   K 3.5      CO2 21*   GLU 87   BUN 14   CREATININE 0.7   CALCIUM 8.2*   PROT 6.1   ALBUMIN 2.6*   BILITOT 0.6   ALKPHOS 221*   *   ALT 87*   ANIONGAP 13   EGFRNONAA >60.0    and All pertinent labs from the last 24 hours have been reviewed.    Diagnostic Results:  CT Abdomen/Pelvic pending    Assessment/Plan:     * Nausea and vomiting  - Nausea/vomiting with inability to tolerate PO x 2 days  - Pulse initially 143, down to 110s with IVF  - 2 L IVF and IV zofran administered in ED  - WBC 3.5  - Mg 1.3, K 3.5  - CT Abdomen/Pelvis pending  - Will keep NPO  - Continuous IVF and electrolyte repletion  - Scheduled IV zofran q6h  - AM CBC, BMP, Mg, and Phos    Hypomagnesemia  - Mg 1.3 on admission  - 2g IV Mg administered in ED  - Will give another 1g IV and recheck Mg in AM    Endometrial carcinoma, serous  - Patient received cycle 2 of maintenance taxol and carboplatin on 9/10/19  - See GYN onc history above    Hypertension  - BP: (104-118)/(63-80) 104/63  - Continue home [facility]        Opal Aleman MD  Gynecologic Oncology  [facility]

## 2019-09-13 NOTE — ED NOTES
Pt awake and alert; resting quietly on stretcher.  Pt remains on continuous cardiac and pulse ox monitoring with non-invasive blood pressure to cycle every 30 minutes.  VS stable; sinus tach noted. Pt denies pain at this time; no acute distress or discomfort reported or observed.  Pt denies restroom needs at this time; is able to reposition self on stretcher. Bed locked in lowest position; side rails up and locked x 2; call light, bedside table, and personal belongings within reach.  Pt instructed to alert nurse for assistance and before attempting to get out of bed; verbalizes understanding. Pt denies needs or complaints at this time; will continue to monitor.

## 2019-09-13 NOTE — ASSESSMENT & PLAN NOTE
- Patient received cycle 2 of maintenance taxol and carboplatin on 9/10/19  - See GYN onc history above

## 2019-09-13 NOTE — ASSESSMENT & PLAN NOTE
- Mg 1.3 on admission  - 2g IV Mg administered in ED  - Will give another 1g IV and recheck Mg in AM

## 2019-09-13 NOTE — PROGRESS NOTES
Patient seen and examined in the emergency room. I have also reviewed the recent labs.  CT images are pending.    Abdomen is soft.  Mildly distended.  Question ascites.  Normal bowel sounds.    My recommendation is to admit the patient for hydration and further electrolyte replacement.    Repeat labs in the morning including phosphorus and magnesium.    Possible discharge in the morning.  Should go home on slow K 8 mEq 2 tablets daily.

## 2019-09-13 NOTE — ASSESSMENT & PLAN NOTE
- Nausea/vomiting with inability to tolerate PO x 2 days  - Pulse initially 143, down to 110s with IVF  - 2 L IVF and IV zofran administered in ED  - WBC 3.5  - Mg 1.3, K 3.5  - CT Abdomen/Pelvis pending  - Will keep NPO  - Continuous IVF and electrolyte repletion  - Scheduled IV zofran q6h  - AM CBC, BMP, Mg, and Phos

## 2019-09-13 NOTE — TELEPHONE ENCOUNTER
Reason for Disposition   Patient sounds very sick or weak to the triager    Additional Information   Negative: Passed out (i.e., fainted, collapsed and was not responding)   Negative: Shock suspected (e.g., cold/pale/clammy skin, too weak to stand, low BP, rapid pulse)   Negative: Difficult to awaken or acting confused (e.g., disoriented, slurred speech)   Negative: Visible sweat on face or sweat dripping down face   Negative: Unable to walk, or can only walk with assistance (e.g., requires support)   Negative: Received SHOCK from implantable cardiac defibrillator and has persisting symptoms (i.e., palpitations, lightheadedness)   Negative: Sounds like a life-threatening emergency to the triager   Negative: Chest pain   Negative: Difficulty breathing   Negative: Dizziness, lightheadedness, or weakness   Negative: Heart beating very rapidly (e.g., > 140 / minute) and present now (EXCEPTION: during exercise)     Highest to day has been 131 - 134   Negative: Heart beating very slowly (e.g., < 50 / minute) (EXCEPTION: athlete)   Negative: New or worsened shortness of breath with activity (dyspnea on exertion)    Protocols used: HEART RATE AND HEARTBEAT [id number]    Jessica's daughter, Patricia, states her mother's pulse rate is 134 this morning.  Has been elevated with [facility] (Dr Jair Watt) per epic chart notes / flowsheet.  She is currently vomiting (after ondansetron dosed) and is unable to speak at present.  Per Patricia, the potassium that Dr Watt ordered on 09/09 has still not yet been received by [facility].  The pharmacy told Patricia it was on order but will not be received for patient to  until Monday, 09/16/19, 7 days after MD order placed.  Serum potassium on 09/09 was 3.2.  Explained to Patricia that MD should always be notified if medication unavailable, so might be re-ordered with another pharmacy.  Informed her that this is an important  electrolyte replacement for her mom. Attempted to reach Dr Watt to inform of tachycardia and potassium replacement not yet received, but no answer in clinic.  Called back to request call to Dr Watt's nurse for assistance, but unable to speak with anyone in clinic.  Per office staff, Dr Watt is in clinic this morning.  Message to Dr Watt directly, as well as his staff, to call Patricia to let her know if mom should be taken to ED or to infusion center.  Please contact Patricia Jessica's daughter, at [phone] directly with any additional care advice.

## 2019-09-13 NOTE — ED TRIAGE NOTES
Pt reports N/V x2days and fast heart rate. Contacted oncologist who suggested coming to the ER. Last chemo was on Tuesday. Being treated for uterine ca.

## 2019-09-14 PROBLEM — R00.0 TACHYCARDIA: Status: ACTIVE | Noted: 2019-09-14

## 2019-09-14 LAB
ANION GAP SERPL CALC-SCNC: 9 MMOL/L (ref 8–16)
BASOPHILS # BLD AUTO: 0 K/UL (ref 0–0.2)
BASOPHILS NFR BLD: 0 % (ref 0–1.9)
BUN SERPL-MCNC: 10 MG/DL (ref 8–23)
CALCIUM SERPL-MCNC: 7.5 MG/DL (ref 8.7–10.5)
CHLORIDE SERPL-SCNC: 103 MMOL/L (ref 95–110)
CO2 SERPL-SCNC: 25 MMOL/L (ref 23–29)
CREAT SERPL-MCNC: 0.7 MG/DL (ref 0.5–1.4)
DIFFERENTIAL METHOD: ABNORMAL
EOSINOPHIL # BLD AUTO: 0 K/UL (ref 0–0.5)
EOSINOPHIL NFR BLD: 1.7 % (ref 0–8)
ERYTHROCYTE [DISTWIDTH] IN BLOOD BY AUTOMATED COUNT: 14.4 % (ref 11.5–14.5)
EST. GFR  (AFRICAN AMERICAN): >60 ML/MIN/1.73 M^2
EST. GFR  (NON AFRICAN AMERICAN): >60 ML/MIN/1.73 M^2
GLUCOSE SERPL-MCNC: 207 MG/DL (ref 70–110)
HCT VFR BLD AUTO: 33.2 % (ref 37–48.5)
HGB BLD-MCNC: 10.5 G/DL (ref 12–16)
IMM GRANULOCYTES # BLD AUTO: 0.01 K/UL (ref 0–0.04)
IMM GRANULOCYTES NFR BLD AUTO: 0.4 % (ref 0–0.5)
LYMPHOCYTES # BLD AUTO: 0.3 K/UL (ref 1–4.8)
LYMPHOCYTES NFR BLD: 11.3 % (ref 18–48)
MAGNESIUM SERPL-MCNC: 1.7 MG/DL (ref 1.6–2.6)
MCH RBC QN AUTO: 27.3 PG (ref 27–31)
MCHC RBC AUTO-ENTMCNC: 31.6 G/DL (ref 32–36)
MCV RBC AUTO: 86 FL (ref 82–98)
MONOCYTES # BLD AUTO: 0.1 K/UL (ref 0.3–1)
MONOCYTES NFR BLD: 3.8 % (ref 4–15)
NEUTROPHILS # BLD AUTO: 2 K/UL (ref 1.8–7.7)
NEUTROPHILS NFR BLD: 82.8 % (ref 38–73)
NRBC BLD-RTO: 0 /100 WBC
PHOSPHATE SERPL-MCNC: 3.1 MG/DL (ref 2.7–4.5)
PLATELET # BLD AUTO: 159 K/UL (ref 150–350)
PMV BLD AUTO: 11.1 FL (ref 9.2–12.9)
POTASSIUM SERPL-SCNC: 3.1 MMOL/L (ref 3.5–5.1)
RBC # BLD AUTO: 3.85 M/UL (ref 4–5.4)
SODIUM SERPL-SCNC: 137 MMOL/L (ref 136–145)
WBC # BLD AUTO: 2.38 K/UL (ref 3.9–12.7)

## 2019-09-14 PROCEDURE — 63600175 PHARM REV CODE 636 W HCPCS: Performed by: STUDENT IN AN ORGANIZED HEALTH CARE EDUCATION/TRAINING PROGRAM

## 2019-09-14 PROCEDURE — 99233 SBSQ HOSP IP/OBS HIGH 50: CPT | Mod: GC,,, | Performed by: OBSTETRICS & GYNECOLOGY

## 2019-09-14 PROCEDURE — 20600001 HC STEP DOWN PRIVATE ROOM

## 2019-09-14 PROCEDURE — 80048 BASIC METABOLIC PNL TOTAL CA: CPT

## 2019-09-14 PROCEDURE — 93010 ELECTROCARDIOGRAM REPORT: CPT | Mod: ,,, | Performed by: INTERNAL MEDICINE

## 2019-09-14 PROCEDURE — 93010 EKG 12-LEAD: ICD-10-PCS | Mod: ,,, | Performed by: INTERNAL MEDICINE

## 2019-09-14 PROCEDURE — 93005 ELECTROCARDIOGRAM TRACING: CPT

## 2019-09-14 PROCEDURE — 83735 ASSAY OF MAGNESIUM: CPT

## 2019-09-14 PROCEDURE — S0028 INJECTION, FAMOTIDINE, 20 MG: HCPCS | Performed by: STUDENT IN AN ORGANIZED HEALTH CARE EDUCATION/TRAINING PROGRAM

## 2019-09-14 PROCEDURE — 94761 N-INVAS EAR/PLS OXIMETRY MLT: CPT

## 2019-09-14 PROCEDURE — 85025 COMPLETE CBC W/AUTO DIFF WBC: CPT

## 2019-09-14 PROCEDURE — 99233 PR SUBSEQUENT HOSPITAL CARE,LEVL III: ICD-10-PCS | Mod: GC,,, | Performed by: OBSTETRICS & GYNECOLOGY

## 2019-09-14 PROCEDURE — 25000003 PHARM REV CODE 250: Performed by: STUDENT IN AN ORGANIZED HEALTH CARE EDUCATION/TRAINING PROGRAM

## 2019-09-14 PROCEDURE — 84100 ASSAY OF PHOSPHORUS: CPT

## 2019-09-14 PROCEDURE — 36415 COLL VENOUS BLD VENIPUNCTURE: CPT

## 2019-09-14 RX ORDER — SODIUM CHLORIDE 9 MG/ML
INJECTION, SOLUTION INTRAVENOUS CONTINUOUS
Status: DISCONTINUED | OUTPATIENT
Start: 2019-09-14 | End: 2019-09-14

## 2019-09-14 RX ORDER — ONDANSETRON 4 MG/1
4 TABLET, FILM COATED ORAL EVERY 6 HOURS
Status: DISCONTINUED | OUTPATIENT
Start: 2019-09-14 | End: 2019-09-15 | Stop reason: HOSPADM

## 2019-09-14 RX ORDER — VANCOMYCIN 2 GRAM/500 ML IN 0.9 % SODIUM CHLORIDE INTRAVENOUS
2000 ONCE
Status: COMPLETED | OUTPATIENT
Start: 2019-09-14 | End: 2019-09-14

## 2019-09-14 RX ORDER — POTASSIUM CHLORIDE 600 MG/1
16 TABLET, FILM COATED, EXTENDED RELEASE ORAL DAILY
Qty: 60 TABLET | Refills: 0 | Status: SHIPPED | OUTPATIENT
Start: 2019-09-14 | End: 2019-09-23

## 2019-09-14 RX ADMIN — ONDANSETRON 4 MG: 2 INJECTION INTRAMUSCULAR; INTRAVENOUS at 05:09

## 2019-09-14 RX ADMIN — ONDANSETRON HYDROCHLORIDE 4 MG: 4 TABLET, FILM COATED ORAL at 11:09

## 2019-09-14 RX ADMIN — SODIUM CHLORIDE: 0.9 INJECTION, SOLUTION INTRAVENOUS at 10:09

## 2019-09-14 RX ADMIN — AMLODIPINE BESYLATE 10 MG: 10 TABLET ORAL at 09:09

## 2019-09-14 RX ADMIN — FAMOTIDINE 20 MG: 10 INJECTION, SOLUTION INTRAVENOUS at 12:09

## 2019-09-14 RX ADMIN — ONDANSETRON HYDROCHLORIDE 4 MG: 4 TABLET, FILM COATED ORAL at 01:09

## 2019-09-14 RX ADMIN — Medication 2000 MG: at 07:09

## 2019-09-14 RX ADMIN — POTASSIUM BICARBONATE 25 MEQ: 978 TABLET, EFFERVESCENT ORAL at 09:09

## 2019-09-14 RX ADMIN — POTASSIUM BICARBONATE 25 MEQ: 978 TABLET, EFFERVESCENT ORAL at 10:09

## 2019-09-14 RX ADMIN — POTASSIUM BICARBONATE 25 MEQ: 978 TABLET, EFFERVESCENT ORAL at 01:09

## 2019-09-14 NOTE — PROGRESS NOTES
Pt arrived to floor from ED via stretcher. Pt AAOx4. Pt c/o mild nausea. VSS. Daughter @ bedside. Will to continue to closely monitor.

## 2019-09-14 NOTE — NURSING
Patient tolerating diet, no vomiting. HR-118-122. Oxygen sats-96% on RA. No distress noted. Will continue to monitor.

## 2019-09-14 NOTE — ASSESSMENT & PLAN NOTE
- Nausea/vomiting with inability to tolerate PO x 2 days on admission  - Pulse initially 143, down to 110s with IVF  - 2 L IVF and IV zofran administered in ED  - CT Abdomen/Pelvis with no evidence of bowel obstruction  - Mg 1.3 > IV Mg 3g > Mg 1.7  - K+ 3.5 > 3.1. Will replete with PO K+ tabs.  - Scheduled IV zofran q6h and continuous IVF  - Patient has appetite this morning with no episodes of emesis in 24 hours  - Clear liquid diet, advance as tolerated. Will discontinue IVF as patient tolerates diet.

## 2019-09-14 NOTE — ASSESSMENT & PLAN NOTE
- Pulse:  [117-125] 117  - Persistently tachycardic since admission  - EKG shows sinus tachycardia  - Needs to follow-up with cardiology outpatient

## 2019-09-14 NOTE — SUBJECTIVE & OBJECTIVE
Interval History: NAEON. Patient resting comfortably in bed. States that she has not had an episode of emesis since yesterday morning. She has an appetite and would like to try eating today. She is ambulating and voiding without difficulty. She denies pain, fever, chills, nausea, CP, and SOB.    Scheduled Meds:   amLODIPine  10 mg Oral Daily    famotidine (PF)  20 mg Intravenous Q12H    ondansetron  4 mg Intravenous Q6H    potassium bicarbonate  25 mEq Oral Q2H     Continuous Infusions:   sodium chloride 0.9%       PRN Meds:HYDROcodone-acetaminophen, HYDROcodone-acetaminophen, ibuprofen, ondansetron, sodium chloride 0.9%    Review of patient's allergies indicates:   Allergen Reactions    Gramicidin d Shortness Of Breath    Hydrochlorothiazide Other (See Comments)     Burning and tingling sensation throughoutt her body    Lisinopril Shortness Of Breath    Promethazine Shortness Of Breath and Nausea And Vomiting    Neosporin [neomycin-bacitracnzn-polymyxnb] Swelling    Polymyxin [bacitracin-polymyxin b] Swelling       Objective:     Vital Signs (Most Recent):  Temp: 98.4 °F (36.9 °C) (09/14/19 0507)  Pulse: (!) 118 (09/14/19 0507)  Resp: 18 (09/14/19 0507)  BP: 103/68 (09/14/19 0507)  SpO2: (!) 94 % (09/14/19 0507) Vital Signs (24h Range):  Temp:  [97.9 °F (36.6 °C)-98.4 °F (36.9 °C)] 98.4 °F (36.9 °C)  Pulse:  [108-143] 118  Resp:  [17-20] 18  SpO2:  [94 %-97 %] 94 %  BP: (103-128)/(63-80) 103/68     Weight: 67 kg (147 lb 11.3 oz)  Body mass index is 27.02 kg/m².    Intake/Output - Last 3 Shifts       09/12 0700 - 09/13 0659 09/13 0700 - 09/14 0659 09/14 0700 - 09/15 0659    P.O.  0     I.V. (mL/kg)  575 (8.6)     IV Piggyback  2000     Total Intake(mL/kg)  2575 (38.4)     Urine (mL/kg/hr)  500     Total Output  500     Net  +2075                     Physical Exam:   Constitutional: She is oriented to person, place, and time. She appears well-developed and well-nourished. No distress.    HENT:   Head:  Normocephalic and atraumatic.      Cardiovascular: Normal rate and regular rhythm.     Pulmonary/Chest: Effort normal. No respiratory distress.        Abdominal: Soft. She exhibits distension. There is no tenderness. There is no rebound and no guarding.             Musculoskeletal: Normal range of motion. She exhibits no edema or tenderness.   SCDs not in place       Neurological: She is alert and oriented to person, place, and time.    Skin: Skin is warm and dry.    Psychiatric: She has a normal mood and affect. Her behavior is normal. Judgment and thought content normal.       Lines/Drains/Airways     Peripheral Intravenous Line                 Peripheral IV - Single Lumen 09/13/19 1207 22 G Left Hand less than 1 day                Laboratory:  BMP:   Recent Labs   Lab 09/13/19  1343 09/14/19  0500   GLU 87 207*   * 137   K 3.5 3.1*    103   CO2 21* 25   BUN 14 10   CREATININE 0.7 0.7   CALCIUM 8.2* 7.5*   MG 1.3* 1.7   , CBC:   Recent Labs   Lab 09/13/19  1206 09/14/19  0500   WBC 3.48* 2.38*   HGB 12.3 10.5*   HCT 37.3 33.2*    159    and All pertinent labs from the last 24 hours have been reviewed.    Diagnostic Results:  CT: Reviewed, no evidence of bowel obstruction

## 2019-09-14 NOTE — ASSESSMENT & PLAN NOTE
- Nausea/vomiting with inability to tolerate PO x 2 days on admission  - Pulse initially 143, down to 110s with IVF  - 2 L IVF and IV zofran administered in ED  - CT Abdomen/Pelvis with no evidence of bowel obstruction  - Last episode of emesis on 9/13  - Continue scheduled PO zofran  - Regular diet

## 2019-09-14 NOTE — PROGRESS NOTES
[facility]  Gynecologic Oncology  Progress Note      Patient Name: Jessica Gorman  MRN: [id number]  Admission Date: 9/13/2019  Hospital Length of Stay: 1 days  Attending Provider: Jair Watt MD  Primary Care Provider: Rico Amaro MD  Principal Problem: Nausea and vomiting    Follow-up For: * No surgery found *  Post-Operative Day:    Subjective:      History of Present Illness:  Jessica Gorman is a 69 year old female with history of recurrent serous endometrial carcinoma who presented to the ED with nausea/vomiting and inability to tolerate PO. Patient received cycle 2 of maintenance taxol and carboplatin on 9/10/19. The following afternoon, she developed persistent nausea and was unable to keep down her lunch and dinner. She took zofran last night with some relief. This morning she had another episode of emesis after attempting to drink water. Her daughter checked her pulse at home and noticed that it was in the 130s and brought her to the ED. She last had a normal bowel movement this morning. She endorses weakness and denies pain, fever, chills, CP, SOB, and light-headedness.    Hospital Course:  09/13/2019: Admitted to GYN onc service for IV fluid hydration and electrolyte repletion.   09/14/2019: NAEON. No episodes of emesis in >24 hrs. Clear liquid diet.     No new subjective & objective note has been filed under this hospital service since the last note was generated.    Assessment/Plan:     * Nausea and vomiting  - Nausea/vomiting with inability to tolerate PO x 2 days on admission  - Pulse initially 143, down to 110s with IVF  - 2 L IVF and IV zofran administered in ED  - CT Abdomen/Pelvis with no evidence of bowel obstruction  - Scheduled IV zofran q6h, will transition to PO  - Patient has appetite this morning with no episodes of emesis in 24 hours  - Will discontinue IV fluids and order regular diet. Patient instructed to start slowly with small bites.    Hypokalemia  -  K+ 3.5 on admission, now 3.1  - Will replete with PO K+ 25mEq q2h x 3 doses  - Slow K+ tabs sent to pharmacy for discharge    Hypomagnesemia  - Mg 1.3 > 3g IV Mg > 1.7    Endometrial carcinoma, serous  - Patient received cycle 2 of maintenance taxol and carboplatin on 9/10/19  - See GYN onc history above    Hypertension  - BP: (103-128)/(63-80) 103/68  - Continue home norvasc    Tachycardia  - Pulse:  [108-143] 119  - Persistently tachycardic since admission  - EKG ordered    VTE Risk Mitigation (From admission, onward)        Ordered     Place ASH hose  Until discontinued      09/13/19 1732     Place sequential compression device  Until discontinued      09/13/19 1732     IP VTE HIGH RISK PATIENT  Once      09/13/19 1732        Dispo: Anticipate discharge home later today if patient is able to tolerate breakfast and lunch.    Opal Aleman MD  Gynecologic Oncology  [facility]      I have seen the patient, reviewed the Resident's history and physical, assessment, plan and progress note. I have personally interviewed and examined the patient at bedside and agree with the findings.         Nithin Becerril MD  [facility]

## 2019-09-14 NOTE — CARE UPDATE
Rapid Response Nurse Chart Check     Chart check completed, abnormal VS noted. Bedside RN Linda contacted, no concerns verbalized at this time, instructed to call [phone] for further concerns or assistance.

## 2019-09-14 NOTE — PLAN OF CARE
Problem: Adult Inpatient Plan of Care  Goal: Plan of Care Review  Outcome: Ongoing (interventions implemented as appropriate)  POC reviewed with patient and daughter; understanding verbalized. D5 NS @ 75. Pt c/o mild nausea. Scheduled IV Zofran and Pepcid administered, with moderate results achieved. No emesis this shift. SCD's in place. Pt remains stand-by assist. Free from falls and injuries this shift. NPO this shift. Pt voids clear yellow urine in hat via bathroom toilet. No BM's this shift. Pt instructed to call when getting OOB. Pt. with nonskid footwear on, bed in lowest position, and locked with bed rails up x 2. Daughter @ bedside. Pt. has call light and personal items within reach. VSS and afebrile this shift. All questions and concerns addressed at this time. Will continue to monitor.

## 2019-09-14 NOTE — PROGRESS NOTES
Pharmacokinetic Initial Assessment: IV Vancomycin    Assessment/Plan:    Initiate intravenous vancomycin with loading dose of 2000 mg once followed by a maintenance dose of vancomycin 750mg IV every 12 hours  Desired empiric serum trough concentration is 10 to 20 mcg/mL  Draw vancomycin trough level 30 min prior to fourth dose on 9/16 at approximately 0700  Pharmacy will continue to follow and monitor vancomycin.      Please contact pharmacy at extension [phone] with any questions regarding this assessment.     Thank you for the consult,   Vincent Gala       Patient brief summary:  Jessica Gorman is a 69 y.o. female initiated on antimicrobial therapy with IV Vancomycin for treatment of suspected bacteremia    Drug Allergies:   Review of patient's allergies indicates:   Allergen Reactions    Gramicidin d Shortness Of Breath    Hydrochlorothiazide Other (See Comments)     Burning and tingling sensation throughoutt her body    Lisinopril Shortness Of Breath    Promethazine Shortness Of Breath and Nausea And Vomiting    Neosporin [neomycin-bacitracnzn-polymyxnb] Swelling    Polymyxin [bacitracin-polymyxin b] Swelling       Actual Body Weight:   67 kg    Renal Function:   Estimated Creatinine Clearance: 68.1 mL/min (based on SCr of 0.7 mg/dL).,     Dialysis Method (if applicable):  N/A    CBC (last 72 hours):  Recent Labs   Lab Result Units 09/13/19  1206 09/14/19  0500   WBC K/uL 3.48* 2.38*   Hemoglobin g/dL 12.3 10.5*   Hematocrit % 37.3 33.2*   Platelets K/uL 283 159   Gran% % 85.0* 82.8*   Lymph% % 11.2* 11.3*   Mono% % 3.2* 3.8*   Eosinophil% % 0.3 1.7   Basophil% % 0.0 0.0   Differential Method  Automated Automated       Metabolic Panel (last 72 hours):  Recent Labs   Lab Result Units 09/13/19  1343 09/13/19  1412 09/14/19  0500   Sodium mmol/L 134*  --  137   Potassium mmol/L 3.5  --  3.1*   Chloride mmol/L 100  --  103   CO2 mmol/L 21*  --  25   Glucose mg/dL 87  --  207*   Glucose, UA   --  Negative  --     BUN, Bld mg/dL 14  --  10   Creatinine mg/dL 0.7  --  0.7   Albumin g/dL 2.6*  --   --    Total Bilirubin mg/dL 0.6  --   --    Alkaline Phosphatase U/L 221*  --   --    AST U/L 150*  --   --    ALT U/L 87*  --   --    Magnesium mg/dL 1.3*  --  1.7   Phosphorus mg/dL 3.3  --  3.1       Drug levels (last 3 results):  No results for input(s): VANCOMYCINRA, VANCOMYCINPE, VANCOMYCINTR in the last 72 hours.    Microbiologic Results:  Microbiology Results (last 7 days)     Procedure Component Value Units Date/Time    Blood culture #1 **CANNOT BE ORDERED STAT** [[id number]] Collected:  09/13/19 1208    Order Status:  Completed Specimen:  Blood from Peripheral, Hand, Left Updated:  09/14/19 1716     Blood Culture, Routine Gram stain aer bottle: Gram positive cocci in clusters resembling Staph       Results called to and read back by: Linda Bond LPN 09/14/2019  17:16    Blood culture #2 **CANNOT BE ORDERED STAT** [[id number]] Collected:  09/13/19 1305    Order Status:  Completed Specimen:  Blood from Peripheral, Hand, Right Updated:  09/14/19 1422     Blood Culture, Routine No Growth to date      No Growth to date

## 2019-09-14 NOTE — PROGRESS NOTES
"Afternoon Assessment:    Patient resting comfortably in bed. States that she was able to tolerate fried chicken and macaroni for lunch without nausea/vomiting. She has had no further emesis since yesterday morning. She is ambulating and voiding without difficulty. Patient states that she continues to feel weak and is worried about going home. After cycle 1 of chemo, she states that she was weak and "out of it" for 10 days. She denies fever, chills, CP, SOB, and dysuria.    Temp:  [98.1 °F (36.7 °C)-99 °F (37.2 °C)] 98.4 °F (36.9 °C)  Pulse:  [117-128] 125  Resp:  [18-20] 18  SpO2:  [87 %-95 %] 87 %  BP: ()/(58-69) 97/58    PE:  CV: Tachycardic. Regular rhythm.  Pulm: CTAB over anterior and posterior lung fields. No wheezes, rales, or rhonchi.  Abdomen: Soft, moderately distended. Non-tender.  MSK: No tenderness or edema. SCDs not in place.    Labs:  Recent Labs   Lab 09/14/19  0500   WBC 2.38*   RBC 3.85*   HGB 10.5*   HCT 33.2*      MCV 86   MCH 27.3   MCHC 31.6*       A/P:  1. Nausea and vomiting:  - Continue scheduled PO zofran  - Patient continues to have an appetite and is tolerating PO  - IVF discontinued  - AM labs pending    2. Positive blood culture:  - Blood cultures ordered yesterday in ED. Blood culture #1 positive for gram positive cocci resembling Staph. Culture #2 with no growth to date.   - Afebrile.   - Concern for contaminant. Spoke to ID who recommends starting vancomycin. If culture turns out to be consistent with contaminant, will discontinue antibiotics. If culture positive for Staph aureus, continue antibiotics and consult ID.   - Will repeat blood cultures in the AM.   - Had long discussion explaining the meaning of the positive culture and the plan with patient and her daughter.     3. Hypertension:  - BP: ()/(58-69) 97/58  - Consider holding home norvasc if hypotensive in AM    4. Tachycardia:  - Pulse:  [117-128] 125  - EKG shows normal sinus rhythm  - Patient " consistently tachycardic at office visits,   - Will place outpatient cardiology consult    5. Hypoxemia:  - SpO2:  [87 %-95 %] 87 %  - Low oxygen saturation throughout the day. RN states that this resolves when she sits patient up in bed and asks her to take deep breaths.  - Discussed breathing exercises with patient. IS ordered.      Encouraged SCDs while in bed. No water in room available to patient. Encouraged patient and RN to keep water pitcher at bedside.      Opal Aleman MD  OBGYN, PGY-2

## 2019-09-15 ENCOUNTER — NURSE TRIAGE (OUTPATIENT)
Dept: ADMINISTRATIVE | Facility: CLINIC | Age: 69
End: 2019-09-15

## 2019-09-15 ENCOUNTER — HOSPITAL ENCOUNTER (EMERGENCY)
Facility: HOSPITAL | Age: 69
Discharge: HOME | End: 2019-09-15
Attending: EMERGENCY MEDICINE
Payer: MEDICARE

## 2019-09-15 VITALS
HEART RATE: 119 BPM | HEIGHT: 62 IN | OXYGEN SATURATION: 96 % | TEMPERATURE: 98 F | BODY MASS INDEX: 27.18 KG/M2 | WEIGHT: 147.69 LBS | RESPIRATION RATE: 18 BRPM | DIASTOLIC BLOOD PRESSURE: 72 MMHG | SYSTOLIC BLOOD PRESSURE: 111 MMHG

## 2019-09-15 VITALS
HEIGHT: 62 IN | SYSTOLIC BLOOD PRESSURE: 120 MMHG | RESPIRATION RATE: 32 BRPM | WEIGHT: 138 LBS | BODY MASS INDEX: 25.4 KG/M2 | HEART RATE: 120 BPM | OXYGEN SATURATION: 93 % | DIASTOLIC BLOOD PRESSURE: 77 MMHG | TEMPERATURE: 99 F

## 2019-09-15 DIAGNOSIS — R00.0 TACHYCARDIA: ICD-10-CM

## 2019-09-15 DIAGNOSIS — R00.0 TACHYCARDIA: Primary | ICD-10-CM

## 2019-09-15 PROBLEM — R78.81 BLOOD BACTERIAL CULTURE POSITIVE: Status: ACTIVE | Noted: 2019-09-15

## 2019-09-15 LAB
ALBUMIN SERPL BCP-MCNC: 2.7 G/DL (ref 3.5–5.2)
ALP SERPL-CCNC: 238 U/L (ref 55–135)
ALT SERPL W/O P-5'-P-CCNC: 81 U/L (ref 10–44)
ANION GAP SERPL CALC-SCNC: 11 MMOL/L (ref 8–16)
ANION GAP SERPL CALC-SCNC: 12 MMOL/L (ref 8–16)
ANISOCYTOSIS BLD QL SMEAR: SLIGHT
ANISOCYTOSIS BLD QL SMEAR: SLIGHT
AST SERPL-CCNC: 175 U/L (ref 10–40)
BASOPHILS # BLD AUTO: 0.01 K/UL (ref 0–0.2)
BASOPHILS # BLD AUTO: 0.02 K/UL (ref 0–0.2)
BASOPHILS NFR BLD: 0.5 % (ref 0–1.9)
BASOPHILS NFR BLD: 0.9 % (ref 0–1.9)
BILIRUB SERPL-MCNC: 0.6 MG/DL (ref 0.1–1)
BILIRUB UR QL STRIP: NEGATIVE
BUN SERPL-MCNC: 10 MG/DL (ref 8–23)
BUN SERPL-MCNC: 10 MG/DL (ref 8–23)
BURR CELLS BLD QL SMEAR: ABNORMAL
CALCIUM SERPL-MCNC: 7.9 MG/DL (ref 8.7–10.5)
CALCIUM SERPL-MCNC: 8.5 MG/DL (ref 8.7–10.5)
CHLORIDE SERPL-SCNC: 100 MMOL/L (ref 95–110)
CHLORIDE SERPL-SCNC: 99 MMOL/L (ref 95–110)
CLARITY UR REFRACT.AUTO: CLEAR
CO2 SERPL-SCNC: 24 MMOL/L (ref 23–29)
CO2 SERPL-SCNC: 25 MMOL/L (ref 23–29)
COLOR UR AUTO: YELLOW
CREAT SERPL-MCNC: 0.7 MG/DL (ref 0.5–1.4)
CREAT SERPL-MCNC: 0.8 MG/DL (ref 0.5–1.4)
DACRYOCYTES BLD QL SMEAR: ABNORMAL
DIFFERENTIAL METHOD: ABNORMAL
DIFFERENTIAL METHOD: ABNORMAL
EOSINOPHIL # BLD AUTO: 0 K/UL (ref 0–0.5)
EOSINOPHIL # BLD AUTO: 0.1 K/UL (ref 0–0.5)
EOSINOPHIL NFR BLD: 1.3 % (ref 0–8)
EOSINOPHIL NFR BLD: 3.4 % (ref 0–8)
ERYTHROCYTE [DISTWIDTH] IN BLOOD BY AUTOMATED COUNT: 13.9 % (ref 11.5–14.5)
ERYTHROCYTE [DISTWIDTH] IN BLOOD BY AUTOMATED COUNT: 14.4 % (ref 11.5–14.5)
EST. GFR  (AFRICAN AMERICAN): >60 ML/MIN/1.73 M^2
EST. GFR  (AFRICAN AMERICAN): >60 ML/MIN/1.73 M^2
EST. GFR  (NON AFRICAN AMERICAN): >60 ML/MIN/1.73 M^2
EST. GFR  (NON AFRICAN AMERICAN): >60 ML/MIN/1.73 M^2
GLUCOSE SERPL-MCNC: 114 MG/DL (ref 70–110)
GLUCOSE SERPL-MCNC: 91 MG/DL (ref 70–110)
GLUCOSE UR QL STRIP: NEGATIVE
HCT VFR BLD AUTO: 35 % (ref 37–48.5)
HCT VFR BLD AUTO: 37.9 % (ref 37–48.5)
HGB BLD-MCNC: 11.1 G/DL (ref 12–16)
HGB BLD-MCNC: 12.1 G/DL (ref 12–16)
HGB UR QL STRIP: NEGATIVE
IMM GRANULOCYTES # BLD AUTO: 0.02 K/UL (ref 0–0.04)
IMM GRANULOCYTES # BLD AUTO: 0.04 K/UL (ref 0–0.04)
IMM GRANULOCYTES NFR BLD AUTO: 1 % (ref 0–0.5)
IMM GRANULOCYTES NFR BLD AUTO: 1.8 % (ref 0–0.5)
KETONES UR QL STRIP: NEGATIVE
LEUKOCYTE ESTERASE UR QL STRIP: NEGATIVE
LYMPHOCYTES # BLD AUTO: 0.3 K/UL (ref 1–4.8)
LYMPHOCYTES # BLD AUTO: 0.3 K/UL (ref 1–4.8)
LYMPHOCYTES NFR BLD: 13.2 % (ref 18–48)
LYMPHOCYTES NFR BLD: 14 % (ref 18–48)
MAGNESIUM SERPL-MCNC: 1.6 MG/DL (ref 1.6–2.6)
MCH RBC QN AUTO: 27.3 PG (ref 27–31)
MCH RBC QN AUTO: 27.5 PG (ref 27–31)
MCHC RBC AUTO-ENTMCNC: 31.7 G/DL (ref 32–36)
MCHC RBC AUTO-ENTMCNC: 31.9 G/DL (ref 32–36)
MCV RBC AUTO: 86 FL (ref 82–98)
MCV RBC AUTO: 87 FL (ref 82–98)
MONOCYTES # BLD AUTO: 0.1 K/UL (ref 0.3–1)
MONOCYTES # BLD AUTO: 0.1 K/UL (ref 0.3–1)
MONOCYTES NFR BLD: 2.2 % (ref 4–15)
MONOCYTES NFR BLD: 3.4 % (ref 4–15)
NEUTROPHILS # BLD AUTO: 1.6 K/UL (ref 1.8–7.7)
NEUTROPHILS # BLD AUTO: 1.8 K/UL (ref 1.8–7.7)
NEUTROPHILS NFR BLD: 77.7 % (ref 38–73)
NEUTROPHILS NFR BLD: 80.6 % (ref 38–73)
NITRITE UR QL STRIP: NEGATIVE
NRBC BLD-RTO: 0 /100 WBC
NRBC BLD-RTO: 0 /100 WBC
OVALOCYTES BLD QL SMEAR: ABNORMAL
PH UR STRIP: 6 [PH] (ref 5–8)
PHOSPHATE SERPL-MCNC: 3 MG/DL (ref 2.7–4.5)
PLATELET # BLD AUTO: 142 K/UL (ref 150–350)
PLATELET # BLD AUTO: 148 K/UL (ref 150–350)
PLATELET BLD QL SMEAR: ABNORMAL
PLATELET BLD QL SMEAR: ABNORMAL
PMV BLD AUTO: 11.1 FL (ref 9.2–12.9)
PMV BLD AUTO: 11.6 FL (ref 9.2–12.9)
POIKILOCYTOSIS BLD QL SMEAR: SLIGHT
POTASSIUM SERPL-SCNC: 3.4 MMOL/L (ref 3.5–5.1)
POTASSIUM SERPL-SCNC: 3.4 MMOL/L (ref 3.5–5.1)
PROT SERPL-MCNC: 6.5 G/DL (ref 6–8.4)
PROT UR QL STRIP: NEGATIVE
RBC # BLD AUTO: 4.03 M/UL (ref 4–5.4)
RBC # BLD AUTO: 4.43 M/UL (ref 4–5.4)
SODIUM SERPL-SCNC: 135 MMOL/L (ref 136–145)
SODIUM SERPL-SCNC: 136 MMOL/L (ref 136–145)
SP GR UR STRIP: 1.02 (ref 1–1.03)
TSH SERPL DL<=0.005 MIU/L-ACNC: 2.03 UIU/ML (ref 0.4–4)
URN SPEC COLLECT METH UR: NORMAL
WBC # BLD AUTO: 2.07 K/UL (ref 3.9–12.7)
WBC # BLD AUTO: 2.27 K/UL (ref 3.9–12.7)

## 2019-09-15 PROCEDURE — 36415 COLL VENOUS BLD VENIPUNCTURE: CPT

## 2019-09-15 PROCEDURE — 84100 ASSAY OF PHOSPHORUS: CPT

## 2019-09-15 PROCEDURE — 63600175 PHARM REV CODE 636 W HCPCS: Performed by: EMERGENCY MEDICINE

## 2019-09-15 PROCEDURE — 93005 ELECTROCARDIOGRAM TRACING: CPT

## 2019-09-15 PROCEDURE — 93010 EKG 12-LEAD: ICD-10-PCS | Mod: ,,, | Performed by: INTERNAL MEDICINE

## 2019-09-15 PROCEDURE — 25500020 PHARM REV CODE 255: Performed by: EMERGENCY MEDICINE

## 2019-09-15 PROCEDURE — 96360 HYDRATION IV INFUSION INIT: CPT

## 2019-09-15 PROCEDURE — 87040 BLOOD CULTURE FOR BACTERIA: CPT | Mod: 59

## 2019-09-15 PROCEDURE — 83735 ASSAY OF MAGNESIUM: CPT

## 2019-09-15 PROCEDURE — 25000003 PHARM REV CODE 250: Performed by: STUDENT IN AN ORGANIZED HEALTH CARE EDUCATION/TRAINING PROGRAM

## 2019-09-15 PROCEDURE — 80053 COMPREHEN METABOLIC PANEL: CPT

## 2019-09-15 PROCEDURE — 85025 COMPLETE CBC W/AUTO DIFF WBC: CPT | Mod: 91

## 2019-09-15 PROCEDURE — 99285 EMERGENCY DEPT VISIT HI MDM: CPT | Mod: ,,, | Performed by: EMERGENCY MEDICINE

## 2019-09-15 PROCEDURE — 93010 ELECTROCARDIOGRAM REPORT: CPT | Mod: ,,, | Performed by: INTERNAL MEDICINE

## 2019-09-15 PROCEDURE — 84443 ASSAY THYROID STIM HORMONE: CPT

## 2019-09-15 PROCEDURE — 81003 URINALYSIS AUTO W/O SCOPE: CPT

## 2019-09-15 PROCEDURE — 99285 EMERGENCY DEPT VISIT HI MDM: CPT | Mod: 25

## 2019-09-15 PROCEDURE — 96361 HYDRATE IV INFUSION ADD-ON: CPT

## 2019-09-15 PROCEDURE — 85025 COMPLETE CBC W/AUTO DIFF WBC: CPT

## 2019-09-15 PROCEDURE — 80048 BASIC METABOLIC PNL TOTAL CA: CPT

## 2019-09-15 PROCEDURE — 99238 HOSP IP/OBS DSCHRG MGMT 30/<: CPT | Mod: ,,, | Performed by: OBSTETRICS & GYNECOLOGY

## 2019-09-15 PROCEDURE — 99285 PR EMERGENCY DEPT VISIT,LEVEL V: ICD-10-PCS | Mod: ,,, | Performed by: EMERGENCY MEDICINE

## 2019-09-15 PROCEDURE — 99238 PR HOSPITAL DISCHARGE DAY,<30 MIN: ICD-10-PCS | Mod: ,,, | Performed by: OBSTETRICS & GYNECOLOGY

## 2019-09-15 RX ADMIN — ONDANSETRON HYDROCHLORIDE 4 MG: 4 TABLET, FILM COATED ORAL at 05:09

## 2019-09-15 RX ADMIN — SODIUM CHLORIDE 1000 ML: 0.9 INJECTION, SOLUTION INTRAVENOUS at 02:09

## 2019-09-15 RX ADMIN — IOHEXOL 100 ML: 350 INJECTION, SOLUTION INTRAVENOUS at 04:09

## 2019-09-15 NOTE — ED TRIAGE NOTES
Jessica Gorman, a 69 y.o. female presents to the ED w/ complaint of vaginal swelling and abdominal swelling. Patient has history of uterine CA.  Patient receiving chemo.     Triage note:  Chief Complaint   Patient presents with    Fatigue     Vaginal Swelling. Hx of Uterine CA. Received chemo Tuesday.      Review of patient's allergies indicates:   Allergen Reactions    Gramicidin d Shortness Of Breath    Hydrochlorothiazide Other (See Comments)     Burning and tingling sensation throughoutt her body    Lisinopril Shortness Of Breath    Promethazine Shortness Of Breath and Nausea And Vomiting    Neosporin [neomycin-bacitracnzn-polymyxnb] Swelling    Polymyxin [bacitracin-polymyxin b] Swelling     Past Medical History:   Diagnosis Date    Abnormal Pap smear     1984    Anorexia 8/30/2019    Cancer     Chemotherapy-induced neutropenia 4/1/2015    Elevated cancer antigen 125 (CA-125) 9/5/2018    Encounter for blood transfusion     FH: breast cancer in relative when <45 years old 3/17/2016    Hypertension     Hypokalemia 9/9/2019    Other screening mammogram 9/17/2015    Postmenopausal bleeding     Pulmonary nodules/lesions, multiple 4/25/2019    Rash 9/17/2015    Uterine cancer     Weight loss 12/17/2015    Well woman exam with routine gynecological exam 9/14/2016     LOC: Patient name and date of birth verified. The patient is awake, alert and aware of environment with an appropriate affect, the patient is oriented x 3 and speaking appropriately.   APPEARANCE: Patient resting comfortably, patient is clean and well groomed, patient's clothing is properly fastened.  SKIN: The skin is warm and dry, color consistent with ethnicity, patient has normal skin turgor and moist mucus membranes, skin intact, no breakdown or bruising noted.  MUSCULOSKELETAL: Patient moving all extremities well, no obvious swelling or deformities noted.   RESPIRATORY: Respirations are spontaneous, patient has a normal  effort and rate, no accessory muscle use noted.  CARDIAC: Patient has a normal rate and rhythm, no periphreal edema noted, capillary refill < 3 seconds.  ABDOMEN: Soft and non tender to palpation, no distention noted. Bowel sounds present in all four quadrants.  NEUROLOGIC: Eyes open spontaneously, behavior appropriate to situation, follows commands, facial expression symmetrical, bilateral hand grasp equal and even, purposeful motor response noted, normal sensation in all extremities when touched with a finger.

## 2019-09-15 NOTE — ASSESSMENT & PLAN NOTE
- Blood cultures ordered 9/13 in ED. Blood culture #1 positive for gram positive cocci resembling Staph. Culture #2 with no growth to date.   - Afebrile.   - Concern for contaminant. Spoke to ID who recommends starting vancomycin. If culture turns out to be consistent with contaminant, will discontinue antibiotics. If culture positive for Staph aureus, continue antibiotics and consult ID.   - Will repeat blood cultures this AM.   - Had long discussion explaining the meaning of the positive culture and the plan with patient and her daughter.

## 2019-09-15 NOTE — NURSING
Pt discharged to home at this time per MD order.  Med rec completed by MD prior to discharge and copy of current med list given to pt.  All discharge instructions, medications, prescriptions, and follow-up appointments reviewed with pt; copy given and all questions answered to pt's satisfaction.  Pt. verbalized understanding with no further questions.  Peripheral IV D/C'd with catheter tip intact.  Pt ambulating in room with steady gait; tolerating a regular diet; voiding without difficulty; pain well-controlled with PO pain meds.  All VSS; O2 sats WNL on RA.  Pt left floor via wheelchair; accompanied by pt escort and family; no distress noted.

## 2019-09-15 NOTE — ASSESSMENT & PLAN NOTE
- Pulse:  [117-125] 117  - Persistently tachycardic since admission  - EKG shows sinus tachycardia  - Will arrange follow-up with cardiology outpatient

## 2019-09-15 NOTE — TELEPHONE ENCOUNTER
Reason for Disposition   Nursing judgment or information in reference    Additional Information   Negative: Nursing judgment, per information in Reference   Negative: Information only call about a Well Adult (no illness or injury)    Protocols used: NO GUIDELINE [id number]  pt states she just left hospital and states Genital area swollen. Phone very muffled throughout clal. No SOB , N/V gone . just picked up new rx. Pt states her and her daughter are headed back to ED. Call back with questions

## 2019-09-15 NOTE — ED PROVIDER NOTES
Encounter Date: 9/15/2019       History     Chief Complaint   Patient presents with    Fatigue     Vaginal Swelling. Hx of Uterine CA. Received chemo Tuesday.      69-year-old female returns to the emergency department after being discharged from the hospital today.  She was concerned that she has the abrupt onset of moderate genital swelling. The swelling seems to be in the mons pubis area   She has uterine cancer and was admitted to the hospital for tachycardia.  The tachycardia seems to be a persistent problem.  She denies chest pain or shortness of breath.  There is no abdominal pain or fever.        Review of patient's allergies indicates:   Allergen Reactions    Gramicidin d Shortness Of Breath    Hydrochlorothiazide Other (See Comments)     Burning and tingling sensation throughoutt her body    Lisinopril Shortness Of Breath    Promethazine Shortness Of Breath and Nausea And Vomiting    Neosporin [neomycin-bacitracnzn-polymyxnb] Swelling    Polymyxin [bacitracin-polymyxin b] Swelling     Past Medical History:   Diagnosis Date    Abnormal Pap smear     1984    Anorexia 8/30/2019    Cancer     Chemotherapy-induced neutropenia 4/1/2015    Elevated cancer antigen 125 (CA-125) 9/5/2018    Encounter for blood transfusion     FH: breast cancer in relative when <45 years old 3/17/2016    Hypertension     Hypokalemia 9/9/2019    Other screening mammogram 9/17/2015    Postmenopausal bleeding     Pulmonary nodules/lesions, multiple 4/25/2019    Rash 9/17/2015    Uterine cancer     Weight loss 12/17/2015    Well woman exam with routine gynecological exam 9/14/2016     Past Surgical History:   Procedure Laterality Date    COLPOSCOPY      DILATION AND CURETTAGE OF UTERUS  10/17/14    DILATION-CURETTAGE OF UTERUS (D AND C)  10/17/2014    [facility]    EXPLORATORY-LAPAROTOMY N/A 12/4/2014    [facility]    HYSTERECTOMY  12/4/2014     radiacl hysterectomy    HYSTERECTOMY-TOTAL-ABDOMINAL AND BSO N/A 12/4/2014    Performed by [facility] at [facility] OR    LYMPHADENECTOMY-PELVIC Bilateral 12/4/2014    [facility] at [facility] OR    OR REMOVAL OF OVARY/TUBE(S)       Family History   Problem Relation Age of Onset    Hypertension Mother     Cataracts Mother     No Known Problems Father     Hypertension Brother     Hypertension Brother     Cancer Son     Cataracts Brother     Breast cancer Cousin         maternal cousin. early 30's when diagnosed.     Breast cancer Other         sister's child. diagnosed in her 30s.     Breast cancer Maternal Grandmother     Breast cancer Maternal Aunt     Cataracts Maternal Aunt     Lung cancer Maternal Uncle     Colon cancer Neg Hx     Ovarian cancer Neg Hx     Uterine cancer Neg Hx      Social History     Tobacco Use    Smoking status: Never Smoker    Smokeless tobacco: Never Used   Substance Use Topics    Alcohol use: No    Drug use: No     Review of Systems   Constitutional: Negative for fever.   HENT: Negative for congestion.    Eyes: Negative for visual disturbance.   Respiratory: Negative for shortness of breath.    Cardiovascular: Negative for chest pain.   Gastrointestinal: Negative for abdominal pain.   Endocrine: Negative for polydipsia and polyuria.   Genitourinary: Negative for difficulty urinating.   Musculoskeletal: Negative for arthralgias.   Neurological: Negative for dizziness.   Hematological: Negative for adenopathy.   Psychiatric/Behavioral: Negative for agitation.       Physical Exam     Initial Vitals [09/15/19 1427]   BP Pulse Resp Temp SpO2   120/76 (!) 133 16 98.6 °F (37 °C) (!) 93 %      MAP       --         Physical Exam    Vitals reviewed.  Constitutional: She appears well-developed and well-nourished.   HENT:   Head: Normocephalic.   Eyes: EOM are normal. Pupils are equal, round, and reactive to light.   Neck: Normal range of motion. Neck  supple.   Cardiovascular: Regular rhythm, normal heart sounds and intact distal pulses.   Tachycardic   Pulmonary/Chest: Breath sounds normal. No respiratory distress. She has no wheezes.   Abdominal: Soft. Bowel sounds are normal. She exhibits no distension. There is no tenderness. There is no rebound and no guarding.   Genitourinary:   Genitourinary Comments: There is mild swelling to the mons pubis.  There is no swelling to the vaginal area.  There is no discharge.    (note picture below was requested by gyn Oncology service.  Patient allowed picture of mons pubis but declined full genital photography)   Musculoskeletal: Normal range of motion. She exhibits no edema.   Neurological: She is alert. She has normal strength. GCS score is 15. GCS eye subscore is 4. GCS verbal subscore is 5. GCS motor subscore is 6.   Skin: Skin is warm. Capillary refill takes less than 2 seconds.   Psychiatric: She has a normal mood and affect.             ED Course   Procedures  Labs Reviewed   CBC W/ AUTO DIFFERENTIAL - Abnormal; Notable for the following components:       Result Value    WBC 2.27 (*)     Mean Corpuscular Hemoglobin Conc 31.9 (*)     Platelets 148 (*)     Immature Granulocytes 1.8 (*)     Lymph # 0.3 (*)     Mono # 0.1 (*)     Gran% 80.6 (*)     Lymph% 13.2 (*)     Mono% 2.2 (*)     All other components within normal limits   COMPREHENSIVE METABOLIC PANEL - Abnormal; Notable for the following components:    Potassium 3.4 (*)     Glucose 114 (*)     Calcium 8.5 (*)     Albumin 2.7 (*)     Alkaline Phosphatase 238 (*)      (*)     ALT 81 (*)     All other components within normal limits   TSH   URINALYSIS, REFLEX TO URINE CULTURE    Narrative:     GRAY AND YELLOW  Preferred Collection Type->Urine, Clean Catch     EKG Readings: (Independently Interpreted)   Sinus tachycardia at 139 without acute ischemic changes.  There are nonspecific T-wave changes in the inferior lateral leads       Imaging Results           CTA Chest Non-Coronary (PE Study) (Final result)  Result time 09/15/19 17:08:36    Final result by Zakiya Dennis MD (09/15/19 17:08:36)                 Impression:      No evidence of pulmonary thromboembolism or infarct.    Stable left upper lobe pulmonary nodules the largest measuring 9 mm.    Moderate pleural effusions with adjacent atelectasis.    Heterogeneous liver with multiple hypodense masses, better evaluated on prior studies.  Moderate volume ascites.    Electronically signed by resident: Jacki Sheehan  Date:    09/15/2019  Time:    16:25    Electronically signed by: Zakiya Dennis  Date:    09/15/2019  Time:    17:08             Narrative:    EXAMINATION:  CTA CHEST NON CORONARY    CLINICAL HISTORY:  Chest pain, acute, PE suspected, high pretest prob;    TECHNIQUE:  Low dose axial images, sagittal and coronal reformations were obtained from the thoracic inlet to the lung bases following the IV administration of 100 mL of Omnipaque 350.  Contrast timing was optimized to evaluate the pulmonary arteries.    COMPARISON:  Radiograph chest AP portable 09/13/2019, CT chest without contrast 04/22/2019, CT abdomen pelvis 09/13/2019    FINDINGS:  Pulmonary vasculature: Satisfactory opacification of the pulmonary arterial system with no filling defect to the proximal segmental level.  The main pulmonary artery is enlarged measuring 3.3 cm, unchanged.    Aorta: Left-sided aortic arch.  No aneurysm and no significant atherosclerosis    Base of Neck: No significant abnormality.    Thoracic soft tissues: Normal.    Heart: Normal size. No effusion.    Kena/Mediastinum: No pathologic valencia enlargement.    Airways: Patent.    Lungs/Pleura: There are bilateral moderate size pleural effusions with associated atelectasis.  There is a 0.9 x 0.9 cm solid nodule in the left upper lobe, (axial series 2, image 111), stable when compared to 04/22/2019.  Stable 4 mm left upper lobe pulmonary nodule (image 158, series  2).    Esophagus: Normal.    Upper Abdomen: Heterogeneous liver parenchyma with the largest ill-defined hypodense lesion measuring approximately 5.4 x 5.5 cm, better evaluated on CT abdomen pelvis with contrast 09/13/2019.  There is abdominal ascites.  Large lipoma in the posterior left chest wall.    Bones: No acute fracture. No suspicious lytic or sclerotic lesions.                                 Medical Decision Making:   History:   Old Medical Records: I decided to obtain old medical records.  Old Records Summarized: other records.       <> Summary of Records: I reviewed her recent hospitalization.  Of note I reviewed that she is persistently tachycardic through the entire admission and even had multiple episodes of tachycardia on previous healthcare encounters.  Initial Assessment:   Patient with swelling to the mons pubis.  This is nonspecific and I doubt is an emergent medical condition.  There is no sign of infection or tenderness.    Patient with persistent tachycardia.  This was evaluated while she was on the gyn service.  Will repeat labs and consider doing a CTA of the chest for possible PE as well as a TSH.  Will give IV fluids.  I discussed case with gyn Oncology.  They felt she would be stable for discharge.  They states that they were aware of the persistent tachycardia and felt that this was stable. Recommended she follow up with cardiologist for persistent tachycardia.  Independently Interpreted Test(s):   I have ordered and independently interpreted EKG Reading(s) - see prior notes  Clinical Tests:   Lab Tests: Ordered and Reviewed  Radiological Study: Ordered  Medical Tests: Ordered and Reviewed  ED Management:  I reviewed studies.  No sign of PE.  She is stable for discharge  Other:   I have discussed this case with another health care provider.                      Clinical Impression:       ICD-10-CM ICD-9-CM   1. Tachycardia R00.0 785.0         Disposition:   Disposition:  Discharged  Condition: Stable                        Jett Almazan MD  09/15/19 0999

## 2019-09-15 NOTE — PROGRESS NOTES
[facility]  Gynecologic Oncology  Progress Note      Patient Name: Jessica Gorman  MRN: [id number]  Admission Date: 9/13/2019  Hospital Length of Stay: 2 days  Attending Provider: Jair Watt MD  Primary Care Provider: Rico Amaro MD  Principal Problem: Nausea and vomiting    Follow-up For: * No surgery found *  Post-Operative Day:    Subjective:      History of Present Illness:  Jessica Gorman is a 69 year old female with history of recurrent serous endometrial carcinoma who presented to the ED with nausea/vomiting and inability to tolerate PO. Patient received cycle 2 of maintenance taxol and carboplatin on 9/10/19. The following afternoon, she developed persistent nausea and was unable to keep down her lunch and dinner. She took zofran last night with some relief. This morning she had another episode of emesis after attempting to drink water. Her daughter checked her pulse at home and noticed that it was in the 130s and brought her to the ED. She last had a normal bowel movement this morning. She endorses weakness and denies pain, fever, chills, CP, SOB, and light-headedness.    Oncology Treatment Plan:   OP GYN PACLITAXEL CARBOPLATIN (AUC 6) Q3W     Oncology History:      Dec 2014: She was operated on by me at [facility] on Dec 4, 2014 for uterine papillary serous carcinoma. 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    CT scan of the abdomen and pelvis after surgery showed: 1.Postsurgical changes seen in the pelvis from hysterectomy and bilateral salpingo-oophorectomy    2.A fluid collection is seen posterior to the cervix and likely represents a seroma. However, abscess cannot be ruled out.    3.Enlarged left inguinal lymph node identified     Ultrasound of this inguinal node was done and showed:    Limited sonographic imaging was performed in an area of a palpable abnormality within the patient's left inguinal region. Within this region, a lymph node is seen that measures 2.4 x 0.9 x 2.8 cm. While enlarged, this lymph node does   demonstrate a fatty hilum and is otherwise normal in morphology.    The patient reports that this palpable abnormality has been getting smaller over time. I discussed with the patient the need to biopsy this lesion to fully exclude any recurrent malignancy however at this time the patient deferred a biopsy attempt.    If this lesion does not resolve, a ultrasound guided biopsy could be rescheduled.        Patient completed vaginal cuff radiation.    She completed 6 cycles of taxol and carboplatin in May 2015.    June 2015: CT scan at completion of treatment shows no evidence for disease. No adenopathy.  was 7.       Sept 2018:  was 47. CT scan of CAP showed numerous bilateral pulmonary nodules and enlarged peripancreatic and gonzalo hepatis lymph nodes with soft tissue infiltration of the mesentery at the region.    3.4 cm left renal mass along the lower pole of the left kidney measures greater than water density.  Differential would include solid renal mass including primary renal neoplasm or metastatic lesion versus is a complex cyst.      Jan 2019: completed 6 cycles of taxol and carboplatin.      Feb 2019:  Completion :16.  Completion CT scan: partial response      April 2019:  Completed 9 cycles of Taxol and carboplatin.  CT scan with persistent pulmonary nodules. PET: multiple  hypermetabolic mesenteric/retroperitoneal lymph nodes and a hypermetabolic left lung nodule concerning for metastatic disease      June 2019: started Avastin maintenance.     August 2019: completed 3 cycles of Avastin. PET showed progressive disease in lung, liver and abdomen. Aug 2019: : 155 (up from 20). Not interested in  clinical trial. Wanted to resume taxol and carboplatin.    Hospital Course:  09/13/2019: Admitted to GYN onc service for IV fluid hydration and electrolyte repletion.   09/14/2019: NAEON. No episodes of emesis in >24 hrs. Advance to reg diet. Blood culture #1 positive for gram pos cocci resembling staph, vancomycin started.   09/15/2019: NAEON. Continues to tolerate PO. Blood culture consistent with contaminant, vanc discontinued. Discharge home today.     Interval History: NAEON. Patient resting comfortably in bed and states that she feels stronger today. She has not had an episode of emesis in 2 days and continues to tolerate PO. She is ambulating and voiding without difficulty. She denies pain, fever, chills, nausea, CP, and SOB.     Scheduled Meds:    amLODIPine 10 mg Oral Daily    ondansetron 4 mg Oral Q6H    vancomycin (VANCOCIN) IVPB (custom) 750 mg Intravenous Q12H     PRN Meds:HYDROcodone-acetaminophen, HYDROcodone-acetaminophen, ibuprofen, sodium chloride 0.9%         Review of patient's allergies indicates:   Allergen Reactions    Gramicidin d Shortness Of Breath    Hydrochlorothiazide Other (See Comments)     Burning and tingling sensation throughoutt her body    Lisinopril Shortness Of Breath    Promethazine Shortness Of Breath and Nausea And Vomiting    Neosporin [neomycin-bacitracnzn-polymyxnb] Swelling    Polymyxin [bacitracin-polymyxin b] Swelling     Objective:     Vital Signs (Most Recent):   Temp: 98.6 °F (37 °C) (09/14/19 2309)   Pulse: (!) 117 (09/15/19 0400)   Resp: 18 (09/15/19 0400)   BP: 113/75 (09/15/19 0400)   SpO2: (!) 94 % (09/15/19 0400) Vital Signs (24h Range):   Temp: [98.2 °F (36.8 °C)-99 °F (37.2 °C)] 98.6 °F (37 °C)   Pulse: [117-125] 117   Resp: [18] 18   SpO2: [87 %-95 %] 94 %   BP: ()/(58-75) 113/75   Weight: 67 kg (147 lb 11.3 oz)   Body mass index is 27.02 kg/m².          Intake/Output - Last 3 Shifts        09/13 0700 - 09/14 0659 09/14 0700 - 09/15 0659 09/15  0700 - 09/16 0659    P.O. 0 630     I.V. (mL/kg) 575 (8.6) 376.3 (5.6)     IV Piggyback 2000 500     Total Intake(mL/kg) 2575 (38.4) 1506.3 (22.5)     Urine (mL/kg/hr) 500 1500 (0.9)     Stool  3     Total Output 500 1503     Net +2075 +3.3            Urine Occurrence  1 x     Stool Occurrence  4 x           Physical Exam:   Constitutional: She is oriented to person, place, and time. She appears well-developed and well-nourished. No distress.   HENT:   Head: Normocephalic and atraumatic.   Cardiovascular: Normal rate and regular rhythm.   Pulmonary/Chest: Effort normal. No respiratory distress.   Abdominal: Soft. She exhibits distension. There is no tenderness. There is no rebound and no guarding.   Musculoskeletal: Normal range of motion. She exhibits no edema or tenderness.   SCDs in place   Neurological: She is alert and oriented to person, place, and time.   Skin: Skin is warm and dry.   Psychiatric: She has a normal mood and affect. Her behavior is normal. Judgment and thought content normal.         Lines/Drains/Airways               Peripheral Intravenous Line                       Peripheral IV - Single Lumen 09/14/19 2249 22 G Anterior;Proximal;Right Forearm less than 1 day                  Laboratory:   BMP:        Recent Labs   Lab 09/13/19   1343 09/14/19   0500   GLU 87 207*   * 137   K 3.5 3.1*    103   CO2 21* 25   BUN 14 10   CREATININE 0.7 0.7   CALCIUM 8.2* 7.5*   MG 1.3* 1.7   , CBC:        Recent Labs   Lab 09/13/19   1206 09/14/19   0500   WBC 3.48* 2.38*   HGB 12.3 10.5*   HCT 37.3 33.2*    159   All pertinent labs from the last 24 hours have been reviewed.     Diagnostic Results:   CT: Reviewed, no evidence of bowel obstruction    Assessment/Plan:     * Nausea and vomiting  - Nausea/vomiting with inability to tolerate PO x 2 days on admission  - Pulse initially 143, down to 110s with IVF  - 2 L IVF and IV zofran administered in ED  - CT Abdomen/Pelvis with no evidence of  bowel obstruction  - Last episode of emesis on 9/13  - Continue scheduled PO zofran  - Regular diet    Blood bacterial culture positive  - Blood cultures ordered 9/13 in ED. Blood culture #1 positive for gram positive cocci resembling Staph. Culture #2 with no growth to date.   - ID recommended starting vancomycin. Patient received one 2g dose yesterday.  - Blood culture consistent with contaminant today. Vancomycin discontinued.  - Repeat blood cultures ordered this morning.    Hypokalemia  - K+ 3.5 > 3.1 > PO K+ 75mEq > 3.4  - Slow K+ tabs sent to pharmacy for discharge    Hypomagnesemia  - Mg 1.3 > 3g IV Mg > 1.7  - Mg 1.6 today    Endometrial carcinoma, serous  - Patient received cycle 2 of maintenance taxol and carboplatin on 9/10/19  - See GYN onc history above    Hypertension  - BP: ()/(58-75) 113/75  - Continue home norvasc    Tachycardia  - Pulse:  [117-125] 117  - Persistently tachycardic since admission  - EKG shows sinus tachycardia  - Will arrange follow-up with cardiology outpatient    VTE Risk Mitigation (From admission, onward)        Ordered     Place ASH hose  Until discontinued      09/13/19 1732     Place sequential compression device  Until discontinued      09/13/19 1732     IP VTE HIGH RISK PATIENT  Once      09/13/19 1732          Opal Aleman MD  Gynecologic Oncology  [facility]          I have seen the patient, reviewed the Resident's history and physical, assessment, plan and progress note. I have personally interviewed and examined the patient at bedside and agree with the findings.         Nithin Becerril MD  [facility]

## 2019-09-15 NOTE — DISCHARGE SUMMARY
[facility]  Gynecologic Oncology  Discharge Summary    Patient Name: Jessica Gorman  MRN: [id number]  Admission Date: 9/13/2019  Hospital Length of Stay: 2 days  Discharge Date and Time:  09/15/2019 9:25 AM  Attending Physician: Jair Watt MD   Discharging Provider: Opal Aleman MD  Primary Care Provider: Rico Amaro MD    HPI:   Jessica Gorman is a 69 year old female with history of recurrent serous endometrial carcinoma who presented to the ED with nausea/vomiting and inability to tolerate PO. Patient received cycle 2 of maintenance taxol and carboplatin on 9/10/19. The following afternoon, she developed persistent nausea and was unable to keep down her lunch and dinner. She took zofran last night with some relief. This morning she had another episode of emesis after attempting to drink water. Her daughter checked her pulse at home and noticed that it was in the 130s and brought her to the ED. She last had a normal bowel movement this morning. She endorses weakness and denies pain, fever, chills, CP, SOB, and light-headedness.    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    CT scan of the abdomen and pelvis after surgery showed: 1.Postsurgical changes seen in the pelvis from hysterectomy and bilateral salpingo-oophorectomy    2.A fluid collection is seen posterior to the cervix and likely represents a seroma. However, abscess cannot be ruled out.    3.Enlarged left inguinal lymph node identified    Ultrasound of this inguinal node was done and  showed:    Limited sonographic imaging was performed in an area of a palpable abnormality within the patient's left inguinal region. Within this region, a lymph node is seen that measures 2.4 x 0.9 x 2.8 cm. While enlarged, this lymph node does   demonstrate a fatty hilum and is otherwise normal in morphology.    The patient reports that this palpable abnormality has been getting smaller over time. I discussed with the patient the need to biopsy this lesion to fully exclude any recurrent malignancy however at this time the patient deferred a biopsy attempt.    If this lesion does not resolve, a ultrasound guided biopsy could be rescheduled.        Patient completed vaginal cuff radiation.    She completed 6 cycles of taxol and carboplatin in May 2015.    June 2015: CT scan at completion of treatment shows no evidence for disease. No adenopathy.  was 7.       Sept 2018:  was 47. CT scan of CAP showed numerous bilateral pulmonary nodules and enlarged peripancreatic and gonzalo hepatis lymph nodes with soft tissue infiltration of the mesentery at the region.    3.4 cm left renal mass along the lower pole of the left kidney measures greater than water density.  Differential would include solid renal mass including primary renal neoplasm or metastatic lesion versus is a complex cyst.      Jan 2019: completed 6 cycles of taxol and carboplatin.      Feb 2019:  Completion :16.  Completion CT scan: partial response      April 2019:  Completed 9 cycles of Taxol and carboplatin.  CT scan with persistent pulmonary nodules. PET: multiple  hypermetabolic mesenteric/retroperitoneal lymph nodes and a hypermetabolic left lung nodule concerning for metastatic disease      June 2019: started Avastin maintenance.     August 2019: completed 3 cycles of Avastin. PET showed progressive disease in lung, liver and abdomen. Aug 2019: : 155 (up from 20). Not interested in clinical trial. Wanted to resume taxol and  carboplatin.    Hospital Course:  09/13/2019: Admitted to GYN onc service for IV fluid hydration and electrolyte repletion.   09/14/2019: NAEON. No episodes of emesis in >24 hrs. Advance to reg diet. Blood culture #1 positive for gram pos cocci resembling staph, vancomycin started.   09/15/2019: NAEON. Continues to tolerate PO. Blood culture consistent with contaminant, vanc discontinued. Discharge home today.     * No surgery found *         Significant Diagnostic Studies: Labs:   BMP:   Recent Labs   Lab 09/13/19  1343 09/14/19  0500 09/15/19  0828   GLU 87 207* 91   * 137 135*   K 3.5 3.1* 3.4*    103 100   CO2 21* 25 24   BUN 14 10 10   CREATININE 0.7 0.7 0.7   CALCIUM 8.2* 7.5* 7.9*   MG 1.3* 1.7 1.6       Pending Diagnostic Studies:     None        Final Active Diagnoses:    Diagnosis Date Noted POA    PRINCIPAL PROBLEM:  Nausea and vomiting [R11.2] 09/13/2019 Yes    Blood bacterial culture positive [R78.81] 09/15/2019 No    Hypokalemia [E87.6] 09/09/2019 Yes    Hypomagnesemia [E83.42] 09/13/2019 Yes    Endometrial carcinoma, serous [C54.1] 11/06/2014 Yes    Hypertension [I10] 12/05/2014 Yes    Tachycardia [R00.0] 09/14/2019 Yes      Problems Resolved During this Admission:      Discharged Condition: good    Disposition: Home or Self Care    Follow Up:  Follow-up Information     Jair Watt MD. Go on 10/1/2019.    Specialty:  Gynecologic Oncology  Contact information:  [address]  [facility] [address]  [phone]             [facility] CARDIOLOGY. Schedule an appointment as soon as possible for a visit in 1 week.    Specialty:  Cardiology  Why:  Follow-up tachycardia  Contact information:  [address]  [facility] [address]  [phone]               Patient Instructions:      Diet Adult Regular     Notify your health care provider if you experience any of the following:  increased confusion or weakness     Notify your health care provider if you experience any of  the following:  persistent dizziness, light-headedness, or visual disturbances     Notify your health care provider if you experience any of the following:  worsening rash     Notify your health care provider if you experience any of the following:  severe persistent headache     Notify your health care provider if you experience any of the following:  difficulty breathing or increased cough     Notify your health care provider if you experience any of the following:  severe uncontrolled pain     Notify your health care provider if you experience any of the following:  persistent nausea and vomiting or diarrhea     Notify your health care provider if you experience any of the following:  temperature >100.4     Activity as tolerated     Medications:  Reconciled Home Medications:      Medication List      START taking these medications    potassium chloride 8 MEQ Tbsr  Commonly known as:  KLOR-CON  Take 2 tablets (16 mEq total) by mouth once daily.        CONTINUE taking these medications    amLODIPine 10 MG tablet  Commonly known as:  NORVASC  Take 1 tablet (10 mg total) by mouth once daily.     dexAMETHasone 4 MG Tab  Commonly known as:  DECADRON  Take 5 tablets every 6 hours for 24 hours prior to chemotherapy     megestrol 625 mg/5 mL Susp  Commonly known as:  MEGACE ES  Take 5 mLs (625 mg total) by mouth once daily.     ondansetron 8 MG Tbdl  Commonly known as:  ZOFRAN-ODT  Take 1 tablet (8 mg total) by mouth every 12 (twelve) hours as needed.     ONE DAILY MULTIVITAMIN per tablet  Generic drug:  multivitamin  Take 1 tablet by mouth once daily.        STOP taking these medications    potassium bicarbonate & potassium chloride 25 mEq Tbef  Commonly known as:  K-LYTE CL            Opal Aleman MD  Gynecologic Oncology  [facility]

## 2019-09-15 NOTE — PROGRESS NOTES
Therapy with vanc complete and/or consult discontinued by provider.  Pharmacy will sign off, please re-consult as needed.    Cherie Rangel, PharmD, BCPS  [id number]

## 2019-09-15 NOTE — PLAN OF CARE
Problem: Adult Inpatient Plan of Care  Goal: Plan of Care Review  Outcome: Ongoing (interventions implemented as appropriate)  Plan of care discussed with patient at start of shift. Free from falls and injuries. Resting quietly with eyes closed. Respirations even, unlabored. Skin warm and dry. Denies pain. Denies nausea. Vancomycin started this shift. Vital signs stable. Family remains at bedside. SCD's in use. Frequent checks for pain and safety maintained. Bed in lowest position, wheels locked, side rails up x's 2, call light in reach. Instructed to call for assistance as needed, verbalizes understanding. Will continue to monitor.

## 2019-09-15 NOTE — SUBJECTIVE & OBJECTIVE
Interval History: NAEON. Patient resting comfortably in bed and states that she feels stronger today. She has not had an episode of emesis in 2 days and continues to tolerate PO. She is ambulating and voiding without difficulty. She denies pain, fever, chills, nausea, CP, and SOB.    Scheduled Meds:   amLODIPine  10 mg Oral Daily    ondansetron  4 mg Oral Q6H    vancomycin (VANCOCIN) IVPB (custom)  750 mg Intravenous Q12H     Continuous Infusions:    PRN Meds:HYDROcodone-acetaminophen, HYDROcodone-acetaminophen, ibuprofen, sodium chloride 0.9%    Review of patient's allergies indicates:   Allergen Reactions    Gramicidin d Shortness Of Breath    Hydrochlorothiazide Other (See Comments)     Burning and tingling sensation throughoutt her body    Lisinopril Shortness Of Breath    Promethazine Shortness Of Breath and Nausea And Vomiting    Neosporin [neomycin-bacitracnzn-polymyxnb] Swelling    Polymyxin [bacitracin-polymyxin b] Swelling       Objective:     Vital Signs (Most Recent):  Temp: 98.6 °F (37 °C) (09/14/19 2309)  Pulse: (!) 117 (09/15/19 0400)  Resp: 18 (09/15/19 0400)  BP: 113/75 (09/15/19 0400)  SpO2: (!) 94 % (09/15/19 0400) Vital Signs (24h Range):  Temp:  [98.2 °F (36.8 °C)-99 °F (37.2 °C)] 98.6 °F (37 °C)  Pulse:  [117-125] 117  Resp:  [18] 18  SpO2:  [87 %-95 %] 94 %  BP: ()/(58-75) 113/75     Weight: 67 kg (147 lb 11.3 oz)  Body mass index is 27.02 kg/m².    Intake/Output - Last 3 Shifts       09/13 0700 - 09/14 0659 09/14 0700 - 09/15 0659 09/15 0700 - 09/16 0659    P.O. 0 630     I.V. (mL/kg) 575 (8.6) 376.3 (5.6)     IV Piggyback 2000 500     Total Intake(mL/kg) 2575 (38.4) 1506.3 (22.5)     Urine (mL/kg/hr) 500 1500 (0.9)     Stool  3     Total Output 500 1503     Net +2075 +3.3            Urine Occurrence  1 x     Stool Occurrence  4 x              Physical Exam:   Constitutional: She is oriented to person, place, and time. She appears well-developed and well-nourished. No distress.     HENT:   Head: Normocephalic and atraumatic.      Cardiovascular: Normal rate and regular rhythm.     Pulmonary/Chest: Effort normal. No respiratory distress.        Abdominal: Soft. She exhibits distension. There is no tenderness. There is no rebound and no guarding.             Musculoskeletal: Normal range of motion. She exhibits no edema or tenderness.   SCDs in place       Neurological: She is alert and oriented to person, place, and time.    Skin: Skin is warm and dry.    Psychiatric: She has a normal mood and affect. Her behavior is normal. Judgment and thought content normal.       Lines/Drains/Airways     Peripheral Intravenous Line                 Peripheral IV - Single Lumen 09/14/19 2249 22 G Anterior;Proximal;Right Forearm less than 1 day                Laboratory:  BMP:   Recent Labs   Lab 09/13/19  1343 09/14/19  0500   GLU 87 207*   * 137   K 3.5 3.1*    103   CO2 21* 25   BUN 14 10   CREATININE 0.7 0.7   CALCIUM 8.2* 7.5*   MG 1.3* 1.7   , CBC:   Recent Labs   Lab 09/13/19  1206 09/14/19  0500   WBC 3.48* 2.38*   HGB 12.3 10.5*   HCT 37.3 33.2*    159    and All pertinent labs from the last 24 hours have been reviewed.    Diagnostic Results:  CT: Reviewed, no evidence of bowel obstruction

## 2019-09-15 NOTE — ASSESSMENT & PLAN NOTE
- Blood cultures ordered 9/13 in ED. Blood culture #1 positive for gram positive cocci resembling Staph. Culture #2 with no growth to date.   - ID recommended starting vancomycin. Patient received one 2g dose yesterday.  - Blood culture consistent with contaminant today. Vancomycin discontinued.  - Repeat blood cultures ordered this morning.

## 2019-09-15 NOTE — PLAN OF CARE
Problem: Adult Inpatient Plan of Care  Goal: Plan of Care Review  Outcome: Ongoing (interventions implemented as appropriate)  POC reviewed with patient; understanding verbalized. Pt. with nonskid footwear on, bed in lowest position, and locked with bed rails up x2.  Pt. instructed to call prior to getting OOB.  Pt. has call light and personal items within reach. Patient ambulates with assistance. No nausea and vomiting and tolerated diet without difficulty. Vancomycin started tonight for +blood cultures resembling staff, will redraw another set of blood cultures in the A.M. Encourage to drink more water. Daughter at bedside. VSS and afebrile this shift. All questions and concerns addressed at this time. Will continue to monitor.

## 2019-09-16 ENCOUNTER — TELEPHONE (OUTPATIENT)
Dept: GYNECOLOGIC ONCOLOGY | Facility: CLINIC | Age: 69
End: 2019-09-16

## 2019-09-16 LAB
BACTERIA BLD CULT: ABNORMAL

## 2019-09-16 NOTE — PHYSICIAN QUERY
PT Name: Jessica Gormna  MR #: [id number]    Physician Query Form - Cause and Effect Relationship Clarification      CDS/: NANI Greene,[email or web address]        Contact information:[email or web address]    This form is a permanent document in the medical record.     Query Date: September 16, 2019    By submitting this query, we are merely seeking further clarification of documentation. Please utilize your independent clinical judgment when addressing the question(s) below.    The Medical record contains the following:  Supporting Clinical Findings   Location in record   PRINCIPAL PROBLEM:  Nausea and vomiting                                                                             female with history of recurrent serous endometrial carcinoma who presented to the ED with nausea/vomiting and inability to tolerate PO. Patient received cycle 2 of maintenance taxol and carboplatin on 9/10/19. The following afternoon, she developed persistent nausea and was unable to keep down her lunch and dinner. She took zofran last night with some relief. This morning she had another episode of emesis after attempting to drink water. Her daughter checked her pulse at home and noticed that it was in the 130s and brought her to the ED                   Oncology Treatment Plan:   OP GYN PACLITAXEL CARBOPLATIN (AUC 6) Q3W                                    Discharge Summary 9/15                                                                                                                                                                                                       Provider, please clarify if there is any correlation between nausea and vomiting and chemotherapy.           Are the conditions:      [X  ] Due to or associated with each other   [  ] Unrelated to each other   [  ] Other (Please Specify): _________________________   [  ] Clinically Undetermined

## 2019-09-16 NOTE — TELEPHONE ENCOUNTER
Spoke with pt. States that she is feeling better. Unable to tell if her heart rate is still fast because she has not taken her blood pressure today. Unsure if the vaginal swelling is still present. Pt states that she is still weak. Encouraged pt to take big deep breaths. Pt verbalized understanding and denies any further questions.

## 2019-09-17 ENCOUNTER — OFFICE VISIT (OUTPATIENT)
Dept: CARDIOLOGY | Facility: CLINIC | Age: 69
End: 2019-09-17
Payer: MEDICARE

## 2019-09-17 VITALS
HEART RATE: 129 BPM | WEIGHT: 143.75 LBS | DIASTOLIC BLOOD PRESSURE: 69 MMHG | SYSTOLIC BLOOD PRESSURE: 104 MMHG | BODY MASS INDEX: 26.45 KG/M2 | HEIGHT: 62 IN

## 2019-09-17 DIAGNOSIS — R00.0 TACHYCARDIA: Primary | ICD-10-CM

## 2019-09-17 DIAGNOSIS — I10 ESSENTIAL HYPERTENSION: ICD-10-CM

## 2019-09-17 PROCEDURE — 99203 PR OFFICE/OUTPT VISIT, NEW, LEVL III, 30-44 MIN: ICD-10-PCS | Mod: S$PBB,,, | Performed by: INTERNAL MEDICINE

## 2019-09-17 PROCEDURE — 99999 PR PBB SHADOW E&M-EST. PATIENT-LVL III: ICD-10-PCS | Mod: PBBFAC,,, | Performed by: INTERNAL MEDICINE

## 2019-09-17 PROCEDURE — 99213 OFFICE O/P EST LOW 20 MIN: CPT | Mod: PBBFAC | Performed by: INTERNAL MEDICINE

## 2019-09-17 PROCEDURE — 99203 OFFICE O/P NEW LOW 30 MIN: CPT | Mod: S$PBB,,, | Performed by: INTERNAL MEDICINE

## 2019-09-17 PROCEDURE — 99999 PR PBB SHADOW E&M-EST. PATIENT-LVL III: CPT | Mod: PBBFAC,,, | Performed by: INTERNAL MEDICINE

## 2019-09-17 NOTE — PROGRESS NOTES
Cardiology Clinic Note  Reason for Visit: tachycardic    HPI:     Jessica Gorman is a 69 y.o. F, who was referred from the ED for tachycardia.    She reports that her last round of chemo was 1 week ago today. She was admitted to the hospital a few days ago for dehydration due to emesis. Her heart rate was still >110 on discharge. Since going home, she continues to feel weak and fatigued. She did have loose bowel movements this morning. No fever, chills, abdominal pain, headache, stiff neck, cough, dyspnea, emesis. No bleeding. No symptoms of DVT/PE. She reports poor hydration recently.     Medical: HTN, uterine papillary serous carcinoma s/p ex lap/VILLA/BSO on taxol/carboplatin after avastin  Surgical: ex lap, hysterectomy  Family: HTN  Social: never smoked, no alcohol use    ROS:    Constitution: Negative for fever or chills.  HENT: Negative for  headaches.  Eyes: Negative for blurred vision.   Cardiovascular: See above  Pulmonary: Negative for SOB. Negative for cough.   Gastrointestinal: Negative for nausea/vomiting.   : Negative for dysuria.   Skin: Negative for rashes.  Neurological: Negative for focal weakness.  Psychological: Negative for depression.  PMH:     Past Medical History:   Diagnosis Date    Abnormal Pap smear     1984    Anorexia 8/30/2019    Cancer     Chemotherapy-induced neutropenia 4/1/2015    Elevated cancer antigen 125 (CA-125) 9/5/2018    Encounter for blood transfusion     FH: breast cancer in relative when <45 years old 3/17/2016    Hypertension     Hypokalemia 9/9/2019    Other screening mammogram 9/17/2015    Postmenopausal bleeding     Pulmonary nodules/lesions, multiple 4/25/2019    Rash 9/17/2015    Uterine cancer     Weight loss 12/17/2015    Well woman exam with routine gynecological exam 9/14/2016     Past Surgical History:   Procedure Laterality Date    COLPOSCOPY      DILATION AND CURETTAGE OF UTERUS  10/17/14    DILATION-CURETTAGE OF UTERUS (D AND C)   10/17/2014    Per[facility] by [facility]lyl [facility] OR    EXPLORATORY-LAPAROTOMY N/A 12/4/2014    Performed [facility]att [facility] at [facility] OR    HYSTERECTOMY  12/4/2014    radiacl hysterectomy    HYSTERECTOMY-TOTAL-ABDOMINAL AND BSO N/A 12/4/2014    Performed by [facility] at [facility] OR    LYMPHADENECTOMY-PELVIC Bilateral 12/4/2014    Performed by [facility]att MD at [facility] OR    MT REMOVAL OF OVARY/TUBE(S)       Allergies:     Review of patient's allergies indicates:   Allergen Reactions    Gramicidin d Shortness Of Breath    Hydrochlorothiazide Other (See Comments)     Burning and tingling sensation throughoutt her body    Lisinopril Shortness Of Breath    Promethazine Shortness Of Breath and Nausea And Vomiting    Neosporin [neomycin-bacitracnzn-polymyxnb] Swelling    Polymyxin [bacitracin-polymyxin b] Swelling     Medications:     Current Outpatient Medications on File Prior to Visit   Medication Sig Dispense Refill    amLODIPine (NORVASC) 10 MG tablet Take 1 tablet (10 mg total) by mouth once daily. 90 tablet 0    dexAMETHasone (DECADRON) 4 MG Tab Take 5 tablets every 6 hours for 24 hours prior to chemotherapy 20 tablet 4    megestrol (MEGACE ES) 625 mg/5 mL Susp Take 5 mLs (625 mg total) by mouth once daily. 150 mL 11    multivitamin (ONE DAILY MULTIVITAMIN) per tablet Take 1 tablet by mouth once daily.      ondansetron (ZOFRAN-ODT) 8 MG TbDL Take 1 tablet (8 mg total) by mouth every 12 (twelve) hours as needed. 30 tablet 1    potassium chloride (KLOR-CON) 8 MEQ TbSR Take 2 tablets (16 mEq total) by mouth once daily. 60 tablet 0     No current facility-administered medications on file prior to visit.      Social History:     Social History     Tobacco Use    Smoking status: Never Smoker    Smokeless tobacco: Never Used   Substance Use Topics    Alcohol use: No     Family History:     Family History   Problem Relation Age of Onset    Hypertension Mother     Cataracts Mother   "   No Known Problems Father     Hypertension Brother     Hypertension Brother     Cancer Son     Cataracts Brother     Breast cancer Cousin         maternal cousin. early 30's when diagnosed.     Breast cancer Other         sister's child. diagnosed in her 30s.     Breast cancer Maternal Grandmother     Breast cancer Maternal Aunt     Cataracts Maternal Aunt     Lung cancer Maternal Uncle     Colon cancer Neg Hx     Ovarian cancer Neg Hx     Uterine cancer Neg Hx      Physical Exam:   /69 (BP Location: Left arm, Patient Position: Sitting, BP Method: Large (Automatic))   Pulse (!) 129   Ht 5' 2" (1.575 m)   Wt 65.2 kg (143 lb 11.8 oz)   BMI 26.29 kg/m²      Constitutional: No apparent distress, conversant  HEENT: Sclera anicteric, extraocular movements intact  Neck: No jugular venous distension, no carotid bruits  CV: Tachycardic, regular rhythm  Pulm: Clear to auscultation bilaterally  GI: Abdomen soft, no palpable masses  Extremities: No lower extremity edema, warm with palpable pulses  Skin: No ecchymosis, erythema, or ulcers; skin tenting c/w dehydration  Psych: Alert and oriented to person place location, appropriate affect  Neuro: No focal deficits    Labs:     Blood Tests:  Lab Results   Component Value Date     09/15/2019    K 3.4 (L) 09/15/2019    CL 99 09/15/2019    CO2 25 09/15/2019    BUN 10 09/15/2019    CREATININE 0.8 09/15/2019     (H) 09/15/2019    MG 1.6 09/15/2019     (H) 09/15/2019    ALT 81 (H) 09/15/2019    ALBUMIN 2.7 (L) 09/15/2019    PROT 6.5 09/15/2019    BILITOT 0.6 09/15/2019    WBC 2.27 (L) 09/15/2019    HGB 12.1 09/15/2019    HCT 37.9 09/15/2019    MCV 86 09/15/2019     (L) 09/15/2019    INR 1.0 02/28/2015    TSH 2.026 09/15/2019       Lab Results   Component Value Date    CHOL 213 (H) 04/27/2018    HDL 89 (H) 04/27/2018    TRIG 72 04/27/2018       Lab Results   Component Value Date    LDLCALC 109.6 04/27/2018       Urine Tests:  Lab " Results   Component Value Date    COLORU Yellow 09/15/2019    APPEARANCEUA Clear 09/15/2019    PHUR 6.0 09/15/2019    SPECGRAV 1.025 09/15/2019    PROTEINUA Negative 09/15/2019    GLUCUA Negative 09/15/2019    KETONESU Negative 09/15/2019    BILIRUBINUA Negative 09/15/2019    OCCULTUA Negative 09/15/2019    NITRITE Negative 09/15/2019    UROBILINOGEN normal 2019    UROBILINOGEN Negative 2015    LEUKOCYTESUR Negative 09/15/2019       Imaging:     Echocardiogram  None    Stress testing  None    Cath Lab  None    Other  CTA chest noncoronary 9/15/19  No evidence of pulmonary thromboembolism or infarct.  Stable left upper lobe pulmonary nodules the largest measuring 9 mm.  Moderate pleural effusions with adjacent atelectasis.  Heterogeneous liver with multiple hypodense masses, better evaluated on prior studies.  Moderate volume ascites.    CTA abdomen 19  Heart/pericardium/mediastinum: Heart is normal in size.  No pericardial effusion.  Vasculature: 2 vessels left aortic arch with common origin of the brachiocephalic and left common carotid artery, a common anatomic variant.  Aorta is normal in course and caliber with no aneurysmal dilatation.  Celiac, superior mesenteric and renal arteries are patent.  Portal, splenic and superior mesenteric veins are patent.    EK/15/19  Sinus tachycardia  Nonspecific ST and T wave abnormality    19  Sinus tachycardia  Otherwise normal ECG    Assessment:     1. Tachycardia    2. Essential hypertension        Plan:     Tachycardia  Sinus tachycardic is a compensatory response to volume depletion. No symptoms/signs of infection.   Discussed presenting to the ED for hydration; however, she wishes to try oral hydration at home.    If she does not improve in the next 48-72h, then will contact heme/onc and my office to consider infusion suite for administration of IV fluids.  Hold amlodipine until systolic BP >130.    Essential hypertension  Holding amlodipine,  as above.    Signed:  Zia Winters MD  Cardiology     9/17/2019  9:00 AM    Follow-up:     Future Appointments   Date Time Provider Department Center   9/17/2019  8:00 AM Blade Winters III, MD [facility] CARDIO [facility]   9/30/2019 10:50 AM LAB, HEMONC CANCER BLDG [facility] LAB HO [facility]   10/1/2019  8:30 AM Jair Watt MD [facility] GYN ONC [facility]   10/1/2019  9:00 AM NOMJULIA, CHEMO [facility] CHEMO [facility]

## 2019-09-17 NOTE — LETTER
September 17, 2019      Opal Aleman MD  [address] [facility] [address]           [facility] - Cardiology  [address]  [place] [address]  Phone: [phone]          Patient: Jessica Gorman   MR Number: [id number]   YOB: 1950   Date of Visit: 9/17/2019       Dear Dr. Opal Aleman:    Thank you for referring Jessica Gorman to me for evaluation. Attached you will find relevant portions of my assessment and plan of care.    If you have questions, please do not hesitate to call me. I look forward to following Jessica Gorman along with you.    Sincerely,    Blade Winters III, MD    Enclosure  CC:  No Recipients    If you would like to receive this communication electronically, please contact [email or web address] or [phone] to request more information on [employer] access.    For providers and/or their staff who would like to refer a patient to [facility], please contact us through our one-stop-shop provider referral line, [facility], at [phone].    If you feel you have received this communication in error or would no longer like to receive these types of communications, please e-mail [email or web address]

## 2019-09-18 LAB — BACTERIA BLD CULT: NORMAL

## 2019-09-20 LAB
BACTERIA BLD CULT: NORMAL
BACTERIA BLD CULT: NORMAL

## 2019-09-23 ENCOUNTER — TELEPHONE (OUTPATIENT)
Dept: GYNECOLOGIC ONCOLOGY | Facility: CLINIC | Age: 69
End: 2019-09-23

## 2019-09-23 ENCOUNTER — TELEPHONE (OUTPATIENT)
Dept: PRIMARY CARE CLINIC | Facility: CLINIC | Age: 69
End: 2019-09-23

## 2019-09-23 DIAGNOSIS — E87.6 HYPOKALEMIA: Primary | ICD-10-CM

## 2019-09-23 NOTE — TELEPHONE ENCOUNTER
Changed to k-lyte     with next lab draw.     ----- Message from Ella Macdonald MA sent at 9/23/2019 10:14 AM CDT -----  Contact: pt @ [phone]  Patient states she passed plastic, which is listed as a side effect on the medication list. Patient would like to speak with you. Please advise.   ----- Message -----  From: Erica Coy  Sent: 9/23/2019   9:52 AM CDT  To: Shukri LUEVANO Staff    Calling to speak with someone in Dr. Watt's office regarding the potassium chloride (KLOR-CON) 8 MEQ TbSR, says she passed some of the plastic, says she has concerns. Please call.

## 2019-09-23 NOTE — TELEPHONE ENCOUNTER
----- Message from Linda Kern sent at 9/23/2019 12:07 PM CDT -----  Contact: SARAH MAGANA   Name of Who is Calling: SARAH MAGANA       What is the request in detail:Patient is requesting a call back regarding Blood pressure medication.      Can the clinic reply by [facility]: NO      What Number to Call Back if not in [facility]: [phone]

## 2019-09-24 ENCOUNTER — PATIENT MESSAGE (OUTPATIENT)
Dept: GYNECOLOGIC ONCOLOGY | Facility: CLINIC | Age: 69
End: 2019-09-24

## 2019-09-24 ENCOUNTER — OFFICE VISIT (OUTPATIENT)
Dept: INTERNAL MEDICINE | Facility: CLINIC | Age: 69
End: 2019-09-24
Payer: MEDICARE

## 2019-09-24 VITALS
SYSTOLIC BLOOD PRESSURE: 118 MMHG | OXYGEN SATURATION: 98 % | HEIGHT: 62 IN | WEIGHT: 138.88 LBS | BODY MASS INDEX: 25.55 KG/M2 | HEART RATE: 126 BPM | DIASTOLIC BLOOD PRESSURE: 88 MMHG

## 2019-09-24 DIAGNOSIS — R00.0 TACHYCARDIA: Primary | ICD-10-CM

## 2019-09-24 DIAGNOSIS — E87.6 HYPOKALEMIA: ICD-10-CM

## 2019-09-24 PROCEDURE — 99999 PR PBB SHADOW E&M-EST. PATIENT-LVL III: CPT | Mod: PBBFAC,,, | Performed by: INTERNAL MEDICINE

## 2019-09-24 PROCEDURE — 99213 OFFICE O/P EST LOW 20 MIN: CPT | Mod: PBBFAC | Performed by: INTERNAL MEDICINE

## 2019-09-24 PROCEDURE — 99214 OFFICE O/P EST MOD 30 MIN: CPT | Mod: S$PBB,,, | Performed by: INTERNAL MEDICINE

## 2019-09-24 PROCEDURE — 99999 PR PBB SHADOW E&M-EST. PATIENT-LVL III: ICD-10-PCS | Mod: PBBFAC,,, | Performed by: INTERNAL MEDICINE

## 2019-09-24 PROCEDURE — 99214 PR OFFICE/OUTPT VISIT, EST, LEVL IV, 30-39 MIN: ICD-10-PCS | Mod: S$PBB,,, | Performed by: INTERNAL MEDICINE

## 2019-09-24 NOTE — PROGRESS NOTES
Subjective:       Patient ID: Jessica Gorman is a 69 y.o. female.    Chief Complaint: Follow-up (Hospital stay on 9/13 for tachycardia, medication advice)    Pt was in ED 11 days ago because of tachycardia. She was not having any symptoms of cp/sob/palpitations/lightheadedness but was prompted to go to ED at the time b/c she noted pulse was elevated on home BP monitors along with low BP. At the time she was having n/v due to chemo which she is receiving for metastatic uterine cancer (taxol/carboplatin). She is no longer vomiting and nausea is controlled with zofran. Record reviewed. W/u was unrevealing except for findings c/w dehydration. She then went to cardiology 1 week ago and suggested that mild tachycardia was due to dehydration/hyperadrenergic state so was recommended she hold norvasc she was taking for BP starting 9/15. She has continued to monitor BP and pulse at home--still with mild tachycardia 108-134 and BP is better but perhaps still on lower side /77-82. Review of record shows that she has had mild tachycardia for over 6 months. Labs also reviewed which showed normal TSH, H/H; electrolytes were normal except for mild hypokalemia which has been addressed by her oncologist. No fever.     Review of Systems   Constitutional: Negative for fever and unexpected weight change.   Eyes: Negative for visual disturbance.   Respiratory: Negative for shortness of breath.    Cardiovascular: Negative for chest pain, palpitations and leg swelling.   Gastrointestinal: Positive for nausea. Negative for vomiting.   Neurological: Negative for headaches.   Psychiatric/Behavioral: Negative for dysphoric mood.       Objective:      Physical Exam   Constitutional: She is oriented to person, place, and time. She appears well-developed and well-nourished.   Eyes: Pupils are equal, round, and reactive to light. EOM are normal.   Neck: Neck supple. No thyromegaly present.   Cardiovascular: Regular rhythm and normal heart  sounds. Tachycardia present.   Pulmonary/Chest: Effort normal and breath sounds normal.   Abdominal: Soft. Bowel sounds are normal. She exhibits no distension and no mass. There is no tenderness.   Musculoskeletal: She exhibits no edema.   Lymphadenopathy:     She has no cervical adenopathy.   Neurological: She is alert and oriented to person, place, and time. No cranial nerve deficit.   Psychiatric: She has a normal mood and affect. Her behavior is normal.       Assessment:       1. Tachycardia        Plan:       1. Tachycardia does appear to be due to hyperadrenergic state and perhaps due in part to chemo (taxol with 2% rate of tachycardia); encouraged adequate hydration--1:1 gatorade/water mixture     2. Continue to hold amlodipine and monitor BP at home; take half tablet prn SBP>140/90 and let oncologist/PCP know if needing frequently  3. ED and RTC prompts d/w pt, specifically associated fever/cp/sob/palpitations/lightheadedness or sustained pulse >130

## 2019-09-30 ENCOUNTER — OFFICE VISIT (OUTPATIENT)
Dept: GYNECOLOGIC ONCOLOGY | Facility: CLINIC | Age: 69
End: 2019-09-30
Payer: MEDICARE

## 2019-09-30 ENCOUNTER — TELEPHONE (OUTPATIENT)
Dept: ADMINISTRATIVE | Facility: OTHER | Age: 69
End: 2019-09-30

## 2019-09-30 VITALS
WEIGHT: 140.88 LBS | BODY MASS INDEX: 25.92 KG/M2 | HEIGHT: 62 IN | DIASTOLIC BLOOD PRESSURE: 75 MMHG | SYSTOLIC BLOOD PRESSURE: 124 MMHG | HEART RATE: 124 BPM

## 2019-09-30 DIAGNOSIS — C54.1 ENDOMETRIAL CARCINOMA: Primary | ICD-10-CM

## 2019-09-30 DIAGNOSIS — Z51.11 CHEMOTHERAPY MANAGEMENT, ENCOUNTER FOR: ICD-10-CM

## 2019-09-30 DIAGNOSIS — E87.6 HYPOKALEMIA: ICD-10-CM

## 2019-09-30 PROCEDURE — 99214 OFFICE O/P EST MOD 30 MIN: CPT | Mod: S$PBB,,, | Performed by: OBSTETRICS & GYNECOLOGY

## 2019-09-30 PROCEDURE — 99999 PR PBB SHADOW E&M-EST. PATIENT-LVL III: CPT | Mod: PBBFAC,,, | Performed by: OBSTETRICS & GYNECOLOGY

## 2019-09-30 PROCEDURE — 99214 PR OFFICE/OUTPT VISIT, EST, LEVL IV, 30-39 MIN: ICD-10-PCS | Mod: S$PBB,,, | Performed by: OBSTETRICS & GYNECOLOGY

## 2019-09-30 PROCEDURE — 99999 PR PBB SHADOW E&M-EST. PATIENT-LVL III: ICD-10-PCS | Mod: PBBFAC,,, | Performed by: OBSTETRICS & GYNECOLOGY

## 2019-09-30 PROCEDURE — 99213 OFFICE O/P EST LOW 20 MIN: CPT | Mod: PBBFAC | Performed by: OBSTETRICS & GYNECOLOGY

## 2019-09-30 RX ORDER — POTASSIUM CHLORIDE 20 MEQ/1
TABLET, EXTENDED RELEASE ORAL
COMMUNITY
Start: 2019-09-24 | End: 2019-09-30

## 2019-09-30 RX ORDER — POTASSIUM CHLORIDE 20 MEQ/1
20 TABLET, EXTENDED RELEASE ORAL DAILY
Qty: 30 TABLET | Refills: 3 | Status: SHIPPED | OUTPATIENT
Start: 2019-09-30 | End: 2020-09-29

## 2019-09-30 NOTE — PROGRESS NOTES
Subjective:       Patient ID: Jessica Gorman is a 69 y.o. female.    Chief Complaint: Endometrial carcinoma, serous and Chemotherapy (Carbo/ Taxol C3)    HPI     Patient comes in today for cycle 3 of maintenance  taxol and carboplatin for recurrent  UPSC after 3 cycles of avastin maintenance with progression.      STRATA testing has been ordered.      However, her  has gone from 155(Aug 2019) to 1795(Sept 2019).   Complains of mons pubis swelling.     Having fatigue. Appetite is good at times.   Episode of nausea/vomiting 2 days ago.      Chemotherapy labs have been reviewed and are appropriate for treatment.  K:3.2.                Mammogram: Oct  8, 2018: normal CBE: June 2018.   Colonscopy: 2004: normal   Sigmoidoscopy: 2015: normal.       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    CT scan of the abdomen and pelvis after surgery showed: 1.Postsurgical changes seen in the pelvis from hysterectomy and bilateral salpingo-oophorectomy    2.A fluid collection is seen posterior to the cervix and likely represents a seroma. However, abscess cannot be ruled out.    3.Enlarged left inguinal lymph node identified    Ultrasound of this inguinal node was done and showed:    Limited sonographic imaging was performed in an area of a palpable abnormality within the patient's left inguinal region. Within this region, a lymph node is seen that measures 2.4 x 0.9 x 2.8 cm. While enlarged, this lymph node does   demonstrate a fatty hilum and is otherwise normal in morphology.    The patient reports that this palpable abnormality has been getting smaller  over time. I discussed with the patient the need to biopsy this lesion to fully exclude any recurrent malignancy however at this time the patient deferred a biopsy attempt.    If this lesion does not resolve, a ultrasound guided biopsy could be rescheduled.        Patient completed vaginal cuff radiation.    She completed 6 cycles of taxol and carboplatin in May 2015.    June 2015: CT scan at completion of treatment shows no evidence for disease. No adenopathy.  was 7.       Sept 2018:  was 47. CT scan of CAP showed numerous bilateral pulmonary nodules and enlarged peripancreatic and gonzalo hepatis lymph nodes with soft tissue infiltration of the mesentery at the region.    3.4 cm left renal mass along the lower pole of the left kidney measures greater than water density.  Differential would include solid renal mass including primary renal neoplasm or metastatic lesion versus is a complex cyst.      Jan 2019: completed 6 cycles of taxol and carboplatin.      Feb 2019:  Completion :16.  Completion CT scan: partial response      April 2019:  Completed 9 cycles of Taxol and carboplatin.  CT scan with persistent pulmonary nodules. PET: multiple  hypermetabolic mesenteric/retroperitoneal lymph nodes and a hypermetabolic left lung nodule concerning for metastatic disease      June 2019: started Avastin maintenance.     August 2019: completed 3 cycles of Avastin. PET showed progressive disease in lung, liver and abdomen. Aug 2019: : 155 (up from 20). Not interested in clinical trial. Wanted to resume taxol and carboplatin.      Strong family history of breast cancer on mother's side. Patient's daughter has been tested for BRCA and is negative. Patient has been tested and is BRCA negative.           Review of Systems   Constitutional: Negative for chills, fatigue and fever.   Respiratory: Negative for cough, shortness of breath and wheezing.    Cardiovascular: Negative for chest pain, palpitations and  "leg swelling.   Gastrointestinal: Negative for abdominal pain, constipation, diarrhea, nausea and vomiting.   Genitourinary: Negative for difficulty urinating, dysuria, frequency, genital sores, hematuria, urgency, vaginal bleeding, vaginal discharge and vaginal pain.   Neurological: Negative for weakness.   Hematological: Negative for adenopathy. Does not bruise/bleed easily.   Psychiatric/Behavioral: The patient is not nervous/anxious.        Objective:   /75   Pulse (!) 124   Ht 5' 2" (1.575 m)   Wt 63.9 kg (140 lb 14 oz)   BMI 25.77 kg/m²      Physical Exam   Constitutional: She is oriented to person, place, and time. She appears well-developed and well-nourished.   HENT:   Head: Normocephalic and atraumatic.   Eyes: No scleral icterus.   Neck: Neck supple. No tracheal deviation present. No thyroid mass and no thyromegaly present.   Cardiovascular: Normal rate and regular rhythm.   Pulmonary/Chest: Effort normal and breath sounds normal. She has no wheezes.   Abdominal: Soft. She exhibits no distension and no mass. There is no hepatosplenomegaly. There is no tenderness. There is no rebound and no guarding.   Genitourinary:   Genitourinary Comments: Bimanual exam:  Vulva: no lesions. Normal appearance. Mild swelling to the mons pubis.   Urethra: Normal size and location. No lesions  Bladder: No masses or tenderness.  Vagina: normal mucosa. No lesion  Cervix: absent.   Uterus: absent.  Adnexa: no masses.  Rectovaginal: Not performed     Musculoskeletal: She exhibits no edema or tenderness.   Lymphadenopathy:     She has no cervical adenopathy.     She has no axillary adenopathy.        Right: Inguinal (1 x2 cm node) adenopathy present. No supraclavicular adenopathy present.        Left: Inguinal (1 x 2 cm node. ) adenopathy present. No supraclavicular adenopathy present.   Neurological: She is alert and oriented to person, place, and time.   Skin: Skin is warm and dry.   Psychiatric: She has a normal " mood and affect. Her behavior is normal. Judgment and thought content normal.       Assessment:       1. Endometrial carcinoma, serous    2. Chemotherapy management, encounter for    3. Hypokalemia        Plan:   Endometrial carcinoma, serous  Will get CT scan given rising .   STRATA testing is pending.   If not actionable mutations, then will plan to start gemcitabine.     -     CT Chest Abdomen Pelvis With Contrast; Future; Expected date: 09/30/2019    Chemotherapy management, encounter for    Hypokalemia    -     potassium chloride SA (K-DUR,KLOR-CON) 20 MEQ tablet; Take 1 tablet (20 mEq total) by mouth once daily.  Dispense: 30 tablet; Refill: 3

## 2019-10-03 ENCOUNTER — HOSPITAL ENCOUNTER (OUTPATIENT)
Dept: RADIOLOGY | Facility: HOSPITAL | Age: 69
Discharge: HOME | End: 2019-10-03
Attending: OBSTETRICS & GYNECOLOGY
Payer: MEDICARE

## 2019-10-03 DIAGNOSIS — C54.1 ENDOMETRIAL CARCINOMA: ICD-10-CM

## 2019-10-03 PROCEDURE — 74177 CT CHEST ABDOMEN PELVIS WITH CONTRAST (XPD): ICD-10-PCS | Mod: 26,,, | Performed by: RADIOLOGY

## 2019-10-03 PROCEDURE — 71260 CT CHEST ABDOMEN PELVIS WITH CONTRAST (XPD): ICD-10-PCS | Mod: 26,,, | Performed by: RADIOLOGY

## 2019-10-03 PROCEDURE — 74177 CT ABD & PELVIS W/CONTRAST: CPT | Mod: 26,,, | Performed by: RADIOLOGY

## 2019-10-03 PROCEDURE — 25500020 PHARM REV CODE 255: Performed by: OBSTETRICS & GYNECOLOGY

## 2019-10-03 PROCEDURE — 74177 CT ABD & PELVIS W/CONTRAST: CPT | Mod: TC

## 2019-10-03 PROCEDURE — 71260 CT THORAX DX C+: CPT | Mod: 26,,, | Performed by: RADIOLOGY

## 2019-10-03 RX ADMIN — IOHEXOL 75 ML: 350 INJECTION, SOLUTION INTRAVENOUS at 04:10

## 2019-10-03 RX ADMIN — IOHEXOL 15 ML: 350 INJECTION, SOLUTION INTRAVENOUS at 02:10

## 2019-10-04 DIAGNOSIS — C54.1 ENDOMETRIAL CARCINOMA: Primary | ICD-10-CM

## 2019-10-04 DIAGNOSIS — R97.1 ELEVATED CANCER ANTIGEN 125 (CA-125): ICD-10-CM

## 2019-10-04 DIAGNOSIS — G44.59 OTHER COMPLICATED HEADACHE SYNDROME: ICD-10-CM

## 2019-10-04 NOTE — PROGRESS NOTES
I called and discussed CT of the abdomen and pelvis with her.  She has stable disease.    However she relates having to take her nausea medicine twice daily because of persistent nausea and vomiting.    Will plan for MRI I have had to rule out metastatic disease.

## 2019-10-07 ENCOUNTER — PATIENT MESSAGE (OUTPATIENT)
Dept: GYNECOLOGIC ONCOLOGY | Facility: CLINIC | Age: 69
End: 2019-10-07

## 2019-10-07 ENCOUNTER — HOSPITAL ENCOUNTER (INPATIENT)
Facility: HOSPITAL | Age: 69
LOS: 8 days | Discharge: HOME HEALTH | DRG: 445 | End: 2019-10-15
Attending: OBSTETRICS & GYNECOLOGY | Admitting: OBSTETRICS & GYNECOLOGY
Payer: MEDICARE

## 2019-10-07 DIAGNOSIS — C54.1 ENDOMETRIAL CARCINOMA: ICD-10-CM

## 2019-10-07 DIAGNOSIS — R00.0 TACHYCARDIA: ICD-10-CM

## 2019-10-07 DIAGNOSIS — R63.0 ANOREXIA: ICD-10-CM

## 2019-10-07 DIAGNOSIS — C54.1 ENDOMETRIAL CARCINOMA: Primary | ICD-10-CM

## 2019-10-07 DIAGNOSIS — G89.3 CANCER ASSOCIATED PAIN: ICD-10-CM

## 2019-10-07 DIAGNOSIS — E86.0 DEHYDRATION: ICD-10-CM

## 2019-10-07 DIAGNOSIS — R79.89 ELEVATED LIVER FUNCTION TESTS: ICD-10-CM

## 2019-10-07 DIAGNOSIS — R11.2 NON-INTRACTABLE VOMITING WITH NAUSEA, UNSPECIFIED VOMITING TYPE: ICD-10-CM

## 2019-10-07 DIAGNOSIS — F41.9 ANXIETY: ICD-10-CM

## 2019-10-07 DIAGNOSIS — R11.2 NAUSEA AND VOMITING: Primary | ICD-10-CM

## 2019-10-07 DIAGNOSIS — R53.1 WEAKNESS: ICD-10-CM

## 2019-10-07 DIAGNOSIS — E83.42 HYPOMAGNESEMIA: ICD-10-CM

## 2019-10-07 DIAGNOSIS — M81.0 OSTEOPOROSIS WITHOUT CURRENT PATHOLOGICAL FRACTURE, UNSPECIFIED OSTEOPOROSIS TYPE: ICD-10-CM

## 2019-10-07 DIAGNOSIS — E80.6 HYPERBILIRUBINEMIA: ICD-10-CM

## 2019-10-07 PROBLEM — R78.81 BLOOD BACTERIAL CULTURE POSITIVE: Status: RESOLVED | Noted: 2019-09-15 | Resolved: 2019-10-07

## 2019-10-07 LAB
ALBUMIN SERPL BCP-MCNC: 2.9 G/DL (ref 3.5–5.2)
ALP SERPL-CCNC: 634 U/L (ref 55–135)
ALT SERPL W/O P-5'-P-CCNC: 159 U/L (ref 10–44)
ANION GAP SERPL CALC-SCNC: 13 MMOL/L (ref 8–16)
AST SERPL-CCNC: 389 U/L (ref 10–40)
BACTERIA #/AREA URNS AUTO: NORMAL /HPF
BASOPHILS # BLD AUTO: 0.01 K/UL (ref 0–0.2)
BASOPHILS NFR BLD: 0.2 % (ref 0–1.9)
BILIRUB SERPL-MCNC: 9.8 MG/DL (ref 0.1–1)
BILIRUB UR QL STRIP: ABNORMAL
BUN SERPL-MCNC: 8 MG/DL (ref 8–23)
CALCIUM SERPL-MCNC: 8.8 MG/DL (ref 8.7–10.5)
CHLORIDE SERPL-SCNC: 94 MMOL/L (ref 95–110)
CLARITY UR REFRACT.AUTO: CLEAR
CO2 SERPL-SCNC: 26 MMOL/L (ref 23–29)
COLOR UR AUTO: ABNORMAL
CREAT SERPL-MCNC: 0.8 MG/DL (ref 0.5–1.4)
DIFFERENTIAL METHOD: ABNORMAL
EOSINOPHIL # BLD AUTO: 0 K/UL (ref 0–0.5)
EOSINOPHIL NFR BLD: 0.2 % (ref 0–8)
ERYTHROCYTE [DISTWIDTH] IN BLOOD BY AUTOMATED COUNT: 17.5 % (ref 11.5–14.5)
EST. GFR  (AFRICAN AMERICAN): >60 ML/MIN/1.73 M^2
EST. GFR  (NON AFRICAN AMERICAN): >60 ML/MIN/1.73 M^2
GLUCOSE SERPL-MCNC: 94 MG/DL (ref 70–110)
GLUCOSE UR QL STRIP: NEGATIVE
HCT VFR BLD AUTO: 35.7 % (ref 37–48.5)
HGB BLD-MCNC: 11.8 G/DL (ref 12–16)
HGB UR QL STRIP: NEGATIVE
HYALINE CASTS UR QL AUTO: 1 /LPF
IMM GRANULOCYTES # BLD AUTO: 0.06 K/UL (ref 0–0.04)
IMM GRANULOCYTES NFR BLD AUTO: 1.3 % (ref 0–0.5)
KETONES UR QL STRIP: ABNORMAL
LEUKOCYTE ESTERASE UR QL STRIP: NEGATIVE
LYMPHOCYTES # BLD AUTO: 0.5 K/UL (ref 1–4.8)
LYMPHOCYTES NFR BLD: 11.4 % (ref 18–48)
MAGNESIUM SERPL-MCNC: 1.3 MG/DL (ref 1.6–2.6)
MCH RBC QN AUTO: 27.6 PG (ref 27–31)
MCHC RBC AUTO-ENTMCNC: 33.1 G/DL (ref 32–36)
MCV RBC AUTO: 83 FL (ref 82–98)
MICROSCOPIC COMMENT: NORMAL
MONOCYTES # BLD AUTO: 0.6 K/UL (ref 0.3–1)
MONOCYTES NFR BLD: 11.6 % (ref 4–15)
NEUTROPHILS # BLD AUTO: 3.6 K/UL (ref 1.8–7.7)
NEUTROPHILS NFR BLD: 75.3 % (ref 38–73)
NITRITE UR QL STRIP: NEGATIVE
NRBC BLD-RTO: 0 /100 WBC
PH UR STRIP: 6 [PH] (ref 5–8)
PHOSPHATE SERPL-MCNC: 3.3 MG/DL (ref 2.7–4.5)
PLATELET # BLD AUTO: 320 K/UL (ref 150–350)
PMV BLD AUTO: 10.6 FL (ref 9.2–12.9)
POTASSIUM SERPL-SCNC: 3.7 MMOL/L (ref 3.5–5.1)
PROT SERPL-MCNC: 6.9 G/DL (ref 6–8.4)
PROT UR QL STRIP: ABNORMAL
RBC # BLD AUTO: 4.28 M/UL (ref 4–5.4)
RBC #/AREA URNS AUTO: 1 /HPF (ref 0–4)
SODIUM SERPL-SCNC: 133 MMOL/L (ref 136–145)
SP GR UR STRIP: 1.01 (ref 1–1.03)
SQUAMOUS #/AREA URNS AUTO: 1 /HPF
URN SPEC COLLECT METH UR: ABNORMAL
WBC # BLD AUTO: 4.73 K/UL (ref 3.9–12.7)
WBC #/AREA URNS AUTO: 3 /HPF (ref 0–5)

## 2019-10-07 PROCEDURE — 80053 COMPREHEN METABOLIC PANEL: CPT

## 2019-10-07 PROCEDURE — 99223 PR INITIAL HOSPITAL CARE,LEVL III: ICD-10-PCS | Mod: AI,GC,, | Performed by: OBSTETRICS & GYNECOLOGY

## 2019-10-07 PROCEDURE — 99223 1ST HOSP IP/OBS HIGH 75: CPT | Mod: AI,GC,, | Performed by: OBSTETRICS & GYNECOLOGY

## 2019-10-07 PROCEDURE — 84100 ASSAY OF PHOSPHORUS: CPT

## 2019-10-07 PROCEDURE — 63600175 PHARM REV CODE 636 W HCPCS: Performed by: STUDENT IN AN ORGANIZED HEALTH CARE EDUCATION/TRAINING PROGRAM

## 2019-10-07 PROCEDURE — 83735 ASSAY OF MAGNESIUM: CPT

## 2019-10-07 PROCEDURE — 25000003 PHARM REV CODE 250: Performed by: STUDENT IN AN ORGANIZED HEALTH CARE EDUCATION/TRAINING PROGRAM

## 2019-10-07 PROCEDURE — 81001 URINALYSIS AUTO W/SCOPE: CPT

## 2019-10-07 PROCEDURE — 36415 COLL VENOUS BLD VENIPUNCTURE: CPT

## 2019-10-07 PROCEDURE — 85025 COMPLETE CBC W/AUTO DIFF WBC: CPT

## 2019-10-07 PROCEDURE — 20600001 HC STEP DOWN PRIVATE ROOM

## 2019-10-07 RX ORDER — ACETAMINOPHEN 325 MG/1
650 TABLET ORAL EVERY 4 HOURS PRN
Status: DISCONTINUED | OUTPATIENT
Start: 2019-10-07 | End: 2019-10-08

## 2019-10-07 RX ORDER — MEGESTROL ACETATE 40 MG/ML
625 SUSPENSION ORAL DAILY
Status: DISCONTINUED | OUTPATIENT
Start: 2019-10-08 | End: 2019-10-07

## 2019-10-07 RX ORDER — SODIUM CHLORIDE 0.9 % (FLUSH) 0.9 %
10 SYRINGE (ML) INJECTION
Status: DISCONTINUED | OUTPATIENT
Start: 2019-10-07 | End: 2019-10-15 | Stop reason: HOSPADM

## 2019-10-07 RX ORDER — SODIUM CHLORIDE 9 MG/ML
INJECTION, SOLUTION INTRAVENOUS CONTINUOUS
Status: DISCONTINUED | OUTPATIENT
Start: 2019-10-07 | End: 2019-10-15 | Stop reason: HOSPADM

## 2019-10-07 RX ORDER — OXYCODONE HYDROCHLORIDE 5 MG/1
10 TABLET ORAL EVERY 4 HOURS PRN
Status: DISCONTINUED | OUTPATIENT
Start: 2019-10-07 | End: 2019-10-15 | Stop reason: HOSPADM

## 2019-10-07 RX ORDER — AMLODIPINE BESYLATE 10 MG/1
10 TABLET ORAL DAILY
Status: DISCONTINUED | OUTPATIENT
Start: 2019-10-07 | End: 2019-10-07

## 2019-10-07 RX ORDER — PROCHLORPERAZINE EDISYLATE 5 MG/ML
5 INJECTION INTRAMUSCULAR; INTRAVENOUS EVERY 6 HOURS PRN
Status: DISCONTINUED | OUTPATIENT
Start: 2019-10-07 | End: 2019-10-11

## 2019-10-07 RX ORDER — ONDANSETRON 4 MG/1
4 TABLET, ORALLY DISINTEGRATING ORAL EVERY 6 HOURS
Status: DISCONTINUED | OUTPATIENT
Start: 2019-10-07 | End: 2019-10-08

## 2019-10-07 RX ORDER — MEGESTROL ACETATE 40 MG/ML
625 SUSPENSION ORAL DAILY
Status: DISCONTINUED | OUTPATIENT
Start: 2019-10-07 | End: 2019-10-07

## 2019-10-07 RX ORDER — OXYCODONE HYDROCHLORIDE 5 MG/1
5 TABLET ORAL EVERY 4 HOURS PRN
Status: DISCONTINUED | OUTPATIENT
Start: 2019-10-07 | End: 2019-10-15 | Stop reason: HOSPADM

## 2019-10-07 RX ORDER — AMLODIPINE BESYLATE 10 MG/1
10 TABLET ORAL DAILY
Status: DISCONTINUED | OUTPATIENT
Start: 2019-10-08 | End: 2019-10-07

## 2019-10-07 RX ADMIN — ONDANSETRON 4 MG: 4 TABLET, ORALLY DISINTEGRATING ORAL at 11:10

## 2019-10-07 RX ADMIN — SODIUM CHLORIDE: 0.9 INJECTION, SOLUTION INTRAVENOUS at 09:10

## 2019-10-07 RX ADMIN — ONDANSETRON 4 MG: 4 TABLET, ORALLY DISINTEGRATING ORAL at 06:10

## 2019-10-07 RX ADMIN — SODIUM CHLORIDE 1000 ML: 0.9 INJECTION, SOLUTION INTRAVENOUS at 07:10

## 2019-10-07 RX ADMIN — MAGNESIUM SULFATE HEPTAHYDRATE 3 G: 500 INJECTION, SOLUTION INTRAMUSCULAR; INTRAVENOUS at 05:10

## 2019-10-07 RX ADMIN — SODIUM CHLORIDE: 0.9 INJECTION, SOLUTION INTRAVENOUS at 04:10

## 2019-10-07 NOTE — HOSPITAL COURSE
10/07/2019 - Admitted to Gyn Onc service for IVF and electrolyte repletion secondary to nausea/vomiting and dehydration. GI/biliary consulted secondary to elevated LFTs.  10/08/2019 - Two episodes of emesis yesterday after taking zofran. MRCP performed yesterday evening showing mildly dilated CBD and large amount of ascites. EUS and ERCP performed by biliary team.  10/09/2019 - Episode of emesis overnight after drinking water. Plan for paracentesis and IR consult for percutaneous biliary drainage today.  10/10/2019 - IR placement of biliary drain and paracentesis yesterday, 2.9L removed. Biliary catheter flushed and draining bilious fluid.   10/11/2019 - NAEON. No nausea/vomiting since catheter was flushed yesterday, tolerating PO. Liver enzymes downtrending.   10/12/2019 - Several episodes of emesis overnight, antiemetics adjusted. Remains tachycardic. NPO. Biliary catheter draining green/brown fluid.  10/13/2019 - One episode of emesis yesterday. Nausea better controlled with scheduled PO reglan. Remains tachycardic, now SOB. CTA and lower extremity dopplers negative for PE/DVT. Liver enzymes continue to trend down with biliary catheter appropriately.  10/14/2019 - Patient did well yesterday and overnight. Tolerating liquid diet, including Breeze and jello. Passing gas, having soft stools. Pain is minimal. Ambulating, voiding, Patient reports she is not hungry but would like to try regular diet today. Remains tachycardic, but continues to deny dyspnea, chest pain.   10/15/2019 - Patient tolerated red beans and rice for lunch and rotisserie chicken with carrots for dinner. Tolerating liquids as well. Reglan decreased to BID and patient reports no episodes of confusion overnight. Resting comfortably. Biliary drain with 450 cc output overnight. Stable for discharge today with home health.

## 2019-10-07 NOTE — CODE/ RAPID DOCUMENTATION
Rapid Response Nurse Chart Check     Chart check completed, abnormal VS noted. Bedside RN Charito contacted, no concerns verbalized at this time, instructed to call [phone] for further concerns or assistance.

## 2019-10-07 NOTE — ASSESSMENT & PLAN NOTE
- BP: (112)/(82) 112/82  - Patient's home amlodipine was recently discontinued due to low to normal range pressures

## 2019-10-07 NOTE — SUBJECTIVE & OBJECTIVE
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    CT scan of the abdomen and pelvis after surgery showed: 1.Postsurgical changes seen in the pelvis from hysterectomy and bilateral salpingo-oophorectomy    2.A fluid collection is seen posterior to the cervix and likely represents a seroma. However, abscess cannot be ruled out.    3.Enlarged left inguinal lymph node identified    Ultrasound of this inguinal node was done and showed:    Limited sonographic imaging was performed in an area of a palpable abnormality within the patient's left inguinal region. Within this region, a lymph node is seen that measures 2.4 x 0.9 x 2.8 cm. While enlarged, this lymph node does   demonstrate a fatty hilum and is otherwise normal in morphology.    The patient reports that this palpable abnormality has been getting smaller over time. I discussed with the patient the need to biopsy this lesion to fully exclude any recurrent malignancy however at this time the patient deferred a biopsy attempt.    If this lesion does not resolve, a ultrasound guided biopsy could be rescheduled.        Patient completed vaginal cuff radiation.    She completed 6 cycles of taxol and carboplatin in May 2015.    June 2015: CT scan at completion of treatment shows no evidence for disease. No adenopathy.  was 7.       Sept 2018:  was 47. CT scan of CAP showed numerous bilateral pulmonary nodules and enlarged peripancreatic and gonzalo hepatis lymph nodes with soft tissue infiltration of the mesentery at the region.    3.4  cm left renal mass along the lower pole of the left kidney measures greater than water density.  Differential would include solid renal mass including primary renal neoplasm or metastatic lesion versus is a complex cyst.      Jan 2019: completed 6 cycles of taxol and carboplatin.      Feb 2019:  Completion :16.  Completion CT scan: partial response      April 2019:  Completed 9 cycles of Taxol and carboplatin.  CT scan with persistent pulmonary nodules. PET: multiple  hypermetabolic mesenteric/retroperitoneal lymph nodes and a hypermetabolic left lung nodule concerning for metastatic disease      June 2019: started Avastin maintenance.     August 2019: completed 3 cycles of Avastin. PET showed progressive disease in lung, liver and abdomen. Aug 2019: : 155 (up from 20). Not interested in clinical trial. Wanted to resume taxol and carboplatin.    September 2019: Admitted 9/13-9/15 for IVF and electrolyte repletion secondary to nausea/vomiting.    Past Medical History:   Diagnosis Date    Abnormal Pap smear     1984    Anorexia 8/30/2019    Cancer     Chemotherapy-induced neutropenia 4/1/2015    Dehydration 10/7/2019    Elevated cancer antigen 125 (CA-125) 9/5/2018    Encounter for blood transfusion     FH: breast cancer in relative when <45 years old 3/17/2016    Hypertension     Hypokalemia 9/9/2019    Other complicated headache syndrome 10/4/2019    Other screening mammogram 9/17/2015    Postmenopausal bleeding     Pulmonary nodules/lesions, multiple 4/25/2019    Rash 9/17/2015    Uterine cancer     Weight loss 12/17/2015    Well woman exam with routine gynecological exam 9/14/2016     Past Surgical History:   Procedure Laterality Date    COLPOSCOPY      DILATION AND CURETTAGE OF UTERUS  10/17/14    HYSTERECTOMY  12/4/2014    radiacl hysterectomy    MI REMOVAL OF OVARY/TUBE(S)       Family History     Problem Relation (Age of Onset)    Breast cancer Cousin, Other, Maternal  Grandmother, Maternal Aunt    Cancer Son    Cataracts Mother, Brother, Maternal Aunt    Heart attack Son    Hypertension Mother, Brother, Brother, Son    Lung cancer Maternal Uncle    No Known Problems Father        Tobacco Use    Smoking status: Never Smoker    Smokeless tobacco: Never Used   Substance and Sexual Activity    Alcohol use: No    Drug use: No    Sexual activity: Not Currently     Birth control/protection: Post-menopausal       PTA Medications   Medication Sig    multivitamin (ONE DAILY MULTIVITAMIN) per tablet Take 1 tablet by mouth once daily.    ondansetron (ZOFRAN-ODT) 8 MG TbDL Take 1 tablet (8 mg total) by mouth every 12 (twelve) hours as needed.    potassium chloride SA (K-DUR,KLOR-CON) 20 MEQ tablet Take 1 tablet (20 mEq total) by mouth once daily.    amLODIPine (NORVASC) 10 MG tablet Take 1 tablet (10 mg total) by mouth once daily.    dexAMETHasone (DECADRON) 4 MG Tab Take 5 tablets every 6 hours for 24 hours prior to chemotherapy    megestrol (MEGACE ES) 625 mg/5 mL Susp Take 5 mLs (625 mg total) by mouth once daily.    potassium bicarbonate & potassium chloride (K-LYTE CL) 25 mEq TbEF Take 1 tablet (25 mEq total) by mouth once daily.       Review of patient's allergies indicates:   Allergen Reactions    Gramicidin d Shortness Of Breath    Hydrochlorothiazide Other (See Comments)     Burning and tingling sensation throughoutt her body    Lisinopril Shortness Of Breath    Promethazine Shortness Of Breath and Nausea And Vomiting    Neosporin [neomycin-bacitracnzn-polymyxnb] Swelling    Polymyxin [bacitracin-polymyxin b] Swelling       Review of Systems   Constitutional: Positive for appetite change (decreased) and fatigue. Negative for fever.   Eyes: Negative for visual disturbance.   Respiratory: Negative for shortness of breath.    Cardiovascular: Negative for chest pain.   Gastrointestinal: Positive for bloating, nausea and vomiting. Negative for abdominal pain,  constipation and diarrhea.   Endocrine: Negative for diabetes.   Genitourinary: Negative for dysuria, menstrual problem, vaginal bleeding and vaginal discharge.   Musculoskeletal: Negative for back pain.   Integumentary:  Negative for rash.   Neurological: Negative for headaches.   Psychiatric/Behavioral: Negative for depression.      Objective:     Vital Signs (Most Recent):  Temp: 98.9 °F (37.2 °C) (10/07/19 1409)  Pulse: (!) 135 (10/07/19 1409)  Resp: 20 (10/07/19 1409)  BP: 112/82 (10/07/19 1409)  SpO2: (!) 93 % (10/07/19 1409) Vital Signs (24h Range):  Temp:  [98.9 °F (37.2 °C)] 98.9 °F (37.2 °C)  Pulse:  [135] 135  Resp:  [20] 20  SpO2:  [93 %] 93 %  BP: (112)/(82) 112/82        There is no height or weight on file to calculate BMI.    Physical Exam:   Constitutional: She is oriented to person, place, and time. She appears well-developed and well-nourished. No distress.    HENT:   Head: Normocephalic and atraumatic.      Cardiovascular: Regular rhythm.    Tachycardic.    Pulmonary/Chest: Effort normal. No respiratory distress. She has no wheezes. She has no rales.        Abdominal: Soft. She exhibits distension. There is no tenderness. There is no rebound and no guarding.             Musculoskeletal: Normal range of motion. She exhibits no edema or tenderness.       Neurological: She is alert and oriented to person, place, and time.    Skin: Skin is warm and dry.    Psychiatric: She has a normal mood and affect. Her behavior is normal. Judgment and thought content normal.

## 2019-10-07 NOTE — ASSESSMENT & PLAN NOTE
- Nausea/vomiting for past 5 days  - Afebrile. Pulse 135, likely secondary to dehydration  - CBC, CMP, Mg, and Phos ordered  - Continuous IVF  - Clear liquid diet  - Zofran 4mg q6h and IV compazine prn  - Will plan to order brain MRI to assess for metastasis after labs result

## 2019-10-07 NOTE — HPI
Jessica Gorman is a 69 year old female with history of recurrent serous endometrial carcinoma who was admitted due to intractable nausea/vomiting. Patient states that about 5 days ago, she began having daily nausea/vomiting that she believes started after she took her potassium tablets. She reports 2-3 episodes of emesis per day since then, mostly when she tries to eat or encounters strong smells. She states that at most, she is able to drink a few sips of water or tablespoons of food per day. Her last episode of emesis was this morning due to the smell of deoderant. She is taking zofran q12h without relief. She did not undergo cycle 3 of chemotherapy last week. She states that she is able to ambulate without difficulty but feels very weak. She denies dysuria, although she states that her urine is much darker. She had a normal BM yesterday. She denies headache, change in vision, SOB, CP, and abdominal pain.

## 2019-10-07 NOTE — PLAN OF CARE
Pt involved in plan of care and communicating needs throughout shift.  Family at bedside and involved in care.  Pt up in room independently. IVF started and IV Mag given per orders. Pt going for MRI MRCP tonight. Will be NPO past MN.  Pt remaining free from falls or injury throughout shift; bed in lowest position; call light within reach.  Pt instructed to call for assistance as needed.  Q1H rounding done on pt.

## 2019-10-08 ENCOUNTER — ANESTHESIA EVENT (OUTPATIENT)
Dept: ENDOSCOPY | Facility: HOSPITAL | Age: 69
DRG: 445 | End: 2019-10-08
Payer: MEDICARE

## 2019-10-08 ENCOUNTER — ANESTHESIA (OUTPATIENT)
Dept: ENDOSCOPY | Facility: HOSPITAL | Age: 69
DRG: 445 | End: 2019-10-08
Payer: MEDICARE

## 2019-10-08 PROBLEM — E80.6 HYPERBILIRUBINEMIA: Status: ACTIVE | Noted: 2019-10-08

## 2019-10-08 LAB
ALBUMIN SERPL BCP-MCNC: 2.2 G/DL (ref 3.5–5.2)
ALP SERPL-CCNC: 543 U/L (ref 55–135)
ALT SERPL W/O P-5'-P-CCNC: 124 U/L (ref 10–44)
ANION GAP SERPL CALC-SCNC: 14 MMOL/L (ref 8–16)
AST SERPL-CCNC: 322 U/L (ref 10–40)
BILIRUB SERPL-MCNC: 8.4 MG/DL (ref 0.1–1)
BUN SERPL-MCNC: 8 MG/DL (ref 8–23)
CALCIUM SERPL-MCNC: 7.9 MG/DL (ref 8.7–10.5)
CHLORIDE SERPL-SCNC: 100 MMOL/L (ref 95–110)
CO2 SERPL-SCNC: 21 MMOL/L (ref 23–29)
CREAT SERPL-MCNC: 0.7 MG/DL (ref 0.5–1.4)
EST. GFR  (AFRICAN AMERICAN): >60 ML/MIN/1.73 M^2
EST. GFR  (NON AFRICAN AMERICAN): >60 ML/MIN/1.73 M^2
GLUCOSE SERPL-MCNC: 69 MG/DL (ref 70–110)
MAGNESIUM SERPL-MCNC: 1.9 MG/DL (ref 1.6–2.6)
PHOSPHATE SERPL-MCNC: 3.6 MG/DL (ref 2.7–4.5)
POTASSIUM SERPL-SCNC: 3.7 MMOL/L (ref 3.5–5.1)
PROT SERPL-MCNC: 5.5 G/DL (ref 6–8.4)
SODIUM SERPL-SCNC: 135 MMOL/L (ref 136–145)

## 2019-10-08 PROCEDURE — D9220A PRA ANESTHESIA: ICD-10-PCS | Mod: CRNA,,, | Performed by: REGISTERED NURSE

## 2019-10-08 PROCEDURE — 63600175 PHARM REV CODE 636 W HCPCS: Performed by: REGISTERED NURSE

## 2019-10-08 PROCEDURE — 37000009 HC ANESTHESIA EA ADD 15 MINS: Performed by: INTERNAL MEDICINE

## 2019-10-08 PROCEDURE — 63600175 PHARM REV CODE 636 W HCPCS: Performed by: STUDENT IN AN ORGANIZED HEALTH CARE EDUCATION/TRAINING PROGRAM

## 2019-10-08 PROCEDURE — C1769 GUIDE WIRE: HCPCS | Performed by: INTERNAL MEDICINE

## 2019-10-08 PROCEDURE — D9220A PRA ANESTHESIA: Mod: CRNA,,, | Performed by: REGISTERED NURSE

## 2019-10-08 PROCEDURE — 20600001 HC STEP DOWN PRIVATE ROOM

## 2019-10-08 PROCEDURE — 43259 EGD US EXAM DUODENUM/JEJUNUM: CPT | Mod: ,,, | Performed by: INTERNAL MEDICINE

## 2019-10-08 PROCEDURE — 80053 COMPREHEN METABOLIC PANEL: CPT

## 2019-10-08 PROCEDURE — 43259 PR ENDOSCOPIC ULTRASOUND EXAM: ICD-10-PCS | Mod: ,,, | Performed by: INTERNAL MEDICINE

## 2019-10-08 PROCEDURE — 83735 ASSAY OF MAGNESIUM: CPT

## 2019-10-08 PROCEDURE — 37000008 HC ANESTHESIA 1ST 15 MINUTES: Performed by: INTERNAL MEDICINE

## 2019-10-08 PROCEDURE — 84100 ASSAY OF PHOSPHORUS: CPT

## 2019-10-08 PROCEDURE — D9220A PRA ANESTHESIA: Mod: ANES,,, | Performed by: ANESTHESIOLOGY

## 2019-10-08 PROCEDURE — 25000003 PHARM REV CODE 250: Performed by: REGISTERED NURSE

## 2019-10-08 PROCEDURE — 27202304 HC CANNULA, ERCP: Performed by: INTERNAL MEDICINE

## 2019-10-08 PROCEDURE — 43259 EGD US EXAM DUODENUM/JEJUNUM: CPT | Performed by: INTERNAL MEDICINE

## 2019-10-08 PROCEDURE — 99231 PR SUBSEQUENT HOSPITAL CARE,LEVL I: ICD-10-PCS | Mod: GC,,, | Performed by: OBSTETRICS & GYNECOLOGY

## 2019-10-08 PROCEDURE — 36415 COLL VENOUS BLD VENIPUNCTURE: CPT

## 2019-10-08 PROCEDURE — D9220A PRA ANESTHESIA: ICD-10-PCS | Mod: ANES,,, | Performed by: ANESTHESIOLOGY

## 2019-10-08 PROCEDURE — 99231 SBSQ HOSP IP/OBS SF/LOW 25: CPT | Mod: GC,,, | Performed by: OBSTETRICS & GYNECOLOGY

## 2019-10-08 RX ORDER — PROPOFOL 10 MG/ML
VIAL (ML) INTRAVENOUS
Status: DISCONTINUED | OUTPATIENT
Start: 2019-10-08 | End: 2019-10-08

## 2019-10-08 RX ORDER — IBUPROFEN 600 MG/1
600 TABLET ORAL EVERY 6 HOURS PRN
Status: DISCONTINUED | OUTPATIENT
Start: 2019-10-08 | End: 2019-10-15 | Stop reason: HOSPADM

## 2019-10-08 RX ORDER — ONDANSETRON 2 MG/ML
4 INJECTION INTRAMUSCULAR; INTRAVENOUS EVERY 6 HOURS PRN
Status: DISCONTINUED | OUTPATIENT
Start: 2019-10-08 | End: 2019-10-11

## 2019-10-08 RX ORDER — FENTANYL CITRATE 50 UG/ML
INJECTION, SOLUTION INTRAMUSCULAR; INTRAVENOUS
Status: DISCONTINUED | OUTPATIENT
Start: 2019-10-08 | End: 2019-10-08

## 2019-10-08 RX ORDER — LIDOCAINE HCL/PF 100 MG/5ML
SYRINGE (ML) INTRAVENOUS
Status: DISCONTINUED | OUTPATIENT
Start: 2019-10-08 | End: 2019-10-08

## 2019-10-08 RX ORDER — PROPOFOL 10 MG/ML
VIAL (ML) INTRAVENOUS CONTINUOUS PRN
Status: DISCONTINUED | OUTPATIENT
Start: 2019-10-08 | End: 2019-10-08

## 2019-10-08 RX ORDER — SODIUM CHLORIDE 9 MG/ML
INJECTION, SOLUTION INTRAVENOUS CONTINUOUS PRN
Status: DISCONTINUED | OUTPATIENT
Start: 2019-10-08 | End: 2019-10-08

## 2019-10-08 RX ORDER — PHENYLEPHRINE HYDROCHLORIDE 10 MG/ML
INJECTION INTRAVENOUS
Status: DISCONTINUED | OUTPATIENT
Start: 2019-10-08 | End: 2019-10-08

## 2019-10-08 RX ADMIN — FENTANYL CITRATE 25 MCG: 50 INJECTION, SOLUTION INTRAMUSCULAR; INTRAVENOUS at 02:10

## 2019-10-08 RX ADMIN — SODIUM CHLORIDE: 0.9 INJECTION, SOLUTION INTRAVENOUS at 10:10

## 2019-10-08 RX ADMIN — PROPOFOL 30 MG: 10 INJECTION, EMULSION INTRAVENOUS at 02:10

## 2019-10-08 RX ADMIN — PHENYLEPHRINE HYDROCHLORIDE 50 MCG: 10 INJECTION INTRAVENOUS at 02:10

## 2019-10-08 RX ADMIN — Medication 10 MG: at 02:10

## 2019-10-08 RX ADMIN — PHENYLEPHRINE HYDROCHLORIDE 50 MCG: 10 INJECTION INTRAVENOUS at 03:10

## 2019-10-08 RX ADMIN — PROPOFOL 20 MG: 10 INJECTION, EMULSION INTRAVENOUS at 02:10

## 2019-10-08 RX ADMIN — PROPOFOL 125 MCG/KG/MIN: 10 INJECTION, EMULSION INTRAVENOUS at 02:10

## 2019-10-08 RX ADMIN — SODIUM CHLORIDE: 0.9 INJECTION, SOLUTION INTRAVENOUS at 02:10

## 2019-10-08 RX ADMIN — LIDOCAINE HYDROCHLORIDE 100 MG: 20 INJECTION, SOLUTION INTRAVENOUS at 02:10

## 2019-10-08 NOTE — NURSING TRANSFER
Nursing Transfer Note      10/8/2019     Transfer From: [facility]    Transfer via stretcher    Transfer with n/a    Transported by pct    Medicines sent: n/a    Chart send with patient: Yes    Notified: daughter at bs    Patient reassessed at: 10/08/2019 at 1550 (date, time)    Upon arrival to floor: call bell in reach and bed in lowest position

## 2019-10-08 NOTE — ANESTHESIA POSTPROCEDURE EVALUATION
Anesthesia Post Evaluation    Patient: Jessica Gorman    Procedure(s) Performed: Procedure(s) (LRB):  ULTRASOUND, UPPER GI TRACT, ENDOSCOPIC (N/A)  ERCP (ENDOSCOPIC RETROGRADE CHOLANGIOPANCREATOGRAPHY) (N/A)    Final Anesthesia Type: general  Patient location during evaluation: floor  Patient participation: Yes- Able to Participate  Level of consciousness: awake and alert  Post-procedure vital signs: reviewed and stable  Pain management: adequate  Airway patency: patent  PONV status at discharge: No PONV  Anesthetic complications: no      Cardiovascular status: blood pressure returned to baseline  Respiratory status: unassisted  Hydration status: euvolemic  Follow-up not needed.          Vitals Value Taken Time   /53 10/8/2019  4:07 PM   Temp 36.9 °C (98.4 °F) 10/8/2019  4:03 PM   Pulse 121 10/8/2019  4:07 PM   Resp 18 10/8/2019  4:03 PM   SpO2 88 % 10/8/2019  4:07 PM   Vitals shown include unvalidated device data.      No case tracking events are documented in the log.      Pain/Olga Score: Olga Score: 9 (10/8/2019  3:30 PM)

## 2019-10-08 NOTE — ANESTHESIA PREPROCEDURE EVALUATION
[facility]  Anesthesia Pre-Operative Evaluation         Patient Name: Jessica Gorman  YOB: 1950  MRN: [id number]  [facility]: [id number]        Date of Procedure: 10/8/2019  Procedure: Procedure(s) (LRB):  ULTRASOUND, UPPER GI TRACT, ENDOSCOPIC (N/A)  ERCP (ENDOSCOPIC RETROGRADE CHOLANGIOPANCREATOGRAPHY) (N/A)  Anesthesia: General/MAC  Pre-Operative Diagnosis: Nausea and vomiting   Proceduralist/Surgeon(s) and Role:     * Gabe Aguirre MD - Primary    SUBJECTIVE:     Jessica Gorman is a 69 y.o. female w/ a significant PMHx listed below.   69 y.o. 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 Last ct chest abdomen pelvis showed hypodense lesions throughout the liver and questionable mild ductal dilatation in the left hepatic lobe, no pancreatic mass or peripancreatic fat stranding.  Bili was normal 3 weeks ago, will elevated AST ALT. With concern of biliary obstruction, AES was consulted, MRCP has been ordered.   Patient now presents for the above procedure(s). Pt appropriately NPO.        Anesthesia Evaluation      Airway   Mallampati: II  TM distance: Normal, at least 6 cm  Dental    (+) In tact        Pulmonary    Cardiovascular   Exercise tolerance: good  (+) hypertension well controlled,   CABG/stent: patient has not have general anesthesia before.    NYHA Classification: III  ECG reviewed  Rate: Normal    Neuro/Psych    (+) headaches,     GI/Hepatic/Renal    (+) GERD,     Endo/Other    Abdominal                   Relevant Problems   No relevant active problems      ALLERGIES:     Review of patient's allergies indicates:   Allergen Reactions    Gramicidin d Shortness Of Breath    Hydrochlorothiazide Other (See Comments)     Burning and tingling sensation throughoutt her body    Lisinopril Shortness Of Breath    Promethazine Shortness Of Breath and Nausea And Vomiting    Neosporin  [neomycin-bacitracnzn-polymyxnb] Swelling    Polymyxin [bacitracin-polymyxin b] Swelling     MEDICATIONS:     Prior to Admission medications    Medication Sig Start Date End Date Taking? Authorizing Provider   multivitamin (ONE DAILY MULTIVITAMIN) per tablet Take 1 tablet by mouth once daily.   Yes Historical Provider, MD   ondansetron (ZOFRAN-ODT) 8 MG TbDL Take 1 tablet (8 mg total) by mouth every 12 (twelve) hours as needed. 1/22/19 1/22/20 Yes Vesna Bales NP   potassium chloride SA (K-DUR,KLOR-CON) 20 MEQ tablet Take 1 tablet (20 mEq total) by mouth once daily. 9/30/19 9/29/20 Yes Jair Watt MD   amLODIPine (NORVASC) 10 MG tablet Take 1 tablet (10 mg total) by mouth once daily. 6/17/19   Rico Amaro MD   dexAMETHasone (DECADRON) 4 MG Tab Take 5 tablets every 6 hours for 24 hours prior to chemotherapy 8/15/19   Vesna Bales NP   megestrol (MEGACE ES) 625 mg/5 mL Susp Take 5 mLs (625 mg total) by mouth once daily. 8/30/19 8/29/20  Jair Watt MD   potassium bicarbonate & potassium chloride (K-LYTE CL) 25 mEq TbEF Take 1 tablet (25 mEq total) by mouth once daily. 9/30/19   Jair Watt MD     History:     Patient Active Problem List   Diagnosis    Endometrial carcinoma, serous    Hypertension    Chemotherapy-induced neutropenia    FH: breast cancer in relative when <45 years old    Osteoporosis    Localized enlarged lymph nodes     Elevated cancer antigen 125 (CA-125)    Lipoma of back    Vitamin D insufficiency    Pulmonary nodules/lesions, multiple    Anorexia    Hypokalemia    Hypomagnesemia    Nausea and vomiting    Tachycardia    Other complicated headache syndrome    Dehydration    Hyperbilirubinemia      Past Medical History:   Diagnosis Date    Abnormal Pap smear     1984    Anorexia 8/30/2019    Cancer     Chemotherapy-induced neutropenia 4/1/2015    Dehydration 10/7/2019    Elevated cancer antigen 125 (CA-125) 9/5/2018    Encounter  "for blood transfusion     FH: breast cancer in relative when <45 years old 3/17/2016    Hyperbilirubinemia 10/8/2019    Hypertension     Hypokalemia 9/9/2019    Other complicated headache syndrome 10/4/2019    Other screening mammogram 9/17/2015    Postmenopausal bleeding     Pulmonary nodules/lesions, multiple 4/25/2019    Rash 9/17/2015    Uterine cancer     Weight loss 12/17/2015    Well woman exam with routine gynecological exam 9/14/2016     Past Surgical History:   Procedure Laterality Date    COLPOSCOPY      DILATION AND CURETTAGE OF UTERUS  10/17/14    HYSTERECTOMY  12/4/2014    radiacl hysterectomy    IL REMOVAL OF OVARY/TUBE(S)       OBJECTIVE:   Last 3 sets of Vitals    Vitals - 1 value per visit 9/30/2019 10/7/2019 10/8/2019   SYSTOLIC 124 - 131   DIASTOLIC 75 - 74   PULSE 124 - 116   TEMPERATURE - - 98.1   RESPIRATIONS - - 14   SPO2 - - 93   Weight (lb) 140.87 137.35 -   Weight (kg) 63.9 62.3 -   HEIGHT 5' 2" 5' 2" -   BODY MASS INDEX 25.77 25.12 -   VISIT REPORT - - -   Pain Score  0 - -   Some recent data might be hidden       Significant Labs:  Lab Results   Component Value Date    WBC 4.73 10/07/2019    HGB 11.8 (L) 10/07/2019    HCT 35.7 (L) 10/07/2019     10/07/2019    CHOL 213 (H) 04/27/2018    TRIG 72 04/27/2018    HDL 89 (H) 04/27/2018     (H) 10/08/2019     (H) 10/08/2019     (L) 10/08/2019    K 3.7 10/08/2019     10/08/2019    CREATININE 0.7 10/08/2019    BUN 8 10/08/2019    CO2 21 (L) 10/08/2019    TSH 2.026 09/15/2019    INR 1.0 02/28/2015     No results found for: PREGTESTUR, PREGSERUM, HCG, HCGQUANT   No LMP recorded. Patient has had a hysterectomy.    EKG:   Results for orders placed or performed during the hospital encounter of 09/15/19   EKG 12-lead    Collection Time: 09/15/19  2:58 PM    Narrative    Test Reason : R00.0,    Vent. Rate : 139 BPM     Atrial Rate : 139 BPM     P-R Int : 128 ms          QRS Dur : 070 ms      QT Int : 272 " ms       P-R-T Axes : 041 017 029 degrees     QTc Int : 413 ms    Sinus tachycardia  Nonspecific ST and T wave abnormality  Abnormal ECG  When compared with ECG of 14-SEP-2019 09:51,  Nonspecific T wave abnormality now evident in Inferior leads  Nonspecific T wave abnormality, worse in Lateral leads  Confirmed by Wil Gan MD ([id number]) on 9/16/2019 9:03:09 PM    Referred By: [email or web address]   SELF           Confirmed By:Wil Gan MD     TTE:  No results found for this or any previous visit.  DAMARIS:  No results found for this or any previous visit.  Stress Test:  No results found for this or any previous visit.   LHC:  No results found for this or any previous visit.   PFT:  No results found for: FEV1, FVC, LHS7TAM, TLC, DLCO     ASSESSMENT/PLAN:       Pre-op Assessment    I have reviewed the Patient Summary Reports.    I have reviewed the Nursing Notes.   I have reviewed the Medications.     Review of Systems  Anesthesia Hx:  No problems with previous Anesthesia  Neg history of prior surgery. Denies Family Hx of Anesthesia complications.   Denies Personal Hx of Anesthesia complications.   Social:  Non-Smoker, No Alcohol Use    Hematology/Oncology:  Hematology Normal      Current/Recent Cancer. (uterine cancer)   EENT/Dental:EENT/Dental Normal   Cardiovascular:   Exercise tolerance: good Hypertension, well controlled CABG/stent: patient has not have general anesthesia before. NYHA Classification III ECG has been reviewed.    Pulmonary:  Pulmonary Normal    Renal/:  Renal/ Normal     Hepatic/GI:   GERD    Musculoskeletal:  Musculoskeletal Normal    Neurological:   Headaches    Endocrine:  Endocrine Normal    Dermatological:  Skin Normal    Psych:  Psychiatric Normal           Physical Exam  General:  Well nourished Has lost 30 pounds in last 2 years.    Airway/Jaw/Neck:  Airway Findings: Mouth Opening: Normal Tongue: Normal  General Airway Assessment: Adult  Mallampati: II  Improves to II with phonation.  TM Distance:  Normal, at least 6 cm        Eyes/Ears/Nose:  EYES/EARS/NOSE FINDINGS: Normal   Dental:  Dental Findings: In tact    Chest/Lungs:  Chest/Lungs Findings: Clear to auscultation     Heart/Vascular:  Heart Findings: Rate: Normal  Rhythm: Regular Rhythm  Sounds: Normal  Heart murmur: negative Vascular Findings: Normal    Abdomen:  Abdomen Findings: Normal    Musculoskeletal:  Musculoskeletal Findings: Normal   Skin:  Skin Findings: Normal    Mental Status:  Mental Status Findings:  Cooperative         Anesthesia Plan  Type of Anesthesia, risks & benefits discussed:  Anesthesia Type:  general  Patient's Preference:   Intra-op Monitoring Plan: standard ASA monitors  Intra-op Monitoring Plan Comments:   Post Op Pain Control Plan: per primary service following discharge from PACU  Post Op Pain Control Plan Comments:   Induction:   IV  Beta Blocker:  Patient is not currently on a Beta-Blocker (No further documentation required).       Informed Consent: Patient understands risks and agrees with Anesthesia plan.  Questions answered. Anesthesia consent signed with patient.  ASA Score: 3     Day of Surgery Review of History & Physical: I have interviewed and examined the patient. I have reviewed the patient's H&P dated:  There are no significant changes.   H&P completed by Anesthesiologist.   Anesthesia Plan Notes: Chart reviewed, patient interviewed and examined.  The anesthetic plan was explained.  Risks, benefits, and alternatives were discussed. Questions were answered and the consent was signed.        DALTON Urias M.D.           Ready For Surgery From Anesthesia Perspective.

## 2019-10-08 NOTE — PLAN OF CARE
Uneventful shift. Pt has family member at bs. Pt had MRCP completed this shift. Pt bolus completed this shift. Pt has IVF infusing. Pt has been NPO since MN this shift. Pt now refusing scheduled zofran stating that the zofran is making her nauseous. Pt has remained free from injury this shift. Bed in low locked position. Call light and personal belongings within reach. Side rails up x2. Nonskid socks in place. Pt instructed to call with any needs. Will continue to monitor.

## 2019-10-08 NOTE — TRANSFER OF CARE
"Anesthesia Transfer of Care Note    Patient: Jessica Gorman    Procedure(s) Performed: Procedure(s) (LRB):  ULTRASOUND, UPPER GI TRACT, ENDOSCOPIC (N/A)  ERCP (ENDOSCOPIC RETROGRADE CHOLANGIOPANCREATOGRAPHY) (N/A)    Patient location: [facility]    Anesthesia Type: general    Transport from OR: Transported from OR on 2-3 L/min O2 by NC with adequate spontaneous ventilation    Post pain: adequate analgesia    Post assessment: tolerated procedure well and no apparent anesthetic complications    Post vital signs: stable    Level of consciousness: sedated    Nausea/Vomiting: no nausea/vomiting    Complications: none    Transfer of care protocol was followed      Last vitals:   Visit Vitals  /72   Pulse (!) 119   Temp 36.5 °C (97.7 °F)   Resp 17   Ht 5' 2" (1.575 m)   Wt 62.3 kg (137 lb 5.6 oz)   SpO2 97%   Breastfeeding? No   BMI 25.12 kg/m²     "

## 2019-10-08 NOTE — ASSESSMENT & PLAN NOTE
- BP: (112-130)/(62-82) 120/72  - Patient's home amlodipine was recently discontinued due to low to normal range pressures

## 2019-10-08 NOTE — ASSESSMENT & PLAN NOTE
- Nausea/vomiting for past 5 days on admission  - Afebrile. Pulse 135, likely secondary to dehydration  - Continuous IVF  - Will discontinue PO zofran and start scheduled IV zofran and IV compazine prn  - Plan to order brain MRI to assess for metastasis after labs result  - GI/biliary consulted secondary to elevated LFTs, MRCP ordered yesterday  - NPO since midnight

## 2019-10-08 NOTE — PLAN OF CARE
Patient off the unit in Endo. Patient of Dr. Watt with a history of recurrent serous endometrial carcinoma admitted on 10/7/19 for intractable nausea/vomiting and associated dehydration. Patient is s/p chemotherapy. MRCP completed, results pending. GI consulted for elevated t.bili and LFTs. Patient off the unit for ERCP and endoscopic ultrasound of the upper GI tract. Vital signs are stable. Patient remains tachycardic. O2 sats WNL on room air. NPO for procedure. Normal saline IV at 75 mL/hr continuously. T. Bili 8.4, , . Electrolytes replaced as needed. MRI of brain to rule out brain mets. Discharge needs to be determined. CM discussed the patient with the team this morning. CM and SW discussed the patient. CM and SW to continue to follow with the team.    Future Appointments   Date Time Provider Department Center   10/9/2019  7:15 AM [facility]     Radha Flood RN, BSN, CM  [facility]  Nurse - Med Onc/Gyn Onc

## 2019-10-08 NOTE — [employer] PATIENT INSTRUCTIONS
Discharge Summary/Instructions after an Endoscopic Procedure  Patient Name: Jessica Gorman  Patient MRN: [id number]  Patient YOB: 1950 Tuesday, October 08, 2019  Gabe Aguirre MD  RESTRICTIONS:  During your procedure today, you received medications for sedation.  These   medications may affect your judgment, balance and coordination.  Therefore,   for 24 hours, you have the following restrictions:   - DO NOT drive a car, operate machinery, make legal/financial decisions,   sign important papers or drink alcohol.    ACTIVITY:  Today: no heavy lifting, straining or running due to procedural   sedation/anesthesia.  The following day: return to full activity including work.  DIET:  Eat and drink normally unless instructed otherwise.     TREATMENT FOR COMMON SIDE EFFECTS:  - Mild abdominal pain, nausea, belching, bloating or excessive gas:  rest,   eat lightly and use a heating pad.  - Sore Throat: treat with throat lozenges and/or gargle with warm salt   water.  - Because air was used during the procedure, expelling large amounts of air   from your rectum or belching is normal.  - If a bowel prep was taken, you may not have a bowel movement for 1-3 days.    This is normal.  SYMPTOMS TO WATCH FOR AND REPORT TO YOUR PHYSICIAN:  1. Abdominal pain or bloating, other than gas cramps.  2. Chest pain.  3. Back pain.  4. Signs of infection such as: chills or fever occurring within 24 hours   after the procedure.  5. Rectal bleeding, which would show as bright red, maroon, or black stools.   (A tablespoon of blood from the rectum is not serious, especially if   hemorrhoids are present.)  6. Vomiting.  7. Weakness or dizziness.  GO DIRECTLY TO THE NEAREST EMERGENCY ROOM IF YOU HAVE ANY OF THE FOLLOWING:      Difficulty breathing              Chills and/or fever over 101 F   Persistent vomiting and/or vomiting blood   Severe abdominal pain   Severe chest pain   Black, tarry stools   Bleeding- more than one  tablespoon   Any other symptom or condition that you feel may need urgent attention  Your doctor recommends these additional instructions:  If any biopsies were taken, your doctors clinic will contact you in 1 to 2   weeks with any results.  - Return patient to hospital [facility] for ongoing care.   - IR biliary drainage if palliation of jaundice is desired.  For questions, problems or results please call your physician - Gabe Aguirre MD at Work:  [phone].  [facility], EMERGENCY ROOM PHONE NUMBER: [phone]  IF A COMPLICATION OR EMERGENCY SITUATION ARISES AND YOU ARE UNABLE TO REACH   YOUR PHYSICIAN - GO DIRECTLY TO THE EMERGENCY ROOM.  Gabe Aguirre MD  10/8/2019 4:18:32 PM  This report has been verified and signed electronically.  [employer]

## 2019-10-08 NOTE — ASSESSMENT & PLAN NOTE
- Pulse:  [124-135] 124  - Patient's baseline HR in 120s  - Seen by cardiology 9/17/19, tachycardia determined to be secondary to volume depletion

## 2019-10-08 NOTE — SUBJECTIVE & OBJECTIVE
Interval History: Patient reports two episodes of emesis after taking two doses of zofran yesterday evening. Otherwise, she states that her nausea/vomiting has been controlled. She denies abdominal pain and is ambulating without difficulty. She is voiding and continues to pass flatus. She denies fever, chills, CP, SOB, and dysuria.    Scheduled Meds:  Continuous Infusions:   sodium chloride 0.9% 75 mL/hr at 10/07/19 2100     PRN Meds:ibuprofen, oxyCODONE, oxyCODONE, prochlorperazine, sodium chloride 0.9%    Review of patient's allergies indicates:   Allergen Reactions    Gramicidin d Shortness Of Breath    Hydrochlorothiazide Other (See Comments)     Burning and tingling sensation throughoutt her body    Lisinopril Shortness Of Breath    Promethazine Shortness Of Breath and Nausea And Vomiting    Neosporin [neomycin-bacitracnzn-polymyxnb] Swelling    Polymyxin [bacitracin-polymyxin b] Swelling       Objective:     Vital Signs (Most Recent):  Temp: 98.4 °F (36.9 °C) (10/08/19 0417)  Pulse: (!) 124 (10/08/19 0417)  Resp: 17 (10/08/19 0417)  BP: 120/72 (10/08/19 0417)  SpO2: (!) 92 % (10/08/19 0417) Vital Signs (24h Range):  Temp:  [98.1 °F (36.7 °C)-98.9 °F (37.2 °C)] 98.4 °F (36.9 °C)  Pulse:  [124-135] 124  Resp:  [16-20] 17  SpO2:  [92 %-93 %] 92 %  BP: (112-130)/(62-82) 120/72     Weight: 62.3 kg (137 lb 5.6 oz)  Body mass index is 25.12 kg/m².    Intake/Output - Last 3 Shifts       10/06 0700 - 10/07 0659 10/07 0700 - 10/08 0659    I.V. (mL/kg)  1025 (16.5)    Total Intake(mL/kg)  1025 (16.5)    Net  +1025          Urine Occurrence  2 x    Emesis Occurrence  1 x             Physical Exam:   Constitutional: She is oriented to person, place, and time. She appears well-developed and well-nourished. No distress.    HENT:   Head: Normocephalic and atraumatic.      Cardiovascular: Regular rhythm.    Tachycardic.    Pulmonary/Chest: Effort normal. No respiratory distress.        Abdominal: Soft. She exhibits  distension. There is no tenderness. There is no rebound and no guarding.             Musculoskeletal: Normal range of motion. She exhibits no edema or tenderness.       Neurological: She is alert and oriented to person, place, and time.    Skin: Skin is warm and dry.    Psychiatric: She has a normal mood and affect. Her behavior is normal. Judgment and thought content normal.       Lines/Drains/Airways     Peripheral Intravenous Line                 Peripheral IV - Single Lumen 10/07/19 1644 22 G Right Antecubital less than 1 day                Laboratory:  CBC:   Recent Labs   Lab 10/07/19  1525   WBC 4.73   HGB 11.8*   HCT 35.7*       and CMP:   Recent Labs   Lab 10/07/19  1525   *   K 3.7   CL 94*   CO2 26   GLU 94   BUN 8   CREATININE 0.8   CALCIUM 8.8   PROT 6.9   ALBUMIN 2.9*   BILITOT 9.8*   ALKPHOS 634*   *   *   ANIONGAP 13   EGFRNONAA >60.0       Diagnostic Results:  MRCP reviewed.

## 2019-10-08 NOTE — PROGRESS NOTES
[facility]  Gynecologic Oncology  Progress Note      Patient Name: Jessica Gorman  MRN: [id number]  Admission Date: 10/7/2019  Hospital Length of Stay: 1 days  Attending Provider: Jair Watt MD  Primary Care Provider: Rico Amaro MD  Principal Problem: Nausea and vomiting    Follow-up For: * No surgery found *  Post-Operative Day:    Subjective:      History of Present Illness:  Jessica Gorman is a 69 year old female with history of recurrent serous endometrial carcinoma who was admitted due to intractable nausea/vomiting. Patient states that about 5 days ago, she began having daily nausea/vomiting that she believes started after she took her potassium tablets. She reports 2-3 episodes of emesis per day since then, mostly when she tries to eat or encounters strong smells. She states that at most, she is able to drink a few sips of water or tablespoons of food per day. Her last episode of emesis was this morning due to the smell of deoderant. She is taking zofran q12h without relief. She did not undergo cycle 3 of chemotherapy last week. She states that she is able to ambulate without difficulty but feels very weak. She denies dysuria, although she states that her urine is much darker. She had a normal BM yesterday. She denies headache, change in vision, SOB, CP, and abdominal pain.    Hospital Course:  10/07/2019 - Admitted to Gyn Onc service for IVF and electrolyte repletion secondary to nausea/vomiting and dehydration. GI/biliary consulted secondary to elevated LFTs.  10/08/2019 - Two episodes of emesis yesterday after taking zofran. MRCP performed yesterday evening.    Interval History: Patient reports two episodes of emesis after taking two doses of zofran yesterday evening. Otherwise, she states that her nausea/vomiting has been controlled. She denies abdominal pain and is ambulating without difficulty. She is voiding and continues to pass flatus. She denies fever, chills, CP,  SOB, and dysuria.    Scheduled Meds:  Continuous Infusions:   sodium chloride 0.9% 75 mL/hr at 10/07/19 2100     PRN Meds:ibuprofen, oxyCODONE, oxyCODONE, prochlorperazine, sodium chloride 0.9%    Review of patient's allergies indicates:   Allergen Reactions    Gramicidin d Shortness Of Breath    Hydrochlorothiazide Other (See Comments)     Burning and tingling sensation throughoutt her body    Lisinopril Shortness Of Breath    Promethazine Shortness Of Breath and Nausea And Vomiting    Neosporin [neomycin-bacitracnzn-polymyxnb] Swelling    Polymyxin [bacitracin-polymyxin b] Swelling       Objective:     Vital Signs (Most Recent):  Temp: 98.4 °F (36.9 °C) (10/08/19 0417)  Pulse: (!) 124 (10/08/19 0417)  Resp: 17 (10/08/19 0417)  BP: 120/72 (10/08/19 0417)  SpO2: (!) 92 % (10/08/19 0417) Vital Signs (24h Range):  Temp:  [98.1 °F (36.7 °C)-98.9 °F (37.2 °C)] 98.4 °F (36.9 °C)  Pulse:  [124-135] 124  Resp:  [16-20] 17  SpO2:  [92 %-93 %] 92 %  BP: (112-130)/(62-82) 120/72     Weight: 62.3 kg (137 lb 5.6 oz)  Body mass index is 25.12 kg/m².    Intake/Output - Last 3 Shifts       10/06 0700 - 10/07 0659 10/07 0700 - 10/08 0659    I.V. (mL/kg)  1025 (16.5)    Total Intake(mL/kg)  1025 (16.5)    Net  +1025          Urine Occurrence  2 x    Emesis Occurrence  1 x             Physical Exam:   Constitutional: She is oriented to person, place, and time. She appears well-developed and well-nourished. No distress.    HENT:   Head: Normocephalic and atraumatic.      Cardiovascular: Regular rhythm.    Tachycardic.    Pulmonary/Chest: Effort normal. No respiratory distress.        Abdominal: Soft. She exhibits distension. There is no tenderness. There is no rebound and no guarding.             Musculoskeletal: Normal range of motion. She exhibits no edema or tenderness.       Neurological: She is alert and oriented to person, place, and time.    Skin: Skin is warm and dry.    Psychiatric: She has a normal mood and affect.  Her behavior is normal. Judgment and thought content normal.       Lines/Drains/Airways     Peripheral Intravenous Line                 Peripheral IV - Single Lumen 10/07/19 1644 22 G Right Antecubital less than 1 day                Laboratory:  CBC:   Recent Labs   Lab 10/07/19  1525   WBC 4.73   HGB 11.8*   HCT 35.7*       and CMP:   Recent Labs   Lab 10/07/19  1525   *   K 3.7   CL 94*   CO2 26   GLU 94   BUN 8   CREATININE 0.8   CALCIUM 8.8   PROT 6.9   ALBUMIN 2.9*   BILITOT 9.8*   ALKPHOS 634*   *   *   ANIONGAP 13   EGFRNONAA >60.0       Diagnostic Results:  MRCP reviewed.    Assessment/Plan:     * Nausea and vomiting  - Nausea/vomiting for past 5 days on admission  - Afebrile. Pulse 135, likely secondary to dehydration  - Continuous IVF  - Will discontinue PO zofran and start scheduled IV zofran and IV compazine prn  - Plan to order brain MRI to assess for metastasis after labs result  - GI/biliary consulted secondary to elevated LFTs, MRCP ordered yesterday  - NPO since midnight    Hypomagnesemia  - Mg 1.3 on admission  - S/p IV Mg 3g  - Continue to monitor and replace as necessary    Endometrial carcinoma, serous  - CA-125 155> 1795  - CT A/P on 10/3/19 showed stable disease  - See gyn onc history above    Hypertension  - BP: (112-130)/(62-82) 120/72  - Patient's home amlodipine was recently discontinued due to low to normal range pressures    Tachycardia  - Pulse:  [124-135] 124  - Patient's baseline HR in 120s  - Seen by cardiology 9/17/19, tachycardia determined to be secondary to volume depletion        VTE Risk Mitigation (From admission, onward)         Ordered     IP VTE LOW RISK PATIENT  Once      10/07/19 1408     Place ASH hose  Until discontinued      10/07/19 1408     Place sequential compression device  Until discontinued      10/07/19 1408                Opal Aleman MD  Gynecologic Oncology  [facility]

## 2019-10-08 NOTE — CONSULTS
[facility]  AES  Consult Note    Patient Name: Jessica Gorman  MRN: [id number]  Admission Date: 10/7/2019  Hospital Length of Stay: 1 days  Code Status: Full Code   Attending Provider: Jair Watt MD   Consulting Provider: Dariusz Rodriguez MD  Primary Care Physician: Rico Amaro MD  Principal Problem:Nausea and vomiting    Inpatient consult to [facility] ([facility])  Consult performed by: Dariusz Rodriguez MD  Consult ordered by: Opal Aleman MD        Subjective:     HPI: Jessica Gorman is a 69 y.o. female with history of recurrent serous endometrial carcinoma was admitted for chief complaint of intractable nausea, vomiting.       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  she was found to have metastatic disease to bilateral lungs and enlarged peripancreatic and gonzalo hepatis lymph nodes with soft tissue infiltration of the mesentery on September 2018. She has a 3.4 cm left renal mass also. In January 2019 she underwent chemo, ain feb 2019, completion CT demonstrated partial response. On April 2019 another cycle of chemo was complete, and PET scan showed multiple hypermetabolic mesenteric/retroperitoneal lymph nodes and hypermetabolic left lung nodule concerning for metastatic disease. In June 2019 was started on avastin and s/p completion PET showed progressive disease in lung liver and abdomen on august 2019.     She currently has nausea, vomiting, denies abdominal pain, fevers, chills, night sweats, confusion.     She is vitally tachycardic, BP stable, wbc 4.73, , hb 11.8, , , bili 9.8, alk phos 634. Last ct chest abdomen pelvis showed  hypodense lesions throughout the liver and questionable mild ductal dilatation in the left hepatic lobe, no pancreatic mass or peripancreatic fat stranding.  Bili was normal 3 weeks ago, will elevated AST ALT. With concern of biliary obstruction, AES was consulted, MRCP has been ordered.         Past Medical History:   Diagnosis Date    Abnormal Pap smear     1984    Anorexia 8/30/2019    Cancer     Chemotherapy-induced neutropenia 4/1/2015    Dehydration 10/7/2019    Elevated cancer antigen 125 (CA-125) 9/5/2018    Encounter for blood transfusion     FH: breast cancer in relative when <45 years old 3/17/2016    Hypertension     Hypokalemia 9/9/2019    Other complicated headache syndrome 10/4/2019    Other screening mammogram 9/17/2015    Postmenopausal bleeding     Pulmonary nodules/lesions, multiple 4/25/2019    Rash 9/17/2015    Uterine cancer     Weight loss 12/17/2015    Well woman exam with routine gynecological exam 9/14/2016       Past Surgical History:   Procedure Laterality Date    COLPOSCOPY      DILATION AND CURETTAGE OF UTERUS  10/17/14    HYSTERECTOMY  12/4/2014    radiacl hysterectomy    CO REMOVAL OF OVARY/TUBE(S)         Family History   Problem Relation Age of Onset    Hypertension Mother     Cataracts Mother     No Known Problems Father     Hypertension Brother     Hypertension Brother     Cancer Son     Hypertension Son     Heart attack Son     Cataracts Brother     Breast cancer Cousin         maternal cousin. early 30's when diagnosed.     Breast cancer Other         sister's child. diagnosed in her 30s.     Breast cancer Maternal Grandmother     Breast cancer Maternal Aunt     Cataracts Maternal Aunt     Lung cancer Maternal Uncle     Colon cancer Neg Hx     Ovarian cancer Neg Hx     Uterine cancer Neg Hx        Social History     Socioeconomic History    Marital status:      Spouse name: Not on file    Number of children: Not on file    Years  of education: Not on file    Highest education level: Not on file   Occupational History    Occupation: Unemployed   Social Needs    Financial resource strain: Not on file    Food insecurity:     Worry: Not on file     Inability: Not on file    Transportation needs:     Medical: Not on file     Non-medical: Not on file   Tobacco Use    Smoking status: Never Smoker    Smokeless tobacco: Never Used   Substance and Sexual Activity    Alcohol use: No    Drug use: No    Sexual activity: Not Currently     Birth control/protection: Post-menopausal   Lifestyle    Physical activity:     Days per week: Not on file     Minutes per session: Not on file    Stress: Not on file   Relationships    Social connections:     Talks on phone: Not on file     Gets together: Not on file     Attends Confucianist service: Not on file     Active member of club or organization: Not on file     Attends meetings of clubs or organizations: Not on file     Relationship status: Not on file   Other Topics Concern    Not on file   Social History Narrative    Pt lives w/her daughter.  Pt has 5 living children.  One .         No current facility-administered medications on file prior to encounter.      Current Outpatient Medications on File Prior to Encounter   Medication Sig Dispense Refill    multivitamin (ONE DAILY MULTIVITAMIN) per tablet Take 1 tablet by mouth once daily.      ondansetron (ZOFRAN-ODT) 8 MG TbDL Take 1 tablet (8 mg total) by mouth every 12 (twelve) hours as needed. 30 tablet 1    potassium chloride SA (K-DUR,KLOR-CON) 20 MEQ tablet Take 1 tablet (20 mEq total) by mouth once daily. 30 tablet 3    amLODIPine (NORVASC) 10 MG tablet Take 1 tablet (10 mg total) by mouth once daily. 90 tablet 0    dexAMETHasone (DECADRON) 4 MG Tab Take 5 tablets every 6 hours for 24 hours prior to chemotherapy 20 tablet 4    megestrol (MEGACE ES) 625 mg/5 mL Susp Take 5 mLs (625 mg total) by mouth once daily. 150 mL 11     potassium bicarbonate & potassium chloride (K-LYTE CL) 25 mEq TbEF Take 1 tablet (25 mEq total) by mouth once daily. 30 tablet 3       Review of patient's allergies indicates:   Allergen Reactions    Gramicidin d Shortness Of Breath    Hydrochlorothiazide Other (See Comments)     Burning and tingling sensation throughoutt her body    Lisinopril Shortness Of Breath    Promethazine Shortness Of Breath and Nausea And Vomiting    Neosporin [neomycin-bacitracnzn-polymyxnb] Swelling    Polymyxin [bacitracin-polymyxin b] Swelling       Review of Systems:   Constitutional: no fever, chills or change in weight   Eyes: no visual changes   ENT: no sore throat or dysphagia  Respiratory: no cough or shortness of breath   Cardiovascular: no chest pain or palpitations   Gastrointestinal: as per HPI  Hematologic/Lymphatic: no easy bruising or lymphadenopathy   Musculoskeletal: no arthralgias or myalgias   Neurological: no change in mental status  Behavioral/Psych: no change in mood    Objective:     Vitals:    10/08/19 0417   BP: 120/72   Pulse: (!) 124   Resp: 17   Temp: 98.4 °F (36.9 °C)       General: Alert and Oriented, no distress  HEENT: Normocephalic, Atraumatic. No scleral icterus.  Resp: Good air entry bilaterally, no adventitious sounds  Cardiac: S1 and S2 normal  Abdomen: Normoactive bowel sounds. Non-distended. Normal tympany. Soft. Non-tender. No peritoneal signs.  Extremities: No peripheral edema.   Neurologic: No gross neurological Deficits  Psych: Calm, cooperative. Normal mood and affect.    Significant Labs:  Recent Labs   Lab 10/07/19  1525   HGB 11.8*       Lab Results   Component Value Date    WBC 4.73 10/07/2019    HGB 11.8 (L) 10/07/2019    HCT 35.7 (L) 10/07/2019    MCV 83 10/07/2019     10/07/2019       Lab Results   Component Value Date     (L) 10/07/2019    K 3.7 10/07/2019    CL 94 (L) 10/07/2019    CO2 26 10/07/2019    BUN 8 10/07/2019    CREATININE 0.8 10/07/2019    CALCIUM 8.8  "10/07/2019    ANIONGAP 13 10/07/2019    ESTGFRAFRICA >60.0 10/07/2019    EGFRNONAA >60.0 10/07/2019       Lab Results   Component Value Date     (H) 10/07/2019     (H) 10/07/2019    ALKPHOS 634 (H) 10/07/2019    BILITOT 9.8 (H) 10/07/2019       Lab Results   Component Value Date    INR 1.0 02/28/2015       Significant Imaging:  Reviewed pertinent radiology findings.       Assessment/Plan:     Jessica Gorman is a 69 y.o. 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 We are consulted for elevated bilirubin.   She currently has nausea, vomiting, denies abdominal pain, fevers, chills, night sweats, confusion.     She is vitally tachycardic, BP stable, wbc 4.73, , hb 11.8, , , bili 9.8, alk phos 634. Last ct chest abdomen pelvis showed hypodense lesions throughout the liver and questionable mild ductal dilatation in the left hepatic lobe, no pancreatic mass or peripancreatic fat stranding.  Bili was normal 3 weeks ago, will elevated AST ALT. With concern of biliary obstruction, AES was consulted, MRCP shows   "Cholelithiasis, mild intrahepatic biliary dilatation, and mild dilatation of the common bile duct with relatively abrupt tapering at the level of the pancreatic head.  No filling defects identified or obstructing lesion in the pancreatic head, noting evaluation limited without IV contrast/ MRCP protocol., multiple liver lesions, large amount of ascites."    Problem List:  1. Elevated bili of 9.8 (normal 3 weeks ago)  2. Recurrent endometrial carcinoma with metastatic disease    Plan:  1. NPO  2. EUS/MRCP today    Thank you for involving us in the care of Jessica Gorman. Please call with any additional questions, concerns or changes in the patient's clinical status.    Dariusz Rodriguez MD  Gastroenterology Fellow PGY IV   [facility] [address]           "

## 2019-10-08 NOTE — [employer] PATIENT INSTRUCTIONS
Discharge Summary/Instructions after an Endoscopic Procedure  Patient Name: Jessica Gorman  Patient MRN: [id number]  Patient YOB: 1950 Tuesday, October 08, 2019  Gabe Aguirre MD  RESTRICTIONS:  During your procedure today, you received medications for sedation.  These   medications may affect your judgment, balance and coordination.  Therefore,   for 24 hours, you have the following restrictions:   - DO NOT drive a car, operate machinery, make legal/financial decisions,   sign important papers or drink alcohol.    ACTIVITY:  Today: no heavy lifting, straining or running due to procedural   sedation/anesthesia.  The following day: return to full activity including work.  DIET:  Eat and drink normally unless instructed otherwise.     TREATMENT FOR COMMON SIDE EFFECTS:  - Mild abdominal pain, nausea, belching, bloating or excessive gas:  rest,   eat lightly and use a heating pad.  - Sore Throat: treat with throat lozenges and/or gargle with warm salt   water.  - Because air was used during the procedure, expelling large amounts of air   from your rectum or belching is normal.  - If a bowel prep was taken, you may not have a bowel movement for 1-3 days.    This is normal.  SYMPTOMS TO WATCH FOR AND REPORT TO YOUR PHYSICIAN:  1. Abdominal pain or bloating, other than gas cramps.  2. Chest pain.  3. Back pain.  4. Signs of infection such as: chills or fever occurring within 24 hours   after the procedure.  5. Rectal bleeding, which would show as bright red, maroon, or black stools.   (A tablespoon of blood from the rectum is not serious, especially if   hemorrhoids are present.)  6. Vomiting.  7. Weakness or dizziness.  GO DIRECTLY TO THE NEAREST EMERGENCY ROOM IF YOU HAVE ANY OF THE FOLLOWING:      Difficulty breathing              Chills and/or fever over 101 F   Persistent vomiting and/or vomiting blood   Severe abdominal pain   Severe chest pain   Black, tarry stools   Bleeding- more than one  tablespoon   Any other symptom or condition that you feel may need urgent attention  Your doctor recommends these additional instructions:  If any biopsies were taken, your doctors clinic will contact you in 1 to 2   weeks with any results.  - Return patient to hospital [facility] for ongoing care.   - Perform an ERCP today.  For questions, problems or results please call your physician - Gabe Aguirre MD at Work:  [phone].  [facility], EMERGENCY ROOM PHONE NUMBER: [phone]  IF A COMPLICATION OR EMERGENCY SITUATION ARISES AND YOU ARE UNABLE TO REACH   YOUR PHYSICIAN - GO DIRECTLY TO THE EMERGENCY ROOM.  Gabe Aguirre MD  10/8/2019 4:14:12 PM  This report has been verified and signed electronically.  [employer]

## 2019-10-08 NOTE — PLAN OF CARE
POC reviewed with patient; understanding verbalized. Endo complete this shift. Pt on clear liquid diet. She remains independent. Voids concentrated urine per the hat; no noted BM this shift. Family to remain at the bedside. Pt. with nonskid footwear on, bed in lowest position, and locked with bed rails up x2. Pt. has call light and personal items within reach. VSS and afebrile this shift. All questions and concerns addressed at this time. Will continue to monitor.

## 2019-10-09 LAB
ALBUMIN SERPL BCP-MCNC: 2.1 G/DL (ref 3.5–5.2)
ALP SERPL-CCNC: 615 U/L (ref 55–135)
ALT SERPL W/O P-5'-P-CCNC: 122 U/L (ref 10–44)
ANION GAP SERPL CALC-SCNC: 16 MMOL/L (ref 8–16)
AST SERPL-CCNC: 360 U/L (ref 10–40)
BILIRUB SERPL-MCNC: 9.4 MG/DL (ref 0.1–1)
BUN SERPL-MCNC: 10 MG/DL (ref 8–23)
CALCIUM SERPL-MCNC: 7.8 MG/DL (ref 8.7–10.5)
CHLORIDE SERPL-SCNC: 103 MMOL/L (ref 95–110)
CO2 SERPL-SCNC: 18 MMOL/L (ref 23–29)
CREAT SERPL-MCNC: 0.8 MG/DL (ref 0.5–1.4)
EST. GFR  (AFRICAN AMERICAN): >60 ML/MIN/1.73 M^2
EST. GFR  (NON AFRICAN AMERICAN): >60 ML/MIN/1.73 M^2
GLUCOSE SERPL-MCNC: 73 MG/DL (ref 70–110)
INR PPP: 1 (ref 0.8–1.2)
INR PPP: 1 (ref 0.8–1.2)
MAGNESIUM SERPL-MCNC: 1.6 MG/DL (ref 1.6–2.6)
PHOSPHATE SERPL-MCNC: 3.6 MG/DL (ref 2.7–4.5)
POTASSIUM SERPL-SCNC: 3.7 MMOL/L (ref 3.5–5.1)
PROT SERPL-MCNC: 5.3 G/DL (ref 6–8.4)
PROTHROMBIN TIME: 10.8 SEC (ref 9–12.5)
PROTHROMBIN TIME: 10.8 SEC (ref 9–12.5)
SODIUM SERPL-SCNC: 137 MMOL/L (ref 136–145)

## 2019-10-09 PROCEDURE — 99231 SBSQ HOSP IP/OBS SF/LOW 25: CPT | Mod: GC,,, | Performed by: OBSTETRICS & GYNECOLOGY

## 2019-10-09 PROCEDURE — 20600001 HC STEP DOWN PRIVATE ROOM

## 2019-10-09 PROCEDURE — 99231 PR SUBSEQUENT HOSPITAL CARE,LEVL I: ICD-10-PCS | Mod: GC,,, | Performed by: OBSTETRICS & GYNECOLOGY

## 2019-10-09 PROCEDURE — 25500020 PHARM REV CODE 255: Performed by: OBSTETRICS & GYNECOLOGY

## 2019-10-09 PROCEDURE — 80053 COMPREHEN METABOLIC PANEL: CPT

## 2019-10-09 PROCEDURE — 83735 ASSAY OF MAGNESIUM: CPT

## 2019-10-09 PROCEDURE — 63600175 PHARM REV CODE 636 W HCPCS: Performed by: RADIOLOGY

## 2019-10-09 PROCEDURE — 36415 COLL VENOUS BLD VENIPUNCTURE: CPT

## 2019-10-09 PROCEDURE — 85610 PROTHROMBIN TIME: CPT

## 2019-10-09 PROCEDURE — 84100 ASSAY OF PHOSPHORUS: CPT

## 2019-10-09 RX ORDER — DOCUSATE SODIUM 100 MG/1
200 CAPSULE, LIQUID FILLED ORAL 2 TIMES DAILY PRN
Status: DISCONTINUED | OUTPATIENT
Start: 2019-10-09 | End: 2019-10-15 | Stop reason: HOSPADM

## 2019-10-09 RX ORDER — SIMETHICONE 80 MG
1 TABLET,CHEWABLE ORAL 3 TIMES DAILY PRN
Status: DISCONTINUED | OUTPATIENT
Start: 2019-10-09 | End: 2019-10-15 | Stop reason: HOSPADM

## 2019-10-09 RX ORDER — CEFTRIAXONE 1 G/1
INJECTION, POWDER, FOR SOLUTION INTRAMUSCULAR; INTRAVENOUS CODE/TRAUMA/SEDATION MEDICATION
Status: COMPLETED | OUTPATIENT
Start: 2019-10-09 | End: 2019-10-09

## 2019-10-09 RX ORDER — FENTANYL CITRATE 50 UG/ML
INJECTION, SOLUTION INTRAMUSCULAR; INTRAVENOUS CODE/TRAUMA/SEDATION MEDICATION
Status: COMPLETED | OUTPATIENT
Start: 2019-10-09 | End: 2019-10-09

## 2019-10-09 RX ORDER — CALCIUM CARBONATE 200(500)MG
500 TABLET,CHEWABLE ORAL 3 TIMES DAILY PRN
Status: DISCONTINUED | OUTPATIENT
Start: 2019-10-09 | End: 2019-10-15 | Stop reason: HOSPADM

## 2019-10-09 RX ORDER — MIDAZOLAM HYDROCHLORIDE 1 MG/ML
INJECTION INTRAMUSCULAR; INTRAVENOUS CODE/TRAUMA/SEDATION MEDICATION
Status: COMPLETED | OUTPATIENT
Start: 2019-10-09 | End: 2019-10-09

## 2019-10-09 RX ADMIN — FENTANYL CITRATE 100 MCG: 50 INJECTION, SOLUTION INTRAMUSCULAR; INTRAVENOUS at 05:10

## 2019-10-09 RX ADMIN — FENTANYL CITRATE 50 MCG: 50 INJECTION, SOLUTION INTRAMUSCULAR; INTRAVENOUS at 06:10

## 2019-10-09 RX ADMIN — IOHEXOL 30 ML: 300 INJECTION, SOLUTION INTRAVENOUS at 06:10

## 2019-10-09 RX ADMIN — FENTANYL CITRATE 50 MCG: 50 INJECTION, SOLUTION INTRAMUSCULAR; INTRAVENOUS at 05:10

## 2019-10-09 RX ADMIN — CEFTRIAXONE SODIUM 1 G: 1 INJECTION, POWDER, FOR SOLUTION INTRAMUSCULAR; INTRAVENOUS at 05:10

## 2019-10-09 RX ADMIN — MIDAZOLAM HYDROCHLORIDE 1 MG: 1 INJECTION, SOLUTION INTRAMUSCULAR; INTRAVENOUS at 05:10

## 2019-10-09 RX ADMIN — MIDAZOLAM HYDROCHLORIDE 2 MG: 1 INJECTION, SOLUTION INTRAMUSCULAR; INTRAVENOUS at 05:10

## 2019-10-09 NOTE — ASSESSMENT & PLAN NOTE
- Nausea/vomiting for past 5 days on admission  - Afebrile. Tachycardic, likely secondary to dehydration  - Continuous IVF  - Continue IV zofran prn and IV compazine prn  - GI/biliary consulted secondary to elevated LFTs, MRCP performed 10/7/19 showing mild CBD dilation and large amount of ascites  - EUS and ERCP performed 10/8/19 by advanced endoscopic team, recommend IR biliary drainage  - Plan for paracentesis today, INR pending  - IR consulted for percutaneous biliary drainage  - NPO since 0200

## 2019-10-09 NOTE — PROGRESS NOTES
Pt procedure complete. VSS. Incision site CDI. Report called to . Pt to [facility]. Report given to SHRUTHI Peterson

## 2019-10-09 NOTE — NURSING
Pt arrived to Interventional Radiology room 189 via stretcher for Paracentesis followed by left sided PTC .  Name verified using two identifiers.  Allergies verified.  Will continue to monitor.

## 2019-10-09 NOTE — PLAN OF CARE
Uneventful shift. Pt has had no c/o pain this shift. Family member at bs. Pt had one episode of emesis this shift. IVF infusing. Pt has remained free from injury this shift. Bed in low locked position. Call light and personal belongings within reach. Side rails up x2. Nonskid socks in place. Pt instructed to call with any needs. Will continue to monitor.

## 2019-10-09 NOTE — TREATMENT PLAN
[facility]  AES  Treatment plan    Subjective:       Nausea and vomiting improved.     Objective:     Vitals:    10/09/19 0320   BP: (!) 108/58   Pulse: (!) 125   Resp: 18   Temp: 98.5 °F (36.9 °C)       General: Alert and Oriented, no distress    Abdomen: Normoactive bowel sounds. Non-distended. Normal tympany. Soft. Non-tender. No peritoneal signs.        Significant Labs:  Recent Labs   Lab 10/07/19  1525   HGB 11.8*       Lab Results   Component Value Date    WBC 4.73 10/07/2019    HGB 11.8 (L) 10/07/2019    HCT 35.7 (L) 10/07/2019    MCV 83 10/07/2019     10/07/2019       Lab Results   Component Value Date     (L) 10/08/2019    K 3.7 10/08/2019     10/08/2019    CO2 21 (L) 10/08/2019    BUN 8 10/08/2019    CREATININE 0.7 10/08/2019    CALCIUM 7.9 (L) 10/08/2019    ANIONGAP 14 10/08/2019    ESTGFRAFRICA >60.0 10/08/2019    EGFRNONAA >60.0 10/08/2019       Lab Results   Component Value Date     (H) 10/08/2019     (H) 10/08/2019    ALKPHOS 543 (H) 10/08/2019    BILITOT 8.4 (H) 10/08/2019       Lab Results   Component Value Date    INR 1.0 02/28/2015       Significant Imaging:  Reviewed pertinent radiology findings.       Assessment/Plan:     EUS and ERCP done 10/9  Impression:           - Acquired duodenal stenosis.                        - The major papilla was not found.    - There was a suggestion of a stricture in the                         lower third of the main bile duct on EUS              Plan:  1. Advance diet as tolerated to max (soft mechanical diet)  2. IR biliary drainage if palliation of jaundice is desired  3. We will sign off    Thank you for involving us in the care of Jessica Gorman. Please call with any additional questions, concerns or changes in the patient's clinical status.    Dariusz Rodriguez MD  Gastroenterology Fellow PGY IV   [facility]

## 2019-10-09 NOTE — ASSESSMENT & PLAN NOTE
- Pulse:  [116-130] 125  - Patient's baseline HR in 120s  - Seen by cardiology 9/17/19, tachycardia determined to be secondary to volume depletion

## 2019-10-09 NOTE — NURSING
Paracentesis complete. 2900 ml peritoneal fluid drained. Pt VSS with no acute distress. Pt now prepped for PTC.

## 2019-10-09 NOTE — H&P
Inpatient Radiology Pre-procedure Note    History of Present Illness:  Jessica Gorman is a 69 y.o. female history of recurrent endometrial carcinoma with a large liver lesion and ongoing nausea and vomiting with obstructive jaundice.  AES evaluated patient, ERCP performed and unable to advance scope into CBD.  Pt presents to IR for PTC and paracentesis.  Pt agreeable to procedure with moderate sedation.  Pt understands that the tube may be permanant depending on her treatment course.    Admission H&P reviewed.  Past Medical History:   Diagnosis Date    Abnormal Pap smear     1984    Anorexia 8/30/2019    Cancer     Chemotherapy-induced neutropenia 4/1/2015    Dehydration 10/7/2019    Elevated cancer antigen 125 (CA-125) 9/5/2018    Encounter for blood transfusion     FH: breast cancer in relative when <45 years old 3/17/2016    Hyperbilirubinemia 10/8/2019    Hypertension     Hypokalemia 9/9/2019    Other complicated headache syndrome 10/4/2019    Other screening mammogram 9/17/2015    Postmenopausal bleeding     Pulmonary nodules/lesions, multiple 4/25/2019    Rash 9/17/2015    Uterine cancer     Weight loss 12/17/2015    Well woman exam with routine gynecological exam 9/14/2016     Past Surgical History:   Procedure Laterality Date    COLPOSCOPY      DILATION AND CURETTAGE OF UTERUS  10/17/14    HYSTERECTOMY  12/4/2014    radiacl hysterectomy    [place] REMOVAL OF OVARY/TUBE(S)         Review of Systems:   As documented in primary team H&P    Home Meds:   Prior to Admission medications    Medication Sig Start Date End Date Taking? Authorizing Provider   multivitamin (ONE DAILY MULTIVITAMIN) per tablet Take 1 tablet by mouth once daily.   Yes Historical Provider, MD   ondansetron (ZOFRAN-ODT) 8 MG TbDL Take 1 tablet (8 mg total) by mouth every 12 (twelve) hours as needed. 1/22/19 1/22/20 Yes Vesna Bales, BLAKE   potassium chloride SA (K-DUR,KLOR-CON) 20 MEQ tablet Take 1 tablet (20  mEq total) by mouth once daily. 9/30/19 9/29/20 Yes Jair Watt MD   amLODIPine (NORVASC) 10 MG tablet Take 1 tablet (10 mg total) by mouth once daily. 6/17/19   Rico Amaro MD   dexAMETHasone (DECADRON) 4 MG Tab Take 5 tablets every 6 hours for 24 hours prior to chemotherapy 8/15/19   Vesna Bales, BLAKE   megestrol (MEGACE ES) 625 mg/5 mL Susp Take 5 mLs (625 mg total) by mouth once daily. 8/30/19 8/29/20  Jair Watt MD   potassium bicarbonate & potassium chloride (K-LYTE CL) 25 mEq TbEF Take 1 tablet (25 mEq total) by mouth once daily. 9/30/19   Jair Watt MD     Scheduled Meds:   Continuous Infusions:    sodium chloride 0.9% 75 mL/hr at 10/08/19 1008     PRN Meds:ibuprofen, ondansetron, oxyCODONE, oxyCODONE, prochlorperazine, sodium chloride 0.9%  Anticoagulants/Antiplatelets: no anticoagulation    Allergies:   Review of patient's allergies indicates:   Allergen Reactions    Gramicidin d Shortness Of Breath    Hydrochlorothiazide Other (See Comments)     Burning and tingling sensation throughoutt her body    Lisinopril Shortness Of Breath    Promethazine Shortness Of Breath and Nausea And Vomiting    Neosporin [neomycin-bacitracnzn-polymyxnb] Swelling    Polymyxin [bacitracin-polymyxin b] Swelling     Sedation Hx: have not been any systemic reactions    Labs:  Recent Labs   Lab 10/09/19  0904   INR 1.0  1.0       Recent Labs   Lab 10/07/19  1525   WBC 4.73   HGB 11.8*   HCT 35.7*   MCV 83         Recent Labs   Lab 10/09/19  0455   GLU 73      K 3.7      CO2 18*   BUN 10   CREATININE 0.8   CALCIUM 7.8*   MG 1.6   *   *   ALBUMIN 2.1*   BILITOT 9.4*         Vitals:  Temp: 98.5 °F (36.9 °C) (10/09/19 0811)  Pulse: (!) 125 (10/09/19 0811)  Resp: 18 (10/09/19 0811)  BP: 138/78 (10/09/19 0811)  SpO2: (!) 91 % (10/09/19 0811)     Physical Exam:  ASA: 3  Mallampati: 2    General: no acute distress  Mental Status: alert and oriented to person, place  and time  HEENT: normocephalic, atraumatic  Chest: unlabored breathing  Abdomen: +distended  Extremity: moves all extremities    Plan: Paracentesis followed by left sided PTC  Sedation Plan: Local anesthetic for paracentesis, Moderate sedation for PTC      Madelaine Ojeda MD  Resident  Department of Radiology  Pager: [phone]

## 2019-10-09 NOTE — PROCEDURES
Radiology Post-Procedure Note    Pre Op Diagnosis: Ascites  Post Op Diagnosis: Same    Procedure: Paracentesis    Procedure performed by: Madelaine Ojeda MD    Written Informed Consent Obtained: Yes  Specimen Removed: YES, removed for patient comfort and prior to PTC  Estimated Blood Loss: Minimal    Findings:   Successful paracentesis.      Patient tolerated procedure well.    Madelaine Ojeda MD  Resident  Department of Radiology  Pager: [phone]

## 2019-10-09 NOTE — ASSESSMENT & PLAN NOTE
- BP: ()/(53-82) 108/58  - Patient's home amlodipine was recently discontinued due to low to normal range pressures

## 2019-10-09 NOTE — SUBJECTIVE & OBJECTIVE
Interval History: Patient reports an episode of emesis yesterday evening after drinking water too quickly. Otherwise, she states that she was able to tolerate jello and juice yesterday. She denies abdominal pain and is ambulating without difficulty. She is voiding and continues to pass flatus. She denies fever, chills, CP, SOB, and dysuria.    Scheduled Meds:  Continuous Infusions:   sodium chloride 0.9% 75 mL/hr at 10/08/19 1008     PRN Meds:ibuprofen, ondansetron, oxyCODONE, oxyCODONE, prochlorperazine, sodium chloride 0.9%    Review of patient's allergies indicates:   Allergen Reactions    Gramicidin d Shortness Of Breath    Hydrochlorothiazide Other (See Comments)     Burning and tingling sensation throughoutt her body    Lisinopril Shortness Of Breath    Promethazine Shortness Of Breath and Nausea And Vomiting    Neosporin [neomycin-bacitracnzn-polymyxnb] Swelling    Polymyxin [bacitracin-polymyxin b] Swelling       Objective:     Vital Signs (Most Recent):  Temp: 98.5 °F (36.9 °C) (10/09/19 0320)  Pulse: (!) 125 (10/09/19 0320)  Resp: 18 (10/09/19 0320)  BP: (!) 108/58 (10/09/19 0320)  SpO2: (!) 93 % (10/09/19 0320) Vital Signs (24h Range):  Temp:  [97.7 °F (36.5 °C)-98.5 °F (36.9 °C)] 98.5 °F (36.9 °C)  Pulse:  [116-130] 125  Resp:  [14-22] 18  SpO2:  [92 %-99 %] 93 %  BP: ()/(53-82) 108/58     Weight: 62.3 kg (137 lb 5.6 oz)  Body mass index is 25.12 kg/m².    Intake/Output - Last 3 Shifts       10/07 0700 - 10/08 0659 10/08 0700 - 10/09 0659    P.O.  0    I.V. (mL/kg) 1025 (16.5) 2574 (41.3)    Total Intake(mL/kg) 1025 (16.5) 2574 (41.3)    Urine (mL/kg/hr)  300 (0.2)    Total Output  300    Net +1025 +2274          Urine Occurrence 2 x     Emesis Occurrence 1 x              Physical Exam:   Constitutional: She is oriented to person, place, and time. She appears well-developed and well-nourished. No distress.    HENT:   Head: Normocephalic and atraumatic.      Cardiovascular: Regular rhythm.     Tachycardic.    Pulmonary/Chest: Effort normal. No respiratory distress.        Abdominal: Soft. She exhibits distension. There is no tenderness. There is no rebound and no guarding.             Musculoskeletal: Normal range of motion. She exhibits no edema or tenderness.       Neurological: She is alert and oriented to person, place, and time.    Skin: Skin is warm and dry.    Psychiatric: She has a normal mood and affect. Her behavior is normal. Judgment and thought content normal.       Lines/Drains/Airways     Peripheral Intravenous Line                 Peripheral IV - Single Lumen 10/07/19 1644 22 G Right Antecubital 1 day                Laboratory:  CBC:   Recent Labs   Lab 10/07/19  1525   WBC 4.73   HGB 11.8*   HCT 35.7*       and CMP:   Recent Labs   Lab 10/07/19  1525 10/08/19  0534   * 135*   K 3.7 3.7   CL 94* 100   CO2 26 21*   GLU 94 69*   BUN 8 8   CREATININE 0.8 0.7   CALCIUM 8.8 7.9*   PROT 6.9 5.5*   ALBUMIN 2.9* 2.2*   BILITOT 9.8* 8.4*   ALKPHOS 634* 543*   * 322*   * 124*   ANIONGAP 13 14   EGFRNONAA >60.0 >60.0       Diagnostic Results:  Narrative     EXAMINATION:  MRI ABDOMEN WITHOUT CONTRAST MRCP    CLINICAL HISTORY:  Abnormal liver function tests (LFTs);    TECHNIQUE:  Multiplanar multisequence imaging of the abdomen was performed without the administration intravenous contrast.  MRCP images include 3D MIP imaging of the biliary tract performed at the MR console.    COMPARISON:  CT chest abdomen pelvis 10/03/2019; CT abdomen pelvis 09/13/2019    FINDINGS:  Evaluation lung bases demonstrate no significant abnormality.    The liver is normal in size with numerous T2 hyperintense lesions replacing a significant portion of the hepatic parenchyma in keeping with known metastatic uterine cancer.  The largest lesion measures approximately 7.4 x 5.6 x 5.6 cm and involves hepatic segments IV a/b, and portions of hepatic segment VIII, II, and III.  There are numerous  smaller peripheral lesions, and a cluster of lesions the inferior aspect of hepatic segment VI.  The gallbladder is distended with numerous layering gallstones.  No significant pericholecystic fluid.  Ill-defined prominence noted about the pancreatic head and about the gonzalo hepatis which could reflect gonzalo hepatis lymphadenopathy.  The common bile duct is prominent in size measuring up to 0.9 cm and demonstrates abrupt tapering distally at the level of the pancreatic head, though no focal obstruction or external compression is apparent.  Of note, evaluation is limited by lack of intravenous contrast.  There is mild intrahepatic biliary ductal dilatation.    The distal esophagus, stomach, spleen, and adrenal glands are unremarkable.    The kidneys normal in size and location.  There several bilateral subcentimeter T2 hyperintense foci in the kidneys, the largest at the superior pole of the right kidney, likely cysts the too small to fully characterize.  No evidence of hydronephrosis.    The aorta and abdominal vasculature grossly unremarkable.    Visualized loops of small and large bowel are grossly unremarkable.  Large volume abdominopelvic free fluid.  No significant abdominopelvic lymphadenopathy.    Visualized osseous structures grossly unremarkable.  Extraperitoneal soft tissues reveal nothing unusual.      Impression       Cholelithiasis, mild intrahepatic biliary dilatation, and mild dilatation of the common bile duct with relatively abrupt tapering at the level of the pancreatic head.  No filling defects identified or obstructing lesion in the pancreatic head, noting evaluation limited without IV contrast/ MRCP protocol.    Multiple liver lesions, correlating with the 10/03/2019 CT/ patient history of metastatic uterine cancer.    Large amount of ascites.

## 2019-10-09 NOTE — CODE/ RAPID DOCUMENTATION
Rapid Response Nurse Chart Check     Chart check completed, abnormal VS noted. Bedside RN Theres contacted, no concerns verbalized at this time. Pt currently in IR undergoing drain placement and paracentesis. RN instructed to call [phone] for further concerns or assistance.

## 2019-10-09 NOTE — PROCEDURES
Radiology Post-Procedure Note    Pre Op Diagnosis: Biliary obstruction    Post Op Diagnosis: Same    Procedure: Percutaneous transhepatic cholangiography    Procedure performed by: Ghassan Sotomayor MD; Rusty BERMUDEZ, Madelaine    Written Informed Consent Obtained: Yes    Specimen Removed: NO    Estimated Blood Loss: Minimal    Findings:    Under US and fluoroscopic guidance, a peripheral duct in the left hepatic lobe was accessed using a 22 gauge needle and contrast was injected confirming position in the biliary tree. A microwire was advanced into the common bile duct and the system was upsized using an Accustick introducer set. An Amplatz wire was then advanced into the small bowel and the tract was serially dilated. An 8.0 internal/external biliary drainage catheter was advanced over the wire and the pigtail was formed in the small bowel. Cholangiogram was performed demonstrating appropriate position of the pigtail and the catheter side holes. The catheter was secured to the skin using 2-0 silk sutures. The catheter was left to gravity drainage and dressed with a sterile dressing.    Please see Imaging report for further details.      Madelaine Ojeda MD  Resident  Department of Radiology  Pager: [phone]

## 2019-10-09 NOTE — CONSULTS
Radiology Consult    Jessica Gorman is a 69 y.o. female with a history of recurrent endometrial carcinoma with a large liver lesion and ongoing nausea and vomiting with obstructive jaundice.  AES evaluated patient, ERCP performed and unable to advance scope into CBD.  IR consulted for PTC and paracentesis.    Past Medical History:   Diagnosis Date    Abnormal Pap smear     1984    Anorexia 8/30/2019    Cancer     Chemotherapy-induced neutropenia 4/1/2015    Dehydration 10/7/2019    Elevated cancer antigen 125 (CA-125) 9/5/2018    Encounter for blood transfusion     FH: breast cancer in relative when <45 years old 3/17/2016    Hyperbilirubinemia 10/8/2019    Hypertension     Hypokalemia 9/9/2019    Other complicated headache syndrome 10/4/2019    Other screening mammogram 9/17/2015    Postmenopausal bleeding     Pulmonary nodules/lesions, multiple 4/25/2019    Rash 9/17/2015    Uterine cancer     Weight loss 12/17/2015    Well woman exam with routine gynecological exam 9/14/2016     Past Surgical History:   Procedure Laterality Date    COLPOSCOPY      DILATION AND CURETTAGE OF UTERUS  10/17/14    HYSTERECTOMY  12/4/2014    radiacl hysterectomy    [place] REMOVAL OF OVARY/TUBE(S)         Discussed with primary team, gyn onc.    Imaging reviewed with Radiology staff, Dr. Dominguez.     Procedure: PTC    Scheduled Meds:   Continuous Infusions:    sodium chloride 0.9% 75 mL/hr at 10/08/19 1008     PRN Meds:ibuprofen, ondansetron, oxyCODONE, oxyCODONE, prochlorperazine, sodium chloride 0.9%    Allergies:   Review of patient's allergies indicates:   Allergen Reactions    Gramicidin d Shortness Of Breath    Hydrochlorothiazide Other (See Comments)     Burning and tingling sensation throughoutt her body    Lisinopril Shortness Of Breath    Promethazine Shortness Of Breath and Nausea And Vomiting    Neosporin [neomycin-bacitracnzn-polymyxnb] Swelling    Polymyxin [bacitracin-polymyxin b] Swelling        Labs:  No results for input(s): INR in the last 168 hours.    Invalid input(s):  PT,  PTT    Recent Labs   Lab 10/07/19  1525   WBC 4.73   HGB 11.8*   HCT 35.7*   MCV 83         Recent Labs   Lab 10/08/19  0534   GLU 69*   *   K 3.7      CO2 21*   BUN 8   CREATININE 0.7   CALCIUM 7.9*   MG 1.9   *   *   ALBUMIN 2.2*   BILITOT 8.4*         Vitals (Most Recent):  Temp: 98.5 °F (36.9 °C) (10/09/19 0811)  Pulse: (!) 125 (10/09/19 0811)  Resp: 18 (10/09/19 0811)  BP: 138/78 (10/09/19 0811)  SpO2: (!) 91 % (10/09/19 0811)    Plan:   1. NPO.  2. Hold anticoagulants.  3. INR pending.  4. Left sided percutaneous biliary drain with paracentesis planned for today should patient be amenable.    Madelaine Ojeda MD  Resident  Department of Radiology  Pager: [phone]

## 2019-10-09 NOTE — PROGRESS NOTES
[facility]  Gynecologic Oncology  Progress Note      Patient Name: Jessica Gorman  MRN: [id number]  Admission Date: 10/7/2019  Hospital Length of Stay: 2 days  Attending Provider: Jair Watt MD  Primary Care Provider: Rico Amaro MD  Principal Problem: Nausea and vomiting    Follow-up For: Procedure(s) (LRB):  ULTRASOUND, UPPER GI TRACT, ENDOSCOPIC (N/A)  ERCP (ENDOSCOPIC RETROGRADE CHOLANGIOPANCREATOGRAPHY) (N/A)  Post-Operative Day: 1 Day Post-Op  Subjective:      History of Present Illness:  Jesscia Gorman is a 69 year old female with history of recurrent serous endometrial carcinoma who was admitted due to intractable nausea/vomiting. Patient states that about 5 days ago, she began having daily nausea/vomiting that she believes started after she took her potassium tablets. She reports 2-3 episodes of emesis per day since then, mostly when she tries to eat or encounters strong smells. She states that at most, she is able to drink a few sips of water or tablespoons of food per day. Her last episode of emesis was this morning due to the smell of deoderant. She is taking zofran q12h without relief. She did not undergo cycle 3 of chemotherapy last week. She states that she is able to ambulate without difficulty but feels very weak. She denies dysuria, although she states that her urine is much darker. She had a normal BM yesterday. She denies headache, change in vision, SOB, CP, and abdominal pain.    Hospital Course:  10/07/2019 - Admitted to Gyn Onc service for IVF and electrolyte repletion secondary to nausea/vomiting and dehydration. GI/biliary consulted secondary to elevated LFTs.  10/08/2019 - Two episodes of emesis yesterday after taking zofran. MRCP performed yesterday evening showing mildly dilated CBD and large amount of ascites. EUS and ERCP performed by biliary team.  10/09/2019 - Episode of emesis overnight after drinking water. Plan for paracentesis and IR consult for  percutaneous biliary drainage today.    Interval History: Patient reports an episode of emesis yesterday evening after drinking water too quickly. Otherwise, she states that she was able to tolerate jello and juice yesterday. She denies abdominal pain and is ambulating without difficulty. She is voiding and continues to pass flatus. She denies fever, chills, CP, SOB, and dysuria.    Scheduled Meds:  Continuous Infusions:   sodium chloride 0.9% 75 mL/hr at 10/08/19 1008     PRN Meds:ibuprofen, ondansetron, oxyCODONE, oxyCODONE, prochlorperazine, sodium chloride 0.9%    Review of patient's allergies indicates:   Allergen Reactions    Gramicidin d Shortness Of Breath    Hydrochlorothiazide Other (See Comments)     Burning and tingling sensation throughoutt her body    Lisinopril Shortness Of Breath    Promethazine Shortness Of Breath and Nausea And Vomiting    Neosporin [neomycin-bacitracnzn-polymyxnb] Swelling    Polymyxin [bacitracin-polymyxin b] Swelling       Objective:     Vital Signs (Most Recent):  Temp: 98.5 °F (36.9 °C) (10/09/19 0320)  Pulse: (!) 125 (10/09/19 0320)  Resp: 18 (10/09/19 0320)  BP: (!) 108/58 (10/09/19 0320)  SpO2: (!) 93 % (10/09/19 0320) Vital Signs (24h Range):  Temp:  [97.7 °F (36.5 °C)-98.5 °F (36.9 °C)] 98.5 °F (36.9 °C)  Pulse:  [116-130] 125  Resp:  [14-22] 18  SpO2:  [92 %-99 %] 93 %  BP: ()/(53-82) 108/58     Weight: 62.3 kg (137 lb 5.6 oz)  Body mass index is 25.12 kg/m².    Intake/Output - Last 3 Shifts       10/07 0700 - 10/08 0659 10/08 0700 - 10/09 0659    P.O.  0    I.V. (mL/kg) 1025 (16.5) 2574 (41.3)    Total Intake(mL/kg) 1025 (16.5) 2574 (41.3)    Urine (mL/kg/hr)  300 (0.2)    Total Output  300    Net +1025 +2274          Urine Occurrence 2 x     Emesis Occurrence 1 x              Physical Exam:   Constitutional: She is oriented to person, place, and time. She appears well-developed and well-nourished. No distress.    HENT:   Head: Normocephalic and atraumatic.       Cardiovascular: Regular rhythm.    Tachycardic.    Pulmonary/Chest: Effort normal. No respiratory distress.        Abdominal: Soft. She exhibits distension. There is no tenderness. There is no rebound and no guarding.             Musculoskeletal: Normal range of motion. She exhibits no edema or tenderness.       Neurological: She is alert and oriented to person, place, and time.    Skin: Skin is warm and dry.    Psychiatric: She has a normal mood and affect. Her behavior is normal. Judgment and thought content normal.       Lines/Drains/Airways     Peripheral Intravenous Line                 Peripheral IV - Single Lumen 10/07/19 1644 22 G Right Antecubital 1 day                Laboratory:  CBC:   Recent Labs   Lab 10/07/19  1525   WBC 4.73   HGB 11.8*   HCT 35.7*       and CMP:   Recent Labs   Lab 10/07/19  1525 10/08/19  0534   * 135*   K 3.7 3.7   CL 94* 100   CO2 26 21*   GLU 94 69*   BUN 8 8   CREATININE 0.8 0.7   CALCIUM 8.8 7.9*   PROT 6.9 5.5*   ALBUMIN 2.9* 2.2*   BILITOT 9.8* 8.4*   ALKPHOS 634* 543*   * 322*   * 124*   ANIONGAP 13 14   EGFRNONAA >60.0 >60.0       Diagnostic Results:  Narrative     EXAMINATION:  MRI ABDOMEN WITHOUT CONTRAST MRCP    CLINICAL HISTORY:  Abnormal liver function tests (LFTs);    TECHNIQUE:  Multiplanar multisequence imaging of the abdomen was performed without the administration intravenous contrast.  MRCP images include 3D MIP imaging of the biliary tract performed at the MR console.    COMPARISON:  CT chest abdomen pelvis 10/03/2019; CT abdomen pelvis 09/13/2019    FINDINGS:  Evaluation lung bases demonstrate no significant abnormality.    The liver is normal in size with numerous T2 hyperintense lesions replacing a significant portion of the hepatic parenchyma in keeping with known metastatic uterine cancer.  The largest lesion measures approximately 7.4 x 5.6 x 5.6 cm and involves hepatic segments IV a/b, and portions of hepatic segment VIII,  II, and III.  There are numerous smaller peripheral lesions, and a cluster of lesions the inferior aspect of hepatic segment VI.  The gallbladder is distended with numerous layering gallstones.  No significant pericholecystic fluid.  Ill-defined prominence noted about the pancreatic head and about the gonzalo hepatis which could reflect gonzalo hepatis lymphadenopathy.  The common bile duct is prominent in size measuring up to 0.9 cm and demonstrates abrupt tapering distally at the level of the pancreatic head, though no focal obstruction or external compression is apparent.  Of note, evaluation is limited by lack of intravenous contrast.  There is mild intrahepatic biliary ductal dilatation.    The distal esophagus, stomach, spleen, and adrenal glands are unremarkable.    The kidneys normal in size and location.  There several bilateral subcentimeter T2 hyperintense foci in the kidneys, the largest at the superior pole of the right kidney, likely cysts the too small to fully characterize.  No evidence of hydronephrosis.    The aorta and abdominal vasculature grossly unremarkable.    Visualized loops of small and large bowel are grossly unremarkable.  Large volume abdominopelvic free fluid.  No significant abdominopelvic lymphadenopathy.    Visualized osseous structures grossly unremarkable.  Extraperitoneal soft tissues reveal nothing unusual.      Impression       Cholelithiasis, mild intrahepatic biliary dilatation, and mild dilatation of the common bile duct with relatively abrupt tapering at the level of the pancreatic head.  No filling defects identified or obstructing lesion in the pancreatic head, noting evaluation limited without IV contrast/ MRCP protocol.    Multiple liver lesions, correlating with the 10/03/2019 CT/ patient history of metastatic uterine cancer.    Large amount of ascites.     Assessment/Plan:     * Nausea and vomiting  - Nausea/vomiting for past 5 days on admission  - Afebrile. Tachycardic,  likely secondary to dehydration  - Continuous IVF  - Continue IV zofran prn and IV compazine prn  - GI/biliary consulted secondary to elevated LFTs, MRCP performed 10/7/19 showing mild CBD dilation and large amount of ascites  - EUS and ERCP performed 10/8/19 by advanced endoscopic team, recommend IR biliary drainage  - Plan for paracentesis today, INR pending  - IR consulted for percutaneous biliary drainage  - NPO since 0200    Hypomagnesemia  - Mg 1.3 on admission  - S/p IV Mg 3g  - Continue to monitor and replace as needed    Endometrial carcinoma, serous  - CA-125 155> 1795  - CT A/P on 10/3/19 showed stable disease  - See gyn onc history above    Hypertension  - BP: ()/(53-82) 108/58  - Patient's home amlodipine was recently discontinued due to low to normal range pressures    Hyperbilirubinemia  - See plan under nausea/vomiting    Tachycardia  - Pulse:  [116-130] 125  - Patient's baseline HR in 120s  - Seen by cardiology 9/17/19, tachycardia determined to be secondary to volume depletion      VTE Risk Mitigation (From admission, onward)         Ordered     IP VTE LOW RISK PATIENT  Once      10/07/19 1408     Place ASH hose  Until discontinued      10/07/19 1408     Place sequential compression device  Until discontinued      10/07/19 1408                Opal Aleman MD  Gynecologic Oncology  [facility]

## 2019-10-10 LAB
ALBUMIN SERPL BCP-MCNC: 1.9 G/DL (ref 3.5–5.2)
ALP SERPL-CCNC: 679 U/L (ref 55–135)
ALT SERPL W/O P-5'-P-CCNC: 148 U/L (ref 10–44)
ANION GAP SERPL CALC-SCNC: 13 MMOL/L (ref 8–16)
AST SERPL-CCNC: 538 U/L (ref 10–40)
BASOPHILS # BLD AUTO: 0 K/UL (ref 0–0.2)
BASOPHILS NFR BLD: 0 % (ref 0–1.9)
BILIRUB SERPL-MCNC: 9.8 MG/DL (ref 0.1–1)
BUN SERPL-MCNC: 10 MG/DL (ref 8–23)
CALCIUM SERPL-MCNC: 7.8 MG/DL (ref 8.7–10.5)
CHLORIDE SERPL-SCNC: 105 MMOL/L (ref 95–110)
CO2 SERPL-SCNC: 19 MMOL/L (ref 23–29)
CREAT SERPL-MCNC: 0.7 MG/DL (ref 0.5–1.4)
DIFFERENTIAL METHOD: ABNORMAL
EOSINOPHIL # BLD AUTO: 0 K/UL (ref 0–0.5)
EOSINOPHIL NFR BLD: 0.2 % (ref 0–8)
ERYTHROCYTE [DISTWIDTH] IN BLOOD BY AUTOMATED COUNT: 18.2 % (ref 11.5–14.5)
EST. GFR  (AFRICAN AMERICAN): >60 ML/MIN/1.73 M^2
EST. GFR  (NON AFRICAN AMERICAN): >60 ML/MIN/1.73 M^2
GLUCOSE SERPL-MCNC: 72 MG/DL (ref 70–110)
HCT VFR BLD AUTO: 30.2 % (ref 37–48.5)
HGB BLD-MCNC: 10.3 G/DL (ref 12–16)
IMM GRANULOCYTES # BLD AUTO: 0.04 K/UL (ref 0–0.04)
IMM GRANULOCYTES NFR BLD AUTO: 0.8 % (ref 0–0.5)
LYMPHOCYTES # BLD AUTO: 0.4 K/UL (ref 1–4.8)
LYMPHOCYTES NFR BLD: 9 % (ref 18–48)
MAGNESIUM SERPL-MCNC: 1.5 MG/DL (ref 1.6–2.6)
MCH RBC QN AUTO: 28.3 PG (ref 27–31)
MCHC RBC AUTO-ENTMCNC: 34.1 G/DL (ref 32–36)
MCV RBC AUTO: 83 FL (ref 82–98)
MONOCYTES # BLD AUTO: 0.4 K/UL (ref 0.3–1)
MONOCYTES NFR BLD: 8.8 % (ref 4–15)
NEUTROPHILS # BLD AUTO: 3.9 K/UL (ref 1.8–7.7)
NEUTROPHILS NFR BLD: 81.2 % (ref 38–73)
NRBC BLD-RTO: 0 /100 WBC
PHOSPHATE SERPL-MCNC: 3 MG/DL (ref 2.7–4.5)
PLATELET # BLD AUTO: 267 K/UL (ref 150–350)
PMV BLD AUTO: 11.6 FL (ref 9.2–12.9)
POTASSIUM SERPL-SCNC: 3.5 MMOL/L (ref 3.5–5.1)
PROT SERPL-MCNC: 4.9 G/DL (ref 6–8.4)
RBC # BLD AUTO: 3.64 M/UL (ref 4–5.4)
SODIUM SERPL-SCNC: 137 MMOL/L (ref 136–145)
WBC # BLD AUTO: 4.77 K/UL (ref 3.9–12.7)

## 2019-10-10 PROCEDURE — 99231 PR SUBSEQUENT HOSPITAL CARE,LEVL I: ICD-10-PCS | Mod: GC,,, | Performed by: OBSTETRICS & GYNECOLOGY

## 2019-10-10 PROCEDURE — 36415 COLL VENOUS BLD VENIPUNCTURE: CPT

## 2019-10-10 PROCEDURE — 25500020 PHARM REV CODE 255: Performed by: OBSTETRICS & GYNECOLOGY

## 2019-10-10 PROCEDURE — 63600175 PHARM REV CODE 636 W HCPCS: Performed by: STUDENT IN AN ORGANIZED HEALTH CARE EDUCATION/TRAINING PROGRAM

## 2019-10-10 PROCEDURE — 84100 ASSAY OF PHOSPHORUS: CPT

## 2019-10-10 PROCEDURE — 80053 COMPREHEN METABOLIC PANEL: CPT

## 2019-10-10 PROCEDURE — 83735 ASSAY OF MAGNESIUM: CPT

## 2019-10-10 PROCEDURE — 85025 COMPLETE CBC W/AUTO DIFF WBC: CPT

## 2019-10-10 PROCEDURE — 20600001 HC STEP DOWN PRIVATE ROOM

## 2019-10-10 PROCEDURE — 99231 SBSQ HOSP IP/OBS SF/LOW 25: CPT | Mod: GC,,, | Performed by: OBSTETRICS & GYNECOLOGY

## 2019-10-10 RX ORDER — MAGNESIUM SULFATE HEPTAHYDRATE 40 MG/ML
2 INJECTION, SOLUTION INTRAVENOUS ONCE
Status: COMPLETED | OUTPATIENT
Start: 2019-10-10 | End: 2019-10-10

## 2019-10-10 RX ORDER — MAGNESIUM SULFATE HEPTAHYDRATE 40 MG/ML
2 INJECTION, SOLUTION INTRAVENOUS ONCE
Status: DISCONTINUED | OUTPATIENT
Start: 2019-10-10 | End: 2019-10-10

## 2019-10-10 RX ADMIN — SODIUM CHLORIDE: 0.9 INJECTION, SOLUTION INTRAVENOUS at 08:10

## 2019-10-10 RX ADMIN — IOHEXOL 10 ML: 300 INJECTION, SOLUTION INTRAVENOUS at 05:10

## 2019-10-10 RX ADMIN — MAGNESIUM SULFATE IN WATER 2 G: 40 INJECTION, SOLUTION INTRAVENOUS at 05:10

## 2019-10-10 RX ADMIN — PROCHLORPERAZINE EDISYLATE 5 MG: 5 INJECTION INTRAMUSCULAR; INTRAVENOUS at 08:10

## 2019-10-10 NOTE — PLAN OF CARE
Plan of care reviewed with patient at beginning of shift. Day + Patient C/O of no issues. Pt was NPO all day due to IR procedure. Pt went to IR for a paracentesis and a biliary drain. VSS. Afebrile. Bed locked in lowest position. Side rails up x2. Call bell within reach. BADL within reach. Rounding Q1H. Will continue to monitor.

## 2019-10-10 NOTE — NURSING
MD on call notified of pt's HR of 130s and O2 low 90s RA. Increased IVF and will continue to monitor at this time.

## 2019-10-10 NOTE — CARE UPDATE
Rapid Response Nurse Chart Check     Chart check completed, abnormal VS noted. Bedside RN Aggie contacted, no concerns verbalized at this time; stated patient is doing well post-paracentesis and placement of drain. Instructed to call [phone] for further concerns or assistance.

## 2019-10-10 NOTE — ASSESSMENT & PLAN NOTE
- Nausea/vomiting for past 5 days on admission  - Afebrile. Tachycardic, likely secondary to dehydration  - Continuous IVF  - Continue IV zofran prn and IV compazine prn  - GI/biliary consulted secondary to elevated LFTs, MRCP performed 10/7/19 showing mild CBD dilation and large amount of ascites  - EUS and ERCP performed 10/8/19 by advanced endoscopic team, recommend IR biliary drainage  - Paracentesis performed yesterday, 2.9 L removed  - IR placement of biliary drain yesterday  - Will try clear diet today

## 2019-10-10 NOTE — PLAN OF CARE
Uneventful shift. Pt c/o discomfort to biliary drain site. Minimal drainage from site. Family member at bs. IVF infusing. Pt remains NPO. Pt has remained free from injury this shift. Bed in low locked position. Call light and personal belongings within reach. Side rails up x2. Nonskid socks in place. Pt instructed to call with any needs. Will continue to monitor.

## 2019-10-10 NOTE — SUBJECTIVE & OBJECTIVE
Interval History: NAEON. Patient denies pain and nausea/vomiting. She has an appetite this morning. She is ambulating and voiding without difficulty. States that she feels much better overall.    Scheduled Meds:  Continuous Infusions:   sodium chloride 0.9% 100 mL/hr at 10/09/19 2140     PRN Meds:calcium carbonate, docusate sodium, ibuprofen, ondansetron, oxyCODONE, oxyCODONE, prochlorperazine, simethicone, sodium chloride 0.9%    Review of patient's allergies indicates:   Allergen Reactions    Gramicidin d Shortness Of Breath    Hydrochlorothiazide Other (See Comments)     Burning and tingling sensation throughoutt her body    Lisinopril Shortness Of Breath    Promethazine Shortness Of Breath and Nausea And Vomiting    Neosporin [neomycin-bacitracnzn-polymyxnb] Swelling    Polymyxin [bacitracin-polymyxin b] Swelling       Objective:     Vital Signs (Most Recent):  Temp: 98.3 °F (36.8 °C) (10/10/19 0434)  Pulse: (!) 129 (10/10/19 0434)  Resp: 18 (10/10/19 0434)  BP: 105/68 (10/10/19 0434)  SpO2: 95 % (10/10/19 0434) Vital Signs (24h Range):  Temp:  [97.6 °F (36.4 °C)-98.8 °F (37.1 °C)] 98.3 °F (36.8 °C)  Pulse:  [123-139] 129  Resp:  [16-27] 18  SpO2:  [89 %-97 %] 95 %  BP: (101-140)/(57-85) 105/68     Weight: 62.3 kg (137 lb 5.6 oz)  Body mass index is 25.12 kg/m².    Intake/Output - Last 3 Shifts       10/08 0700 - 10/09 0659 10/09 0700 - 10/10 0659 10/10 0700 - 10/11 0659    P.O. 0 0     I.V. (mL/kg) 2574 (41.3) 865 (13.9)     Total Intake(mL/kg) 2574 (41.3) 865 (13.9)     Urine (mL/kg/hr) 300 (0.2)      Other  2900     Total Output 300 2900     Net +2274 -2035            Urine Occurrence  2 x              Physical Exam:   Constitutional: She is oriented to person, place, and time. She appears well-developed and well-nourished. No distress.    HENT:   Head: Normocephalic and atraumatic.      Cardiovascular: Regular rhythm.    Tachycardic.    Pulmonary/Chest: Effort normal. No respiratory distress.         Abdominal: Soft. She exhibits distension. There is no tenderness. There is no rebound and no guarding.   Biliary drain present draining bloody fluid.             Musculoskeletal: Normal range of motion. She exhibits no edema or tenderness.       Neurological: She is alert and oriented to person, place, and time.    Skin: Skin is warm and dry.    Psychiatric: She has a normal mood and affect. Her behavior is normal. Judgment and thought content normal.       Lines/Drains/Airways     Peripheral Intravenous Line                 Peripheral IV - Single Lumen 10/07/19 1644 22 G Right Antecubital 2 days                Laboratory:  CBC:   Recent Labs   Lab 10/10/19  0446   WBC 4.77   HGB 10.3*   HCT 30.2*       and CMP:   Recent Labs   Lab 10/09/19  0455 10/10/19  0446    137   K 3.7 3.5    105   CO2 18* 19*   GLU 73 72   BUN 10 10   CREATININE 0.8 0.7   CALCIUM 7.8* 7.8*   PROT 5.3* 4.9*   ALBUMIN 2.1* 1.9*   BILITOT 9.4* 9.8*   ALKPHOS 615* 679*   * 538*   * 148*   ANIONGAP 16 13   EGFRNONAA >60.0 >60.0

## 2019-10-10 NOTE — PLAN OF CARE
Problem: Adult Inpatient Plan of Care  Goal: Plan of Care Review  Flowsheets (Taken 10/10/2019 6470)  Plan of Care Reviewed With: patient; daughter   Patient remains free from falls and injury this shift. Bed in low, locked position with call light in reach. Plan of care reviewed with pt.  Daughter at bedside. Clear liquid diet ordered this morning, pt vomited and Compazine given.  NPO then initiated for plans of checking bili drain in IR today. Patient encouraged to call for assistance when getting out of bed, SCDs in place.  Patient verbalized understanding. All belongings within reach.  Will continue to monitor.

## 2019-10-10 NOTE — ASSESSMENT & PLAN NOTE
- Pulse:  [123-139] 129  - Patient's baseline HR in 120s  - Seen by cardiology 9/17/19, tachycardia determined to be secondary to volume depletion

## 2019-10-10 NOTE — PLAN OF CARE
Pt arrived to  via stretcher for renée tube check. Name verified using two identifiers. No acute distress noted. Orders, allergies and labs reviewed. .

## 2019-10-10 NOTE — PROGRESS NOTES
[facility]  Gynecologic Oncology  Progress Note      Patient Name: Jessica Gorman  MRN: [id number]  Admission Date: 10/7/2019  Hospital Length of Stay: 3 days  Attending Provider: Jair Watt MD  Primary Care Provider: Rico Amaro MD  Principal Problem: Nausea and vomiting    Follow-up For: Procedure(s) (LRB):  ULTRASOUND, UPPER GI TRACT, ENDOSCOPIC (N/A)  ERCP (ENDOSCOPIC RETROGRADE CHOLANGIOPANCREATOGRAPHY) (N/A)  Post-Operative Day: 2 Days Post-Op  Subjective:      History of Present Illness:  Jessica Gorman is a 69 year old female with history of recurrent serous endometrial carcinoma who was admitted due to intractable nausea/vomiting. Patient states that about 5 days ago, she began having daily nausea/vomiting that she believes started after she took her potassium tablets. She reports 2-3 episodes of emesis per day since then, mostly when she tries to eat or encounters strong smells. She states that at most, she is able to drink a few sips of water or tablespoons of food per day. Her last episode of emesis was this morning due to the smell of deoderant. She is taking zofran q12h without relief. She did not undergo cycle 3 of chemotherapy last week. She states that she is able to ambulate without difficulty but feels very weak. She denies dysuria, although she states that her urine is much darker. She had a normal BM yesterday. She denies headache, change in vision, SOB, CP, and abdominal pain.    Hospital Course:  10/07/2019 - Admitted to Gyn Onc service for IVF and electrolyte repletion secondary to nausea/vomiting and dehydration. GI/biliary consulted secondary to elevated LFTs.  10/08/2019 - Two episodes of emesis yesterday after taking zofran. MRCP performed yesterday evening showing mildly dilated CBD and large amount of ascites. EUS and ERCP performed by biliary team.  10/09/2019 - Episode of emesis overnight after drinking water. Plan for paracentesis and IR consult for  percutaneous biliary drainage today.  10/10/2019 - IR placement of biliary drain and paracentesis yesterday, 2.9L removed.     Interval History: NAEON. Patient denies pain and nausea/vomiting. She has an appetite this morning. She is ambulating and voiding without difficulty. States that she feels much better overall.    Scheduled Meds:  Continuous Infusions:   sodium chloride 0.9% 100 mL/hr at 10/09/19 2140     PRN Meds:calcium carbonate, docusate sodium, ibuprofen, ondansetron, oxyCODONE, oxyCODONE, prochlorperazine, simethicone, sodium chloride 0.9%    Review of patient's allergies indicates:   Allergen Reactions    Gramicidin d Shortness Of Breath    Hydrochlorothiazide Other (See Comments)     Burning and tingling sensation throughoutt her body    Lisinopril Shortness Of Breath    Promethazine Shortness Of Breath and Nausea And Vomiting    Neosporin [neomycin-bacitracnzn-polymyxnb] Swelling    Polymyxin [bacitracin-polymyxin b] Swelling       Objective:     Vital Signs (Most Recent):  Temp: 98.3 °F (36.8 °C) (10/10/19 0434)  Pulse: (!) 129 (10/10/19 0434)  Resp: 18 (10/10/19 0434)  BP: 105/68 (10/10/19 0434)  SpO2: 95 % (10/10/19 0434) Vital Signs (24h Range):  Temp:  [97.6 °F (36.4 °C)-98.8 °F (37.1 °C)] 98.3 °F (36.8 °C)  Pulse:  [123-139] 129  Resp:  [16-27] 18  SpO2:  [89 %-97 %] 95 %  BP: (101-140)/(57-85) 105/68     Weight: 62.3 kg (137 lb 5.6 oz)  Body mass index is 25.12 kg/m².    Intake/Output - Last 3 Shifts       10/08 0700 - 10/09 0659 10/09 0700 - 10/10 0659 10/10 0700 - 10/11 0659    P.O. 0 0     I.V. (mL/kg) 2574 (41.3) 865 (13.9)     Total Intake(mL/kg) 2574 (41.3) 865 (13.9)     Urine (mL/kg/hr) 300 (0.2)      Other  2900     Total Output 300 2900     Net +2274 -2035            Urine Occurrence  2 x              Physical Exam:   Constitutional: She is oriented to person, place, and time. She appears well-developed and well-nourished. No distress.    HENT:   Head: Normocephalic and  atraumatic.      Cardiovascular: Regular rhythm.    Tachycardic.    Pulmonary/Chest: Effort normal. No respiratory distress.        Abdominal: Soft. She exhibits distension. There is no tenderness. There is no rebound and no guarding.   Biliary drain present draining bloody fluid.             Musculoskeletal: Normal range of motion. She exhibits no edema or tenderness.       Neurological: She is alert and oriented to person, place, and time.    Skin: Skin is warm and dry.    Psychiatric: She has a normal mood and affect. Her behavior is normal. Judgment and thought content normal.       Lines/Drains/Airways     Peripheral Intravenous Line                 Peripheral IV - Single Lumen 10/07/19 1644 22 G Right Antecubital 2 days                Laboratory:  CBC:   Recent Labs   Lab 10/10/19  0446   WBC 4.77   HGB 10.3*   HCT 30.2*       and CMP:   Recent Labs   Lab 10/09/19  0455 10/10/19  0446    137   K 3.7 3.5    105   CO2 18* 19*   GLU 73 72   BUN 10 10   CREATININE 0.8 0.7   CALCIUM 7.8* 7.8*   PROT 5.3* 4.9*   ALBUMIN 2.1* 1.9*   BILITOT 9.4* 9.8*   ALKPHOS 615* 679*   * 538*   * 148*   ANIONGAP 16 13   EGFRNONAA >60.0 >60.0     Assessment/Plan:     * Nausea and vomiting  - Nausea/vomiting for past 5 days on admission  - Afebrile. Tachycardic, likely secondary to dehydration  - Continuous IVF  - Continue IV zofran prn and IV compazine prn  - GI/biliary consulted secondary to elevated LFTs, MRCP performed 10/7/19 showing mild CBD dilation and large amount of ascites  - EUS and ERCP performed 10/8/19 by advanced endoscopic team, recommend IR biliary drainage  - Paracentesis performed yesterday, 2.9 L removed  - IR placement of biliary drain yesterday  - Will try clear diet today    Hypomagnesemia  - Mg 1.3 on admission  - S/p IV Mg 3g  - Continue to monitor and replace as needed    Endometrial carcinoma, serous  - CA-125 155> 1795  - CT A/P on 10/3/19 showed stable disease  - See gyn  onc history above    Hypertension  - BP: ()/(53-82) 108/58  - Patient's home amlodipine was recently discontinued due to low to normal range pressures    Hyperbilirubinemia  - See plan under nausea/vomiting    Tachycardia  - Pulse:  [123-139] 129  - Patient's baseline HR in 120s  - Seen by cardiology 9/17/19, tachycardia determined to be secondary to volume depletion      VTE Risk Mitigation (From admission, onward)         Ordered     Place sequential compression device  Until discontinued      10/09/19 0638     IP VTE LOW RISK PATIENT  Once      10/07/19 1408     Place ASH hose  Until discontinued      10/07/19 1408                Opal Aleman MD  Gynecologic Oncology  [facility]

## 2019-10-10 NOTE — H&P
Inpatient Radiology Pre-procedure Note    History of Present Illness:  Jessica Gorman is a 69 y.o. female who presents for int/external biliary tube check and possible exchange.  Admission H&P reviewed.  Past Medical History:   Diagnosis Date    Abnormal Pap smear     1984    Anorexia 8/30/2019    Cancer     Chemotherapy-induced neutropenia 4/1/2015    Dehydration 10/7/2019    Elevated cancer antigen 125 (CA-125) 9/5/2018    Encounter for blood transfusion     FH: breast cancer in relative when <45 years old 3/17/2016    Hyperbilirubinemia 10/8/2019    Hypertension     Hypokalemia 9/9/2019    Other complicated headache syndrome 10/4/2019    Other screening mammogram 9/17/2015    Postmenopausal bleeding     Pulmonary nodules/lesions, multiple 4/25/2019    Rash 9/17/2015    Uterine cancer     Weight loss 12/17/2015    Well woman exam with routine gynecological exam 9/14/2016     Past Surgical History:   Procedure Laterality Date    COLPOSCOPY      DILATION AND CURETTAGE OF UTERUS  10/17/14    ENDOSCOPIC ULTRASOUND OF UPPER GASTROINTESTINAL TRACT N/A 10/8/2019    Procedure: ULTRASOUND, UPPER GI TRACT, ENDOSCOPIC;  Surgeon: Gabe Aguirre MD;  Location: [facility] ([address] [place] [address]);  Service: Endoscopy;  Laterality: N/A;    ERCP N/A 10/8/2019    Procedure: ERCP (ENDOSCOPIC RETROGRADE CHOLANGIOPANCREATOGRAPHY);  Surgeon: Gabe Aguirre MD;  Location: [facility] ([address] [place] [address]);  Service: Endoscopy;  Laterality: N/A;    HYSTERECTOMY  12/4/2014    radiacl hysterectomy    SC REMOVAL OF OVARY/TUBE(S)         Review of Systems:   As documented in primary team H&P    Home Meds:   Prior to Admission medications    Medication Sig Start Date End Date Taking? Authorizing Provider   multivitamin (ONE DAILY MULTIVITAMIN) per tablet Take 1 tablet by mouth once daily.   Yes Historical Provider, MD   ondansetron (ZOFRAN-ODT) 8 MG TbDL Take 1 tablet (8 mg total) by mouth every 12 (twelve) hours as needed. 1/22/19 1/22/20 Yes  Vesna Bales, BLAKE   potassium chloride SA (K-DUR,KLOR-CON) 20 MEQ tablet Take 1 tablet (20 mEq total) by mouth once daily. 9/30/19 9/29/20 Yes Jair Watt MD   amLODIPine (NORVASC) 10 MG tablet Take 1 tablet (10 mg total) by mouth once daily. 6/17/19   Rico Amaro MD   dexAMETHasone (DECADRON) 4 MG Tab Take 5 tablets every 6 hours for 24 hours prior to chemotherapy 8/15/19   Vesna Bales NP   megestrol (MEGACE ES) 625 mg/5 mL Susp Take 5 mLs (625 mg total) by mouth once daily. 8/30/19 8/29/20  Jair Watt MD   potassium bicarbonate & potassium chloride (K-LYTE CL) 25 mEq TbEF Take 1 tablet (25 mEq total) by mouth once daily. 9/30/19   Jair Watt MD     Scheduled Meds:    magnesium sulfate IVPB  2 g Intravenous Once     Continuous Infusions:    sodium chloride 0.9% Stopped (10/10/19 1524)     PRN Meds:calcium carbonate, docusate sodium, ibuprofen, ondansetron, oxyCODONE, oxyCODONE, prochlorperazine, simethicone, sodium chloride 0.9%  Anticoagulants/Antiplatelets: no anticoagulation    Allergies:   Review of patient's allergies indicates:   Allergen Reactions    Gramicidin d Shortness Of Breath    Hydrochlorothiazide Other (See Comments)     Burning and tingling sensation throughoutt her body    Lisinopril Shortness Of Breath    Promethazine Shortness Of Breath and Nausea And Vomiting    Neosporin [neomycin-bacitracnzn-polymyxnb] Swelling    Polymyxin [bacitracin-polymyxin b] Swelling     Sedation Hx: have not been any systemic reactions    Labs:  Recent Labs   Lab 10/09/19  0904   INR 1.0  1.0       Recent Labs   Lab 10/10/19  0446   WBC 4.77   HGB 10.3*   HCT 30.2*   MCV 83         Recent Labs   Lab 10/10/19  0446   GLU 72      K 3.5      CO2 19*   BUN 10   CREATININE 0.7   CALCIUM 7.8*   MG 1.5*   *   *   ALBUMIN 1.9*   BILITOT 9.8*         Vitals:  Temp: 97.4 °F (36.3 °C) (10/10/19 1127)  Pulse: (!) 128 (10/10/19 1127)  Resp:  14 (10/10/19 1127)  BP: 113/66 (10/10/19 1127)  SpO2: 95 % (10/10/19 1127)     Physical Exam:  ASA: 3  Mallampati: 2    General: no acute distress  Mental Status: alert and oriented to person, place and time  HEENT: normocephalic, atraumatic  Chest: unlabored breathing  Abdomen: nondistended  Extremity: moves all extremities    Plan: int/external biliary tube check with possible exchange  Sedation Plan: Moderate    Madelaine Ojeda MD  Resident  Department of Radiology  Pager: [phone]

## 2019-10-10 NOTE — PROCEDURES
Radiology Post-Procedure Note    Pre Op Diagnosis: Biliary stricture    Post Op Diagnosis: Same    Procedure: Percutaneous transhepatic cholangiography through existing catheter    Procedure performed by: Harsha Montiel MD, Madelaine Ojeda MD     Written Informed Consent Obtained: Yes    Specimen Removed: NO    Estimated Blood Loss: Minimal    Findings:    Under fluoroscopic guidance, a preexisting left internal external biliary catheter was accessed.  Contrast was injected revealing opacification of the bilary tree with forward flow into the small bowel.  No evidence for obstruction.  Catheter was flushed with sterile saline.  The catheter was left to gravity drainage and dressed with a sterile dressing.    There were no complications.  Please see Imaging report for further details.    To note, the patient's HR was elevated through the procedure in the 140s (baseline appears in 120s).  Pt refused treatment as it is likely related to anxiety and would prefer to wait for her HR to improve when she returns to the unit.  Primary team made aware and further follow-up recommended.    Plan:  Monitor bilirubin and expect downward trend.  Bloody output should decrease in time.  Should bilirubin not trend down, please contact IR to evaluate for upsizing the current tube.      Madelaine Ojeda MD  Resident  Department of Radiology  Pager: [phone]

## 2019-10-11 PROBLEM — F41.9 ANXIETY: Status: ACTIVE | Noted: 2019-10-11

## 2019-10-11 LAB
ALBUMIN SERPL BCP-MCNC: 1.9 G/DL (ref 3.5–5.2)
ALBUMIN SERPL BCP-MCNC: 1.9 G/DL (ref 3.5–5.2)
ALP SERPL-CCNC: 665 U/L (ref 55–135)
ALP SERPL-CCNC: 665 U/L (ref 55–135)
ALT SERPL W/O P-5'-P-CCNC: 132 U/L (ref 10–44)
ALT SERPL W/O P-5'-P-CCNC: 132 U/L (ref 10–44)
ANION GAP SERPL CALC-SCNC: 13 MMOL/L (ref 8–16)
ANION GAP SERPL CALC-SCNC: 13 MMOL/L (ref 8–16)
AST SERPL-CCNC: 377 U/L (ref 10–40)
AST SERPL-CCNC: 377 U/L (ref 10–40)
BASOPHILS # BLD AUTO: 0.01 K/UL (ref 0–0.2)
BASOPHILS # BLD AUTO: 0.01 K/UL (ref 0–0.2)
BASOPHILS NFR BLD: 0.2 % (ref 0–1.9)
BASOPHILS NFR BLD: 0.2 % (ref 0–1.9)
BILIRUB SERPL-MCNC: 5.4 MG/DL (ref 0.1–1)
BILIRUB SERPL-MCNC: 5.4 MG/DL (ref 0.1–1)
BUN SERPL-MCNC: 11 MG/DL (ref 8–23)
BUN SERPL-MCNC: 11 MG/DL (ref 8–23)
CALCIUM SERPL-MCNC: 7.7 MG/DL (ref 8.7–10.5)
CALCIUM SERPL-MCNC: 7.7 MG/DL (ref 8.7–10.5)
CHLORIDE SERPL-SCNC: 105 MMOL/L (ref 95–110)
CHLORIDE SERPL-SCNC: 105 MMOL/L (ref 95–110)
CO2 SERPL-SCNC: 20 MMOL/L (ref 23–29)
CO2 SERPL-SCNC: 20 MMOL/L (ref 23–29)
CREAT SERPL-MCNC: 0.6 MG/DL (ref 0.5–1.4)
CREAT SERPL-MCNC: 0.6 MG/DL (ref 0.5–1.4)
DIFFERENTIAL METHOD: ABNORMAL
DIFFERENTIAL METHOD: ABNORMAL
EOSINOPHIL # BLD AUTO: 0 K/UL (ref 0–0.5)
EOSINOPHIL # BLD AUTO: 0 K/UL (ref 0–0.5)
EOSINOPHIL NFR BLD: 0.4 % (ref 0–8)
EOSINOPHIL NFR BLD: 0.4 % (ref 0–8)
ERYTHROCYTE [DISTWIDTH] IN BLOOD BY AUTOMATED COUNT: 18.5 % (ref 11.5–14.5)
ERYTHROCYTE [DISTWIDTH] IN BLOOD BY AUTOMATED COUNT: 18.5 % (ref 11.5–14.5)
EST. GFR  (AFRICAN AMERICAN): >60 ML/MIN/1.73 M^2
EST. GFR  (AFRICAN AMERICAN): >60 ML/MIN/1.73 M^2
EST. GFR  (NON AFRICAN AMERICAN): >60 ML/MIN/1.73 M^2
EST. GFR  (NON AFRICAN AMERICAN): >60 ML/MIN/1.73 M^2
GLUCOSE SERPL-MCNC: 90 MG/DL (ref 70–110)
GLUCOSE SERPL-MCNC: 90 MG/DL (ref 70–110)
HCT VFR BLD AUTO: 30.3 % (ref 37–48.5)
HCT VFR BLD AUTO: 30.3 % (ref 37–48.5)
HGB BLD-MCNC: 10.3 G/DL (ref 12–16)
HGB BLD-MCNC: 10.3 G/DL (ref 12–16)
IMM GRANULOCYTES # BLD AUTO: 0.06 K/UL (ref 0–0.04)
IMM GRANULOCYTES # BLD AUTO: 0.06 K/UL (ref 0–0.04)
IMM GRANULOCYTES NFR BLD AUTO: 1.1 % (ref 0–0.5)
IMM GRANULOCYTES NFR BLD AUTO: 1.1 % (ref 0–0.5)
LYMPHOCYTES # BLD AUTO: 0.4 K/UL (ref 1–4.8)
LYMPHOCYTES # BLD AUTO: 0.4 K/UL (ref 1–4.8)
LYMPHOCYTES NFR BLD: 7.6 % (ref 18–48)
LYMPHOCYTES NFR BLD: 7.6 % (ref 18–48)
MAGNESIUM SERPL-MCNC: 1.7 MG/DL (ref 1.6–2.6)
MAGNESIUM SERPL-MCNC: 1.7 MG/DL (ref 1.6–2.6)
MCH RBC QN AUTO: 27.4 PG (ref 27–31)
MCH RBC QN AUTO: 27.4 PG (ref 27–31)
MCHC RBC AUTO-ENTMCNC: 34 G/DL (ref 32–36)
MCHC RBC AUTO-ENTMCNC: 34 G/DL (ref 32–36)
MCV RBC AUTO: 81 FL (ref 82–98)
MCV RBC AUTO: 81 FL (ref 82–98)
MONOCYTES # BLD AUTO: 0.5 K/UL (ref 0.3–1)
MONOCYTES # BLD AUTO: 0.5 K/UL (ref 0.3–1)
MONOCYTES NFR BLD: 9.8 % (ref 4–15)
MONOCYTES NFR BLD: 9.8 % (ref 4–15)
NEUTROPHILS # BLD AUTO: 4.3 K/UL (ref 1.8–7.7)
NEUTROPHILS # BLD AUTO: 4.3 K/UL (ref 1.8–7.7)
NEUTROPHILS NFR BLD: 80.9 % (ref 38–73)
NEUTROPHILS NFR BLD: 80.9 % (ref 38–73)
NRBC BLD-RTO: 1 /100 WBC
NRBC BLD-RTO: 1 /100 WBC
PHOSPHATE SERPL-MCNC: 3 MG/DL (ref 2.7–4.5)
PHOSPHATE SERPL-MCNC: 3 MG/DL (ref 2.7–4.5)
PLATELET # BLD AUTO: 254 K/UL (ref 150–350)
PLATELET # BLD AUTO: 254 K/UL (ref 150–350)
PMV BLD AUTO: 11.9 FL (ref 9.2–12.9)
PMV BLD AUTO: 11.9 FL (ref 9.2–12.9)
POTASSIUM SERPL-SCNC: 3.3 MMOL/L (ref 3.5–5.1)
POTASSIUM SERPL-SCNC: 3.3 MMOL/L (ref 3.5–5.1)
PROT SERPL-MCNC: 4.9 G/DL (ref 6–8.4)
PROT SERPL-MCNC: 4.9 G/DL (ref 6–8.4)
RBC # BLD AUTO: 3.76 M/UL (ref 4–5.4)
RBC # BLD AUTO: 3.76 M/UL (ref 4–5.4)
SODIUM SERPL-SCNC: 138 MMOL/L (ref 136–145)
SODIUM SERPL-SCNC: 138 MMOL/L (ref 136–145)
WBC # BLD AUTO: 5.28 K/UL (ref 3.9–12.7)
WBC # BLD AUTO: 5.28 K/UL (ref 3.9–12.7)

## 2019-10-11 PROCEDURE — 84100 ASSAY OF PHOSPHORUS: CPT

## 2019-10-11 PROCEDURE — 63600175 PHARM REV CODE 636 W HCPCS: Performed by: STUDENT IN AN ORGANIZED HEALTH CARE EDUCATION/TRAINING PROGRAM

## 2019-10-11 PROCEDURE — 93010 ELECTROCARDIOGRAM REPORT: CPT | Mod: ,,, | Performed by: INTERNAL MEDICINE

## 2019-10-11 PROCEDURE — 36415 COLL VENOUS BLD VENIPUNCTURE: CPT

## 2019-10-11 PROCEDURE — 99231 SBSQ HOSP IP/OBS SF/LOW 25: CPT | Mod: GC,,, | Performed by: OBSTETRICS & GYNECOLOGY

## 2019-10-11 PROCEDURE — 25000003 PHARM REV CODE 250: Performed by: OBSTETRICS & GYNECOLOGY

## 2019-10-11 PROCEDURE — 80053 COMPREHEN METABOLIC PANEL: CPT

## 2019-10-11 PROCEDURE — 20600001 HC STEP DOWN PRIVATE ROOM

## 2019-10-11 PROCEDURE — 83735 ASSAY OF MAGNESIUM: CPT

## 2019-10-11 PROCEDURE — 93005 ELECTROCARDIOGRAM TRACING: CPT

## 2019-10-11 PROCEDURE — 85025 COMPLETE CBC W/AUTO DIFF WBC: CPT

## 2019-10-11 PROCEDURE — 99231 PR SUBSEQUENT HOSPITAL CARE,LEVL I: ICD-10-PCS | Mod: GC,,, | Performed by: OBSTETRICS & GYNECOLOGY

## 2019-10-11 PROCEDURE — 93010 EKG 12-LEAD: ICD-10-PCS | Mod: ,,, | Performed by: INTERNAL MEDICINE

## 2019-10-11 PROCEDURE — 25000003 PHARM REV CODE 250: Performed by: STUDENT IN AN ORGANIZED HEALTH CARE EDUCATION/TRAINING PROGRAM

## 2019-10-11 RX ORDER — SCOLOPAMINE TRANSDERMAL SYSTEM 1 MG/1
1 PATCH, EXTENDED RELEASE TRANSDERMAL
Status: DISCONTINUED | OUTPATIENT
Start: 2019-10-11 | End: 2019-10-13

## 2019-10-11 RX ORDER — ONDANSETRON 2 MG/ML
8 INJECTION INTRAMUSCULAR; INTRAVENOUS EVERY 6 HOURS
Status: DISCONTINUED | OUTPATIENT
Start: 2019-10-12 | End: 2019-10-11

## 2019-10-11 RX ORDER — ONDANSETRON 2 MG/ML
4 INJECTION INTRAMUSCULAR; INTRAVENOUS EVERY 6 HOURS PRN
Status: DISCONTINUED | OUTPATIENT
Start: 2019-10-11 | End: 2019-10-11

## 2019-10-11 RX ORDER — ONDANSETRON 2 MG/ML
8 INJECTION INTRAMUSCULAR; INTRAVENOUS EVERY 6 HOURS PRN
Status: DISCONTINUED | OUTPATIENT
Start: 2019-10-11 | End: 2019-10-12

## 2019-10-11 RX ORDER — ALPRAZOLAM 0.5 MG/1
0.5 TABLET ORAL 3 TIMES DAILY PRN
Status: DISCONTINUED | OUTPATIENT
Start: 2019-10-11 | End: 2019-10-12

## 2019-10-11 RX ORDER — POTASSIUM CHLORIDE 7.45 MG/ML
10 INJECTION INTRAVENOUS
Status: COMPLETED | OUTPATIENT
Start: 2019-10-11 | End: 2019-10-12

## 2019-10-11 RX ORDER — ONDANSETRON 4 MG/1
4 TABLET, FILM COATED ORAL EVERY 6 HOURS PRN
Status: DISCONTINUED | OUTPATIENT
Start: 2019-10-11 | End: 2019-10-11

## 2019-10-11 RX ORDER — METOCLOPRAMIDE HYDROCHLORIDE 5 MG/ML
10 INJECTION INTRAMUSCULAR; INTRAVENOUS EVERY 6 HOURS PRN
Status: DISCONTINUED | OUTPATIENT
Start: 2019-10-11 | End: 2019-10-12

## 2019-10-11 RX ADMIN — SODIUM CHLORIDE 1000 ML: 0.9 INJECTION, SOLUTION INTRAVENOUS at 10:10

## 2019-10-11 RX ADMIN — ONDANSETRON HYDROCHLORIDE 4 MG: 4 TABLET, FILM COATED ORAL at 01:10

## 2019-10-11 RX ADMIN — SODIUM CHLORIDE: 0.9 INJECTION, SOLUTION INTRAVENOUS at 01:10

## 2019-10-11 RX ADMIN — ALPRAZOLAM 0.5 MG: 0.5 TABLET ORAL at 06:10

## 2019-10-11 RX ADMIN — POTASSIUM CHLORIDE 10 MEQ: 10 INJECTION, SOLUTION INTRAVENOUS at 11:10

## 2019-10-11 RX ADMIN — POTASSIUM CHLORIDE 10 MEQ: 10 INJECTION, SOLUTION INTRAVENOUS at 10:10

## 2019-10-11 RX ADMIN — SODIUM CHLORIDE: 0.9 INJECTION, SOLUTION INTRAVENOUS at 05:10

## 2019-10-11 RX ADMIN — SCOPALAMINE 1 PATCH: 1 PATCH, EXTENDED RELEASE TRANSDERMAL at 09:10

## 2019-10-11 RX ADMIN — ONDANSETRON 8 MG: 2 INJECTION INTRAMUSCULAR; INTRAVENOUS at 09:10

## 2019-10-11 RX ADMIN — ONDANSETRON 4 MG: 2 INJECTION INTRAMUSCULAR; INTRAVENOUS at 05:10

## 2019-10-11 NOTE — ASSESSMENT & PLAN NOTE
- Nausea/vomiting for past 5 days on admission  - Afebrile. Tachycardic, likely secondary to dehydration  - Continuous IVF  - Patient states that ODT zofran makes her nauseous and IV compazine makes her too sedated. Will try PO (nondissolving) zofran prn today.  - GI/biliary consulted secondary to elevated LFTs, MRCP performed 10/7/19 showing mild CBD dilation and large amount of ascites  - EUS and ERCP performed 10/8/19 by advanced endoscopic team, recommend IR biliary drainage  - Paracentesis performed 10/9/19, 2.9 L removed  - Biliary catheter flushed yesterday, now draining dark green/brown fluid  - Tolerating clear liquid diet

## 2019-10-11 NOTE — SUBJECTIVE & OBJECTIVE
Interval History: NAEON. Patient states that she has had no nausea/vomiting since her biliary catheter was flushed yesterday and is tolerating clears. She denies pain. She reports feeling nervous throughout the day, especially during the IR procedures. She is ambulating and voiding without difficulty. Passing flatus. Denies CP, SOB, and dysuria.    Scheduled Meds:  Continuous Infusions:   sodium chloride 0.9% 100 mL/hr at 10/11/19 0530     PRN Meds:calcium carbonate, docusate sodium, ibuprofen, ondansetron, ondansetron, oxyCODONE, oxyCODONE, simethicone, sodium chloride 0.9%    Review of patient's allergies indicates:   Allergen Reactions    Gramicidin d Shortness Of Breath    Hydrochlorothiazide Other (See Comments)     Burning and tingling sensation throughoutt her body    Lisinopril Shortness Of Breath    Promethazine Shortness Of Breath and Nausea And Vomiting    Neosporin [neomycin-bacitracnzn-polymyxnb] Swelling    Polymyxin [bacitracin-polymyxin b] Swelling       Objective:     Vital Signs (Most Recent):  Temp: 97.9 °F (36.6 °C) (10/11/19 0437)  Pulse: (!) 135 (10/11/19 0437)  Resp: 18 (10/11/19 0437)  BP: 106/68 (10/11/19 0437)  SpO2: 95 % (10/11/19 0437) Vital Signs (24h Range):  Temp:  [97.3 °F (36.3 °C)-98.7 °F (37.1 °C)] 97.9 °F (36.6 °C)  Pulse:  [123-144] 135  Resp:  [14-20] 18  SpO2:  [95 %-97 %] 95 %  BP: (106-124)/(66-75) 106/68     Weight: 62.3 kg (137 lb 5.6 oz)  Body mass index is 25.12 kg/m².    Intake/Output - Last 3 Shifts       10/09 0700 - 10/10 0659 10/10 0700 - 10/11 0659    P.O. 0 580    I.V. (mL/kg) 865 (13.9) 2108.3 (33.8)    Total Intake(mL/kg) 865 (13.9) 2688.3 (43.2)    Urine (mL/kg/hr)  300 (0.2)    Emesis/NG output  0    Drains  390    Other 2900     Total Output 2900 690    Net -2035 +1998.3          Urine Occurrence 2 x 1 x    Emesis Occurrence  1 x             Physical Exam:   Constitutional: She is oriented to person, place, and time. She appears well-developed and  well-nourished. No distress.    HENT:   Head: Normocephalic and atraumatic.      Cardiovascular: Regular rhythm.    Tachycardic.    Pulmonary/Chest: Effort normal. No respiratory distress.        Abdominal: Soft. She exhibits distension. There is no tenderness. There is no rebound and no guarding.   Biliary drain present draining dark green/brown fluid.             Musculoskeletal: Normal range of motion. She exhibits no edema or tenderness.       Neurological: She is alert and oriented to person, place, and time.    Skin: Skin is warm and dry.    Psychiatric: She has a normal mood and affect. Her behavior is normal. Judgment and thought content normal.       Lines/Drains/Airways     Drain                 Biliary Tube 10/10/19 1500 RLQ less than 1 day          Peripheral Intravenous Line                 Peripheral IV - Single Lumen 10/07/19 1644 22 G Right Antecubital 3 days                Laboratory:  CBC:   Recent Labs   Lab 10/10/19  0446 10/11/19  0357   WBC 4.77 5.28  5.28   HGB 10.3* 10.3*  10.3*   HCT 30.2* 30.3*  30.3*    254  254    and CMP:   Recent Labs   Lab 10/10/19  0446 10/11/19  0357    138  138   K 3.5 3.3*  3.3*    105  105   CO2 19* 20*  20*   GLU 72 90  90   BUN 10 11  11   CREATININE 0.7 0.6  0.6   CALCIUM 7.8* 7.7*  7.7*   PROT 4.9* 4.9*  4.9*   ALBUMIN 1.9* 1.9*  1.9*   BILITOT 9.8* 5.4*  5.4*   ALKPHOS 679* 665*  665*   * 377*  377*   * 132*  132*   ANIONGAP 13 13  13   EGFRNONAA >60.0 >60.0  >60.0

## 2019-10-11 NOTE — PROGRESS NOTES
10/11/19 0437   Vital Signs   Temp 97.9 °F (36.6 °C)   Temp src Oral   Pulse (!) 135   Heart Rate Source Monitor   Resp 18   SpO2 95 %   Pulse Oximetry Type Intermittent   O2 Device (Oxygen Therapy) room air   /68   MAP (mmHg) 82   BP Location Left arm   BP Method Automatic   Patient Position Lying     Notified MD. Pt asymptomatic and resting. STAT EKG ordered. Will continue to closely monitor.

## 2019-10-11 NOTE — CARE UPDATE
Rapid Response Nurse Chart Check     Chart check completed, abnormal VS noted. Pt. Remains tachycardic. Bedside RN Ayesha contacted, no concerns verbalized at this time, instructed to call [phone] for further concerns or assistance.

## 2019-10-11 NOTE — ASSESSMENT & PLAN NOTE
- Pulse:  [123-144] 135  - Patient's baseline HR in 120s  - Seen by cardiology 9/17/19, tachycardia determined to be secondary to volume depletion  - EKG this AM showed sinus tach

## 2019-10-11 NOTE — PLAN OF CARE
POC reviewed with patient and daughter; understanding verbalized. -141. Notified MD. STAT EKG ordered, with sinus tach results. NS @ 100. Bili drain with dark green output. Pt remains up with assist. Free from falls and injuries this shift. Clear liquid diet. Good appetite. Pt voids joan urine in hat via bedside commode. No BM's this shift. Pt instructed to call when getting OOB. Pt. with nonskid footwear on, bed in lowest position, and locked with bed rails up x 2. Daughter @ bedside. Pt. has call light and personal items within reach. All questions and concerns addressed at this time. Will continue to monitor.

## 2019-10-11 NOTE — PLAN OF CARE
Problem: Adult Inpatient Plan of Care  Goal: Plan of Care Review  Outcome: Ongoing, Progressing    Plan of care reviewed with patient and family this shift. 2 episodes of emesis this shift. PO zofran given as ordered. IVF continued. Patient and daughter educated on drain care. Heart rate remains elevated, patient asymptomatic. BP stable. Maintaining saturations on room air. All questions and concerns addressed. Will continue to monitor.

## 2019-10-11 NOTE — PROGRESS NOTES
[facility]  Gynecologic Oncology  Progress Note      Patient Name: Jessica Gorman  MRN: [id number]  Admission Date: 10/7/2019  Hospital Length of Stay: 4 days  Attending Provider: Jair Watt MD  Primary Care Provider: Rico Amaro MD  Principal Problem: Nausea and vomiting    Follow-up For: Procedure(s) (LRB):  ULTRASOUND, UPPER GI TRACT, ENDOSCOPIC (N/A)  ERCP (ENDOSCOPIC RETROGRADE CHOLANGIOPANCREATOGRAPHY) (N/A)  Post-Operative Day: 3 Days Post-Op  Subjective:      History of Present Illness:  Jessica Gorman is a 69 year old female with history of recurrent serous endometrial carcinoma who was admitted due to intractable nausea/vomiting. Patient states that about 5 days ago, she began having daily nausea/vomiting that she believes started after she took her potassium tablets. She reports 2-3 episodes of emesis per day since then, mostly when she tries to eat or encounters strong smells. She states that at most, she is able to drink a few sips of water or tablespoons of food per day. Her last episode of emesis was this morning due to the smell of deoderant. She is taking zofran q12h without relief. She did not undergo cycle 3 of chemotherapy last week. She states that she is able to ambulate without difficulty but feels very weak. She denies dysuria, although she states that her urine is much darker. She had a normal BM yesterday. She denies headache, change in vision, SOB, CP, and abdominal pain.    Hospital Course:  10/07/2019 - Admitted to Gyn Onc service for IVF and electrolyte repletion secondary to nausea/vomiting and dehydration. GI/biliary consulted secondary to elevated LFTs.  10/08/2019 - Two episodes of emesis yesterday after taking zofran. MRCP performed yesterday evening showing mildly dilated CBD and large amount of ascites. EUS and ERCP performed by biliary team.  10/09/2019 - Episode of emesis overnight after drinking water. Plan for paracentesis and IR consult for  percutaneous biliary drainage today.  10/10/2019 - IR placement of biliary drain and paracentesis yesterday, 2.9L removed. Biliary catheter flushed and draining bilious fluid.   10/11/2019 - NAEON. No nausea/vomiting since catheter was flushed yesterday, tolerating PO. Liver enzymes downtrending.     Interval History: NAEON. Patient states that she has had no nausea/vomiting since her biliary catheter was flushed yesterday and is tolerating clears. She denies pain. She reports feeling nervous throughout the day, especially during the IR procedures. She is ambulating and voiding without difficulty. Passing flatus. Denies CP, SOB, and dysuria.    Scheduled Meds:  Continuous Infusions:   sodium chloride 0.9% 100 mL/hr at 10/11/19 0530     PRN Meds:calcium carbonate, docusate sodium, ibuprofen, ondansetron, ondansetron, oxyCODONE, oxyCODONE, simethicone, sodium chloride 0.9%    Review of patient's allergies indicates:   Allergen Reactions    Gramicidin d Shortness Of Breath    Hydrochlorothiazide Other (See Comments)     Burning and tingling sensation throughoutt her body    Lisinopril Shortness Of Breath    Promethazine Shortness Of Breath and Nausea And Vomiting    Neosporin [neomycin-bacitracnzn-polymyxnb] Swelling    Polymyxin [bacitracin-polymyxin b] Swelling       Objective:     Vital Signs (Most Recent):  Temp: 97.9 °F (36.6 °C) (10/11/19 0437)  Pulse: (!) 135 (10/11/19 0437)  Resp: 18 (10/11/19 0437)  BP: 106/68 (10/11/19 0437)  SpO2: 95 % (10/11/19 0437) Vital Signs (24h Range):  Temp:  [97.3 °F (36.3 °C)-98.7 °F (37.1 °C)] 97.9 °F (36.6 °C)  Pulse:  [123-144] 135  Resp:  [14-20] 18  SpO2:  [95 %-97 %] 95 %  BP: (106-124)/(66-75) 106/68     Weight: 62.3 kg (137 lb 5.6 oz)  Body mass index is 25.12 kg/m².    Intake/Output - Last 3 Shifts       10/09 0700 - 10/10 0659 10/10 0700 - 10/11 0659    P.O. 0 580    I.V. (mL/kg) 865 (13.9) 2108.3 (33.8)    Total Intake(mL/kg) 865 (13.9) 2688.3 (43.2)    Urine  (mL/kg/hr)  300 (0.2)    Emesis/NG output  0    Drains  390    Other 2900     Total Output 2900 690    Net -2035 +1998.3          Urine Occurrence 2 x 1 x    Emesis Occurrence  1 x             Physical Exam:   Constitutional: She is oriented to person, place, and time. She appears well-developed and well-nourished. No distress.    HENT:   Head: Normocephalic and atraumatic.      Cardiovascular: Regular rhythm.    Tachycardic.    Pulmonary/Chest: Effort normal. No respiratory distress.        Abdominal: Soft. She exhibits distension. There is no tenderness. There is no rebound and no guarding.   Biliary drain present draining dark green/brown fluid.             Musculoskeletal: Normal range of motion. She exhibits no edema or tenderness.       Neurological: She is alert and oriented to person, place, and time.    Skin: Skin is warm and dry.    Psychiatric: She has a normal mood and affect. Her behavior is normal. Judgment and thought content normal.       Lines/Drains/Airways     Drain                 Biliary Tube 10/10/19 1500 RLQ less than 1 day          Peripheral Intravenous Line                 Peripheral IV - Single Lumen 10/07/19 1644 22 G Right Antecubital 3 days                Laboratory:  CBC:   Recent Labs   Lab 10/10/19  0446 10/11/19  0357   WBC 4.77 5.28  5.28   HGB 10.3* 10.3*  10.3*   HCT 30.2* 30.3*  30.3*    254  254    and CMP:   Recent Labs   Lab 10/10/19  0446 10/11/19  0357    138  138   K 3.5 3.3*  3.3*    105  105   CO2 19* 20*  20*   GLU 72 90  90   BUN 10 11  11   CREATININE 0.7 0.6  0.6   CALCIUM 7.8* 7.7*  7.7*   PROT 4.9* 4.9*  4.9*   ALBUMIN 1.9* 1.9*  1.9*   BILITOT 9.8* 5.4*  5.4*   ALKPHOS 679* 665*  665*   * 377*  377*   * 132*  132*   ANIONGAP 13 13  13   EGFRNONAA >60.0 >60.0  >60.0     Assessment/Plan:     * Nausea and vomiting  - Nausea/vomiting for past 5 days on admission  - Afebrile. Tachycardic, likely secondary to  dehydration  - Continuous IVF  - Patient states that ODT zofran makes her nauseous and IV compazine makes her too sedated. Will try PO (nondissolving) zofran prn today.  - GI/biliary consulted secondary to elevated LFTs, MRCP performed 10/7/19 showing mild CBD dilation and large amount of ascites  - EUS and ERCP performed 10/8/19 by advanced endoscopic team, recommend IR biliary drainage  - Paracentesis performed 10/9/19, 2.9 L removed  - Biliary catheter flushed yesterday, now draining dark green/brown fluid  - Tolerating clear liquid diet    Hypomagnesemia  - Mg 1.3 on admission  - S/p IV Mg 3g  - Continue to monitor and replace as needed    Endometrial carcinoma, serous  - CA-125 155> 1795  - CT A/P on 10/3/19 showed stable disease  - See gyn onc history above    Hypertension  - BP: ()/(53-82) 108/58  - Patient's home amlodipine was recently discontinued due to low to normal range pressures    Hyperbilirubinemia  - Biliary catheter placed by IR on 10/9/19  - LFTs downtrending after placement and flushing of biliary catheter  - T bili 9.8 > 5.4,  > 377,  > 132    Tachycardia  - Pulse:  [123-144] 135  - Patient's baseline HR in 120s  - Seen by cardiology 9/17/19, tachycardia determined to be secondary to volume depletion  - EKG this AM showed sinus tach      VTE Risk Mitigation (From admission, onward)         Ordered     Place sequential compression device  Until discontinued      10/09/19 0638     IP VTE LOW RISK PATIENT  Once      10/07/19 1408     Place ASH hose  Until discontinued      10/07/19 1408              Opal Aleman MD  Gynecologic Oncology  [facility]

## 2019-10-11 NOTE — ASSESSMENT & PLAN NOTE
- Biliary catheter placed by IR on 10/9/19  - LFTs downtrending after placement and flushing of biliary catheter  - T bili 9.8 > 5.4,  > 377,  > 132

## 2019-10-12 LAB
ALBUMIN SERPL BCP-MCNC: 1.7 G/DL (ref 3.5–5.2)
ALP SERPL-CCNC: 498 U/L (ref 55–135)
ALT SERPL W/O P-5'-P-CCNC: 97 U/L (ref 10–44)
ANION GAP SERPL CALC-SCNC: 10 MMOL/L (ref 8–16)
AST SERPL-CCNC: 251 U/L (ref 10–40)
BASOPHILS # BLD AUTO: 0.01 K/UL (ref 0–0.2)
BASOPHILS NFR BLD: 0.2 % (ref 0–1.9)
BILIRUB SERPL-MCNC: 4.3 MG/DL (ref 0.1–1)
BUN SERPL-MCNC: 12 MG/DL (ref 8–23)
CALCIUM SERPL-MCNC: 7.5 MG/DL (ref 8.7–10.5)
CHLORIDE SERPL-SCNC: 108 MMOL/L (ref 95–110)
CO2 SERPL-SCNC: 19 MMOL/L (ref 23–29)
CREAT SERPL-MCNC: 0.6 MG/DL (ref 0.5–1.4)
DIFFERENTIAL METHOD: ABNORMAL
EOSINOPHIL # BLD AUTO: 0 K/UL (ref 0–0.5)
EOSINOPHIL NFR BLD: 0.3 % (ref 0–8)
ERYTHROCYTE [DISTWIDTH] IN BLOOD BY AUTOMATED COUNT: 19.5 % (ref 11.5–14.5)
EST. GFR  (AFRICAN AMERICAN): >60 ML/MIN/1.73 M^2
EST. GFR  (NON AFRICAN AMERICAN): >60 ML/MIN/1.73 M^2
GLUCOSE SERPL-MCNC: 82 MG/DL (ref 70–110)
HCT VFR BLD AUTO: 29.6 % (ref 37–48.5)
HGB BLD-MCNC: 10.1 G/DL (ref 12–16)
IMM GRANULOCYTES # BLD AUTO: 0.07 K/UL (ref 0–0.04)
IMM GRANULOCYTES NFR BLD AUTO: 1.1 % (ref 0–0.5)
LYMPHOCYTES # BLD AUTO: 0.4 K/UL (ref 1–4.8)
LYMPHOCYTES NFR BLD: 5.9 % (ref 18–48)
MAGNESIUM SERPL-MCNC: 1.7 MG/DL (ref 1.6–2.6)
MCH RBC QN AUTO: 27.7 PG (ref 27–31)
MCHC RBC AUTO-ENTMCNC: 34.1 G/DL (ref 32–36)
MCV RBC AUTO: 81 FL (ref 82–98)
MONOCYTES # BLD AUTO: 0.5 K/UL (ref 0.3–1)
MONOCYTES NFR BLD: 7.8 % (ref 4–15)
NEUTROPHILS # BLD AUTO: 5.4 K/UL (ref 1.8–7.7)
NEUTROPHILS NFR BLD: 84.7 % (ref 38–73)
NRBC BLD-RTO: 1 /100 WBC
PHOSPHATE SERPL-MCNC: 2.9 MG/DL (ref 2.7–4.5)
PLATELET # BLD AUTO: 281 K/UL (ref 150–350)
PMV BLD AUTO: 12.1 FL (ref 9.2–12.9)
POTASSIUM SERPL-SCNC: 4 MMOL/L (ref 3.5–5.1)
PROT SERPL-MCNC: 4.9 G/DL (ref 6–8.4)
RBC # BLD AUTO: 3.64 M/UL (ref 4–5.4)
SODIUM SERPL-SCNC: 137 MMOL/L (ref 136–145)
WBC # BLD AUTO: 6.41 K/UL (ref 3.9–12.7)

## 2019-10-12 PROCEDURE — 83735 ASSAY OF MAGNESIUM: CPT

## 2019-10-12 PROCEDURE — 25000003 PHARM REV CODE 250: Performed by: STUDENT IN AN ORGANIZED HEALTH CARE EDUCATION/TRAINING PROGRAM

## 2019-10-12 PROCEDURE — 25500020 PHARM REV CODE 255: Performed by: OBSTETRICS & GYNECOLOGY

## 2019-10-12 PROCEDURE — 84100 ASSAY OF PHOSPHORUS: CPT

## 2019-10-12 PROCEDURE — 63600175 PHARM REV CODE 636 W HCPCS: Performed by: STUDENT IN AN ORGANIZED HEALTH CARE EDUCATION/TRAINING PROGRAM

## 2019-10-12 PROCEDURE — 36415 COLL VENOUS BLD VENIPUNCTURE: CPT

## 2019-10-12 PROCEDURE — 97802 MEDICAL NUTRITION INDIV IN: CPT

## 2019-10-12 PROCEDURE — 99233 SBSQ HOSP IP/OBS HIGH 50: CPT | Mod: ,,, | Performed by: OBSTETRICS & GYNECOLOGY

## 2019-10-12 PROCEDURE — 80053 COMPREHEN METABOLIC PANEL: CPT

## 2019-10-12 PROCEDURE — 20600001 HC STEP DOWN PRIVATE ROOM

## 2019-10-12 PROCEDURE — 94761 N-INVAS EAR/PLS OXIMETRY MLT: CPT

## 2019-10-12 PROCEDURE — 99233 PR SUBSEQUENT HOSPITAL CARE,LEVL III: ICD-10-PCS | Mod: ,,, | Performed by: OBSTETRICS & GYNECOLOGY

## 2019-10-12 PROCEDURE — 85025 COMPLETE CBC W/AUTO DIFF WBC: CPT

## 2019-10-12 RX ORDER — METOCLOPRAMIDE 10 MG/1
10 TABLET ORAL EVERY 6 HOURS
Status: DISCONTINUED | OUTPATIENT
Start: 2019-10-12 | End: 2019-10-14

## 2019-10-12 RX ORDER — ONDANSETRON 2 MG/ML
8 INJECTION INTRAMUSCULAR; INTRAVENOUS EVERY 6 HOURS PRN
Status: DISCONTINUED | OUTPATIENT
Start: 2019-10-12 | End: 2019-10-15 | Stop reason: HOSPADM

## 2019-10-12 RX ORDER — ALPRAZOLAM 0.5 MG/1
0.5 TABLET ORAL 3 TIMES DAILY PRN
Status: DISCONTINUED | OUTPATIENT
Start: 2019-10-12 | End: 2019-10-15 | Stop reason: HOSPADM

## 2019-10-12 RX ORDER — METOCLOPRAMIDE HYDROCHLORIDE 5 MG/ML
20 INJECTION INTRAMUSCULAR; INTRAVENOUS EVERY 8 HOURS PRN
Status: DISCONTINUED | OUTPATIENT
Start: 2019-10-12 | End: 2019-10-12

## 2019-10-12 RX ORDER — LORAZEPAM 0.5 MG/1
1 TABLET ORAL EVERY 6 HOURS PRN
Status: DISCONTINUED | OUTPATIENT
Start: 2019-10-12 | End: 2019-10-15 | Stop reason: HOSPADM

## 2019-10-12 RX ADMIN — ONDANSETRON 8 MG: 2 INJECTION INTRAMUSCULAR; INTRAVENOUS at 03:10

## 2019-10-12 RX ADMIN — METOCLOPRAMIDE 10 MG: 10 TABLET ORAL at 11:10

## 2019-10-12 RX ADMIN — POTASSIUM CHLORIDE 10 MEQ: 10 INJECTION, SOLUTION INTRAVENOUS at 01:10

## 2019-10-12 RX ADMIN — IOHEXOL 75 ML: 350 INJECTION, SOLUTION INTRAVENOUS at 10:10

## 2019-10-12 RX ADMIN — METOCLOPRAMIDE 10 MG: 5 INJECTION, SOLUTION INTRAMUSCULAR; INTRAVENOUS at 12:10

## 2019-10-12 RX ADMIN — ONDANSETRON 8 MG: 2 INJECTION INTRAMUSCULAR; INTRAVENOUS at 06:10

## 2019-10-12 RX ADMIN — POTASSIUM CHLORIDE 10 MEQ: 10 INJECTION, SOLUTION INTRAVENOUS at 02:10

## 2019-10-12 RX ADMIN — METOCLOPRAMIDE 10 MG: 10 TABLET ORAL at 06:10

## 2019-10-12 RX ADMIN — SODIUM CHLORIDE: 0.9 INJECTION, SOLUTION INTRAVENOUS at 08:10

## 2019-10-12 NOTE — CARE UPDATE
Rapid Response Nurse Follow-up Note     Followed up with patient for proactive rounding.   No acute issues at this time.  Pt's HR remains 120's. BP stable.  Please call Rapid Response RN, Silvia Barrera RN with any questions or concerns at [phone].

## 2019-10-12 NOTE — CARE UPDATE
Artificial Intelligence Notificaton      Admit Date: 10/7/2019  LOS: 5  Code Status: Full Code   Date of Consult: 10/12/2019  : 1950  Age: 69 y.o.  Weight:   Wt Readings from Last 1 Encounters:   10/07/19 62.3 kg (137 lb 5.6 oz)     Sex: female  Bed: 3/[facility] A:   MRN: [id number]  Attending Physician: Jiar Watt MD  Primary Service: [facility] GYN ONCOLOGY  Time AI Alert Received: 1404  Time at Bedside: 1435           Patient found resting, watching TV; she denies palpitations, chest pain or dypnea; she has not had vomiting and feels much better today than previously. Discussed with Dr. Aleman who is considering pursuing further imaging to evaluate etiology of sinus tachycardia.       Vital Signs (Most Recent):  Temp: 98.4 °F (36.9 °C) (10/12/19 1124)  Pulse: (!) 133 (10/12/19 1124)  Resp: (!) 22 (10/12/19 1124)  BP: (!) 137/90 (10/12/19 1124)  SpO2: (!) 92 % (10/12/19 0822) Vital Signs (24h Range):  Temp:  [97.7 °F (36.5 °C)-98.4 °F (36.9 °C)] 98.4 °F (36.9 °C)  Pulse:  [117-145] 133  Resp:  [20-40] 22  SpO2:  [92 %-98 %] 92 %  BP: (107-137)/(62-90) 137/90         This is an  Artificial Intelligence Notification.     Artificial Intelligence alert discussed with Primary team:  Name Dr. Aleman    Please place a Critical Care consult if evaluation/consultation is needed  The Critical Care Fellow can be reached at x [phone]

## 2019-10-12 NOTE — CODE/ RAPID DOCUMENTATION
Rapid Response Nurse AI Alert     AI alert received, chart check completed abnormal VS noted. Bedside RNAyesha contacted, orders obtained from Gyn Onc resident and initiated per nurse, HR improved. Instructed to call [phone] for further concerns or assistance.

## 2019-10-12 NOTE — SUBJECTIVE & OBJECTIVE
"Interval History: Patient reports about 10 "attacks" of heaving/emesis yesterday. She reports episodes of emesis after trying chicken broth and jello. She had improvement of nausea with IV reglan and scopolamine patch. She states that her anxiety improved yesterday with the addition of xanax. She continues to ambulate and urinate without difficulty. She continues to pass gas and had a BM yesterday. She denies fever, chills, CP, and SOB.    Scheduled Meds:   scopolamine  1 patch Transdermal Q3 Days     Continuous Infusions:   sodium chloride 0.9% 150 mL/hr at 10/12/19 0124     PRN Meds:ALPRAZolam, calcium carbonate, docusate sodium, ibuprofen, metoclopramide HCl, ondansetron, oxyCODONE, oxyCODONE, simethicone, sodium chloride 0.9%    Review of patient's allergies indicates:   Allergen Reactions    Gramicidin d Shortness Of Breath    Hydrochlorothiazide Other (See Comments)     Burning and tingling sensation throughoutt her body    Lisinopril Shortness Of Breath    Promethazine Shortness Of Breath and Nausea And Vomiting    Neosporin [neomycin-bacitracnzn-polymyxnb] Swelling    Polymyxin [bacitracin-polymyxin b] Swelling       Objective:     Vital Signs (Most Recent):  Temp: 98.3 °F (36.8 °C) (10/12/19 0430)  Pulse: (!) 124 (10/12/19 0430)  Resp: (!) 22 (10/12/19 0430)  BP: 107/62 (10/12/19 0430)  SpO2: 95 % (10/12/19 0430) Vital Signs (24h Range):  Temp:  [97.7 °F (36.5 °C)-98.3 °F (36.8 °C)] 98.3 °F (36.8 °C)  Pulse:  [117-145] 124  Resp:  [16-40] 22  SpO2:  [94 %-98 %] 95 %  BP: (107-124)/(62-73) 107/62     Weight: 62.3 kg (137 lb 5.6 oz)  Body mass index is 25.12 kg/m².    Intake/Output - Last 3 Shifts       10/10 0700 - 10/11 0659 10/11 0700 - 10/12 0659    P.O. 580 480    I.V. (mL/kg) 2108.3 (33.8) 2455.8 (39.4)    IV Piggyback  1400    Total Intake(mL/kg) 2688.3 (43.2) 4335.8 (69.6)    Urine (mL/kg/hr) 300 (0.2) 800 (0.5)    Emesis/NG output 0 0    Drains 390 985    Stool  0    Total Output 690 1785    " Net +1998.3 +2550.8          Urine Occurrence 1 x 4 x    Stool Occurrence  1 x    Emesis Occurrence 1 x 6 x             Physical Exam:   Constitutional: She is oriented to person, place, and time. She appears well-developed and well-nourished. No distress.    HENT:   Head: Normocephalic and atraumatic.      Cardiovascular: Regular rhythm.    Tachycardic.    Pulmonary/Chest: Effort normal. No respiratory distress.        Abdominal: Soft. She exhibits distension (slightly more distended than exam yesterday). There is no tenderness. There is no rebound and no guarding.   Biliary drain present draining dark green/brown fluid.  Hypoactive bowel sounds.             Musculoskeletal: Normal range of motion. She exhibits no edema or tenderness.       Neurological: She is alert and oriented to person, place, and time.    Skin: Skin is warm and dry.    Psychiatric: She has a normal mood and affect. Her behavior is normal. Judgment and thought content normal.       Lines/Drains/Airways     Drain                 Biliary Tube 10/10/19 1500 RLQ 1 day          Peripheral Intravenous Line                 Peripheral IV - Single Lumen 10/11/19 2136 20 G Left;Posterior Hand less than 1 day                Laboratory:  CBC:   Recent Labs   Lab 10/11/19  0357 10/12/19  0535   WBC 5.28  5.28 6.41   HGB 10.3*  10.3* 10.1*   HCT 30.3*  30.3* 29.6*     254 281    and CMP:   Recent Labs   Lab 10/11/19  0357 10/12/19  0535     138 137   K 3.3*  3.3* 4.0     105 108   CO2 20*  20* 19*   GLU 90  90 82   BUN 11  11 12   CREATININE 0.6  0.6 0.6   CALCIUM 7.7*  7.7* 7.5*   PROT 4.9*  4.9* 4.9*   ALBUMIN 1.9*  1.9* 1.7*   BILITOT 5.4*  5.4* 4.3*   ALKPHOS 665*  665* 498*   *  377* 251*   *  132* 97*   ANIONGAP 13  13 10   EGFRNONAA >60.0  >60.0 >60.0

## 2019-10-12 NOTE — PLAN OF CARE
Patient remained AAOx3 throughout the shift. Assessment completed. Heart rate remained elevated per the patient normal baseline. Nausea medication given as order and as requested PRN.NPO diet maintained; Activity done with assistance. Voids per toilet;adequate output noted. One BM noted today.  side rails up x2; call bell in place; bed in lowest, locked position; care plan explained to patient; no additional complaints at this time. Will continue routine plan of care.

## 2019-10-12 NOTE — ASSESSMENT & PLAN NOTE
- Pulse:  [117-145] 124  - Patient's baseline HR in 120s  - Seen by cardiology 9/17/19, tachycardia determined to be secondary to volume depletion  - EKG 10/11/19 showed sinus tach

## 2019-10-12 NOTE — PROGRESS NOTES
[facility]  Gynecologic Oncology  Progress Note      Patient Name: Jessica Gorman  MRN: [id number]  Admission Date: 10/7/2019  Hospital Length of Stay: 5 days  Attending Provider: Jair Watt MD  Primary Care Provider: Rico Amaro MD  Principal Problem: Nausea and vomiting    Follow-up For: Procedure(s) (LRB):  ULTRASOUND, UPPER GI TRACT, ENDOSCOPIC (N/A)  ERCP (ENDOSCOPIC RETROGRADE CHOLANGIOPANCREATOGRAPHY) (N/A)  Post-Operative Day: 4 Days Post-Op  Subjective:      History of Present Illness:  Jessica Gorman is a 69 year old female with history of recurrent serous endometrial carcinoma who was admitted due to intractable nausea/vomiting. Patient states that about 5 days ago, she began having daily nausea/vomiting that she believes started after she took her potassium tablets. She reports 2-3 episodes of emesis per day since then, mostly when she tries to eat or encounters strong smells. She states that at most, she is able to drink a few sips of water or tablespoons of food per day. Her last episode of emesis was this morning due to the smell of deoderant. She is taking zofran q12h without relief. She did not undergo cycle 3 of chemotherapy last week. She states that she is able to ambulate without difficulty but feels very weak. She denies dysuria, although she states that her urine is much darker. She had a normal BM yesterday. She denies headache, change in vision, SOB, CP, and abdominal pain.    Hospital Course:  10/07/2019 - Admitted to Gyn Onc service for IVF and electrolyte repletion secondary to nausea/vomiting and dehydration. GI/biliary consulted secondary to elevated LFTs.  10/08/2019 - Two episodes of emesis yesterday after taking zofran. MRCP performed yesterday evening showing mildly dilated CBD and large amount of ascites. EUS and ERCP performed by biliary team.  10/09/2019 - Episode of emesis overnight after drinking water. Plan for paracentesis and IR consult for  "percutaneous biliary drainage today.  10/10/2019 - IR placement of biliary drain and paracentesis yesterday, 2.9L removed. Biliary catheter flushed and draining bilious fluid.   10/11/2019 - NAEON. No nausea/vomiting since catheter was flushed yesterday, tolerating PO. Liver enzymes downtrending.   10/12/2019 - Several episodes of emesis overnight. Remains tachycardic. NPO. Biliary catheter draining green/brown fluid.    Interval History: Patient reports about 10 "attacks" of heaving/emesis yesterday. She reports episodes of emesis after trying chicken broth and jello. She had improvement of nausea with IV reglan and scopolamine patch. She states that her anxiety improved yesterday with the addition of xanax. She continues to ambulate and urinate without difficulty. She continues to pass gas and had a BM yesterday. She denies fever, chills, CP, and SOB.    Scheduled Meds:   scopolamine  1 patch Transdermal Q3 Days     Continuous Infusions:   sodium chloride 0.9% 150 mL/hr at 10/12/19 0124     PRN Meds:ALPRAZolam, calcium carbonate, docusate sodium, ibuprofen, metoclopramide HCl, ondansetron, oxyCODONE, oxyCODONE, simethicone, sodium chloride 0.9%    Review of patient's allergies indicates:   Allergen Reactions    Gramicidin d Shortness Of Breath    Hydrochlorothiazide Other (See Comments)     Burning and tingling sensation throughoutt her body    Lisinopril Shortness Of Breath    Promethazine Shortness Of Breath and Nausea And Vomiting    Neosporin [neomycin-bacitracnzn-polymyxnb] Swelling    Polymyxin [bacitracin-polymyxin b] Swelling       Objective:     Vital Signs (Most Recent):  Temp: 98.3 °F (36.8 °C) (10/12/19 0430)  Pulse: (!) 124 (10/12/19 0430)  Resp: (!) 22 (10/12/19 0430)  BP: 107/62 (10/12/19 0430)  SpO2: 95 % (10/12/19 0430) Vital Signs (24h Range):  Temp:  [97.7 °F (36.5 °C)-98.3 °F (36.8 °C)] 98.3 °F (36.8 °C)  Pulse:  [117-145] 124  Resp:  [16-40] 22  SpO2:  [94 %-98 %] 95 %  BP: " (107-124)/(62-73) 107/62     Weight: 62.3 kg (137 lb 5.6 oz)  Body mass index is 25.12 kg/m².    Intake/Output - Last 3 Shifts       10/10 0700 - 10/11 0659 10/11 0700 - 10/12 0659    P.O. 580 480    I.V. (mL/kg) 2108.3 (33.8) 2455.8 (39.4)    IV Piggyback  1400    Total Intake(mL/kg) 2688.3 (43.2) 4335.8 (69.6)    Urine (mL/kg/hr) 300 (0.2) 800 (0.5)    Emesis/NG output 0 0    Drains 390 985    Stool  0    Total Output 690 1785    Net +1998.3 +2550.8          Urine Occurrence 1 x 4 x    Stool Occurrence  1 x    Emesis Occurrence 1 x 6 x             Physical Exam:   Constitutional: She is oriented to person, place, and time. She appears well-developed and well-nourished. No distress.    HENT:   Head: Normocephalic and atraumatic.      Cardiovascular: Regular rhythm.    Tachycardic.    Pulmonary/Chest: Effort normal. No respiratory distress.        Abdominal: Soft. She exhibits distension (slightly more distended than exam yesterday). There is no tenderness. There is no rebound and no guarding.   Biliary drain present draining dark green/brown fluid.  Hypoactive bowel sounds.             Musculoskeletal: Normal range of motion. She exhibits no edema or tenderness.       Neurological: She is alert and oriented to person, place, and time.    Skin: Skin is warm and dry.    Psychiatric: She has a normal mood and affect. Her behavior is normal. Judgment and thought content normal.       Lines/Drains/Airways     Drain                 Biliary Tube 10/10/19 1500 RLQ 1 day          Peripheral Intravenous Line                 Peripheral IV - Single Lumen 10/11/19 2136 20 G Left;Posterior Hand less than 1 day                Laboratory:  CBC:   Recent Labs   Lab 10/11/19  0357 10/12/19  0535   WBC 5.28  5.28 6.41   HGB 10.3*  10.3* 10.1*   HCT 30.3*  30.3* 29.6*     254 281    and CMP:   Recent Labs   Lab 10/11/19  0357 10/12/19  0535     138 137   K 3.3*  3.3* 4.0     105 108   CO2 20*  20* 19*   GLU  90  90 82   BUN 11  11 12   CREATININE 0.6  0.6 0.6   CALCIUM 7.7*  7.7* 7.5*   PROT 4.9*  4.9* 4.9*   ALBUMIN 1.9*  1.9* 1.7*   BILITOT 5.4*  5.4* 4.3*   ALKPHOS 665*  665* 498*   *  377* 251*   *  132* 97*   ANIONGAP 13  13 10   EGFRNONAA >60.0  >60.0 >60.0     Assessment/Plan:     * Nausea and vomiting  - Nausea/vomiting for past 5 days on admission  - Afebrile. Tachycardic, likely secondary to dehydration  - GI/biliary consulted secondary to elevated LFTs, MRCP performed 10/7/19 showing mild CBD dilation and large amount of ascites  - EUS and ERCP performed 10/8/19 by advanced endoscopic team, recommend IR biliary drainage  - Paracentesis performed 10/9/19, 2.9 L removed  - Biliary catheter placed 10/9 with improvement in nausea/vomiting and liver enzymes    - Several episodes of emesis overnight, tachycardia and tachypnea triggered RRT  - 1L IVF bolus given overnight with improvement in HR  - Continuous IVF rate increased from 100 to 150cc/hr  - PO antiemetics discontinued. Scopolamine patch, IV zofran 8mg q6 prn, and IV reglan 10mg q6h prn started.  - NPO    Hypokalemia  - K 3.3 yesterday  - IV KCl 40mEq administered  - K 4.0 this AM  - Continue to monitor and replace as needed    Hypomagnesemia  - Mg 1.3 on admission  - Continue to monitor and replace as needed    Endometrial carcinoma, serous  - CA-125 155> 1795  - CT A/P on 10/3/19 showed stable disease  - See gyn onc history above    Hypertension  - BP: ()/(53-82) 108/58  - Patient's home amlodipine was recently discontinued due to low to normal range pressures    Anxiety  - Xanax 0.5mg TID prn started yesterday with improvement of anxiety    Hyperbilirubinemia  - Biliary catheter placed by IR on 10/9/19  - LFTs downtrending after placement and flushing of biliary catheter  - T bili 9.8 > 5.4 > 4.3,  > 377>251,  > 132 > 97    Tachycardia  - Pulse:  [117-145] 124  - Patient's baseline HR in 120s  - Seen by  cardiology 9/17/19, tachycardia determined to be secondary to volume depletion  - EKG 10/11/19 showed sinus tach      VTE Risk Mitigation (From admission, onward)         Ordered     Place sequential compression device  Until discontinued      10/09/19 0638     IP VTE LOW RISK PATIENT  Once      10/07/19 1408     Place ASH hose  Until discontinued      10/07/19 1408              Opal Aleman MD  Gynecologic Oncology  [facility]

## 2019-10-12 NOTE — CARE UPDATE
"RAPID RESPONSE NURSE PROACTIVE ROUNDING NOTE     Time of Visit: 820    Admit Date: 10/7/2019  LOS: 5  Code Status: Full Code   Date of Visit: 10/12/2019  : 1950  Age: 69 y.o.  Sex: female  Race: Black or   Bed: 843/843 A:   MRN: [id number]  Was the patient discharged from an ICU this admission? no   Was the patient discharged from a PACU within last 24 hours?  no  Did the patient receive conscious sedation/general anesthesia in last 24 hours?  no  Was the patient in the ED within the past 24 hours?  no  Was the patient started on NIPPV within the past 24 hours?  no  Attending Physician: Jair Watt MD  Primary Service: [facility]    ASSESSMENT     Diagnosis: Nausea and vomiting    Abnormal Vital Signs: /62 (BP Location: Right arm, Patient Position: Lying)   Pulse (!) 127   Temp 97.8 °F (36.6 °C) (Oral)   Resp (!) 25   Ht 5' 2" (1.575 m)   Wt 62.3 kg (137 lb 5.6 oz)   SpO2 (!) 92%   Breastfeeding? No   BMI 25.12 kg/m²      Clinical Issues: Dysrythmia    Patient  has a past medical history of Abnormal Pap smear, Anorexia, Anxiety, Cancer, Chemotherapy-induced neutropenia, Dehydration, Elevated cancer antigen 125 (CA-125), Encounter for blood transfusion, FH: breast cancer in relative when <45 years old, Hyperbilirubinemia, Hypertension, Hypokalemia, Other complicated headache syndrome, Other screening mammogram, Postmenopausal bleeding, Pulmonary nodules/lesions, multiple, Rash, Uterine cancer, Weight loss, and Well woman exam with routine gynecological exam.    ND performed for tachycardia over night.       INTERVENTIONS/ RECOMMENDATIONS     's after 1 L NS bolus. Per primary RN, this is pt's baseline. Respirations even and unlabored. NAD noted at this time. Primary RN and pt deny needs/concerns at this time.     Discussed plan of care with RNJemma, [id number].    PHYSICIAN ESCALATION     Yes/No  no    Orders received and case discussed with NA.    Disposition: " Remain in room 843 A.    FOLLOW-UP     Call back the Rapid Response Nurse, Silvia Barrera RN at [phone] for additional questions or concerns.

## 2019-10-12 NOTE — PROGRESS NOTES
Admit Assessment    Patient Identification  Jessica Gorman   :  1950  Admit Date:  10/7/2019  Attending Provider:  Jair Watt MD              Referral:   Pt was admitted to  with a diagnosis of Nausea and vomiting, and was admitted this hospital stay due to Nausea and vomiting [R11.2].   is involved was referred to the Social Work Department via routine referral.  Patient presents as a 69 y.o. year old single female.    Persons interviewed: patient and daughter    Living Situation:  Pt. States that she resides at home alone but that her daughter lives next door and is able to help with her needs. Daughter currently not working so pt. Has help full time.     Resides at [address] [place] [address] [facility] [address], phone: [phone] (home).      (RETIRED) Functional Status Prior  Ambulation Prior: 0-->independent  Transferrin-->independent  Toiletin-->independent    Current or Past Agencies and Description of Services/Supplies    DME  Agency Name: none  Agency Phone Number: n/a  Equipment Currently Used at Home: none    Home Health  Agency Name: none  Agency Phone Number: n/a  Services: n/a    IV Infusion  Agency Name: none  Agency Phone Number: n/a    Nutrition: oral    Outpatient Pharmacy:     [id number] - [place], [place] - [address] [place] [address]  Phone: [phone] Fax: [phone]      Patient Preference of agencies include: none noted    Patient/Caregiver informed of right to choose providers or agencies.  Patient provides permission to release any necessary information to [facility] and to Non[facility] agencies as needed to facilitate patient care, treatment planning, and patient discharge planning.  Written and verbal resources provided.      Coping: doing well, daughter very supportive          Adjustment to Diagnosis and Treatment:  appropriate      Emotional/Behavioral/Cognitive Issues: none noted            History/Current Symptoms of Anxiety/Depression: Yes (related to illness)  History/Current Substance Use:   Social History     Tobacco Use    Smoking status: Never Smoker    Smokeless tobacco: Never Used   Substance and Sexual Activity    Alcohol use: No    Drug use: No    Sexual activity: Not Currently     Birth control/protection: Post-menopausal       Indications of Abuse/Neglect: No:   Abuse Screen (yes response referral indicated)  Feels Unsafe at Home or Work/School: no    Financial:  Payor/Plan Subscr  Sex Relation Sub. Ins. ID Effective Group Num   1. [place]* SARAH MAGANA RODOLFO 1950 Female  [id number] 8/1/15                                    [address]   2. [place] SAR[place] MAG[place] 1950 Female  [id number]* 18                                    [address] [address]                            Other identified concerns/needs: none noted    Plan: to return home with help from daughter    Interventions/Referrals: TBD  Patient/caregiver engaged in treatment planning process.     providing psychosocial and supportive counseling, resources, education, assistance and discharge planning as appropriate.  Patient/caregiver state understanding of  available resources,  following, remains available. Provided pt. With 'er phone # and encouraged pt/dtr to call if needed. Will follow.

## 2019-10-12 NOTE — ASSESSMENT & PLAN NOTE
- K 3.3 yesterday  - IV KCl 40mEq administered  - K 4.0 this AM  - Continue to monitor and replace as needed

## 2019-10-12 NOTE — CONSULTS
[facility]  Adult Nutrition  Consult Note    SUMMARY     Recommendations    Recommendation/Intervention:   1.) ADAT to low fat diet.   2.) If clear liquid diet initiated, suggest Boost Breeze (peach) TID.   3.) If PO intakes <50% of meals x 48hr, suggest TF initiation. Consult RD for recommendations.   4.) Daily weights.   5.) RD to provide cancer diet education (per dtr request).     Goals: 1.) Pt to return to nutritionally adequate diet by follow up. 2.) Pt to maintain wt (+/-10% of 62.3kg) during admit  Nutrition Goal Status: new  Communication of RD Recs: reviewed with RN    Reason for Assessment    Reason For Assessment: consult  Diagnosis: gastrointestinal disease(N/V)  Relevant Medical History: HTN, uterine cancer, endometrial cancer s/p x2 chemo treatments, anorexia, anxiety, hyperbilirubinema  General Information Comments: Pt sitting up in bed with dtr to bedside. +N/V this morning, however observed pt sipping on clear liquids with the aid of RN. Pt reports feeling a little better with administration of anti-emetics. Pt and dtr reports poor appetite x 5 days with continued N/V. Pt only able to take sips of liquids. She reports smell of foods causing continued nausea. S/p x2 chemo treatments. Pt missing 3rd round 2/2 to admit. Last scheduled chemo 8/10/19. Pt denies other GI distress. Encouraged anti-aromatic foods to aid in intake. Pt agreeable to try Boost Breeze when diet advanced. Encouraged sips every 15-20 minutes, any nausea to skip until the next 15 minutes. Pt endorses wt loss with #. Wt maintained likely to fluid in abdomen. Noted elevated LFT's. Pt likely to have lost wt, however unable to determine at this time. S/pparacentesis 10/10 with 2.9L removed. NFPE completed at bedside with moderate/severe fat/muscle wasting. Pt meets ASPEN criteria for acute/chronic moderate malnutrition.    Nutrition Discharge Planning: Optimize nutritional intake to aid in muscle  "repletion.     Nutrition Risk Screen    Nutrition Risk Screen: other (see comments)(npo)    Nutrition/Diet History    Spiritual, Cultural Beliefs, Faith Practices, Values that Affect Care: no    Anthropometrics    Temp: 98.4 °F (36.9 °C)  Height Method: Stated  Height: 5' 2" (157.5 cm)  Height (inches): 62 in  Weight Method: Standard Scale  Weight: 62.3 kg (137 lb 5.6 oz)  Weight (lb): 137.35 lb  Ideal Body Weight (IBW), Female: 110 lb  % Ideal Body Weight, Female (lb): 124.86 lb  BMI (Calculated): 25.2  Usual Body Weight (UBW), k.7 kg(per pt)  % Usual Body Weight: 99.57       Lab/Procedures/Meds    Pertinent Labs Reviewed: reviewed  Pertinent Labs Comments: Ca 7.5, , , ALT 97  Pertinent Medications Reviewed: reviewed  Pertinent Medications Comments: metoclopramide, potassium chloride, scopolamine    Estimated/Assessed Needs    Weight Used For Calorie Calculations: 62.3 kg (137 lb 5.6 oz)  Energy Calorie Requirements (kcal): 1318-9926  Energy Need Method: Kcal/kg(30-35 kcal/kg)  Protein Requirements: 81-94(g/day)  Weight Used For Protein Calculations: 62.3 kg (137 lb 5.6 oz)(1.2-1.5 g/kg)     Estimated Fluid Requirement Method: RDA Method(or per MD)  RDA Method (mL):      Nutrition Prescription Ordered    Current Diet Order: NPO    Evaluation of Received Nutrient/Fluid Intake    I/O: +6.1L since admit  Comments: LBM 10/11  % Intake of Estimated Energy Needs: 0 - 25 %  % Meal Intake: NPO    Nutrition Risk    Level of Risk/Frequency of Follow-up: high     Assessment and Plan  Moderate protein-calorie malnutrition  Malnutrition in the context of Acute on Chronic Illness/Injury    Related to (etiology):  Acuity of illness    Signs and Symptoms (as evidenced by):  Energy Intake: <50% of estimated energy requirement for >5 days  Body Fat Depletion: moderate depletion of orbitals, triceps and thoracic and lumbar region   Muscle Mass Depletion: moderate depletion of temples, clavicle region, " scapular region, interosseous muscle and lower extremities   Weight Loss: pt with abdomen distention masking wt loss  Fluid Accumulation: moderate/severe    Interventions/Recommendations (treatment strategy):  Collaboration of nutrition care with other providers  Referral of care  Modified diet-low fat  Supplemental beverage-Boost Breeze TID  Diet education    Nutrition Diagnosis Status:  New         Monitor and Evaluation    Food and Nutrient Intake: energy intake, food and beverage intake  Food and Nutrient Adminstration: diet order  Knowledge/Beliefs/Attitudes: food and nutrition knowledge/skill  Physical Activity and Function: nutrition-related ADLs and IADLs  Anthropometric Measurements: weight, weight change, body mass index  Biochemical Data, Medical Tests and Procedures: electrolyte and renal panel, gastrointestinal profile, glucose/endocrine profile, inflammatory profile, lipid profile  Nutrition-Focused Physical Findings: overall appearance     Malnutrition Assessment  Malnutrition Type: acute illness or injury  Energy Intake: severe energy intake(<50% x 5 days)          Weight Loss (Malnutrition): other (see comments)(unable to determine amount at this time )  Energy Intake (Malnutrition): less than or equal to 50% for greater than or equal to 5 days  Fluid Accumulation (Malnutrition): moderate to severe(abdomen area)   Orbital Region (Subcutaneous Fat Loss): moderate depletion  Upper Arm Region (Subcutaneous Fat Loss): moderate depletion   Congregation Region (Muscle Loss): moderate depletion  Clavicle Bone Region (Muscle Loss): moderate depletion  Clavicle and Acromion Bone Region (Muscle Loss): moderate depletion  Dorsal Hand (Muscle Loss): mild depletion  Anterior Thigh Region (Muscle Loss): moderate depletion  Posterior Calf Region (Muscle Loss): moderate depletion   Edema (Fluid Accumulation): 3-->moderate   Subcutaneous Fat Loss (Final Summary): moderate protein-calorie malnutrition  Muscle Loss  Evaluation (Final Summary): moderate protein-calorie malnutrition         Nutrition Follow-Up    RD Follow-up?: Yes

## 2019-10-12 NOTE — PLAN OF CARE
POC reviewed with patient and daughter; understanding verbalized.  @ start of shift and pt actively vomiting. Notified MD. New orders 1L bolus NS, 4 IVPB K riders, and NS increased to @ 150. Rapid Response Nurse @ bedside. Bili drain with dark green output. Pt vomited several times. PRN IV Zofran and Reglan administered, with moderate results achieved. Pt remains up with assist. Free from falls and injuries this shift. NPO diet resumed. Pt voids joan urine in hat via bedside commode. No BM's this shift. Pt instructed to call when getting OOB. Pt. with nonskid footwear on, bed in lowest position, and locked with bed rails up x 2. Daughter @ bedside. Pt. has call light and personal items within reach. All questions and concerns addressed at this time. Will continue to monitor.

## 2019-10-12 NOTE — ASSESSMENT & PLAN NOTE
- Nausea/vomiting for past 5 days on admission  - Afebrile. Tachycardic, likely secondary to dehydration  - GI/biliary consulted secondary to elevated LFTs, MRCP performed 10/7/19 showing mild CBD dilation and large amount of ascites  - EUS and ERCP performed 10/8/19 by advanced endoscopic team, recommend IR biliary drainage  - Paracentesis performed 10/9/19, 2.9 L removed  - Biliary catheter placed 10/9 with improvement in nausea/vomiting and liver enzymes    - Several episodes of emesis overnight, tachycardia and tachypnea triggered RRT  - 1L IVF bolus given overnight with improvement in HR  - Continuous IVF rate increased from 100 to 150cc/hr  - PO antiemetics discontinued. Scopolamine patch, IV zofran 8mg q6 prn, and IV reglan 10mg q6h prn started.  - NPO

## 2019-10-12 NOTE — CODE/ RAPID DOCUMENTATION
"RAPID RESPONSE NURSE PROACTIVE ROUNDING NOTE     Time of Visit: 2100    Admit Date: 10/7/2019  LOS: 4  Code Status: Full Code   Date of Visit: 10/11/2019  : 1950  Age: 69 y.o.  Sex: female  Race: Black or   Bed: 843/843 A:   MRN: [id number]  Was the patient discharged from an ICU this admission? no   Was the patient discharged from a PACU within last 24 hours?  no  Did the patient receive conscious sedation/general anesthesia in last 24 hours?  no  Was the patient in the ED within the past 24 hours?  no  Was the patient started on NIPPV within the past 24 hours?  no  Attending Physician: Jair Watt MD  Primary Service: [facility] [place] GYN ONCOLOGY    ASSESSMENT     Diagnosis: Nausea and vomiting    Abnormal Vital Signs: /69   Pulse (!) 134   Temp 97.7 °F (36.5 °C) (Oral)   Resp (!) 40   Ht 5' 2" (1.575 m)   Wt 62.3 kg (137 lb 5.6 oz)   SpO2 (!) 94%   Breastfeeding? No   BMI 25.12 kg/m²      Clinical Issues: Dysrythmia    Patient  has a past medical history of Abnormal Pap smear, Anorexia, Anxiety, Cancer, Chemotherapy-induced neutropenia, Dehydration, Elevated cancer antigen 125 (CA-125), Encounter for blood transfusion, FH: breast cancer in relative when <45 years old, Hyperbilirubinemia, Hypertension, Hypokalemia, Other complicated headache syndrome, Other screening mammogram, Postmenopausal bleeding, Pulmonary nodules/lesions, multiple, Rash, Uterine cancer, Weight loss, and Well woman exam with routine gynecological exam.    Patient triggered for tachycardia with HR in the 150's. Went to assess patient. Patient with intractable nausea and vomiting, poor skin turgor, poor venous access, concentrated urine output, and bili drain with significant bilious output. Gyn Onc resident notified of concerns for persistent sinus tachycardia in the setting of dehydration. Orders to increase MIVF and administer IV bolus to be ordered. Will administer and assess response.      INTERVENTIONS/ " RECOMMENDATIONS     Increase MIVF. 1L Bolus, electrolyte replacement and n/v control.    Discussed plan of care with RN, Ayesha.    PHYSICIAN ESCALATION     Yes/No  yes    Orders received and case discussed with Dr. Aleman.    Disposition: Remain in room 843.    FOLLOW-UP     Call back the Rapid Response Nurse, Lexie Beaver RN at [phone] for additional questions or concerns.

## 2019-10-12 NOTE — CARE UPDATE
Rapid Response Respiratory Therapy Proactive Rounding Note      Time of visit: 1513    Code Status: Full Code   : 1950  Age: 69 y.o.  Weight:   Wt Readings from Last 1 Encounters:   10/07/19 62.3 kg (137 lb 5.6 oz)     Sex: female  Race: Black or    Bed: 843/843 A:   MRN: [id number]    SITUATION     Evaluated patient for: MEWS Score    BACKGROUND     Patient has a past medical history of Abnormal Pap smear, Anorexia, Anxiety, Cancer, Chemotherapy-induced neutropenia, Dehydration, Elevated cancer antigen 125 (CA-125), Encounter for blood transfusion, FH: breast cancer in relative when <45 years old, Hyperbilirubinemia, Hypertension, Hypokalemia, Other complicated headache syndrome, Other screening mammogram, Postmenopausal bleeding, Pulmonary nodules/lesions, multiple, Rash, Uterine cancer, Weight loss, and Well woman exam with routine gynecological exam.    ASSESSMENT/INTERVENTIONS     Upon arrival in room, pt found on room air with an SpO2 of 88% pulse 147. Family member at bedside saying the pt can not breath and that she is throwing up. Pt placed on nasal cannula at 2L and is now maintaining an SpO2 of 97%. RNJemma, notified of this change and of pts nausea and vomiting. RN gave Zofran. Pt reports relief. Will continue to monitor.     Pulse: 147 Respiratory rate: 20 Temperature: Temp: 97.8 °F (36.6 °C) BP: BP: 112/70 SpO2: 97%  Level of Consciousness: Level of Consciousness (AVPU): alert  Respiratory Effort: Respiratory Effort: Normal, Unlabored Expansion/Accessory Muscle Usage: Expansion/Accessory Muscles/Retractions: expansion symmetric  All Lung Field Breath Sounds: All Lung Fields Breath Sounds: Anterior:, Lateral:, diminished, clear  Mobility at time of assessment: General Mobility: generalized weakness  O2 Device/Concentration: Nasal cannula 2L  Most recent blood gas: No results for input(s): PH, PCO2, PO2, HCO3, POCSATURATED, BE in the last 72 hours.   NIPPV: No Surgical  airway: No  Ambu at bedside: Ambu bag with the patient?: Yes, Adult Ambu bag and mask    Current Respiratory Care Orders:   10/07/19 1600  Pulse Oximetry Q4H Every 4 hours (33 of 56 released)    Release    10/07/19 1258         RECOMMENDATIONS     We recommend: Continue plan of care    ESCALATION      Physician Escalation (Yes/No) No   Care discussed with: Shanice  Discussed plan of care primary RT, Taammy     FOLLOW-UP     Please call back the Rapid Response RT, Latoya Cook, RRT at x [phone] for any questions or concerns.

## 2019-10-12 NOTE — ASSESSMENT & PLAN NOTE
- Biliary catheter placed by IR on 10/9/19  - LFTs downtrending after placement and flushing of biliary catheter  - T bili 9.8 > 5.4 > 4.3,  > 377>251,  > 132 > 97   Statement Selected

## 2019-10-12 NOTE — PROGRESS NOTES
Pt actively vomiting. . Notified MD. Waiting new orders. Pt denies chest pain. Rapid Response nurse @ bedside. Will continue to closely monitor.

## 2019-10-12 NOTE — PROGRESS NOTES
Call received from pts. Daughter (Patricia) requesting a rolling walker for pt. At dc. Notified [facility]OS[facility]IGU[facility]/ONC team of request, order placed in Epic. Contacted Sabrina at [facility] ([phone]), order has been received and will be delivered to pts. Room this afternoon. Pt. With possible dc over the weekend. Pt. And dtr aware of the above and in agreement.

## 2019-10-13 PROBLEM — R53.1 WEAKNESS: Status: ACTIVE | Noted: 2019-10-13

## 2019-10-13 PROBLEM — E63.9 POOR NUTRITION: Status: ACTIVE | Noted: 2019-10-13

## 2019-10-13 LAB
ALBUMIN SERPL BCP-MCNC: 1.8 G/DL (ref 3.5–5.2)
ALP SERPL-CCNC: 412 U/L (ref 55–135)
ALT SERPL W/O P-5'-P-CCNC: 89 U/L (ref 10–44)
ANION GAP SERPL CALC-SCNC: 16 MMOL/L (ref 8–16)
AST SERPL-CCNC: 231 U/L (ref 10–40)
BASOPHILS # BLD AUTO: 0.01 K/UL (ref 0–0.2)
BASOPHILS NFR BLD: 0.2 % (ref 0–1.9)
BILIRUB SERPL-MCNC: 3.7 MG/DL (ref 0.1–1)
BUN SERPL-MCNC: 10 MG/DL (ref 8–23)
CALCIUM SERPL-MCNC: 7.9 MG/DL (ref 8.7–10.5)
CHLORIDE SERPL-SCNC: 109 MMOL/L (ref 95–110)
CO2 SERPL-SCNC: 15 MMOL/L (ref 23–29)
CREAT SERPL-MCNC: 0.6 MG/DL (ref 0.5–1.4)
DIFFERENTIAL METHOD: ABNORMAL
EOSINOPHIL # BLD AUTO: 0.1 K/UL (ref 0–0.5)
EOSINOPHIL NFR BLD: 0.8 % (ref 0–8)
ERYTHROCYTE [DISTWIDTH] IN BLOOD BY AUTOMATED COUNT: 20.7 % (ref 11.5–14.5)
EST. GFR  (AFRICAN AMERICAN): >60 ML/MIN/1.73 M^2
EST. GFR  (NON AFRICAN AMERICAN): >60 ML/MIN/1.73 M^2
GLUCOSE SERPL-MCNC: 71 MG/DL (ref 70–110)
HCT VFR BLD AUTO: 34 % (ref 37–48.5)
HGB BLD-MCNC: 10.6 G/DL (ref 12–16)
IMM GRANULOCYTES # BLD AUTO: 0.06 K/UL (ref 0–0.04)
IMM GRANULOCYTES NFR BLD AUTO: 0.9 % (ref 0–0.5)
LYMPHOCYTES # BLD AUTO: 0.4 K/UL (ref 1–4.8)
LYMPHOCYTES NFR BLD: 5.6 % (ref 18–48)
MAGNESIUM SERPL-MCNC: 1.5 MG/DL (ref 1.6–2.6)
MCH RBC QN AUTO: 27.6 PG (ref 27–31)
MCHC RBC AUTO-ENTMCNC: 31.2 G/DL (ref 32–36)
MCV RBC AUTO: 89 FL (ref 82–98)
MONOCYTES # BLD AUTO: 0.4 K/UL (ref 0.3–1)
MONOCYTES NFR BLD: 6.9 % (ref 4–15)
NEUTROPHILS # BLD AUTO: 5.5 K/UL (ref 1.8–7.7)
NEUTROPHILS NFR BLD: 85.6 % (ref 38–73)
NRBC BLD-RTO: 1 /100 WBC
PHOSPHATE SERPL-MCNC: 3.4 MG/DL (ref 2.7–4.5)
PLATELET # BLD AUTO: 287 K/UL (ref 150–350)
PMV BLD AUTO: 11.8 FL (ref 9.2–12.9)
POTASSIUM SERPL-SCNC: 4 MMOL/L (ref 3.5–5.1)
PROT SERPL-MCNC: 5.2 G/DL (ref 6–8.4)
RBC # BLD AUTO: 3.84 M/UL (ref 4–5.4)
SODIUM SERPL-SCNC: 140 MMOL/L (ref 136–145)
WBC # BLD AUTO: 6.39 K/UL (ref 3.9–12.7)

## 2019-10-13 PROCEDURE — 25000003 PHARM REV CODE 250: Performed by: STUDENT IN AN ORGANIZED HEALTH CARE EDUCATION/TRAINING PROGRAM

## 2019-10-13 PROCEDURE — 36415 COLL VENOUS BLD VENIPUNCTURE: CPT

## 2019-10-13 PROCEDURE — S0028 INJECTION, FAMOTIDINE, 20 MG: HCPCS | Performed by: OBSTETRICS & GYNECOLOGY

## 2019-10-13 PROCEDURE — 99233 PR SUBSEQUENT HOSPITAL CARE,LEVL III: ICD-10-PCS | Mod: ,,, | Performed by: OBSTETRICS & GYNECOLOGY

## 2019-10-13 PROCEDURE — 99233 SBSQ HOSP IP/OBS HIGH 50: CPT | Mod: ,,, | Performed by: OBSTETRICS & GYNECOLOGY

## 2019-10-13 PROCEDURE — 84100 ASSAY OF PHOSPHORUS: CPT

## 2019-10-13 PROCEDURE — 63600175 PHARM REV CODE 636 W HCPCS: Performed by: STUDENT IN AN ORGANIZED HEALTH CARE EDUCATION/TRAINING PROGRAM

## 2019-10-13 PROCEDURE — 20600001 HC STEP DOWN PRIVATE ROOM

## 2019-10-13 PROCEDURE — 25000003 PHARM REV CODE 250: Performed by: OBSTETRICS & GYNECOLOGY

## 2019-10-13 PROCEDURE — 80053 COMPREHEN METABOLIC PANEL: CPT

## 2019-10-13 PROCEDURE — 85025 COMPLETE CBC W/AUTO DIFF WBC: CPT

## 2019-10-13 PROCEDURE — 83735 ASSAY OF MAGNESIUM: CPT

## 2019-10-13 RX ORDER — MAGNESIUM SULFATE HEPTAHYDRATE 40 MG/ML
2 INJECTION, SOLUTION INTRAVENOUS ONCE
Status: COMPLETED | OUTPATIENT
Start: 2019-10-13 | End: 2019-10-13

## 2019-10-13 RX ORDER — FAMOTIDINE 10 MG/ML
20 INJECTION INTRAVENOUS 2 TIMES DAILY
Status: DISCONTINUED | OUTPATIENT
Start: 2019-10-13 | End: 2019-10-13

## 2019-10-13 RX ORDER — FAMOTIDINE 10 MG/ML
20 INJECTION INTRAVENOUS 2 TIMES DAILY
Status: DISCONTINUED | OUTPATIENT
Start: 2019-10-13 | End: 2019-10-14

## 2019-10-13 RX ADMIN — FAMOTIDINE 20 MG: 10 INJECTION, SOLUTION INTRAVENOUS at 09:10

## 2019-10-13 RX ADMIN — METOCLOPRAMIDE 10 MG: 10 TABLET ORAL at 12:10

## 2019-10-13 RX ADMIN — METOCLOPRAMIDE 10 MG: 10 TABLET ORAL at 06:10

## 2019-10-13 RX ADMIN — MAGNESIUM SULFATE IN WATER 2 G: 40 INJECTION, SOLUTION INTRAVENOUS at 09:10

## 2019-10-13 RX ADMIN — SODIUM CHLORIDE: 0.9 INJECTION, SOLUTION INTRAVENOUS at 06:10

## 2019-10-13 NOTE — PLAN OF CARE
Afebrile. Free from falls or injury. No complaints of pain. NS infusing at 150. Tachy on telemetry. Reglan po given as scheduled. Bilary drain with green liquid. Bed locked in lowest position, non skid socks on, call light within reach. Pt instructed to call if any assistance is needed. Vitals stable. Family at bedside. Will cont to justin pt.

## 2019-10-13 NOTE — CARE UPDATE
Rapid Response Nurse Chart Check     Chart check completed, abnormal VS noted. Bedside RN Warner contacted, no concerns verbalized at this time, instructed to call [phone] for further concerns or assistance.

## 2019-10-13 NOTE — ASSESSMENT & PLAN NOTE
- Nutrition consulted yesterday, appreciate recommendations  - Recommend ADAT to low fat diet. If put on clears, add Boost Breeze. If PO intake <50% of meals over 48hrs, consider TF

## 2019-10-13 NOTE — ASSESSMENT & PLAN NOTE
- Patient's baseline HR in 120s  - Seen by cardiology 9/17/19, tachycardia determined to be secondary to volume depletion  - EKG 10/11/19 showed sinus tach    - Pulse:  [121-133] 124  - AI and RRT triggered yesterday due to tachycardia, tachypnea, and O2 sat 88%  - CTA and lower extremity dopplers ordered and negative for PE/DVT (see full imaging results above)

## 2019-10-13 NOTE — NURSING
Patient complained of tremors, reported to MD Love. I offered the patient Xanax per verbal order from MD Love, but she declined. Will continue to monitor and follow orders as directed.

## 2019-10-13 NOTE — SUBJECTIVE & OBJECTIVE
"Interval History: Patient NPO yesterday and reports one episode of emesis in the afternoon, nausea relieved with IV zofran. She reports a few additional episodes of nausea and retching throughout the night. She states that overall she is "feeling better" and believes that the PO reglan is controlling her nausea more effectively. She also reports feeling more short of breath, especially when nauseous. Her daughter is at bedside and is concerned that her speech has slowed down over the course of her hospital admission. She continues to ambulate and urinate with difficulty. She had a BM yesterday. She denies abdominal pain, fever, chills, CP, and dysuria.    Scheduled Meds:   famotidine (PF)  20 mg Intravenous BID    magnesium sulfate IVPB  2 g Intravenous Once    metoclopramide HCl  10 mg Oral Q6H    scopolamine  1 patch Transdermal Q3 Days     Continuous Infusions:   sodium chloride 0.9% 150 mL/hr at 10/13/19 0736     PRN Meds:ALPRAZolam, calcium carbonate, docusate sodium, ibuprofen, LORazepam, ondansetron, oxyCODONE, oxyCODONE, simethicone, sodium chloride 0.9%    Review of patient's allergies indicates:   Allergen Reactions    Gramicidin d Shortness Of Breath    Hydrochlorothiazide Other (See Comments)     Burning and tingling sensation throughoutt her body    Lisinopril Shortness Of Breath    Promethazine Shortness Of Breath and Nausea And Vomiting    Neosporin [neomycin-bacitracnzn-polymyxnb] Swelling    Polymyxin [bacitracin-polymyxin b] Swelling       Objective:     Vital Signs (Most Recent):  Temp: 97.4 °F (36.3 °C) (10/13/19 0733)  Pulse: (!) 124 (10/13/19 0733)  Resp: (!) 25 (10/13/19 0733)  BP: 118/68 (10/13/19 0733)  SpO2: 99 % (10/13/19 0733) Vital Signs (24h Range):  Temp:  [97.4 °F (36.3 °C)-98.4 °F (36.9 °C)] 97.4 °F (36.3 °C)  Pulse:  [121-133] 124  Resp:  [22-31] 25  SpO2:  [88 %-99 %] 99 %  BP: (108-137)/(61-90) 118/68     Weight: 62.3 kg (137 lb 5.6 oz)  Body mass index is 25.12 " kg/m².    Intake/Output - Last 3 Shifts       10/11 0700 - 10/12 0659 10/12 0700 - 10/13 0659 10/13 0700 - 10/14 0659    P.O. 480 0     I.V. (mL/kg) 2455.8 (39.4) 2732.5 (43.9) 1692.1 (27.2)    IV Piggyback 1400      Total Intake(mL/kg) 4335.8 (69.6) 2732.5 (43.9) 1692.1 (27.2)    Urine (mL/kg/hr) 800 (0.5) 850 (0.6)     Emesis/NG output 0      Drains 985 275 300    Stool 0      Total Output 1785 1125 300    Net +2550.8 +1607.5 +1392.1           Urine Occurrence 4 x      Stool Occurrence 1 x      Emesis Occurrence 6 x               Physical Exam:   Constitutional: She is oriented to person, place, and time. She appears well-developed and well-nourished. No distress.    HENT:   Head: Normocephalic and atraumatic.      Cardiovascular: Regular rhythm.    Tachycardic.    Pulmonary/Chest: Effort normal. No respiratory distress.        Abdominal: Soft. She exhibits distension (slightly more distended than exam yesterday). There is no tenderness. There is no rebound and no guarding.   Biliary drain present draining dark green/brown fluid.  Hypoactive bowel sounds.             Musculoskeletal: Normal range of motion. She exhibits no edema or tenderness.       Neurological: She is alert and oriented to person, place, and time.    Skin: Skin is warm and dry.    Psychiatric: She has a normal mood and affect. Her behavior is normal. Judgment and thought content normal.       Lines/Drains/Airways     Drain                 Biliary Tube 10/10/19 1500 RLQ 2 days          Peripheral Intravenous Line                 Peripheral IV - Single Lumen 10/11/19 2136 20 G Left;Posterior Hand 1 day                Laboratory:  CBC:   Recent Labs   Lab 10/12/19  0535 10/13/19  0426   WBC 6.41 6.39   HGB 10.1* 10.6*   HCT 29.6* 34.0*    287    and CMP:   Recent Labs   Lab 10/12/19  0535 10/13/19  0426    140   K 4.0 4.0    109   CO2 19* 15*   GLU 82 71   BUN 12 10   CREATININE 0.6 0.6   CALCIUM 7.5* 7.9*   PROT 4.9* 5.2*    ALBUMIN 1.7* 1.8*   BILITOT 4.3* 3.7*   ALKPHOS 498* 412*   * 231*   ALT 97* 89*   ANIONGAP 10 16   EGFRNONAA >60.0 >60.0     Imaging:  CTA, 10/12/19:  Narrative     EXAMINATION:  CTA CHEST NON CORONARY    CLINICAL HISTORY:  Tachycardic, tachypneic, rule out PE;    TECHNIQUE:  Low dose axial images, sagittal and coronal reformations were obtained from the thoracic inlet to the lung bases following the IV administration of 75 mL of Omnipaque 350.  Contrast timing was optimized to evaluate the pulmonary arteries.  MIP images were performed.    COMPARISON:  CT chest abdomen pelvis 10/03/2019.  CTA chest 09/15/2019.    FINDINGS:  Examination of the soft tissue and vascular structures at the base of the neck is unremarkable.    The thoracic aorta maintains normal caliber, contour, and course without significant atherosclerotic calcification.  There is no evidence of aneurysmal dilation or dissection.    The pulmonary arteries distribute normally without evidence of filling defect to indicate pulmonary thromboembolism.    The trachea and proximal airways are patent.    The lungs are symmetrically expanded.  Persistent bilateral moderate-sized pleural effusions with associated compressive atelectasis, similar to prior.  No pneumothorax.  Stable left upper lobe pulmonary nodules, the largest measuring approximately 5 mm (axial series 3, image 27).    The heart is not enlarged.  No pericardial effusion.    There is no axillary, mediastinal, or hilar lymph node enlargement.    The esophagus maintains a normal course and caliber.    Limited images of the upper abdomen obtained during the course of this dedicated thoracic CT is negative for acute findings.  Partial visualization of previously identified multiple hypodense hepatic lesions in this patient with metastatic uterine cancer.  Partially visualized biliary tube.  Small volume abdominal ascites.    The osseous structures are within expected limits.       Impression       No evidence of pulmonary thromboembolism.    Bilateral moderate-sized pleural effusions with associated compressive atelectasis, similar to prior.    Stable left upper lobe pulmonary nodules, the largest measuring approximately 5 mm.    Small volume abdominal ascites.    Partial visualization of previously identified multiple hypodense hepatic lesions in this patient with metastatic uterine cancer.  Partially visualized biliary tube.    Additional stable findings as above.         U/S lower extremities, 10/12/19:  Narrative     EXAMINATION:  US LOWER EXTREMITY VEINS BILATERAL    CLINICAL HISTORY:  Rule out DVT;    TECHNIQUE:  Duplex and color flow Doppler and dynamic compression was performed of the bilateral lower extremity veins was performed.    COMPARISON:  None.    FINDINGS:  Right thigh veins: The common femoral, femoral, popliteal, upper greater saphenous, and deep femoral veins are patent and free of thrombus. The veins are normally compressible and have normal phasic flow and augmentation response.    Right calf veins: The visualized calf veins are patent.    Left thigh veins: The common femoral, femoral, popliteal, upper greater saphenous, and deep femoral veins are patent and free of thrombus. The veins are normally compressible and have normal phasic flow and augmentation response.    Left calf veins: The visualized calf veins are patent.    Miscellaneous: Mild bilateral lower extremity edema.      Impression       Nonspecific edema lower extremity soft tissues.  No evidence of deep venous thrombosis in either lower extremity.

## 2019-10-13 NOTE — ASSESSMENT & PLAN NOTE
- Xanax 0.5mg TID prn started during admission  - Patient encouraged to request medicine during episodes of anxiety

## 2019-10-13 NOTE — PLAN OF CARE
Patient remained AAOx3 throughout the shift. Assessment completed; Vital signs remained stable. All schedules/PRN medications administered as ordered. NPO this morning, but changed to clear liquid. Activity done with minimal or stand-by assistance. MRI of brain completed today. Voids per toilet;  side rails up x2; call bell in place; bed in lowest, locked position; skid proof socks on;  care plan explained to patient and her daughter; no additional complaints at this time. Will continue routine plan of care; possible discharge in the morning.

## 2019-10-13 NOTE — ASSESSMENT & PLAN NOTE
- Nausea/vomiting for past 5 days on admission  - Afebrile. Tachycardic, likely secondary to dehydration  - GI/biliary consulted secondary to elevated LFTs, MRCP performed 10/7/19 showing mild CBD dilation and large amount of ascites  - EUS and ERCP performed 10/8/19 by advanced endoscopic team, recommend IR biliary drainage  - Paracentesis performed 10/9/19, 2.9 L removed  - Biliary catheter placed 10/9/19 with improvement in nausea/vomiting and liver enzymes    - Scheduled PO reglan started yesterday with some improvement in symptoms. Continue scopolamine patch, PO ativan 1mg q6h prn, and IV zofran 8mg q6h prn. Patient states that she becomes too sedated and confused with phenergan and compazine so will avoid these meds.  - Patient becomes tachycardic and tachypneic during n/v episodes, triggering RRT nurse. AI also triggered yesterday.  - Continue maintenance IVF at 150cc/hr  - NPO, will consider advancing diet today

## 2019-10-13 NOTE — PROGRESS NOTES
[facility]  Gynecologic Oncology  Progress Note      Patient Name: Jessica Gorman  MRN: [id number]  Admission Date: 10/7/2019  Hospital Length of Stay: 6 days  Attending Provider: Jair Watt MD  Primary Care Provider: Rico Amaro MD  Principal Problem: Nausea and vomiting    Follow-up For: Procedure(s) (LRB):  ULTRASOUND, UPPER GI TRACT, ENDOSCOPIC (N/A)  ERCP (ENDOSCOPIC RETROGRADE CHOLANGIOPANCREATOGRAPHY) (N/A)  Post-Operative Day: 5 Days Post-Op  Subjective:      History of Present Illness:  Jessica Gorman is a 69 year old female with history of recurrent serous endometrial carcinoma who was admitted due to intractable nausea/vomiting. Patient states that about 5 days ago, she began having daily nausea/vomiting that she believes started after she took her potassium tablets. She reports 2-3 episodes of emesis per day since then, mostly when she tries to eat or encounters strong smells. She states that at most, she is able to drink a few sips of water or tablespoons of food per day. Her last episode of emesis was this morning due to the smell of deoderant. She is taking zofran q12h without relief. She did not undergo cycle 3 of chemotherapy last week. She states that she is able to ambulate without difficulty but feels very weak. She denies dysuria, although she states that her urine is much darker. She had a normal BM yesterday. She denies headache, change in vision, SOB, CP, and abdominal pain.    Hospital Course:  10/07/2019 - Admitted to Gyn Onc service for IVF and electrolyte repletion secondary to nausea/vomiting and dehydration. GI/biliary consulted secondary to elevated LFTs.  10/08/2019 - Two episodes of emesis yesterday after taking zofran. MRCP performed yesterday evening showing mildly dilated CBD and large amount of ascites. EUS and ERCP performed by biliary team.  10/09/2019 - Episode of emesis overnight after drinking water. Plan for paracentesis and IR consult for  "percutaneous biliary drainage today.  10/10/2019 - IR placement of biliary drain and paracentesis yesterday, 2.9L removed. Biliary catheter flushed and draining bilious fluid.   10/11/2019 - NAEON. No nausea/vomiting since catheter was flushed yesterday, tolerating PO. Liver enzymes downtrending.   10/12/2019 - Several episodes of emesis overnight, antiemetics adjusted. Remains tachycardic. NPO. Biliary catheter draining green/brown fluid.  10/13/2019 - One episode of emesis yesterday. Nausea better controlled with scheduled PO reglan. Remains tachycardic, now SOB. CTA and lower extremity dopplers negative for PE/DVT. Liver enzymes continue to trend down with biliary catheter appropriately.    Interval History: Patient NPO yesterday and reports one episode of emesis in the afternoon, nausea relieved with IV zofran. She reports a few additional episodes of nausea and retching throughout the night. She states that overall she is "feeling better" and believes that the PO reglan is controlling her nausea more effectively. She also reports feeling more short of breath, especially when nauseous. Her daughter is at bedside and is concerned that her speech has slowed down over the course of her hospital admission. She continues to ambulate and urinate with difficulty. She had a BM yesterday. She denies abdominal pain, fever, chills, CP, and dysuria.    Scheduled Meds:   famotidine (PF)  20 mg Intravenous BID    magnesium sulfate IVPB  2 g Intravenous Once    metoclopramide HCl  10 mg Oral Q6H    scopolamine  1 patch Transdermal Q3 Days     Continuous Infusions:   sodium chloride 0.9% 150 mL/hr at 10/13/19 0736     PRN Meds:ALPRAZolam, calcium carbonate, docusate sodium, ibuprofen, LORazepam, ondansetron, oxyCODONE, oxyCODONE, simethicone, sodium chloride 0.9%    Review of patient's allergies indicates:   Allergen Reactions    Gramicidin d Shortness Of Breath    Hydrochlorothiazide Other (See Comments)     Burning and " tingling sensation throughoutt her body    Lisinopril Shortness Of Breath    Promethazine Shortness Of Breath and Nausea And Vomiting    Neosporin [neomycin-bacitracnzn-polymyxnb] Swelling    Polymyxin [bacitracin-polymyxin b] Swelling       Objective:     Vital Signs (Most Recent):  Temp: 97.4 °F (36.3 °C) (10/13/19 0733)  Pulse: (!) 124 (10/13/19 0733)  Resp: (!) 25 (10/13/19 0733)  BP: 118/68 (10/13/19 0733)  SpO2: 99 % (10/13/19 0733) Vital Signs (24h Range):  Temp:  [97.4 °F (36.3 °C)-98.4 °F (36.9 °C)] 97.4 °F (36.3 °C)  Pulse:  [121-133] 124  Resp:  [22-31] 25  SpO2:  [88 %-99 %] 99 %  BP: (108-137)/(61-90) 118/68     Weight: 62.3 kg (137 lb 5.6 oz)  Body mass index is 25.12 kg/m².    Intake/Output - Last 3 Shifts       10/11 0700 - 10/12 0659 10/12 0700 - 10/13 0659 10/13 0700 - 10/14 0659    P.O. 480 0     I.V. (mL/kg) 2455.8 (39.4) 2732.5 (43.9) 1692.1 (27.2)    IV Piggyback 1400      Total Intake(mL/kg) 4335.8 (69.6) 2732.5 (43.9) 1692.1 (27.2)    Urine (mL/kg/hr) 800 (0.5) 850 (0.6)     Emesis/NG output 0      Drains 985 275 300    Stool 0      Total Output 1785 1125 300    Net +2550.8 +1607.5 +1392.1           Urine Occurrence 4 x      Stool Occurrence 1 x      Emesis Occurrence 6 x               Physical Exam:   Constitutional: She is oriented to person, place, and time. She appears well-developed and well-nourished. No distress.    HENT:   Head: Normocephalic and atraumatic.      Cardiovascular: Regular rhythm.    Tachycardic.    Pulmonary/Chest: Effort normal. No respiratory distress.        Abdominal: Soft. She exhibits distension (slightly more distended than exam yesterday). There is no tenderness. There is no rebound and no guarding.   Biliary drain present draining dark green/brown fluid.  Hypoactive bowel sounds.             Musculoskeletal: Normal range of motion. She exhibits no edema or tenderness.       Neurological: She is alert and oriented to person, place, and time.    Skin: Skin is  warm and dry.    Psychiatric: She has a normal mood and affect. Her behavior is normal. Judgment and thought content normal.       Lines/Drains/Airways     Drain                 Biliary Tube 10/10/19 1500 RLQ 2 days          Peripheral Intravenous Line                 Peripheral IV - Single Lumen 10/11/19 2136 20 G Left;Posterior Hand 1 day                Laboratory:  CBC:   Recent Labs   Lab 10/12/19  0535 10/13/19  0426   WBC 6.41 6.39   HGB 10.1* 10.6*   HCT 29.6* 34.0*    287    and CMP:   Recent Labs   Lab 10/12/19  0535 10/13/19  0426    140   K 4.0 4.0    109   CO2 19* 15*   GLU 82 71   BUN 12 10   CREATININE 0.6 0.6   CALCIUM 7.5* 7.9*   PROT 4.9* 5.2*   ALBUMIN 1.7* 1.8*   BILITOT 4.3* 3.7*   ALKPHOS 498* 412*   * 231*   ALT 97* 89*   ANIONGAP 10 16   EGFRNONAA >60.0 >60.0     Imaging:  CTA, 10/12/19:  Narrative     EXAMINATION:  CTA CHEST NON CORONARY    CLINICAL HISTORY:  Tachycardic, tachypneic, rule out PE;    TECHNIQUE:  Low dose axial images, sagittal and coronal reformations were obtained from the thoracic inlet to the lung bases following the IV administration of 75 mL of Omnipaque 350.  Contrast timing was optimized to evaluate the pulmonary arteries.  MIP images were performed.    COMPARISON:  CT chest abdomen pelvis 10/03/2019.  CTA chest 09/15/2019.    FINDINGS:  Examination of the soft tissue and vascular structures at the base of the neck is unremarkable.    The thoracic aorta maintains normal caliber, contour, and course without significant atherosclerotic calcification.  There is no evidence of aneurysmal dilation or dissection.    The pulmonary arteries distribute normally without evidence of filling defect to indicate pulmonary thromboembolism.    The trachea and proximal airways are patent.    The lungs are symmetrically expanded.  Persistent bilateral moderate-sized pleural effusions with associated compressive atelectasis, similar to prior.  No pneumothorax.   Stable left upper lobe pulmonary nodules, the largest measuring approximately 5 mm (axial series 3, image 27).    The heart is not enlarged.  No pericardial effusion.    There is no axillary, mediastinal, or hilar lymph node enlargement.    The esophagus maintains a normal course and caliber.    Limited images of the upper abdomen obtained during the course of this dedicated thoracic CT is negative for acute findings.  Partial visualization of previously identified multiple hypodense hepatic lesions in this patient with metastatic uterine cancer.  Partially visualized biliary tube.  Small volume abdominal ascites.    The osseous structures are within expected limits.      Impression       No evidence of pulmonary thromboembolism.    Bilateral moderate-sized pleural effusions with associated compressive atelectasis, similar to prior.    Stable left upper lobe pulmonary nodules, the largest measuring approximately 5 mm.    Small volume abdominal ascites.    Partial visualization of previously identified multiple hypodense hepatic lesions in this patient with metastatic uterine cancer.  Partially visualized biliary tube.    Additional stable findings as above.         U/S lower extremities, 10/12/19:  Narrative     EXAMINATION:  US LOWER EXTREMITY VEINS BILATERAL    CLINICAL HISTORY:  Rule out DVT;    TECHNIQUE:  Duplex and color flow Doppler and dynamic compression was performed of the bilateral lower extremity veins was performed.    COMPARISON:  None.    FINDINGS:  Right thigh veins: The common femoral, femoral, popliteal, upper greater saphenous, and deep femoral veins are patent and free of thrombus. The veins are normally compressible and have normal phasic flow and augmentation response.    Right calf veins: The visualized calf veins are patent.    Left thigh veins: The common femoral, femoral, popliteal, upper greater saphenous, and deep femoral veins are patent and free of thrombus. The veins are normally  compressible and have normal phasic flow and augmentation response.    Left calf veins: The visualized calf veins are patent.    Miscellaneous: Mild bilateral lower extremity edema.      Impression       Nonspecific edema lower extremity soft tissues.  No evidence of deep venous thrombosis in either lower extremity.     Assessment/Plan:     * Nausea and vomiting  - Nausea/vomiting for past 5 days on admission  - Afebrile. Tachycardic, likely secondary to dehydration  - GI/biliary consulted secondary to elevated LFTs, MRCP performed 10/7/19 showing mild CBD dilation and large amount of ascites  - EUS and ERCP performed 10/8/19 by advanced endoscopic team, recommend IR biliary drainage  - Paracentesis performed 10/9/19, 2.9 L removed  - Biliary catheter placed 10/9/19 with improvement in nausea/vomiting and liver enzymes    - Scheduled PO reglan started yesterday with some improvement in symptoms. Continue scopolamine patch, PO ativan 1mg q6h prn, and IV zofran 8mg q6h prn. Patient states that she becomes too sedated and confused with phenergan and compazine so will avoid these meds.  - Patient becomes tachycardic and tachypneic during n/v episodes, triggering RRT nurse. AI also triggered yesterday.  - Continue maintenance IVF at 150cc/hr  - NPO, will consider advancing diet today    Tachycardia  - Patient's baseline HR in 120s  - Seen by cardiology 9/17/19, tachycardia determined to be secondary to volume depletion  - EKG 10/11/19 showed sinus tach    - Pulse:  [121-133] 124  - AI and RRT triggered yesterday due to tachycardia, tachypnea, and O2 sat 88%  - CTA and lower extremity dopplers ordered and negative for PE/DVT (see full imaging results above)    Hypokalemia  - S/p IV KCl 40mEq  - K 4.0 today  - Continue to monitor and replace as needed    Hypomagnesemia  - Mg 1.3 on admission  - S/p IV Mg 5g since admit  - Mg 1.5 today, will administer IV 2g  - Continue to monitor and replace as needed    Endometrial  carcinoma, serous  - CA-125 155> 1795  - CT A/P on 10/3/19 showed stable disease  - See gyn onc history above    Weakness  - PT/OT consult    Poor nutrition  - Nutrition consulted yesterday, appreciate recommendations  - Recommend ADAT to low fat diet. If put on clears, add Boost Breeze. If PO intake <50% of meals over 48hrs, consider TF    Anxiety  - Xanax 0.5mg TID prn started during admission  - Patient encouraged to request medicine during episodes of anxiety    Hyperbilirubinemia  - Biliary catheter placed by IR on 10/9/19  - LFTs downtrending      VTE Risk Mitigation (From admission, onward)         Ordered     Place sequential compression device  Until discontinued      10/09/19 0638     IP VTE LOW RISK PATIENT  Once      10/07/19 1408     Place ASH hose  Until discontinued      10/07/19 1408              Opal Aleman MD  Gynecologic Oncology  [facility]

## 2019-10-13 NOTE — CARE UPDATE
Rapid Response Nurse Follow-up Note     Followed up with patient for proactive rounding.   Spoke with charge RN and bedside RN about increased HR and AI alert.  Patient not currently on floor.  MD gave permission for patient to leave the floor. Reviewed plan of care with primary RN.  Please call Rapid Response RN, Patricia Page RN with any questions or concerns at [phone].

## 2019-10-13 NOTE — NURSING
Patient left leg appeared more swollen from her baseline this morning, which was +2 and had increased to +3. Reported to MD Love. I will re-enforce education to the patient about continuously wearing her comprssion stockings/SCDs and elevating her lower extremities as discussed with MD Love. I will continue to monitor and follow orders as directed by MD Love

## 2019-10-13 NOTE — ASSESSMENT & PLAN NOTE
- Mg 1.3 on admission  - S/p IV Mg 5g since admit  - Mg 1.5 today, will administer IV 2g  - Continue to monitor and replace as needed

## 2019-10-14 DIAGNOSIS — C54.1 ENDOMETRIAL CARCINOMA: Primary | ICD-10-CM

## 2019-10-14 LAB
ALBUMIN SERPL BCP-MCNC: 2.1 G/DL (ref 3.5–5.2)
ALP SERPL-CCNC: 390 U/L (ref 55–135)
ALT SERPL W/O P-5'-P-CCNC: 85 U/L (ref 10–44)
ANION GAP SERPL CALC-SCNC: 14 MMOL/L (ref 8–16)
AST SERPL-CCNC: 202 U/L (ref 10–40)
BASOPHILS # BLD AUTO: 0.02 K/UL (ref 0–0.2)
BASOPHILS NFR BLD: 0.3 % (ref 0–1.9)
BILIRUB SERPL-MCNC: 4.2 MG/DL (ref 0.1–1)
BUN SERPL-MCNC: 14 MG/DL (ref 8–23)
CALCIUM SERPL-MCNC: 8.4 MG/DL (ref 8.7–10.5)
CHLORIDE SERPL-SCNC: 109 MMOL/L (ref 95–110)
CO2 SERPL-SCNC: 18 MMOL/L (ref 23–29)
CREAT SERPL-MCNC: 0.7 MG/DL (ref 0.5–1.4)
DIFFERENTIAL METHOD: ABNORMAL
EOSINOPHIL # BLD AUTO: 0.1 K/UL (ref 0–0.5)
EOSINOPHIL NFR BLD: 0.7 % (ref 0–8)
ERYTHROCYTE [DISTWIDTH] IN BLOOD BY AUTOMATED COUNT: 20.1 % (ref 11.5–14.5)
EST. GFR  (AFRICAN AMERICAN): >60 ML/MIN/1.73 M^2
EST. GFR  (NON AFRICAN AMERICAN): >60 ML/MIN/1.73 M^2
GLUCOSE SERPL-MCNC: 111 MG/DL (ref 70–110)
HCT VFR BLD AUTO: 33.8 % (ref 37–48.5)
HGB BLD-MCNC: 11.6 G/DL (ref 12–16)
IMM GRANULOCYTES # BLD AUTO: 0.06 K/UL (ref 0–0.04)
IMM GRANULOCYTES NFR BLD AUTO: 0.9 % (ref 0–0.5)
LYMPHOCYTES # BLD AUTO: 0.4 K/UL (ref 1–4.8)
LYMPHOCYTES NFR BLD: 5.2 % (ref 18–48)
MCH RBC QN AUTO: 28.5 PG (ref 27–31)
MCHC RBC AUTO-ENTMCNC: 34.3 G/DL (ref 32–36)
MCV RBC AUTO: 83 FL (ref 82–98)
MONOCYTES # BLD AUTO: 0.8 K/UL (ref 0.3–1)
MONOCYTES NFR BLD: 11.9 % (ref 4–15)
NEUTROPHILS # BLD AUTO: 5.7 K/UL (ref 1.8–7.7)
NEUTROPHILS NFR BLD: 81 % (ref 38–73)
NRBC BLD-RTO: 0 /100 WBC
PLATELET # BLD AUTO: 310 K/UL (ref 150–350)
PMV BLD AUTO: 11.9 FL (ref 9.2–12.9)
POTASSIUM SERPL-SCNC: 3.8 MMOL/L (ref 3.5–5.1)
PROT SERPL-MCNC: 5.9 G/DL (ref 6–8.4)
RBC # BLD AUTO: 4.07 M/UL (ref 4–5.4)
SODIUM SERPL-SCNC: 141 MMOL/L (ref 136–145)
WBC # BLD AUTO: 6.98 K/UL (ref 3.9–12.7)

## 2019-10-14 PROCEDURE — 97161 PT EVAL LOW COMPLEX 20 MIN: CPT

## 2019-10-14 PROCEDURE — 99231 PR SUBSEQUENT HOSPITAL CARE,LEVL I: ICD-10-PCS | Mod: GC,,, | Performed by: OBSTETRICS & GYNECOLOGY

## 2019-10-14 PROCEDURE — 20600001 HC STEP DOWN PRIVATE ROOM

## 2019-10-14 PROCEDURE — 85025 COMPLETE CBC W/AUTO DIFF WBC: CPT

## 2019-10-14 PROCEDURE — 80053 COMPREHEN METABOLIC PANEL: CPT

## 2019-10-14 PROCEDURE — 99231 SBSQ HOSP IP/OBS SF/LOW 25: CPT | Mod: GC,,, | Performed by: OBSTETRICS & GYNECOLOGY

## 2019-10-14 PROCEDURE — 36415 COLL VENOUS BLD VENIPUNCTURE: CPT

## 2019-10-14 PROCEDURE — 97165 OT EVAL LOW COMPLEX 30 MIN: CPT

## 2019-10-14 PROCEDURE — 25000003 PHARM REV CODE 250: Performed by: OBSTETRICS & GYNECOLOGY

## 2019-10-14 RX ORDER — FAMOTIDINE 20 MG/1
20 TABLET, FILM COATED ORAL 2 TIMES DAILY
Status: DISCONTINUED | OUTPATIENT
Start: 2019-10-14 | End: 2019-10-15 | Stop reason: HOSPADM

## 2019-10-14 RX ORDER — METOCLOPRAMIDE 10 MG/1
10 TABLET ORAL 2 TIMES DAILY
Status: DISCONTINUED | OUTPATIENT
Start: 2019-10-14 | End: 2019-10-15 | Stop reason: HOSPADM

## 2019-10-14 RX ADMIN — FAMOTIDINE 20 MG: 20 TABLET ORAL at 08:10

## 2019-10-14 NOTE — SUBJECTIVE & OBJECTIVE
Interval History: Patient did well yesterday and overnight. Tolerating liquid diet, including Breeze and jello. No emesis yesterday or overnight. She did lose IV access with plans for midline today if she stays. Passing gas, having soft stools. Pain is minimal. Ambulating, voiding, Patient reports she is not hungry but would like to try regular diet today. Remains tachycardic, but denies dyspnea, chest pain.     Scheduled Meds:   famotidine (PF)  20 mg Intravenous BID    metoclopramide HCl  10 mg Oral Q6H     Continuous Infusions:   sodium chloride 0.9% 150 mL/hr at 10/13/19 0736     PRN Meds:ALPRAZolam, calcium carbonate, docusate sodium, ibuprofen, LORazepam, ondansetron, oxyCODONE, oxyCODONE, simethicone, sodium chloride 0.9%    Review of patient's allergies indicates:   Allergen Reactions    Gramicidin d Shortness Of Breath    Hydrochlorothiazide Other (See Comments)     Burning and tingling sensation throughoutt her body    Lisinopril Shortness Of Breath    Promethazine Shortness Of Breath and Nausea And Vomiting    Neosporin [neomycin-bacitracnzn-polymyxnb] Swelling    Polymyxin [bacitracin-polymyxin b] Swelling       Objective:     Vital Signs (Most Recent):  Temp: 97.5 °F (36.4 °C) (10/14/19 0441)  Pulse: (!) 140 (10/14/19 0441)  Resp: 18 (10/14/19 0441)  BP: 115/78 (10/14/19 0441)  SpO2: (!) 94 % (10/14/19 0441) Vital Signs (24h Range):  Temp:  [97.2 °F (36.2 °C)-97.5 °F (36.4 °C)] 97.5 °F (36.4 °C)  Pulse:  [122-140] 140  Resp:  [13-26] 18  SpO2:  [94 %-99 %] 94 %  BP: (111-129)/(67-78) 115/78     Weight: 62.3 kg (137 lb 5.6 oz)  Body mass index is 25.12 kg/m².    Intake/Output - Last 3 Shifts       10/12 0700 - 10/13 0659 10/13 0700 - 10/14 0659    P.O. 0     I.V. (mL/kg) 2732.5 (43.9) 1692.1 (27.2)    Total Intake(mL/kg) 2732.5 (43.9) 1692.1 (27.2)    Urine (mL/kg/hr) 850 (0.6) 250 (0.2)    Drains 275 300    Total Output 1125 550    Net +1607.5 +1142.1                   Physical Exam:    Constitutional: She is oriented to person, place, and time. She appears well-developed and well-nourished. No distress.    HENT:   Head: Normocephalic and atraumatic.      Cardiovascular: Regular rhythm.    Tachycardic.    Pulmonary/Chest: Effort normal. No respiratory distress.        Abdominal: Soft. She exhibits distension (slightly more distended than exam yesterday). There is no tenderness. There is no rebound and no guarding.   Biliary drain present draining dark green/brown fluid.  Hypoactive bowel sounds.             Musculoskeletal: Normal range of motion. She exhibits no edema or tenderness.       Neurological: She is alert and oriented to person, place, and time.    Skin: Skin is warm and dry.    Psychiatric: She has a normal mood and affect. Her behavior is normal. Judgment and thought content normal.       Lines/Drains/Airways     Drain                 Biliary Tube 10/10/19 1500 RLQ 3 days          Peripheral Intravenous Line                 Peripheral IV - Single Lumen 10/11/19 2136 20 G Left;Posterior Hand 2 days                Laboratory:  CBC:   Recent Labs   Lab 10/13/19  0426   WBC 6.39   HGB 10.6*   HCT 34.0*       and CMP:   Recent Labs   Lab 10/13/19  0426      K 4.0      CO2 15*   GLU 71   BUN 10   CREATININE 0.6   CALCIUM 7.9*   PROT 5.2*   ALBUMIN 1.8*   BILITOT 3.7*   ALKPHOS 412*   *   ALT 89*   ANIONGAP 16   EGFRNONAA >60.0     Imaging:  CTA, 10/12/19:  Narrative     EXAMINATION:  CTA CHEST NON CORONARY    CLINICAL HISTORY:  Tachycardic, tachypneic, rule out PE;    TECHNIQUE:  Low dose axial images, sagittal and coronal reformations were obtained from the thoracic inlet to the lung bases following the IV administration of 75 mL of Omnipaque 350.  Contrast timing was optimized to evaluate the pulmonary arteries.  MIP images were performed.    COMPARISON:  CT chest abdomen pelvis 10/03/2019.  CTA chest 09/15/2019.    FINDINGS:  Examination of the soft tissue and  vascular structures at the base of the neck is unremarkable.    The thoracic aorta maintains normal caliber, contour, and course without significant atherosclerotic calcification.  There is no evidence of aneurysmal dilation or dissection.    The pulmonary arteries distribute normally without evidence of filling defect to indicate pulmonary thromboembolism.    The trachea and proximal airways are patent.    The lungs are symmetrically expanded.  Persistent bilateral moderate-sized pleural effusions with associated compressive atelectasis, similar to prior.  No pneumothorax.  Stable left upper lobe pulmonary nodules, the largest measuring approximately 5 mm (axial series 3, image 27).    The heart is not enlarged.  No pericardial effusion.    There is no axillary, mediastinal, or hilar lymph node enlargement.    The esophagus maintains a normal course and caliber.    Limited images of the upper abdomen obtained during the course of this dedicated thoracic CT is negative for acute findings.  Partial visualization of previously identified multiple hypodense hepatic lesions in this patient with metastatic uterine cancer.  Partially visualized biliary tube.  Small volume abdominal ascites.    The osseous structures are within expected limits.      Impression       No evidence of pulmonary thromboembolism.    Bilateral moderate-sized pleural effusions with associated compressive atelectasis, similar to prior.    Stable left upper lobe pulmonary nodules, the largest measuring approximately 5 mm.    Small volume abdominal ascites.    Partial visualization of previously identified multiple hypodense hepatic lesions in this patient with metastatic uterine cancer.  Partially visualized biliary tube.    Additional stable findings as above.         U/S lower extremities, 10/12/19:  Narrative     EXAMINATION:  US LOWER EXTREMITY VEINS BILATERAL    CLINICAL HISTORY:  Rule out DVT;    TECHNIQUE:  Duplex and color flow Doppler and  dynamic compression was performed of the bilateral lower extremity veins was performed.    COMPARISON:  None.    FINDINGS:  Right thigh veins: The common femoral, femoral, popliteal, upper greater saphenous, and deep femoral veins are patent and free of thrombus. The veins are normally compressible and have normal phasic flow and augmentation response.    Right calf veins: The visualized calf veins are patent.    Left thigh veins: The common femoral, femoral, popliteal, upper greater saphenous, and deep femoral veins are patent and free of thrombus. The veins are normally compressible and have normal phasic flow and augmentation response.    Left calf veins: The visualized calf veins are patent.    Miscellaneous: Mild bilateral lower extremity edema.      Impression       Nonspecific edema lower extremity soft tissues.  No evidence of deep venous thrombosis in either lower extremity.

## 2019-10-14 NOTE — PLAN OF CARE
[facility]    HOME HEALTH ORDERS  FACE TO FACE ENCOUNTER    Patient Name: Jessica Gorman  YOB: 1950    PCP: Rico Amaro MD   PCP Address: [address] [place] [id number]  PCP Phone Number: [phone]  PCP Fax: [phone]    Encounter Date: 10/14/2019    Admit to Home Health    Diagnoses:  Active Hospital Problems    Diagnosis  POA    *Nausea and vomiting [R11.2]  Yes    Poor nutrition [E63.9]  Yes    Weakness [R53.1]  Yes     - PT/OT consult      Anxiety [F41.9]  Yes    Hyperbilirubinemia [E80.6]  Yes    Tachycardia [R00.0]  Yes     - Pulse:  [108-143] 119  - Persistently tachycardic since admission  - EKG ordered      Hypomagnesemia [E83.42]  Yes    Hypokalemia [E87.6]  Yes    Endometrial carcinoma, 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       No future appointments.  Follow-up Information     Jair Watt MD.    Specialty:  Gynecologic Oncology  Why:  Follow-Up Appointment as scheduled by the clinic  Contact information:  [address] [facility] [id number] [phone]                 I have seen and examined this patient face to face today. My clinical findings that support the need for the home health skilled services and home bound status are the following:  Weakness/numbness causing balance and gait disturbance due to Malignancy/Cancer making it taxing to leave home.    Allergies:  Review of patient's allergies indicates:   Allergen Reactions    Gramicidin d Shortness Of Breath    Hydrochlorothiazide Other (See Comments)     Burning and tingling sensation throughoutt her body    Lisinopril Shortness Of Breath    Promethazine Shortness Of Breath and Nausea And Vomiting    Neosporin [neomycin-bacitracnzn-polymyxnb] Swelling    Polymyxin [bacitracin-polymyxin b] Swelling       Diet: regular diet    Activities: activity as tolerated    Nursing:   SN to  complete comprehensive assessment including routine vital signs. Instruct on disease process and s/s of complications to report to MD. Review/verify medication list sent home with the patient at time of discharge  and instruct patient/caregiver as needed. Frequency may be adjusted depending on start of care date.    Empty and maintain biliary drain.     Notify MD if SBP > 160 or < 90; DBP > 90 or < 50; HR > 120 or < 50; Temp > 101; Other:  Intractable nausea and vomiting     CONSULTS:    Physical Therapy to evaluate and treat. Evaluate for home safety and equipment needs; Establish/upgrade home exercise program. Perform / instruct on therapeutic exercises, gait training, transfer training, and Range of Motion.  Occupational Therapy to evaluate and treat. Evaluate home environment for safety and equipment needs. Perform/Instruct on transfers, ADL training, ROM, and therapeutic exercises.    MISCELLANEOUS CARE:  N/A    Medications: Review discharge medications with patient and family and provide education.      Current Discharge Medication List      CONTINUE these medications which have NOT CHANGED    Details   multivitamin (ONE DAILY MULTIVITAMIN) per tablet Take 1 tablet by mouth once daily.      ondansetron (ZOFRAN-ODT) 8 MG TbDL Take 1 tablet (8 mg total) by mouth every 12 (twelve) hours as needed.  Qty: 30 tablet, Refills: 1    Associated Diagnoses: Chemotherapy-induced nausea      potassium chloride SA (K-DUR,KLOR-CON) 20 MEQ tablet Take 1 tablet (20 mEq total) by mouth once daily.  Qty: 30 tablet, Refills: 3    Associated Diagnoses: Hypokalemia      amLODIPine (NORVASC) 10 MG tablet Take 1 tablet (10 mg total) by mouth once daily.  Qty: 90 tablet, Refills: 0    Associated Diagnoses: Essential hypertension      dexAMETHasone (DECADRON) 4 MG Tab Take 5 tablets every 6 hours for 24 hours prior to chemotherapy  Qty: 20 tablet, Refills: 4    Associated Diagnoses: Endometrial carcinoma      megestrol (MEGACE ES) 625  mg/5 mL Susp Take 5 mLs (625 mg total) by mouth once daily.  Qty: 150 mL, Refills: 11    Associated Diagnoses: Anorexia      potassium bicarbonate & potassium chloride (K-LYTE CL) 25 mEq TbEF Take 1 tablet (25 mEq total) by mouth once daily.  Qty: 30 tablet, Refills: 3    Associated Diagnoses: Hypokalemia             I certify that this patient is confined to her home and needs intermittent skilled nursing care, physical therapy and occupational therapy.    Christen Mackey MD  OB/GYN  PGY-2'

## 2019-10-14 NOTE — CARE UPDATE
Rapid Response Nurse Follow-up Note     Followed up with patient for proactive rounding.   No acute issues at this time. Reviewed plan of care with primary RN,Warner  .   Please call Rapid Response RN, Carmina Colunga RN with any questions or concerns at [phone].

## 2019-10-14 NOTE — PHYSICIAN QUERY
PT Name: Jessica Gorman  MR #: [id number]    Physician Query Form - Nutrition Clarification     CDS/: NANI Greene,RNC-MNN        Contact information:[email or web address]     This form is a permanent document in the medical record.     Query Date: 2019    By submitting this query, we are merely seeking further clarification of documentation.. Please utilize your independent clinical judgment when addressing the question(s) below.    The Medical record contains the following:   Indicators  Supporting Clinical Findings Location in Medical Record   X % of Estimated Energy Intake over a time frame from p.o., TF, or TPN % Intake of Estimated Energy Needs: 0 - 25 %  % Meal Intake: NPO Nutrition Consult note 10@228pm   X Weight Status over a time frame Pt endorses wt loss with #. Wt maintained likely to fluid in abdomen. Noted elevated LFT's. Pt likely to have lost wt, however unable to determine at this time Nutrition Consult note 10@228pm   X Subcutaneous Fat and/or Muscle Loss Body Fat Depletion: moderate depletion of orbitals, triceps and thoracic and lumbar region   Muscle Mass Depletion: moderate depletion of temples, clavicle region, scapular region, interosseous muscle and lower extremities X Nutrition Consult note 10@228pm   X Fluid Accumulation or Edema Fluid Accumulation (Malnutrition): moderate to severe(abdomen area)  Nutrition Consult note 10@228pm    Reduced  Strength     X Wt / BMI / Usual Body Weight Weight Method: Standard Scale  Weight: 62.3 kg (137 lb 5.6 oz)  Weight (lb): 137.35 lb  Ideal Body Weight (IBW), Female: 110 lb  % Ideal Body Weight, Female (lb): 124.86 lb  BMI (Calculated): 25.2  Usual Body Weight (UBW), k.7 kg(per pt)  % Usual Body Weight: 99.57 Nutrition Consult note 10@228pm    Delayed Wound Healing / Failure to Thrive     X Acute or Chronic Illness Moderate protein-calorie malnutrition  Malnutrition in the context of Acute  on Chronic Illness/Injury    Diagnosis: gastrointestinal disease(N/V)  Relevant Medical History: HTN, uterine cancer, endometrial cancer s/p x2 chemo treatments, anorexia, anxiety, hyperbilirubinema Nutrition Consult note 10/12@228pm    Medication     X Treatment Recommendation/Intervention:   1.) ADAT to low fat diet.   2.) If clear liquid diet initiated, suggest Boost Breeze (peach) TID.   3.) If PO intakes <50% of meals x 48hr, suggest TF initiation. Consult RD for recommendations.   4.) Daily weights.   5.) RD to provide cancer diet education (per dtr request).  Nutrition Consult note 10/12@228pm    Other       AND / ASPEN Clinical Characteristics (October 2011)  A minimum of two characteristics is recommended for diagnosing either moderate or severe malnutrition   Mild Malnutrition Moderate Malnutrition Severe Malnutrition   Energy Intake from p.o., TF or TPN. < 75% intake of estimated energy needs for less than 7 days < 75% intake of estimated energy needs for greater than 7 days < 50% intake of estimated energy needs for > 5 days   Weight Loss 1-2% in 1 month  5% in 3 months  7.5% in 6 months  10% in 1 year 1-2 % in 1 week  5% in 1 month  7.5% in 3 months  10% in 6 months  20% in 1 year > 2% in 1 week  > 5% in 1 month  > 7.5% in 3 months  > 10% in 6 months  > 20% in 1 year   Physical Findings     None *Mild subcutaneous fat and/or muscle loss  *Mild fluid accumulation  *Stage II decubitus  *Surgical wound or non-healing wound *Mod/severe subcutaneous fat and/or muscle loss  *Mod/severe fluid accumulation  *Stage III or IV decubitus  *Non-healing surgical wound     Provider, please specify diagnosis or diagnoses associated with above clinical findings.    [ x ] Moderate Protein-Calorie Malnutrition   [  ] Anorexia   [  ] Other Nutritional Diagnosis (please specify):    [  ] Other:    [  ] Clinically Undetermined       Please document in your progress notes daily for the duration of treatment until resolved and  include in your discharge summary.

## 2019-10-14 NOTE — PLAN OF CARE
Plan of care reviewed with patient and family.  Fall precautions maintained, side rails upx2, call light in reach, bed in low position and locked, nonskid socks on.  Family at the bedside.  Getting iv fluids infusing without difficulty.  Has biliary drain emptied during the shift.  No complaints voiced.

## 2019-10-14 NOTE — PT/OT/SLP EVAL
Occupational Therapy   Evaluation    Name: Jessica Gorman  MRN: [id number]  Admitting Diagnosis:  Nausea and vomiting 6 Days Post-Op    Recommendations:     Discharge Recommendations: home with home health  Discharge Equipment Recommendations:  none  Barriers to discharge:  None    Assessment:     Jessica Gorman is a 69 y.o. female with a medical diagnosis of Nausea and vomiting.  She presents with impairments listed below. Pt did well to tolerate and participate in session. Pt displayed decreased endurance after ascending 10 stairs. Pt displayed global deconditioning requiring increased assist for ADLs and mobility at this time. Pt would benefit from skilled OT services to improve independence and overall occupational functioning.     Performance deficits affecting function: weakness, impaired endurance, impaired cardiopulmonary response to activity.      Rehab Prognosis: Good; patient would benefit from acute skilled OT services to address these deficits and reach maximum level of function.       Plan:     Patient to be seen 3 x/week to address the above listed problems via self-care/home management, therapeutic activities, therapeutic exercises  · Plan of Care Expires:    · Plan of Care Reviewed with: patient, daughter    Subjective     Chief Complaint: No complaints   Patient/Family Comments/goals: return home.     Occupational Profile:  Living Environment: Pt lives alone in a [facility] w/ 21 stairs to bedroom and R HR.   Previous level of function: Indep w/ ADLs and MOD I (RW) for mobility.   Roles and Routines: N/A  Equipment Used at Home:  none  Assistance upon Discharge: Pt has assistance upon D/C.     Pain/Comfort:  Pain Rating 1: 0/10  Pain Rating Post-Intervention 1: 0/10    Patients cultural, spiritual, Rastafari conflicts given the current situation:      Objective:     Communicated with: RN prior to session.  Patient found up in chair with   upon OT entry to room.    General Precautions: Standard, fall    Orthopedic Precautions:N/A   Braces: N/A     Occupational Performance:      Functional Mobility/Transfers:  · Patient completed Sit <> Stand Transfer with supervision  with  rolling walker   · Functional Mobility: Pt ambulated ~125 at spv. Pt ascended 10 stairs at cga w/ RW. Pt fatigued and sob requiring extended standing rest break at top of stairs.     Activities of Daily Living:  · Lower Body Dressing: independence donned and doffed socks seated at bedside chair    Cognitive/Visual Perceptual:  Cognitive/Psychosocial Skills:     -       Oriented to: Person, Place, Time and Situation   -       Follows Commands/attention:Follows multistep  commands  -       Communication: clear/fluent  -       Memory: No Deficits noted  -       Safety awareness/insight to disability: intact   -       Mood/Affect/Coping skills/emotional control: Appropriate to situation  Visual/Perceptual:      -Intact      Physical Exam:  Balance:    -       Pt displayed good overall balance for ambualtion   Postural examination/scapula alignment:    -       Rounded shoulders  Skin integrity: Visible skin intact  Upper Extremity Range of Motion:     -       Right Upper Extremity: WFL  -       Left Upper Extremity: WFL  Upper Extremity Strength:    -       Right Upper Extremity: WFL  -       Left Upper Extremity: WFL   Strength:    -       Right Upper Extremity: WFL  -       Left Upper Extremity: WFL  Fine Motor Coordination:    -       Intact  Gross motor coordination:   WFL    AMPAC 6 Click ADL:  AMPAC Total Score: 22    Treatment & Education:  Pt educated on POC.   Education:    Patient left up in chair with all lines intact, call button in reach and dtr present    GOALS:   Multidisciplinary Problems     Occupational Therapy Goals        Problem: Occupational Therapy Goal    Goal Priority Disciplines Outcome Interventions   Occupational Therapy Goal     OT, PT/OT     Description:  Pt will perform LBD indep.  Pt will complete toileting at  toilet indep.   Pt will complete grooming standing at sink indep.  Pt will ascend and descend 21 stairs at sba w/ RHR.                      History:     Past Medical History:   Diagnosis Date    Abnormal Pap smear     1984    Anorexia 8/30/2019    Anxiety 10/11/2019    Cancer     Chemotherapy-induced neutropenia 4/1/2015    Dehydration 10/7/2019    Elevated cancer antigen 125 (CA-125) 9/5/2018    Encounter for blood transfusion     FH: breast cancer in relative when <45 years old 3/17/2016    Hyperbilirubinemia 10/8/2019    Hypertension     Hypokalemia 9/9/2019    Other complicated headache syndrome 10/4/2019    Other screening mammogram 9/17/2015    Postmenopausal bleeding     Pulmonary nodules/lesions, multiple 4/25/2019    Rash 9/17/2015    Uterine cancer     Weight loss 12/17/2015    Well woman exam with routine gynecological exam 9/14/2016         Past Surgical History:   Procedure Laterality Date    COLPOSCOPY      DILATION AND CURETTAGE OF UTERUS  10/17/14    ENDOSCOPIC ULTRASOUND OF UPPER GASTROINTESTINAL TRACT N/A 10/8/2019    Procedure: ULTRASOUND, UPPER GI TRACT, ENDOSCOPIC;  Surgeon: Gabe Aguirre MD;  Location: [address]);  Service: Endoscopy;  Laterality: N/A;    ERCP N/A 10/8/2019    Procedure: ERCP (ENDOSCOPIC RETROGRADE CHOLANGIOPANCREATOGRAPHY);  Surgeon: Gabe Aguirre MD;  Location: [facility] ([address] [place] [address]);  Service: Endoscopy;  Laterality: N/A;    HYSTERECTOMY  12/4/2014    radiacl hysterectomy    [place] REMOVAL OF OVARY/TUBE(S)         Time Tracking:     OT Date of Treatment: 10/14/19  OT Start Time: 1029  OT Stop Time: 1039  OT Total Time (min): 10 min    Billable Minutes:Evaluation 10 minutes    Porfirio Enrique, OT  10/14/2019

## 2019-10-14 NOTE — ASSESSMENT & PLAN NOTE
- Nausea/vomiting for 5 days prior to admission  - GI/biliary consulted secondary to elevated LFTs, MRCP performed 10/7/19 showing mild CBD dilation and large amount of ascites  - EUS and ERCP performed 10/8/19 by advanced endoscopic team, recommend IR biliary drainage  - Paracentesis performed 10/9/19, 2.9 L removed  - Biliary catheter placed 10/9/19 with improvement in nausea/vomiting and liver enzymes, put out 400 cc overnight  - Improving with PO reglan with scopolamine patch  - PO ativan and IV zofran PRN  - Consider advancing diet today

## 2019-10-14 NOTE — PROGRESS NOTES
PM Rounds    Patient doing well. Tolerated red beans and rice. Drinking fluid without N/V. Peripheral IV replaced. Patient able to ambulate without assistance. Discussion with Dr. Watt, patient, and family regarding goals of care. Awaiting STRATTA testing but overall patient is likely to benefit most from palliative chemotherapy. Family desires to speak with case management regarding home health companies. Will message case management now. Family relays concerns of patient confusion and believes reglan is the cause. Will decrease frequency from QID to BID. Will d/c completely if confusion persists. If N/V returns will perform gastrograffin study and possibly pursue G Tube. If patient's status remains stable overnight anticipate discharge tomorrow AM.     Ash Snow MD  OB/GYN PGY-4  Pager: [phone]

## 2019-10-14 NOTE — PROGRESS NOTES
Pharmacist Intervention IV to PO Note    Jessica Gorman is a 69 y.o. female being treated with IV medication famotidine    Patient Data:    Vital Signs (Most Recent):  Temp: 97.5 °F (36.4 °C) (10/14/19 0441)  Pulse: (!) 140 (10/14/19 0441)  Resp: 18 (10/14/19 0441)  BP: 115/78 (10/14/19 0441)  SpO2: (!) 94 % (10/14/19 0441)   Vital Signs (72h Range):  Temp:  [97.2 °F (36.2 °C)-98.4 °F (36.9 °C)]   Pulse:  [117-145]   Resp:  [13-40]   BP: (107-137)/(61-90)   SpO2:  [88 %-99 %]      CBC:  Recent Labs   Lab 10/12/19  0535 10/13/19  0426 10/14/19  0501   WBC 6.41 6.39 6.98   RBC 3.64* 3.84* 4.07   HGB 10.1* 10.6* 11.6*   HCT 29.6* 34.0* 33.8*    287 310   MCV 81* 89 83   MCH 27.7 27.6 28.5   MCHC 34.1 31.2* 34.3     CMP:     Recent Labs   Lab 10/12/19  0535 10/13/19  0426 10/14/19  0501   GLU 82 71 111*   CALCIUM 7.5* 7.9* 8.4*   ALBUMIN 1.7* 1.8* 2.1*   PROT 4.9* 5.2* 5.9*    140 141   K 4.0 4.0 3.8   CO2 19* 15* 18*    109 109   BUN 12 10 14   CREATININE 0.6 0.6 0.7   ALKPHOS 498* 412* 390*   ALT 97* 89* 85*   * 231* 202*   BILITOT 4.3* 3.7* 4.2*       Dietary Orders:  Diet Orders            Dietary nutrition supplements [facility]; Boost Breeze - Peach starting at 10/13 1200    Diet clear liquid: Clear Liquid starting at 10/13 0924            Based on the following criteria, this patient qualifies for intravenous to oral conversion:  [x] The patients gastrointestinal tract is functioning (tolerating medications via oral or enteral route for 24 hours and tolerating food or enteral feeds for 24 hours).      IV medication famotidine 20 mg BID  will be changed to oral medication famotidine 20 mg BID    Pharmacist's Name: Cherie Rangel  Pharmacist's Extension: [phone]

## 2019-10-14 NOTE — PLAN OF CARE
Afebrile. Free from falls or injury. No complaints of pain. NS infusing at 150. Tachy on telemetry. Bilary drain with green liquid. Dressing changed once this shift. Bed locked in lowest position, non skid socks on, call light within reach. Pt instructed to call if any assistance is needed. Vitals stable. Family at bedside. Will cont to justin pt.

## 2019-10-14 NOTE — PT/OT/SLP EVAL
Physical Therapy Evaluation    Patient Name:  Jessica Gorman   MRN:  [id number]    Recommendations:     Discharge Recommendations:  home health PT   Discharge Equipment Recommendations: RW and BSC ordered during admit (per pt)  Barriers to discharge: Inaccessible home (21 IFEOMA bedroom upstairs)    Assessment:     Jessica Gorman is a 69 y.o. female admitted with a medical diagnosis of Nausea and vomiting.  She presents with the following impairments/functional limitations:  weakness, impaired functional mobilty, impaired endurance, decreased lower extremity function. Pt completed functional mobility without physical assistance. Pt demo'd gait stability during ambulation with use of RW today, however, gait distance limited 2* generalized weakness and decreased endurance. Pt would continue to benefit from acute skilled PT services in order to progress functional mobility.    Rehab Prognosis: Good; patient would benefit from acute skilled PT services to address these deficits and reach maximum level of function.    Recent Surgery: Procedure(s) (LRB):  ULTRASOUND, UPPER GI TRACT, ENDOSCOPIC (N/A)  ERCP (ENDOSCOPIC RETROGRADE CHOLANGIOPANCREATOGRAPHY) (N/A) 6 Days Post-Op    Plan:     During this hospitalization, patient to be seen 3 x/week to address the identified rehab impairments via gait training, therapeutic activities, therapeutic exercises, neuromuscular re-education and progress toward the following goals:    · Plan of Care Expires:  11/12/19    Subjective     Chief Complaint: none  Patient/Family Comments/goals: return home  Pain/Comfort:  · Pain Rating 1: 0/10    Patients cultural, spiritual, Denominational conflicts given the current situation: no    Living Environment:  Pt lives alone in 2SH with 4STE and 21 IFEOMA bedroom upstairs. Prior to admission, patients level of function was independent, including ADLs and functional mobility. Pt reports her daughter drives her and lives next door. Pt is not working.   Equipment used at home: none.  DME owned (not currently used): none. (RW and BSC ordered during admit per pt) Upon discharge, patient will have assistance from family.    Objective:     Communicated with RN prior to session. Patient found up in chair with (biliary drain)  upon PT entry to room. Daughter present in room.    General Precautions: Standard, fall   Orthopedic Precautions:N/A   Braces: N/A     Exams:  · Cognitive Exam:  Patient is oriented to Person, Place, Time and Situation. Pt pleasant and cooperative throughout session  · Postural Exam:  Patient presented with the following abnormalities:    · -       Forward head  · RLE ROM: WFL  · RLE Strength: Hip flexors 3+/5, knee extensors and ankle DF 4/5  · LLE ROM: WFL  · LLE Strength: Hip flexors 3+/5, knee extensors and ankle DF 4/5    Functional Mobility:  · Transfers:     · Sit<> Stand:  stand by assistance with rolling walker  · Cues given for safety, sequencing and RW management  · Gait: ~140ft SBA with RW  · Decreased sally, decreased foot clearance and mild forward flexed posture  · Increased UE reliance on RW  · Cues given for self-pacing and RW management    Therapeutic Activities and Exercises:  Pt educated on PT role, PT POC and goals for therapy session. Pt verbalized understanding.  Functional mobility completed as described above.  Pt educated on benefits of OOB activity and staying up in the chair during meals and daylight hours. Educated pt about decreasing fall risk and use of call button for assistance from RN/PCT staff for transfers and ambulation. Pt v/u.    AM-PAC 6 CLICK MOBILITY  Total Score:18     Patient left up in chair with all lines intact, call button in reach, RN notified and daughter present.    GOALS:   Multidisciplinary Problems     Physical Therapy Goals        Problem: Physical Therapy Goal    Goal Priority Disciplines Outcome Goal Variances Interventions   Physical Therapy Goal     PT, PT/OT      Description:  Goals to  be met by: 10/28/2019    Patient will increase functional independence with mobility by performin. Supine <> sit with Supervision.  2. Sit <> stand transfer with Supervision using LRAD and no AD.  3. Gait  x 200 feet with Supervision using LRAD or no AD to prepare for community ambulation and endurance activities.  4. Ascend/descend 21 steps with right Handrails Stand-by Assistance using LRAD or no AD.  5. Lower extremity exercise program x15 reps, with supervision, in order to increase LE strength and (I) with functional mobility.                           History:     Past Medical History:   Diagnosis Date    Abnormal Pap smear     1984    Anorexia 2019    Anxiety 10/11/2019    Cancer     Chemotherapy-induced neutropenia 2015    Dehydration 10/7/2019    Elevated cancer antigen 125 (CA-125) 2018    Encounter for blood transfusion     FH: breast cancer in relative when <45 years old 3/17/2016    Hyperbilirubinemia 10/8/2019    Hypertension     Hypokalemia 2019    Other complicated headache syndrome 10/4/2019    Other screening mammogram 2015    Postmenopausal bleeding     Pulmonary nodules/lesions, multiple 2019    Rash 2015    Uterine cancer     Weight loss 2015    Well woman exam with routine gynecological exam 2016       Past Surgical History:   Procedure Laterality Date    COLPOSCOPY      DILATION AND CURETTAGE OF UTERUS  10/17/14    ENDOSCOPIC ULTRASOUND OF UPPER GASTROINTESTINAL TRACT N/A 10/8/2019    Procedure: ULTRASOUND, UPPER GI TRACT, ENDOSCOPIC;  Surgeon: Gabe Aguirre MD;  Location: [address]);  Service: Endoscopy;  Laterality: N/A;    ERCP N/A 10/8/2019    Procedure: ERCP (ENDOSCOPIC RETROGRADE CHOLANGIOPANCREATOGRAPHY);  Surgeon: Gabe Aguirre MD;  Location: [address]);  Service: Endoscopy;  Laterality: N/A;    HYSTERECTOMY  2014    radiacl hysterectomy    CT REMOVAL OF OVARY/TUBE(S)         Time  Tracking:     PT Received On: 10/14/19  PT Start Time: 0914     PT Stop Time: 0936  PT Total Time (min): 22 min     Billable Minutes: Evaluation 22    Ellen Neck, SPT  10/14/2019

## 2019-10-14 NOTE — PLAN OF CARE
PT evaluation complete and appropriate goals established.    Chante Blanco, PT, DPT   10/14/2019  [phone]    Problem: Physical Therapy Goal  Goal: Physical Therapy Goal  Description  Goals to be met by: 10/28/2019    Patient will increase functional independence with mobility by performin. Supine <> sit with Supervision.  2. Sit <> stand transfer with Supervision using LRAD and no AD.  3. Gait  x 200 feet with Supervision using LRAD or no AD to prepare for community ambulation and endurance activities.  4. Ascend/descend 21 steps with right Handrails Stand-by Assistance using LRAD or no AD.  5. Lower extremity exercise program x15 reps, with supervision, in order to increase LE strength and (I) with functional mobility.          Outcome: Ongoing, Progressing

## 2019-10-14 NOTE — PROGRESS NOTES
[facility]  Gynecologic Oncology  Progress Note      Patient Name: Jessica Gorman  MRN: [id number]  Admission Date: 10/7/2019  Hospital Length of Stay: 7 days  Attending Provider: Jair Watt MD  Primary Care Provider: Rico Amaro MD  Principal Problem: Nausea and vomiting    Follow-up For: Procedure(s) (LRB):  ULTRASOUND, UPPER GI TRACT, ENDOSCOPIC (N/A)  ERCP (ENDOSCOPIC RETROGRADE CHOLANGIOPANCREATOGRAPHY) (N/A)  Post-Operative Day: 6 Days Post-Op  Subjective:      History of Present Illness:  Jessica Gorman is a 69 year old female with history of recurrent serous endometrial carcinoma who was admitted due to intractable nausea/vomiting. Patient states that about 5 days ago, she began having daily nausea/vomiting that she believes started after she took her potassium tablets. She reports 2-3 episodes of emesis per day since then, mostly when she tries to eat or encounters strong smells. She states that at most, she is able to drink a few sips of water or tablespoons of food per day. Her last episode of emesis was this morning due to the smell of deoderant. She is taking zofran q12h without relief. She did not undergo cycle 3 of chemotherapy last week. She states that she is able to ambulate without difficulty but feels very weak. She denies dysuria, although she states that her urine is much darker. She had a normal BM yesterday. She denies headache, change in vision, SOB, CP, and abdominal pain.    Hospital Course:  10/07/2019 - Admitted to Gyn Onc service for IVF and electrolyte repletion secondary to nausea/vomiting and dehydration. GI/biliary consulted secondary to elevated LFTs.  10/08/2019 - Two episodes of emesis yesterday after taking zofran. MRCP performed yesterday evening showing mildly dilated CBD and large amount of ascites. EUS and ERCP performed by biliary team.  10/09/2019 - Episode of emesis overnight after drinking water. Plan for paracentesis and IR consult for  percutaneous biliary drainage today.  10/10/2019 - IR placement of biliary drain and paracentesis yesterday, 2.9L removed. Biliary catheter flushed and draining bilious fluid.   10/11/2019 - NAEON. No nausea/vomiting since catheter was flushed yesterday, tolerating PO. Liver enzymes downtrending.   10/12/2019 - Several episodes of emesis overnight, antiemetics adjusted. Remains tachycardic. NPO. Biliary catheter draining green/brown fluid.  10/13/2019 - One episode of emesis yesterday. Nausea better controlled with scheduled PO reglan. Remains tachycardic, now SOB. CTA and lower extremity dopplers negative for PE/DVT. Liver enzymes continue to trend down with biliary catheter appropriately.  10/14/2019 - Patient did well yesterday and overnight. Tolerating liquid diet, including Breeze and jello. Passing gas, having soft stools. Pain is minimal. Ambulating, voiding, Patient reports she is not hungry but would like to try regular diet today. Remains tachycardic, but continues to deny dyspnea, chest pain.     Interval History: Patient did well yesterday and overnight. Tolerating liquid diet, including Breeze and jello. No emesis yesterday or overnight. She did lose IV access with plans for midline today if she stays. Passing gas, having soft stools. Pain is minimal. Ambulating, voiding, Patient reports she is not hungry but would like to try regular diet today. Remains tachycardic, but denies dyspnea, chest pain.     Scheduled Meds:   famotidine (PF)  20 mg Intravenous BID    metoclopramide HCl  10 mg Oral Q6H     Continuous Infusions:   sodium chloride 0.9% 150 mL/hr at 10/13/19 0736     PRN Meds:ALPRAZolam, calcium carbonate, docusate sodium, ibuprofen, LORazepam, ondansetron, oxyCODONE, oxyCODONE, simethicone, sodium chloride 0.9%    Review of patient's allergies indicates:   Allergen Reactions    Gramicidin d Shortness Of Breath    Hydrochlorothiazide Other (See Comments)     Burning and tingling sensation  throughoutt her body    Lisinopril Shortness Of Breath    Promethazine Shortness Of Breath and Nausea And Vomiting    Neosporin [neomycin-bacitracnzn-polymyxnb] Swelling    Polymyxin [bacitracin-polymyxin b] Swelling       Objective:     Vital Signs (Most Recent):  Temp: 97.5 °F (36.4 °C) (10/14/19 0441)  Pulse: (!) 140 (10/14/19 0441)  Resp: 18 (10/14/19 0441)  BP: 115/78 (10/14/19 0441)  SpO2: (!) 94 % (10/14/19 0441) Vital Signs (24h Range):  Temp:  [97.2 °F (36.2 °C)-97.5 °F (36.4 °C)] 97.5 °F (36.4 °C)  Pulse:  [122-140] 140  Resp:  [13-26] 18  SpO2:  [94 %-99 %] 94 %  BP: (111-129)/(67-78) 115/78     Weight: 62.3 kg (137 lb 5.6 oz)  Body mass index is 25.12 kg/m².    Intake/Output - Last 3 Shifts       10/12 0700 - 10/13 0659 10/13 0700 - 10/14 0659    P.O. 0     I.V. (mL/kg) 2732.5 (43.9) 1692.1 (27.2)    Total Intake(mL/kg) 2732.5 (43.9) 1692.1 (27.2)    Urine (mL/kg/hr) 850 (0.6) 250 (0.2)    Drains 275 300    Total Output 1125 550    Net +1607.5 +1142.1                   Physical Exam:   Constitutional: She is oriented to person, place, and time. She appears well-developed and well-nourished. No distress.    HENT:   Head: Normocephalic and atraumatic.      Cardiovascular: Regular rhythm.    Tachycardic.    Pulmonary/Chest: Effort normal. No respiratory distress.        Abdominal: Soft. She exhibits distension (slightly more distended than exam yesterday). There is no tenderness. There is no rebound and no guarding.   Biliary drain present draining dark green/brown fluid.  Hypoactive bowel sounds.             Musculoskeletal: Normal range of motion. She exhibits no edema or tenderness.       Neurological: She is alert and oriented to person, place, and time.    Skin: Skin is warm and dry.    Psychiatric: She has a normal mood and affect. Her behavior is normal. Judgment and thought content normal.       Lines/Drains/Airways     Drain                 Biliary Tube 10/10/19 1500 RLQ 3 days           Peripheral Intravenous Line                 Peripheral IV - Single Lumen 10/11/19 2136 20 G Left;Posterior Hand 2 days                Laboratory:  CBC:   Recent Labs   Lab 10/13/19  0426   WBC 6.39   HGB 10.6*   HCT 34.0*       and CMP:   Recent Labs   Lab 10/13/19  0426      K 4.0      CO2 15*   GLU 71   BUN 10   CREATININE 0.6   CALCIUM 7.9*   PROT 5.2*   ALBUMIN 1.8*   BILITOT 3.7*   ALKPHOS 412*   *   ALT 89*   ANIONGAP 16   EGFRNONAA >60.0     Imaging:  CTA, 10/12/19:  Narrative     EXAMINATION:  CTA CHEST NON CORONARY    CLINICAL HISTORY:  Tachycardic, tachypneic, rule out PE;    TECHNIQUE:  Low dose axial images, sagittal and coronal reformations were obtained from the thoracic inlet to the lung bases following the IV administration of 75 mL of Omnipaque 350.  Contrast timing was optimized to evaluate the pulmonary arteries.  MIP images were performed.    COMPARISON:  CT chest abdomen pelvis 10/03/2019.  CTA chest 09/15/2019.    FINDINGS:  Examination of the soft tissue and vascular structures at the base of the neck is unremarkable.    The thoracic aorta maintains normal caliber, contour, and course without significant atherosclerotic calcification.  There is no evidence of aneurysmal dilation or dissection.    The pulmonary arteries distribute normally without evidence of filling defect to indicate pulmonary thromboembolism.    The trachea and proximal airways are patent.    The lungs are symmetrically expanded.  Persistent bilateral moderate-sized pleural effusions with associated compressive atelectasis, similar to prior.  No pneumothorax.  Stable left upper lobe pulmonary nodules, the largest measuring approximately 5 mm (axial series 3, image 27).    The heart is not enlarged.  No pericardial effusion.    There is no axillary, mediastinal, or hilar lymph node enlargement.    The esophagus maintains a normal course and caliber.    Limited images of the upper abdomen obtained  during the course of this dedicated thoracic CT is negative for acute findings.  Partial visualization of previously identified multiple hypodense hepatic lesions in this patient with metastatic uterine cancer.  Partially visualized biliary tube.  Small volume abdominal ascites.    The osseous structures are within expected limits.      Impression       No evidence of pulmonary thromboembolism.    Bilateral moderate-sized pleural effusions with associated compressive atelectasis, similar to prior.    Stable left upper lobe pulmonary nodules, the largest measuring approximately 5 mm.    Small volume abdominal ascites.    Partial visualization of previously identified multiple hypodense hepatic lesions in this patient with metastatic uterine cancer.  Partially visualized biliary tube.    Additional stable findings as above.         U/S lower extremities, 10/12/19:  Narrative     EXAMINATION:  US LOWER EXTREMITY VEINS BILATERAL    CLINICAL HISTORY:  Rule out DVT;    TECHNIQUE:  Duplex and color flow Doppler and dynamic compression was performed of the bilateral lower extremity veins was performed.    COMPARISON:  None.    FINDINGS:  Right thigh veins: The common femoral, femoral, popliteal, upper greater saphenous, and deep femoral veins are patent and free of thrombus. The veins are normally compressible and have normal phasic flow and augmentation response.    Right calf veins: The visualized calf veins are patent.    Left thigh veins: The common femoral, femoral, popliteal, upper greater saphenous, and deep femoral veins are patent and free of thrombus. The veins are normally compressible and have normal phasic flow and augmentation response.    Left calf veins: The visualized calf veins are patent.    Miscellaneous: Mild bilateral lower extremity edema.      Impression       Nonspecific edema lower extremity soft tissues.  No evidence of deep venous thrombosis in either lower extremity.     Assessment/Plan:     *  Nausea and vomiting  - Nausea/vomiting for 5 days prior to admission  - GI/biliary consulted secondary to elevated LFTs, MRCP performed 10/7/19 showing mild CBD dilation and large amount of ascites  - EUS and ERCP performed 10/8/19 by advanced endoscopic team, recommend IR biliary drainage  - Paracentesis performed 10/9/19, 2.9 L removed  - Biliary catheter placed 10/9/19 with improvement in nausea/vomiting and liver enzymes, put out 400 cc overnight  - Improving with PO reglan with scopolamine patch  - PO ativan and IV zofran PRN  - Consider advancing diet today    Weakness  - PT/OT consult    Poor nutrition  - Nutrition consulted yesterday, appreciate recommendations  - Recommend ADAT to low fat diet. If put on clears, add Boost Breeze. If PO intake <50% of meals over 48hrs, consider TF    Anxiety  - Xanax 0.5mg TID prn started during admission  - Patient encouraged to request medicine during episodes of anxiety    Hyperbilirubinemia  - Biliary catheter placed by IR on 10/9/19  - LFTs downtrending, AM CBC pending    Tachycardia  - Patient's baseline HR in 120s  - Seen by cardiology 9/17/19, tachycardia determined to be secondary to volume depletion vs adrenergic response to chemotherapy  - EKG 10/11/19 showed sinus tach  - CTA and lower extremity dopplers ordered and negative for PE/DVT (see full imaging results above)    Hypomagnesemia  - Mg 1.3 on admission  - S/p IV Mg 5g since admit  - Mg today pending    Hypokalemia  - Has previously resolved  - CMP today pending  - Continue to monitor and replace as needed    Hypertension  - BP: ()/(53-82) 108/58  - Patient's home amlodipine was recently discontinued due to low to normal range pressures    Endometrial carcinoma, serous  - CA-125 155> 1795  - CT A/P on 10/3/19 showed stable disease  - See gyn onc history above      VTE Risk Mitigation (From admission, onward)         Ordered     Place sequential compression device  Until discontinued      10/09/19 0638      IP VTE LOW RISK PATIENT  Once      10/07/19 1408     Place ASH hose  Until discontinued      10/07/19 1408                Ash Snow MD  Gynecologic Oncology  [facility]

## 2019-10-14 NOTE — PROGRESS NOTES
Notified Dr. Aleman that the patient currently has no IV access after several attempts. She stated it was ok. I let her know there's a midline consult put in for tomorrow. Will cont to justin blum.

## 2019-10-15 VITALS
BODY MASS INDEX: 25.28 KG/M2 | OXYGEN SATURATION: 98 % | RESPIRATION RATE: 18 BRPM | SYSTOLIC BLOOD PRESSURE: 118 MMHG | TEMPERATURE: 98 F | WEIGHT: 137.38 LBS | HEIGHT: 62 IN | DIASTOLIC BLOOD PRESSURE: 72 MMHG | HEART RATE: 130 BPM

## 2019-10-15 PROBLEM — R11.2 NAUSEA AND VOMITING: Status: RESOLVED | Noted: 2019-09-13 | Resolved: 2019-10-15

## 2019-10-15 PROBLEM — E83.42 HYPOMAGNESEMIA: Status: RESOLVED | Noted: 2019-09-13 | Resolved: 2019-10-15

## 2019-10-15 PROBLEM — E87.6 HYPOKALEMIA: Status: RESOLVED | Noted: 2019-09-09 | Resolved: 2019-10-15

## 2019-10-15 LAB
ALBUMIN SERPL BCP-MCNC: 1.9 G/DL (ref 3.5–5.2)
ALP SERPL-CCNC: 314 U/L (ref 55–135)
ALT SERPL W/O P-5'-P-CCNC: 69 U/L (ref 10–44)
ANION GAP SERPL CALC-SCNC: 9 MMOL/L (ref 8–16)
AST SERPL-CCNC: 161 U/L (ref 10–40)
BASOPHILS # BLD AUTO: 0.01 K/UL (ref 0–0.2)
BASOPHILS NFR BLD: 0.1 % (ref 0–1.9)
BILIRUB SERPL-MCNC: 3 MG/DL (ref 0.1–1)
BUN SERPL-MCNC: 14 MG/DL (ref 8–23)
CALCIUM SERPL-MCNC: 8.2 MG/DL (ref 8.7–10.5)
CHLORIDE SERPL-SCNC: 107 MMOL/L (ref 95–110)
CO2 SERPL-SCNC: 23 MMOL/L (ref 23–29)
CREAT SERPL-MCNC: 0.8 MG/DL (ref 0.5–1.4)
DIFFERENTIAL METHOD: ABNORMAL
EOSINOPHIL # BLD AUTO: 0.1 K/UL (ref 0–0.5)
EOSINOPHIL NFR BLD: 1.9 % (ref 0–8)
ERYTHROCYTE [DISTWIDTH] IN BLOOD BY AUTOMATED COUNT: 20.4 % (ref 11.5–14.5)
EST. GFR  (AFRICAN AMERICAN): >60 ML/MIN/1.73 M^2
EST. GFR  (NON AFRICAN AMERICAN): >60 ML/MIN/1.73 M^2
GLUCOSE SERPL-MCNC: 121 MG/DL (ref 70–110)
HCT VFR BLD AUTO: 30.1 % (ref 37–48.5)
HGB BLD-MCNC: 10 G/DL (ref 12–16)
IMM GRANULOCYTES # BLD AUTO: 0.07 K/UL (ref 0–0.04)
IMM GRANULOCYTES NFR BLD AUTO: 1 % (ref 0–0.5)
LYMPHOCYTES # BLD AUTO: 0.4 K/UL (ref 1–4.8)
LYMPHOCYTES NFR BLD: 5.6 % (ref 18–48)
MCH RBC QN AUTO: 27.5 PG (ref 27–31)
MCHC RBC AUTO-ENTMCNC: 33.2 G/DL (ref 32–36)
MCV RBC AUTO: 83 FL (ref 82–98)
MONOCYTES # BLD AUTO: 0.9 K/UL (ref 0.3–1)
MONOCYTES NFR BLD: 12.5 % (ref 4–15)
NEUTROPHILS # BLD AUTO: 5.4 K/UL (ref 1.8–7.7)
NEUTROPHILS NFR BLD: 78.9 % (ref 38–73)
NRBC BLD-RTO: 0 /100 WBC
PLATELET # BLD AUTO: 315 K/UL (ref 150–350)
PMV BLD AUTO: 12.5 FL (ref 9.2–12.9)
POTASSIUM SERPL-SCNC: 3.6 MMOL/L (ref 3.5–5.1)
PROT SERPL-MCNC: 5.3 G/DL (ref 6–8.4)
RBC # BLD AUTO: 3.63 M/UL (ref 4–5.4)
SODIUM SERPL-SCNC: 139 MMOL/L (ref 136–145)
WBC # BLD AUTO: 6.8 K/UL (ref 3.9–12.7)

## 2019-10-15 PROCEDURE — 99238 PR HOSPITAL DISCHARGE DAY,<30 MIN: ICD-10-PCS | Mod: ,,, | Performed by: OBSTETRICS & GYNECOLOGY

## 2019-10-15 PROCEDURE — 99238 HOSP IP/OBS DSCHRG MGMT 30/<: CPT | Mod: ,,, | Performed by: OBSTETRICS & GYNECOLOGY

## 2019-10-15 PROCEDURE — 25000003 PHARM REV CODE 250: Performed by: OBSTETRICS & GYNECOLOGY

## 2019-10-15 PROCEDURE — 85025 COMPLETE CBC W/AUTO DIFF WBC: CPT

## 2019-10-15 PROCEDURE — 36415 COLL VENOUS BLD VENIPUNCTURE: CPT

## 2019-10-15 PROCEDURE — 80053 COMPREHEN METABOLIC PANEL: CPT

## 2019-10-15 RX ORDER — ALPRAZOLAM 0.5 MG/1
0.5 TABLET ORAL 3 TIMES DAILY PRN
Qty: 30 TABLET | Refills: 1 | Status: SHIPPED | OUTPATIENT
Start: 2019-10-15 | End: 2019-11-14

## 2019-10-15 RX ORDER — OXYCODONE AND ACETAMINOPHEN 5; 325 MG/1; MG/1
1 TABLET ORAL EVERY 4 HOURS PRN
Qty: 30 TABLET | Refills: 0 | Status: SHIPPED | OUTPATIENT
Start: 2019-10-15

## 2019-10-15 RX ORDER — METOCLOPRAMIDE 10 MG/1
10 TABLET ORAL 2 TIMES DAILY
Qty: 120 TABLET | Refills: 1 | Status: SHIPPED | OUTPATIENT
Start: 2019-10-15 | End: 2019-12-14

## 2019-10-15 RX ORDER — CODEINE PHOSPHATE AND GUAIFENESIN 10; 100 MG/5ML; MG/5ML
5 SOLUTION ORAL EVERY 6 HOURS PRN
Qty: 236 ML | Refills: 0 | Status: SHIPPED | OUTPATIENT
Start: 2019-10-15 | End: 2019-10-15 | Stop reason: SDUPTHER

## 2019-10-15 RX ORDER — METOCLOPRAMIDE 10 MG/1
10 TABLET ORAL 2 TIMES DAILY
Qty: 120 TABLET | Refills: 1 | Status: SHIPPED | OUTPATIENT
Start: 2019-10-15 | End: 2019-10-15

## 2019-10-15 RX ORDER — SIMETHICONE 80 MG
80 TABLET,CHEWABLE ORAL 3 TIMES DAILY PRN
Refills: 0 | COMMUNITY
Start: 2019-10-15

## 2019-10-15 RX ORDER — CODEINE PHOSPHATE AND GUAIFENESIN 10; 100 MG/5ML; MG/5ML
5 SOLUTION ORAL EVERY 6 HOURS PRN
Qty: 236 ML | Refills: 0 | Status: SHIPPED | OUTPATIENT
Start: 2019-10-15 | End: 2019-10-27

## 2019-10-15 RX ADMIN — FAMOTIDINE 20 MG: 20 TABLET ORAL at 09:10

## 2019-10-15 RX ADMIN — METOCLOPRAMIDE 10 MG: 10 TABLET ORAL at 09:10

## 2019-10-15 NOTE — DISCHARGE SUMMARY
[facility]  Gynecologic Oncology  Discharge Summary    Patient Name: Jessica Gorman  MRN: [id number]  Admission Date: 10/7/2019  Hospital Length of Stay: 8 days  Discharge Date and Time:  10/15/2019 6:18 AM  Attending Physician: Jair Watt MD   Discharging Provider: Ash Snow MD  Primary Care Provider: Rico Amaro MD    HPI:   Jessica Gorman is a 69 year old female with history of recurrent serous endometrial carcinoma who was admitted due to intractable nausea/vomiting. Patient states that about 5 days ago, she began having daily nausea/vomiting that she believes started after she took her potassium tablets. She reports 2-3 episodes of emesis per day since then, mostly when she tries to eat or encounters strong smells. She states that at most, she is able to drink a few sips of water or tablespoons of food per day. Her last episode of emesis was this morning due to the smell of deoderant. She is taking zofran q12h without relief. She did not undergo cycle 3 of chemotherapy last week. She states that she is able to ambulate without difficulty but feels very weak. She denies dysuria, although she states that her urine is much darker. She had a normal BM yesterday. She denies headache, change in vision, SOB, CP, and abdominal pain.    Hospital Course:  10/07/2019 - Admitted to Gyn Onc service for IVF and electrolyte repletion secondary to nausea/vomiting and dehydration. GI/biliary consulted secondary to elevated LFTs.  10/08/2019 - Two episodes of emesis yesterday after taking zofran. MRCP performed yesterday evening showing mildly dilated CBD and large amount of ascites. EUS and ERCP performed by biliary team.  10/09/2019 - Episode of emesis overnight after drinking water. Plan for paracentesis and IR consult for percutaneous biliary drainage today.  10/10/2019 - IR placement of biliary drain and paracentesis yesterday, 2.9L removed. Biliary catheter flushed and draining  bilious fluid.   10/11/2019 - NAEON. No nausea/vomiting since catheter was flushed yesterday, tolerating PO. Liver enzymes downtrending.   10/12/2019 - Several episodes of emesis overnight, antiemetics adjusted. Remains tachycardic. NPO. Biliary catheter draining green/brown fluid.  10/13/2019 - One episode of emesis yesterday. Nausea better controlled with scheduled PO reglan. Remains tachycardic, now SOB. CTA and lower extremity dopplers negative for PE/DVT. Liver enzymes continue to trend down with biliary catheter appropriately.  10/14/2019 - Patient did well yesterday and overnight. Tolerating liquid diet, including Breeze and jello. Passing gas, having soft stools. Pain is minimal. Ambulating, voiding, Patient reports she is not hungry but would like to try regular diet today. Remains tachycardic, but continues to deny dyspnea, chest pain.   10/15/2019 - Patient tolerated red beans and rice for lunch and rotisserie chicken with carrots for dinner. Tolerating liquids as well. Reglan decreased to BID and patient reports no episodes of confusion overnight. Resting comfortably. Biliary drain with 450 cc output overnight. Stable for discharge today with home health.     Procedure(s) (LRB):  ULTRASOUND, UPPER GI TRACT, ENDOSCOPIC (N/A)  ERCP (ENDOSCOPIC RETROGRADE CHOLANGIOPANCREATOGRAPHY) (N/A)     Consults (From admission, onward)        Status Ordering Provider     Inpatient consult to Advanced Endoscopy Service (AES)  Once     Provider:  (Not yet assigned)    Completed DEBI BLAND     Inpatient consult to Interventional Radiology  Once     Provider:  (Not yet assigned)    Completed DEBI BLAND     Inpatient consult to Midline team  Once     Provider:  (Not yet assigned)    Completed KANDI CRUZ     Inpatient consult to Registered Dietitian/Nutritionist  Once     Provider:  (Not yet assigned)    Completed DEBI BLAND          Significant Diagnostic Studies: Labs: All labs  "within the past 24 hours have been reviewed    Pending Diagnostic Studies:     Procedure Component Value Units Date/Time    IR Biliary Transhepatic Cholangiogram [[id number]] Resulted:  10/10/19 1721    Order Status:  Sent Lab Status:  In process Updated:  10/10/19 1725        Final Active Diagnoses:    Diagnosis Date Noted POA    Poor nutrition [E63.9] 10/13/2019 Yes    Weakness [R53.1] 10/13/2019 Yes    Anxiety [F41.9] 10/11/2019 Yes    Hyperbilirubinemia [E80.6] 10/08/2019 Yes    Tachycardia [R00.0] 09/14/2019 Yes    Endometrial carcinoma, serous [C54.1] 11/06/2014 Yes      Problems Resolved During this Admission:    Diagnosis Date Noted Date Resolved POA    PRINCIPAL PROBLEM:  Nausea and vomiting [R11.2] 09/13/2019 10/15/2019 Yes    Hypomagnesemia [E83.42] 09/13/2019 10/15/2019 Yes    Hypokalemia [E87.6] 09/09/2019 10/15/2019 Yes        Does this patient meet criteria for extended DVT prophylaxis? No, because non surgical    Discharged Condition: fair    Disposition: Home or Self Care    Follow Up:  Follow-up Information     Jair Watt MD.    Specialty:  Gynecologic Oncology  Why:  Follow-Up Appointment as scheduled by the clinic  Contact information:  [address]  [facility] [address] [phone]                 Patient Instructions:      WALKER FOR HOME USE     Order Specific Question Answer Comments   Type of Walker: Adult (5'4"-6'6")    With wheels? Yes    Height: 5' 2" (1.575 m)    Weight: 62.3 kg (137 lb 5.6 oz)    Length of need (1-99 months): 99    Does patient have medical equipment at home? none    Please check all that apply: Patient's condition impairs ambulation.    Please check all that apply: Patient is unable to safely ambulate without equipment.    Vendor: [facility]    Expected Date of Delivery: 10/11/2019      Notify your health care provider if you experience any of the following:  temperature >100.4     Notify your health care provider if you experience any of the " following:  persistent nausea and vomiting or diarrhea     Notify your health care provider if you experience any of the following:  severe uncontrolled pain     Notify your health care provider if you experience any of the following:  redness, tenderness, or signs of infection (pain, swelling, redness, odor or green/yellow discharge around incision site)     Activity as tolerated     Medications:  Reconciled Home Medications:      Medication List      START taking these medications    ALPRAZolam 0.5 MG tablet  Commonly known as:  XANAX  Take 1 tablet (0.5 mg total) by mouth 3 (three) times daily as needed for Anxiety (breakthrough anxiety not relieved by ativan).     metoclopramide HCl 10 MG tablet  Commonly known as:  REGLAN  Take 1 tablet (10 mg total) by mouth 2 (two) times daily.     oxyCODONE-acetaminophen 5-325 mg per tablet  Commonly known as:  PERCOCET  Take 1 tablet by mouth every 4 (four) hours as needed for Pain.     ranitidine 150 MG tablet  Commonly known as:  ZANTAC  Take 1 tablet (150 mg total) by mouth 2 (two) times daily.     simethicone 80 MG chewable tablet  Commonly known as:  MYLICON  Take 1 tablet (80 mg total) by mouth 3 (three) times daily as needed for Flatulence.        CONTINUE taking these medications    amLODIPine 10 MG tablet  Commonly known as:  NORVASC  Take 1 tablet (10 mg total) by mouth once daily.     dexAMETHasone 4 MG Tab  Commonly known as:  DECADRON  Take 5 tablets every 6 hours for 24 hours prior to chemotherapy     megestrol 625 mg/5 mL Susp  Commonly known as:  MEGACE ES  Take 5 mLs (625 mg total) by mouth once daily.     ondansetron 8 MG Tbdl  Commonly known as:  ZOFRAN-ODT  Take 1 tablet (8 mg total) by mouth every 12 (twelve) hours as needed.     ONE DAILY MULTIVITAMIN per tablet  Generic drug:  multivitamin  Take 1 tablet by mouth once daily.     potassium bicarbonate & potassium chloride 25 mEq Tbef  Commonly known as:  K-LYTE CL  Take 1 tablet (25 mEq total) by mouth  once daily.     potassium chloride SA 20 MEQ tablet  Commonly known as:  K-DUR,KLOR-CON  Take 1 tablet (20 mEq total) by mouth once daily.            Ash Snow MD  Gynecologic Oncology  [facility]

## 2019-10-15 NOTE — ASSESSMENT & PLAN NOTE
- Nausea/vomiting for 5 days prior to admission  - GI/biliary consulted secondary to elevated LFTs, MRCP performed 10/7/19 showing mild CBD dilation and large amount of ascites  - EUS and ERCP performed 10/8/19 by advanced endoscopic team, recommend IR biliary drainage  - Paracentesis performed 10/9/19, 2.9 L removed  - Biliary catheter placed 10/9/19 with improvement in nausea/vomiting and liver enzymes, put out 400 cc overnight  - Improving with PO reglan with scopolamine patch. Reglan 10 BID has optimized N/V and decreased confusion.  - Tolerating regular diet  - PO ativan and IV zofran PRN  - Stable for discharge

## 2019-10-15 NOTE — PROGRESS NOTES
Dressing change to abdomen drain, site cleaned, new dressing with gauze and Tegaderm applied. Pt given and daughter given instruction on care to site and how to drain bag.

## 2019-10-15 NOTE — PLAN OF CARE
CM discussed the patient with the Gyn Onc team. Plans for the patient to discharge home today. Patient will be setup with home health services for drain management, vitals, and physical therapy.  to meet with the patient to assist with home health arrangements. Discharge and follow-up instructions to be completed by the bedside nurse.    ELENA requested a follow-up appointment with Dr. Watt's clinic.     10/15/19 4010   Final Note   Assessment Type Final Discharge Note   Anticipated Discharge Disposition Home-Health   What phone number can be called within the next 1-3 days to see how you are doing after discharge?   ([phone])   Hospital Follow Up  Appt(s) scheduled? Yes   Discharge plans and expectations educations in teach back method with documentation complete? Yes  (per the bedside nurse)

## 2019-10-15 NOTE — PROGRESS NOTES
[facility]  Gynecologic Oncology  Progress Note      Patient Name: Jessica Gorman  MRN: [id number]  Admission Date: 10/7/2019  Hospital Length of Stay: 8 days  Attending Provider: Jair Watt MD  Primary Care Provider: Rico Amaro MD  Principal Problem: Nausea and vomiting    Follow-up For: Procedure(s) (LRB):  ULTRASOUND, UPPER GI TRACT, ENDOSCOPIC (N/A)  ERCP (ENDOSCOPIC RETROGRADE CHOLANGIOPANCREATOGRAPHY) (N/A)  Post-Operative Day: 7 Days Post-Op  Subjective:      History of Present Illness:  Jessica Gorman is a 69 year old female with history of recurrent serous endometrial carcinoma who was admitted due to intractable nausea/vomiting. Patient states that about 5 days ago, she began having daily nausea/vomiting that she believes started after she took her potassium tablets. She reports 2-3 episodes of emesis per day since then, mostly when she tries to eat or encounters strong smells. She states that at most, she is able to drink a few sips of water or tablespoons of food per day. Her last episode of emesis was this morning due to the smell of deoderant. She is taking zofran q12h without relief. She did not undergo cycle 3 of chemotherapy last week. She states that she is able to ambulate without difficulty but feels very weak. She denies dysuria, although she states that her urine is much darker. She had a normal BM yesterday. She denies headache, change in vision, SOB, CP, and abdominal pain.    Hospital Course:  10/07/2019 - Admitted to Gyn Onc service for IVF and electrolyte repletion secondary to nausea/vomiting and dehydration. GI/biliary consulted secondary to elevated LFTs.  10/08/2019 - Two episodes of emesis yesterday after taking zofran. MRCP performed yesterday evening showing mildly dilated CBD and large amount of ascites. EUS and ERCP performed by biliary team.  10/09/2019 - Episode of emesis overnight after drinking water. Plan for paracentesis and IR consult for  percutaneous biliary drainage today.  10/10/2019 - IR placement of biliary drain and paracentesis yesterday, 2.9L removed. Biliary catheter flushed and draining bilious fluid.   10/11/2019 - NAEON. No nausea/vomiting since catheter was flushed yesterday, tolerating PO. Liver enzymes downtrending.   10/12/2019 - Several episodes of emesis overnight, antiemetics adjusted. Remains tachycardic. NPO. Biliary catheter draining green/brown fluid.  10/13/2019 - One episode of emesis yesterday. Nausea better controlled with scheduled PO reglan. Remains tachycardic, now SOB. CTA and lower extremity dopplers negative for PE/DVT. Liver enzymes continue to trend down with biliary catheter appropriately.  10/14/2019 - Patient did well yesterday and overnight. Tolerating liquid diet, including Breeze and jello. Passing gas, having soft stools. Pain is minimal. Ambulating, voiding, Patient reports she is not hungry but would like to try regular diet today. Remains tachycardic, but continues to deny dyspnea, chest pain.   10/15/2019 - Patient tolerated red beans and rice for lunch and rotisserie chicken with carrots for dinner. Tolerating liquids as well. Reglan decreased to BID and patient reports no episodes of confusion overnight. Resting comfortably. Biliary drain with 450 cc output overnight. Stable for discharge today with home health.     Interval History: Patient tolerated red beans and rice for lunch and rotisserie chicken with carrots for dinner. Tolerating liquids as well. Reglan decreased to BID and patient reports no episodes of confusion overnight. Resting comfortably. Biliary drain with 450 cc output overnight. Stable for discharge today with home health.. Remains tachycardic, but denies dyspnea, chest pain.     Scheduled Meds:   famotidine  20 mg Oral BID    metoclopramide HCl  10 mg Oral BID     Continuous Infusions:   sodium chloride 0.9% 15 mL/hr at 10/14/19 1545     PRN Meds:ALPRAZolam, calcium carbonate,  docusate sodium, ibuprofen, LORazepam, ondansetron, oxyCODONE, oxyCODONE, simethicone, sodium chloride 0.9%    Review of patient's allergies indicates:   Allergen Reactions    Gramicidin d Shortness Of Breath    Hydrochlorothiazide Other (See Comments)     Burning and tingling sensation throughoutt her body    Lisinopril Shortness Of Breath    Promethazine Shortness Of Breath and Nausea And Vomiting    Neosporin [neomycin-bacitracnzn-polymyxnb] Swelling    Polymyxin [bacitracin-polymyxin b] Swelling       Objective:     Vital Signs (Most Recent):  Temp: 97.6 °F (36.4 °C) (10/15/19 0509)  Pulse: (!) 120 (10/15/19 0509)  Resp: 20 (10/15/19 0509)  BP: 109/67 (10/15/19 0509)  SpO2: (!) 94 % (10/15/19 0509) Vital Signs (24h Range):  Temp:  [97.4 °F (36.3 °C)-97.7 °F (36.5 °C)] 97.6 °F (36.4 °C)  Pulse:  [120-141] 120  Resp:  [17-20] 20  SpO2:  [94 %-95 %] 94 %  BP: (107-137)/(65-73) 109/67     Weight: 62.3 kg (137 lb 5.6 oz)  Body mass index is 25.12 kg/m².    Intake/Output - Last 3 Shifts       10/13 0700 - 10/14 0659 10/14 0700 - 10/15 0659    P.O.  1040    I.V. (mL/kg) 1692.1 (27.2) 202 (3.2)    Total Intake(mL/kg) 1692.1 (27.2) 1242 (19.9)    Urine (mL/kg/hr) 450 (0.3) 300 (0.2)    Drains 650 850    Stool 0     Total Output 1100 1150    Net +592.1 +92          Stool Occurrence 1 x              Physical Exam:   Constitutional: She is oriented to person, place, and time. She appears well-developed and well-nourished. No distress.    HENT:   Head: Normocephalic and atraumatic.      Cardiovascular: Regular rhythm.    Tachycardic.    Pulmonary/Chest: Effort normal. No respiratory distress.        Abdominal: Soft. She exhibits distension (slightly more distended than exam yesterday). There is no tenderness. There is no rebound and no guarding.   Biliary drain present draining dark green/brown fluid.  Hypoactive bowel sounds.             Musculoskeletal: Normal range of motion. She exhibits no edema or tenderness.        Neurological: She is alert and oriented to person, place, and time.    Skin: Skin is warm and dry.    Psychiatric: She has a normal mood and affect. Her behavior is normal. Judgment and thought content normal.       Lines/Drains/Airways     Drain                 Biliary Tube 10/10/19 1500 RLQ 4 days          Peripheral Intravenous Line                 Peripheral IV - Single Lumen 10/14/19 1530 20 G;1 3/4 in Left Forearm less than 1 day                Laboratory:  CBC:   Recent Labs   Lab 10/14/19  0501 10/15/19  0446   WBC 6.98 6.80   HGB 11.6* 10.0*   HCT 33.8* 30.1*    315    and CMP:   Recent Labs   Lab 10/14/19  0501      K 3.8      CO2 18*   *   BUN 14   CREATININE 0.7   CALCIUM 8.4*   PROT 5.9*   ALBUMIN 2.1*   BILITOT 4.2*   ALKPHOS 390*   *   ALT 85*   ANIONGAP 14   EGFRNONAA >60.0     Imaging:  CTA, 10/12/19:  Narrative     EXAMINATION:  CTA CHEST NON CORONARY    CLINICAL HISTORY:  Tachycardic, tachypneic, rule out PE;    TECHNIQUE:  Low dose axial images, sagittal and coronal reformations were obtained from the thoracic inlet to the lung bases following the IV administration of 75 mL of Omnipaque 350.  Contrast timing was optimized to evaluate the pulmonary arteries.  MIP images were performed.    COMPARISON:  CT chest abdomen pelvis 10/03/2019.  CTA chest 09/15/2019.    FINDINGS:  Examination of the soft tissue and vascular structures at the base of the neck is unremarkable.    The thoracic aorta maintains normal caliber, contour, and course without significant atherosclerotic calcification.  There is no evidence of aneurysmal dilation or dissection.    The pulmonary arteries distribute normally without evidence of filling defect to indicate pulmonary thromboembolism.    The trachea and proximal airways are patent.    The lungs are symmetrically expanded.  Persistent bilateral moderate-sized pleural effusions with associated compressive atelectasis, similar to prior.  No  pneumothorax.  Stable left upper lobe pulmonary nodules, the largest measuring approximately 5 mm (axial series 3, image 27).    The heart is not enlarged.  No pericardial effusion.    There is no axillary, mediastinal, or hilar lymph node enlargement.    The esophagus maintains a normal course and caliber.    Limited images of the upper abdomen obtained during the course of this dedicated thoracic CT is negative for acute findings.  Partial visualization of previously identified multiple hypodense hepatic lesions in this patient with metastatic uterine cancer.  Partially visualized biliary tube.  Small volume abdominal ascites.    The osseous structures are within expected limits.      Impression       No evidence of pulmonary thromboembolism.    Bilateral moderate-sized pleural effusions with associated compressive atelectasis, similar to prior.    Stable left upper lobe pulmonary nodules, the largest measuring approximately 5 mm.    Small volume abdominal ascites.    Partial visualization of previously identified multiple hypodense hepatic lesions in this patient with metastatic uterine cancer.  Partially visualized biliary tube.    Additional stable findings as above.         U/S lower extremities, 10/12/19:  Narrative     EXAMINATION:  US LOWER EXTREMITY VEINS BILATERAL    CLINICAL HISTORY:  Rule out DVT;    TECHNIQUE:  Duplex and color flow Doppler and dynamic compression was performed of the bilateral lower extremity veins was performed.    COMPARISON:  None.    FINDINGS:  Right thigh veins: The common femoral, femoral, popliteal, upper greater saphenous, and deep femoral veins are patent and free of thrombus. The veins are normally compressible and have normal phasic flow and augmentation response.    Right calf veins: The visualized calf veins are patent.    Left thigh veins: The common femoral, femoral, popliteal, upper greater saphenous, and deep femoral veins are patent and free of thrombus. The veins  are normally compressible and have normal phasic flow and augmentation response.    Left calf veins: The visualized calf veins are patent.    Miscellaneous: Mild bilateral lower extremity edema.      Impression       Nonspecific edema lower extremity soft tissues.  No evidence of deep venous thrombosis in either lower extremity.     Assessment/Plan:     * Nausea and vomiting  - Nausea/vomiting for 5 days prior to admission  - GI/biliary consulted secondary to elevated LFTs, MRCP performed 10/7/19 showing mild CBD dilation and large amount of ascites  - EUS and ERCP performed 10/8/19 by advanced endoscopic team, recommend IR biliary drainage  - Paracentesis performed 10/9/19, 2.9 L removed  - Biliary catheter placed 10/9/19 with improvement in nausea/vomiting and liver enzymes, put out 400 cc overnight  - Improving with PO reglan with scopolamine patch. Reglan 10 BID has optimized N/V and decreased confusion.  - Tolerating regular diet  - PO ativan and IV zofran PRN  - Stable for discharge    Weakness  - PT/OT consult    Poor nutrition  - Nutrition consulted yesterday, appreciate recommendations  - Recommend ADAT to low fat diet. If put on clears, add Boost Breeze. If PO intake <50% of meals over 48hrs, consider TF    Anxiety  - Xanax 0.5mg TID prn started during admission  - Patient encouraged to request medicine during episodes of anxiety    Hyperbilirubinemia  - Biliary catheter placed by IR on 10/9/19  - LFTs downtrending, AM CBC pending    Tachycardia  - Patient's baseline HR in 120s  - Seen by cardiology 9/17/19, tachycardia determined to be secondary to volume depletion vs adrenergic response to chemotherapy  - EKG 10/11/19 showed sinus tach  - CTA and lower extremity dopplers ordered and negative for PE/DVT (see full imaging results above)    Hypomagnesemia  - Mg 1.3 on admission  - S/p IV Mg 5g since admit  - Mg today pending    Hypokalemia  - Has previously resolved  - CMP today pending  - Continue to  monitor and replace as needed    Hypertension  - BP: ()/(53-82) 108/58  - Patient's home amlodipine was recently discontinued due to low to normal range pressures    Endometrial carcinoma, serous  - CA-125 155> 1795  - CT A/P on 10/3/19 showed stable disease  - See gyn onc history above      VTE Risk Mitigation (From admission, onward)         Ordered     Place sequential compression device  Until discontinued      10/09/19 0638     IP VTE LOW RISK PATIENT  Once      10/07/19 1408     Place ASH hose  Until discontinued      10/07/19 1408                Was adam catheter removed? Yes    Ash Snow MD  Gynecologic Oncology  [facility]

## 2019-10-15 NOTE — PLAN OF CARE
Afebrile. Free from falls or injury. No complaints of pain. NS infusing at 15. Tachy on telemetry. Bilary drain with green liquid. Bed locked in lowest position, non skid socks on, call light within reach. Pt instructed to call if any assistance is needed. Vitals stable. Family at bedside. Will cont to justin pt.

## 2019-10-15 NOTE — PROGRESS NOTES
Consult received to arrange for home health for Sn and PT/OT. Spoke with pt. And daughter and discussed HH referral. Pt. And daughter given list of HH providers, pt. Would like a referral to [facility] ([phone]). All information (orders, H&P, progress notes and demographics) sent to [facility] via [employer]. Message received back that pt. Has been accepted. Contacted [facility] and confirmed receipt of referral. No other info needed and pt. Will be seen for services beginning tomorrow. BSC ordered thru [facility] ([phone]). Notified by Sabrina that BSC will be delivered to pts. Room prior to dc. Pt. To dc home with her daughter. No other needs identified.

## 2019-10-15 NOTE — CARE UPDATE
Rapid Response Nurse Chart Check     Chart check completed, abnormal VS noted. Bedside RN Lanie contacted, no concerns verbalized at this time. Pt being discharged home today. Instructed to call [phone] for further concerns or assistance.

## 2019-10-15 NOTE — PLAN OF CARE
Side rails up x2; call bell in place; bed in lowest, locked position; skid proof socks on; no evidence of skin breakdown; care plan explained to patient; pt remains free of injury.  Pt tolerated po in small amount, walks to BR with assistance x 1, voids, and BM x 1. Pt denied pain, n/v, or any other complaints. Bili drain secured drain green brown output @ 100 cc dressing changed. Daughter received [facility] outpt pharmacy prescriptions. IV d/cd dressing applied. Discharge instruction given to daughter and verbalized understanding. Pt to be escorted out per w/c with daughter.

## 2019-10-16 ENCOUNTER — TELEPHONE (OUTPATIENT)
Dept: GYNECOLOGIC ONCOLOGY | Facility: CLINIC | Age: 69
End: 2019-10-16

## 2019-10-16 ENCOUNTER — NURSE TRIAGE (OUTPATIENT)
Dept: ADMINISTRATIVE | Facility: CLINIC | Age: 69
End: 2019-10-16

## 2019-10-16 PROCEDURE — G0180 MD CERTIFICATION HHA PATIENT: HCPCS | Mod: ,,, | Performed by: OBSTETRICS & GYNECOLOGY

## 2019-10-16 PROCEDURE — G0180 PR HOME HEALTH MD CERTIFICATION: ICD-10-PCS | Mod: ,,, | Performed by: OBSTETRICS & GYNECOLOGY

## 2019-10-16 NOTE — TELEPHONE ENCOUNTER
----- Message from Ashley Dodson sent at 10/16/2019  2:02 PM CDT -----  Contact: Patricia (daughter)  Name of Who is Calling: Patricia (daughter)    What is the request in detail: Would like to know if patient still needs to be seen next and if she needs to have blood work done. Please contact to further discuss and advise      Can the clinic reply by [facility]: yes    What [facility] Back [facility]: [phone]

## 2019-10-16 NOTE — TELEPHONE ENCOUNTER
Spoke with Jessica with HH after reviewing her concerns with Dr. Vincent. Patients HR was elevated in the hospital at 140. She has chronic anxiety and baseline is 120. She was evaluated by cardiology and thought to be volume depleted. Advise PT/OT to evaluate according to patient activity tolerance, Xanax prn, and encourage po hydration. She has follow up with Dr. Watt on 11/1.    Reason for Disposition   [1] Caller requesting NON-URGENT health information AND [2] PCP's office is the best resource    Protocols used: INFORMATION ONLY CALL-A-    Pt's [facility] Nurse Jessica called and stated she was not with the Pt, but needs to speak with Provider about Pt. Advised triage cannot be done unless she is with Pt. Informed High Priority message will be sent to Provider's office to contact her. Left voicemail informing Nurse Lopez if she is concerned about Pt's condition based on her assessment, to send to Urgent Care or ED.

## 2019-10-18 ENCOUNTER — TELEPHONE (OUTPATIENT)
Dept: GYNECOLOGIC ONCOLOGY | Facility: CLINIC | Age: 69
End: 2019-10-18

## 2019-10-18 NOTE — TELEPHONE ENCOUNTER
Called Jessica and is reporting pt having a HR of 140. Jessica requesting a HR parameter. Informed Jessica Watt is out but I will forward this message to Dr Vincent for review.   ----- Message from Linda Kern sent at 10/18/2019  3:54 PM CDT -----  Contact:  [facility]  Name of Who is Calling:  [facility]      What is the request in detail: [facility] is requesting a call back regarding tachycardia. Please contact to further advise.      Can the clinic reply by [facility]: NO      What [facility]: [phone]

## 2019-10-19 ENCOUNTER — HOSPITAL ENCOUNTER (INPATIENT)
Facility: HOSPITAL | Age: 69
LOS: 4 days | Discharge: HOSPICE | DRG: 754 | End: 2019-10-24
Attending: EMERGENCY MEDICINE | Admitting: OBSTETRICS & GYNECOLOGY
Payer: MEDICARE

## 2019-10-19 DIAGNOSIS — R00.0 SINUS TACHYCARDIA: Primary | ICD-10-CM

## 2019-10-19 DIAGNOSIS — E86.0 DEHYDRATION: ICD-10-CM

## 2019-10-19 DIAGNOSIS — R41.82 ALTERED MENTAL STATUS: ICD-10-CM

## 2019-10-19 DIAGNOSIS — R11.2 NAUSEA AND VOMITING, INTRACTABILITY OF VOMITING NOT SPECIFIED, UNSPECIFIED VOMITING TYPE: ICD-10-CM

## 2019-10-19 DIAGNOSIS — I95.9 HYPOTENSION, UNSPECIFIED HYPOTENSION TYPE: ICD-10-CM

## 2019-10-19 DIAGNOSIS — R11.2 NON-INTRACTABLE VOMITING WITH NAUSEA, UNSPECIFIED VOMITING TYPE: ICD-10-CM

## 2019-10-19 DIAGNOSIS — R00.0 TACHYCARDIA: ICD-10-CM

## 2019-10-19 DIAGNOSIS — Z71.89 GOALS OF CARE, COUNSELING/DISCUSSION: ICD-10-CM

## 2019-10-19 DIAGNOSIS — T45.1X5A CHEMOTHERAPY-INDUCED NAUSEA: ICD-10-CM

## 2019-10-19 DIAGNOSIS — R11.0 CHEMOTHERAPY-INDUCED NAUSEA: ICD-10-CM

## 2019-10-19 DIAGNOSIS — Z71.89 ADVANCED CARE PLANNING/COUNSELING DISCUSSION: ICD-10-CM

## 2019-10-19 DIAGNOSIS — R07.9 ACUTE CHEST PAIN: ICD-10-CM

## 2019-10-19 LAB
BASOPHILS # BLD AUTO: 0.01 K/UL (ref 0–0.2)
BASOPHILS NFR BLD: 0.1 % (ref 0–1.9)
BNP SERPL-MCNC: 29 PG/ML (ref 0–99)
DIFFERENTIAL METHOD: ABNORMAL
EOSINOPHIL # BLD AUTO: 0 K/UL (ref 0–0.5)
EOSINOPHIL NFR BLD: 0 % (ref 0–8)
ERYTHROCYTE [DISTWIDTH] IN BLOOD BY AUTOMATED COUNT: 20.3 % (ref 11.5–14.5)
HCT VFR BLD AUTO: 32.9 % (ref 37–48.5)
HGB BLD-MCNC: 10.6 G/DL (ref 12–16)
IMM GRANULOCYTES # BLD AUTO: 0.11 K/UL (ref 0–0.04)
IMM GRANULOCYTES NFR BLD AUTO: 1.2 % (ref 0–0.5)
LACTATE SERPL-SCNC: 2.3 MMOL/L (ref 0.5–2.2)
LYMPHOCYTES # BLD AUTO: 0.5 K/UL (ref 1–4.8)
LYMPHOCYTES NFR BLD: 5.1 % (ref 18–48)
MCH RBC QN AUTO: 27.5 PG (ref 27–31)
MCHC RBC AUTO-ENTMCNC: 32.2 G/DL (ref 32–36)
MCV RBC AUTO: 85 FL (ref 82–98)
MONOCYTES # BLD AUTO: 0.8 K/UL (ref 0.3–1)
MONOCYTES NFR BLD: 8 % (ref 4–15)
NEUTROPHILS # BLD AUTO: 8.1 K/UL (ref 1.8–7.7)
NEUTROPHILS NFR BLD: 85.6 % (ref 38–73)
NRBC BLD-RTO: 1 /100 WBC
PLATELET # BLD AUTO: 283 K/UL (ref 150–350)
PMV BLD AUTO: 11.4 FL (ref 9.2–12.9)
RBC # BLD AUTO: 3.86 M/UL (ref 4–5.4)
TROPONIN I SERPL DL<=0.01 NG/ML-MCNC: 0.16 NG/ML (ref 0–0.03)
WBC # BLD AUTO: 9.41 K/UL (ref 3.9–12.7)

## 2019-10-19 PROCEDURE — 84484 ASSAY OF TROPONIN QUANT: CPT

## 2019-10-19 PROCEDURE — 83605 ASSAY OF LACTIC ACID: CPT

## 2019-10-19 PROCEDURE — 83880 ASSAY OF NATRIURETIC PEPTIDE: CPT

## 2019-10-19 PROCEDURE — 36410 VNPNXR 3YR/> PHY/QHP DX/THER: CPT | Mod: GC,,, | Performed by: EMERGENCY MEDICINE

## 2019-10-19 PROCEDURE — 99285 PR EMERGENCY DEPT VISIT,LEVEL V: ICD-10-PCS | Mod: 25,GC,, | Performed by: EMERGENCY MEDICINE

## 2019-10-19 PROCEDURE — 80053 COMPREHEN METABOLIC PANEL: CPT

## 2019-10-19 PROCEDURE — 96361 HYDRATE IV INFUSION ADD-ON: CPT

## 2019-10-19 PROCEDURE — 36410 PR VENIPUNC NEED PHYS SKILL,DX OR RX: ICD-10-PCS | Mod: GC,,, | Performed by: EMERGENCY MEDICINE

## 2019-10-19 PROCEDURE — 93010 ELECTROCARDIOGRAM REPORT: CPT | Mod: ,,, | Performed by: INTERNAL MEDICINE

## 2019-10-19 PROCEDURE — 63600175 PHARM REV CODE 636 W HCPCS: Performed by: EMERGENCY MEDICINE

## 2019-10-19 PROCEDURE — 99285 EMERGENCY DEPT VISIT HI MDM: CPT | Mod: 25,GC,, | Performed by: EMERGENCY MEDICINE

## 2019-10-19 PROCEDURE — 36410 VNPNXR 3YR/> PHY/QHP DX/THER: CPT

## 2019-10-19 PROCEDURE — 93005 ELECTROCARDIOGRAM TRACING: CPT

## 2019-10-19 PROCEDURE — 83690 ASSAY OF LIPASE: CPT

## 2019-10-19 PROCEDURE — 93010 EKG 12-LEAD: ICD-10-PCS | Mod: 76,,, | Performed by: INTERNAL MEDICINE

## 2019-10-19 PROCEDURE — 96372 THER/PROPH/DIAG INJ SC/IM: CPT

## 2019-10-19 PROCEDURE — 96374 THER/PROPH/DIAG INJ IV PUSH: CPT

## 2019-10-19 PROCEDURE — 99285 EMERGENCY DEPT VISIT HI MDM: CPT | Mod: 25

## 2019-10-19 PROCEDURE — 93010 ELECTROCARDIOGRAM REPORT: CPT | Mod: 76,,, | Performed by: INTERNAL MEDICINE

## 2019-10-19 PROCEDURE — 85025 COMPLETE CBC W/AUTO DIFF WBC: CPT

## 2019-10-19 PROCEDURE — 63600175 PHARM REV CODE 636 W HCPCS: Performed by: STUDENT IN AN ORGANIZED HEALTH CARE EDUCATION/TRAINING PROGRAM

## 2019-10-19 RX ORDER — METOCLOPRAMIDE HYDROCHLORIDE 5 MG/ML
10 INJECTION INTRAMUSCULAR; INTRAVENOUS
Status: COMPLETED | OUTPATIENT
Start: 2019-10-19 | End: 2019-10-19

## 2019-10-19 RX ADMIN — METOCLOPRAMIDE 10 MG: 5 INJECTION, SOLUTION INTRAMUSCULAR; INTRAVENOUS at 10:10

## 2019-10-19 RX ADMIN — SODIUM CHLORIDE, SODIUM LACTATE, POTASSIUM CHLORIDE, AND CALCIUM CHLORIDE 1000 ML: .6; .31; .03; .02 INJECTION, SOLUTION INTRAVENOUS at 11:10

## 2019-10-19 RX ADMIN — SODIUM CHLORIDE 1000 ML: 0.9 INJECTION, SOLUTION INTRAVENOUS at 10:10

## 2019-10-20 PROBLEM — R11.2 NAUSEA AND VOMITING: Status: ACTIVE | Noted: 2019-10-20

## 2019-10-20 PROBLEM — R79.89 ELEVATED TROPONIN: Status: ACTIVE | Noted: 2019-10-20

## 2019-10-20 LAB
ALBUMIN SERPL BCP-MCNC: 1.7 G/DL (ref 3.5–5.2)
ALLENS TEST: ABNORMAL
ALP SERPL-CCNC: 335 U/L (ref 55–135)
ALT SERPL W/O P-5'-P-CCNC: 61 U/L (ref 10–44)
ANION GAP SERPL CALC-SCNC: 15 MMOL/L (ref 8–16)
ASCENDING AORTA: 2.99 CM
AST SERPL-CCNC: 204 U/L (ref 10–40)
AV INDEX (PROSTH): 0.84
AV MEAN GRADIENT: 5 MMHG
AV PEAK GRADIENT: 8 MMHG
AV VALVE AREA: 2.81 CM2
AV VELOCITY RATIO: 0.84
BACTERIA #/AREA URNS AUTO: ABNORMAL /HPF
BILIRUB SERPL-MCNC: 2.5 MG/DL (ref 0.1–1)
BILIRUB UR QL STRIP: NEGATIVE
BSA FOR ECHO PROCEDURE: 1.65 M2
BUN SERPL-MCNC: 20 MG/DL (ref 8–23)
CALCIUM SERPL-MCNC: 8 MG/DL (ref 8.7–10.5)
CHLORIDE SERPL-SCNC: 105 MMOL/L (ref 95–110)
CLARITY UR REFRACT.AUTO: ABNORMAL
CO2 SERPL-SCNC: 19 MMOL/L (ref 23–29)
COLOR UR AUTO: ABNORMAL
CREAT SERPL-MCNC: 0.8 MG/DL (ref 0.5–1.4)
CV ECHO LV RWT: 0.35 CM
DELSYS: ABNORMAL
DOP CALC AO PEAK VEL: 1.39 M/S
DOP CALC AO VTI: 18.26 CM
DOP CALC LVOT AREA: 3.3 CM2
DOP CALC LVOT DIAMETER: 2.06 CM
DOP CALC LVOT PEAK VEL: 1.17 M/S
DOP CALC LVOT STROKE VOLUME: 51.33 CM3
DOP CALCLVOT PEAK VEL VTI: 15.41 CM
E/E' RATIO: 8.57 M/S
ECHO LV POSTERIOR WALL: 0.6 CM (ref 0.6–1.1)
EST. GFR  (AFRICAN AMERICAN): >60 ML/MIN/1.73 M^2
EST. GFR  (NON AFRICAN AMERICAN): >60 ML/MIN/1.73 M^2
FRACTIONAL SHORTENING: 28 % (ref 28–44)
GLUCOSE SERPL-MCNC: 103 MG/DL (ref 70–110)
GLUCOSE UR QL STRIP: NEGATIVE
HCO3 UR-SCNC: 17.6 MMOL/L (ref 24–28)
HGB UR QL STRIP: ABNORMAL
HYALINE CASTS UR QL AUTO: 12 /LPF
INTERVENTRICULAR SEPTUM: 0.69 CM (ref 0.6–1.1)
IVRT: 0.1 MSEC
KETONES UR QL STRIP: ABNORMAL
LA MAJOR: 4.78 CM
LA MINOR: 4.81 CM
LA WIDTH: 3.25 CM
LEFT ATRIUM SIZE: 2.79 CM
LEFT ATRIUM VOLUME INDEX: 22.6 ML/M2
LEFT ATRIUM VOLUME: 36.96 CM3
LEFT INTERNAL DIMENSION IN SYSTOLE: 2.47 CM (ref 2.1–4)
LEFT VENTRICLE DIASTOLIC VOLUME INDEX: 29.68 ML/M2
LEFT VENTRICLE DIASTOLIC VOLUME: 48.46 ML
LEFT VENTRICLE MASS INDEX: 33 G/M2
LEFT VENTRICLE SYSTOLIC VOLUME INDEX: 13.3 ML/M2
LEFT VENTRICLE SYSTOLIC VOLUME: 21.73 ML
LEFT VENTRICULAR INTERNAL DIMENSION IN DIASTOLE: 3.43 CM (ref 3.5–6)
LEFT VENTRICULAR MASS: 54.51 G
LEUKOCYTE ESTERASE UR QL STRIP: ABNORMAL
LIPASE SERPL-CCNC: 82 U/L (ref 4–60)
LV LATERAL E/E' RATIO: 7.5 M/S
LV SEPTAL E/E' RATIO: 10 M/S
MAGNESIUM SERPL-MCNC: 1.4 MG/DL (ref 1.6–2.6)
MICROSCOPIC COMMENT: ABNORMAL
MV PEAK E VEL: 0.9 M/S
NITRITE UR QL STRIP: NEGATIVE
PCO2 BLDA: 30.6 MMHG (ref 35–45)
PH SMN: 7.37 [PH] (ref 7.35–7.45)
PH UR STRIP: 5 [PH] (ref 5–8)
PHOSPHATE SERPL-MCNC: 3.8 MG/DL (ref 2.7–4.5)
PISA TR MAX VEL: 3.02 M/S
PO2 BLDA: 94 MMHG (ref 80–100)
POC BE: -8 MMOL/L
POC SATURATED O2: 97 % (ref 95–100)
POC TCO2: 19 MMOL/L (ref 23–27)
POTASSIUM SERPL-SCNC: 3.5 MMOL/L (ref 3.5–5.1)
PROT SERPL-MCNC: 5.5 G/DL (ref 6–8.4)
PROT UR QL STRIP: ABNORMAL
RA MAJOR: 3.86 CM
RA WIDTH: 2.29 CM
RBC #/AREA URNS AUTO: 1 /HPF (ref 0–4)
RIGHT VENTRICULAR END-DIASTOLIC DIMENSION: 2.38 CM
RV TISSUE DOPPLER FREE WALL SYSTOLIC VELOCITY 1 (APICAL 4 CHAMBER VIEW): 16.23 CM/S
SAMPLE: ABNORMAL
SINUS: 2.98 CM
SITE: ABNORMAL
SODIUM SERPL-SCNC: 139 MMOL/L (ref 136–145)
SP GR UR STRIP: 1.01 (ref 1–1.03)
SQUAMOUS #/AREA URNS AUTO: 1 /HPF
STJ: 2.44 CM
TDI LATERAL: 0.12 M/S
TDI SEPTAL: 0.09 M/S
TDI: 0.11 M/S
TR MAX PG: 36 MMHG
TRICUSPID ANNULAR PLANE SYSTOLIC EXCURSION: 1.32 CM
TROPONIN I SERPL DL<=0.01 NG/ML-MCNC: 0.17 NG/ML (ref 0–0.03)
URN SPEC COLLECT METH UR: ABNORMAL
WBC #/AREA URNS AUTO: 13 /HPF (ref 0–5)

## 2019-10-20 PROCEDURE — 84484 ASSAY OF TROPONIN QUANT: CPT

## 2019-10-20 PROCEDURE — 82803 BLOOD GASES ANY COMBINATION: CPT

## 2019-10-20 PROCEDURE — 99900035 HC TECH TIME PER 15 MIN (STAT)

## 2019-10-20 PROCEDURE — 83735 ASSAY OF MAGNESIUM: CPT

## 2019-10-20 PROCEDURE — 36600 WITHDRAWAL OF ARTERIAL BLOOD: CPT

## 2019-10-20 PROCEDURE — 36415 COLL VENOUS BLD VENIPUNCTURE: CPT

## 2019-10-20 PROCEDURE — 25000003 PHARM REV CODE 250: Performed by: STUDENT IN AN ORGANIZED HEALTH CARE EDUCATION/TRAINING PROGRAM

## 2019-10-20 PROCEDURE — 99233 SBSQ HOSP IP/OBS HIGH 50: CPT | Mod: GC,,, | Performed by: OBSTETRICS & GYNECOLOGY

## 2019-10-20 PROCEDURE — 63600175 PHARM REV CODE 636 W HCPCS: Performed by: STUDENT IN AN ORGANIZED HEALTH CARE EDUCATION/TRAINING PROGRAM

## 2019-10-20 PROCEDURE — 96374 THER/PROPH/DIAG INJ IV PUSH: CPT

## 2019-10-20 PROCEDURE — 87088 URINE BACTERIA CULTURE: CPT

## 2019-10-20 PROCEDURE — 84100 ASSAY OF PHOSPHORUS: CPT

## 2019-10-20 PROCEDURE — 81001 URINALYSIS AUTO W/SCOPE: CPT

## 2019-10-20 PROCEDURE — 87086 URINE CULTURE/COLONY COUNT: CPT

## 2019-10-20 PROCEDURE — 99233 PR SUBSEQUENT HOSPITAL CARE,LEVL III: ICD-10-PCS | Mod: GC,,, | Performed by: OBSTETRICS & GYNECOLOGY

## 2019-10-20 PROCEDURE — 20600001 HC STEP DOWN PRIVATE ROOM

## 2019-10-20 PROCEDURE — 85610 PROTHROMBIN TIME: CPT

## 2019-10-20 RX ORDER — IBUPROFEN 600 MG/1
600 TABLET ORAL EVERY 6 HOURS PRN
Status: DISCONTINUED | OUTPATIENT
Start: 2019-10-20 | End: 2019-10-24 | Stop reason: HOSPADM

## 2019-10-20 RX ORDER — LORAZEPAM/0.9% SODIUM CHLORIDE 100MG/0.1L
2 PLASTIC BAG, INJECTION (ML) INTRAVENOUS ONCE
Status: COMPLETED | OUTPATIENT
Start: 2019-10-20 | End: 2019-10-20

## 2019-10-20 RX ORDER — ONDANSETRON 4 MG/1
4 TABLET, ORALLY DISINTEGRATING ORAL EVERY 6 HOURS PRN
Status: DISCONTINUED | OUTPATIENT
Start: 2019-10-20 | End: 2019-10-24 | Stop reason: HOSPADM

## 2019-10-20 RX ORDER — METOCLOPRAMIDE HYDROCHLORIDE 5 MG/ML
10 INJECTION INTRAMUSCULAR; INTRAVENOUS 2 TIMES DAILY PRN
Status: DISCONTINUED | OUTPATIENT
Start: 2019-10-20 | End: 2019-10-21

## 2019-10-20 RX ORDER — ONDANSETRON 8 MG/1
8 TABLET, ORALLY DISINTEGRATING ORAL EVERY 8 HOURS PRN
Status: DISCONTINUED | OUTPATIENT
Start: 2019-10-20 | End: 2019-10-24 | Stop reason: HOSPADM

## 2019-10-20 RX ORDER — OXYCODONE AND ACETAMINOPHEN 5; 325 MG/1; MG/1
1 TABLET ORAL EVERY 4 HOURS PRN
Status: DISCONTINUED | OUTPATIENT
Start: 2019-10-20 | End: 2019-10-21

## 2019-10-20 RX ORDER — HEPARIN SODIUM 5000 [USP'U]/ML
5000 INJECTION, SOLUTION INTRAVENOUS; SUBCUTANEOUS EVERY 8 HOURS
Status: DISCONTINUED | OUTPATIENT
Start: 2019-10-20 | End: 2019-10-20

## 2019-10-20 RX ORDER — SODIUM CHLORIDE 0.9 % (FLUSH) 0.9 %
10 SYRINGE (ML) INJECTION
Status: DISCONTINUED | OUTPATIENT
Start: 2019-10-20 | End: 2019-10-24 | Stop reason: HOSPADM

## 2019-10-20 RX ORDER — FAMOTIDINE 20 MG/1
20 TABLET, FILM COATED ORAL 2 TIMES DAILY
Status: DISCONTINUED | OUTPATIENT
Start: 2019-10-20 | End: 2019-10-24 | Stop reason: HOSPADM

## 2019-10-20 RX ORDER — ALPRAZOLAM 0.5 MG/1
0.5 TABLET ORAL 3 TIMES DAILY PRN
Status: DISCONTINUED | OUTPATIENT
Start: 2019-10-20 | End: 2019-10-20

## 2019-10-20 RX ORDER — OXYCODONE AND ACETAMINOPHEN 10; 325 MG/1; MG/1
1 TABLET ORAL EVERY 4 HOURS PRN
Status: DISCONTINUED | OUTPATIENT
Start: 2019-10-20 | End: 2019-10-24 | Stop reason: HOSPADM

## 2019-10-20 RX ORDER — SODIUM CHLORIDE 9 MG/ML
INJECTION, SOLUTION INTRAVENOUS CONTINUOUS
Status: DISCONTINUED | OUTPATIENT
Start: 2019-10-20 | End: 2019-10-20

## 2019-10-20 RX ADMIN — MAGNESIUM SULFATE IN WATER 2 G: 40 INJECTION, SOLUTION INTRAVENOUS at 12:10

## 2019-10-20 RX ADMIN — HEPARIN SODIUM 5000 UNITS: 5000 INJECTION, SOLUTION INTRAVENOUS; SUBCUTANEOUS at 10:10

## 2019-10-20 RX ADMIN — HEPARIN SODIUM 5000 UNITS: 5000 INJECTION, SOLUTION INTRAVENOUS; SUBCUTANEOUS at 06:10

## 2019-10-20 RX ADMIN — SODIUM CHLORIDE 1000 ML: 0.9 INJECTION, SOLUTION INTRAVENOUS at 03:10

## 2019-10-20 RX ADMIN — SODIUM CHLORIDE 1000 ML: 0.9 INJECTION, SOLUTION INTRAVENOUS at 06:10

## 2019-10-20 RX ADMIN — METOCLOPRAMIDE 10 MG: 5 INJECTION, SOLUTION INTRAMUSCULAR; INTRAVENOUS at 05:10

## 2019-10-20 RX ADMIN — FAMOTIDINE 20 MG: 20 TABLET ORAL at 10:10

## 2019-10-20 RX ADMIN — SODIUM CHLORIDE: 0.9 INJECTION, SOLUTION INTRAVENOUS at 03:10

## 2019-10-20 NOTE — PLAN OF CARE
POC reviewed with pt and family at bedside. VSS on RA aside from tachycardia. Tolerating regular diet with no NVD. Tele monitored running sinus tach, 140s-160s, mds aware. Up to toilet with assistance from daughters. Pt is very weak. IVF and boluses given per MAR. No complaints of pain. Pt resting with call light in reach and family at bedside. Will continue to monitor.

## 2019-10-20 NOTE — CONSULTS
[facility]  Advanced Endoscopy Service  Consult Note    Patient Name: Jessica Gorman  MRN: [id number]  Admission Date: 10/19/2019  Hospital Length of Stay: 0 days  Code Status: Full Code   Attending Provider: Dl Peterson MD   Consulting Provider: El Townsend MD  Primary Care Physician: Rico Amaro MD  Principal Problem:Nausea and vomiting    Inpatient consult to Gastroenterology  Consult performed by: El Townsend MD  Consult ordered by: Christen Mackey MD        Subjective:     HPI: Jessica Gorman is a 69 y.o. female with history of metastatic recurrent serous endometrial carcinoma was admitted for chief complaint of intractable nausea, vomiting.     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  she was found to have metastatic disease to bilateral lungs and enlarged peripancreatic and gonzalo hepatis lymph nodes with soft tissue infiltration of the mesentery on September 2018. She has a 3.4 cm left renal mass also. In January 2019 she underwent chemo, ain feb 2019, completion CT demonstrated partial response. On April 2019 another cycle of chemo was complete, and PET scan showed multiple hypermetabolic mesenteric/retroperitoneal lymph nodes and hypermetabolic left lung nodule concerning for metastatic disease. In June 2019 was started on avastin and s/p completion PET showed progressive disease in lung liver and abdomen on august 2019.     Has had multiple admissions for nausea and vomiting. Was seen by [facility] on 10/8 for elevated bili 9.8, found to have acquired duodenal stenosis and lower third bile duct stricture, but unable to visualize major papilla. Biliary drain placed 10/10  by IR and Tbili trending down since then, now 2.5.  Now presents with nausea, vomiting and PO intolerance for last 3-4 days. No dysphagia, reflux, heartburn. Used to feel early satiety, but none lately. Non-bloody emesis. Was given PO reglan after last admission, but hasn't tried it regularly due to potential side effect. Tried one dose but vomited 30min later. Biliary drain still draining. Last chemo in September.    Tachycardic to 130s which is chronic. CT a/p with no SBO. Troponin slightly elevated. Lactate 2.3. Lipase normal.      Past Medical History:   Diagnosis Date    Abnormal Pap smear     1984    Anorexia 8/30/2019    Anxiety 10/11/2019    Cancer     Chemotherapy-induced neutropenia 4/1/2015    Dehydration 10/7/2019    Elevated cancer antigen 125 (CA-125) 9/5/2018    Encounter for blood transfusion     FH: breast cancer in relative when <45 years old 3/17/2016    Hyperbilirubinemia 10/8/2019    Hypertension     Hypokalemia 9/9/2019    Other complicated headache syndrome 10/4/2019    Other screening mammogram 9/17/2015    Postmenopausal bleeding     Pulmonary nodules/lesions, multiple 4/25/2019    Rash 9/17/2015    Uterine cancer     Weight loss 12/17/2015    Well woman exam with routine gynecological exam 9/14/2016       Past Surgical History:   Procedure Laterality Date    COLPOSCOPY      DILATION AND CURETTAGE OF UTERUS  10/17/14    ENDOSCOPIC ULTRASOUND OF UPPER GASTROINTESTINAL TRACT N/A 10/8/2019    Procedure: ULTRASOUND, UPPER GI TRACT, ENDOSCOPIC;  Surgeon: Gabe Aguirre MD;  Location: [address]);  Service: Endoscopy;  Laterality: N/A;    ERCP N/A 10/8/2019    Procedure: ERCP (ENDOSCOPIC RETROGRADE CHOLANGIOPANCREATOGRAPHY);  Surgeon: Gabe Aguirre MD;  Location: [facility] ([address] [place] [address]);  Service: Endoscopy;  Laterality: N/A;    HYSTERECTOMY  12/4/2014    radiacl hysterectomy    SD REMOVAL OF OVARY/TUBE(S)         Family History   Problem Relation Age of Onset     Hypertension Mother     Cataracts Mother     No Known Problems Father     Hypertension Brother     Hypertension Brother     Cancer Son     Hypertension Son     Heart attack Son     Cataracts Brother     Breast cancer Cousin         maternal cousin. early 30's when diagnosed.     Breast cancer Other         sister's child. diagnosed in her 30s.     Breast cancer Maternal Grandmother     Breast cancer Maternal Aunt     Cataracts Maternal Aunt     Lung cancer Maternal Uncle     Colon cancer Neg Hx     Ovarian cancer Neg Hx     Uterine cancer Neg Hx        Social History     Socioeconomic History    Marital status:      Spouse name: Not on file    Number of children: Not on file    Years of education: Not on file    Highest education level: Not on file   Occupational History    Occupation: Unemployed   Social Needs    Financial resource strain: Not on file    Food insecurity:     Worry: Not on file     Inability: Not on file    Transportation needs:     Medical: Not on file     Non-medical: Not on file   Tobacco Use    Smoking status: Never Smoker    Smokeless tobacco: Never Used   Substance and Sexual Activity    Alcohol use: No    Drug use: No    Sexual activity: Not Currently     Birth control/protection: Post-menopausal   Lifestyle    Physical activity:     Days per week: Not on file     Minutes per session: Not on file    Stress: Not on file   Relationships    Social connections:     Talks on phone: Not on file     Gets together: Not on file     Attends Sabianist service: Not on file     Active member of club or organization: Not on file     Attends meetings of clubs or organizations: Not on file     Relationship status: Not on file   Other Topics Concern    Not on file   Social History Narrative    Pt lives w/her daughter.  Pt has 5 living children.  One .         No current facility-administered medications on file prior to encounter.      Current Outpatient  Medications on File Prior to Encounter   Medication Sig Dispense Refill    guaifenesin-codeine 100-10 mg/5 ml (TUSSI-ORGANIDIN NR)  mg/5 mL syrup Take 5 mLs by mouth every 6 (six) hours as needed for Cough. 236 mL 0    metoclopramide HCl (REGLAN) 10 MG tablet Take 1 tablet (10 mg total) by mouth 2 (two) times daily. 120 tablet 1    ALPRAZolam (XANAX) 0.5 MG tablet Take 1 tablet (0.5 mg total) by mouth 3 (three) times daily as needed for Anxiety (breakthrough anxiety not relieved by ativan). 30 tablet 1    amLODIPine (NORVASC) 10 MG tablet Take 1 tablet (10 mg total) by mouth once daily. 90 tablet 0    dexAMETHasone (DECADRON) 4 MG Tab Take 5 tablets every 6 hours for 24 hours prior to chemotherapy 20 tablet 4    megestrol (MEGACE ES) 625 mg/5 mL Susp Take 5 mLs (625 mg total) by mouth once daily. 150 mL 11    multivitamin (ONE DAILY MULTIVITAMIN) per tablet Take 1 tablet by mouth once daily.      ondansetron (ZOFRAN-ODT) 8 MG TbDL Take 1 tablet (8 mg total) by mouth every 12 (twelve) hours as needed. 30 tablet 1    oxyCODONE-acetaminophen (PERCOCET) 5-325 mg per tablet Take 1 tablet by mouth every 4 (four) hours as needed for Pain. 30 tablet 0    potassium bicarbonate & potassium chloride (K-LYTE CL) 25 mEq TbEF Take 1 tablet (25 mEq total) by mouth once daily. 30 tablet 3    potassium chloride SA (K-DUR,KLOR-CON) 20 MEQ tablet Take 1 tablet (20 mEq total) by mouth once daily. 30 tablet 3    ranitidine (ZANTAC) 150 MG tablet Take 1 tablet (150 mg total) by mouth 2 (two) times daily. 60 tablet 11    simethicone (MYLICON) 80 MG chewable tablet Take 1 tablet (80 mg total) by mouth 3 (three) times daily as needed for Flatulence.  0       Review of patient's allergies indicates:   Allergen Reactions    Gramicidin d Shortness Of Breath    Hydrochlorothiazide Other (See Comments)     Burning and tingling sensation throughoutt her body    Lisinopril Shortness Of Breath    Promethazine Shortness Of  Breath and Nausea And Vomiting    Neosporin [neomycin-bacitracnzn-polymyxnb] Swelling    Polymyxin [bacitracin-polymyxin b] Swelling       Review of Systems:   Constitutional: no fever, chills  Eyes: no visual changes   ENT: no sore throat or dysphagia  Respiratory: no cough or shortness of breath   Cardiovascular: no chest pain or palpitations   Gastrointestinal: as per HPI  Hematologic/Lymphatic: no easy bruising or lymphadenopathy   Musculoskeletal: no arthralgias or myalgias   Neurological: no change in mental status  Behavioral/Psych: no change in mood    Objective:     Vitals:    10/20/19 0850   BP:    Pulse: (!) 135   Resp:    Temp:        General: Alert and Oriented, no distress  HEENT: Normocephalic, Atraumatic. No scleral icterus.  Resp: Good air entry bilaterally, no adventitious sounds  Cardiac: S1 and S2 normal  Abdomen: Normoactive bowel sounds. Moderately distended. Normal tympany. Soft. Non-tender. No peritoneal signs. Biliary drain in place.  Extremities: No peripheral edema.   Neurologic: No gross neurological Deficits  Psych: Calm, cooperative. Normal mood and affect.    Significant Labs:  Recent Labs   Lab 10/14/19  0501 10/15/19  0446 10/19/19  2200   HGB 11.6* 10.0* 10.6*       Lab Results   Component Value Date    WBC 9.41 10/19/2019    HGB 10.6 (L) 10/19/2019    HCT 32.9 (L) 10/19/2019    MCV 85 10/19/2019     10/19/2019       Lab Results   Component Value Date     10/19/2019    K 3.5 10/19/2019     10/19/2019    CO2 19 (L) 10/19/2019    BUN 20 10/19/2019    CREATININE 0.8 10/19/2019    CALCIUM 8.0 (L) 10/19/2019    ANIONGAP 15 10/19/2019    ESTGFRAFRICA >60.0 10/19/2019    EGFRNONAA >60.0 10/19/2019       Lab Results   Component Value Date    ALT 61 (H) 10/19/2019     (H) 10/19/2019    ALKPHOS 335 (H) 10/19/2019    BILITOT 2.5 (H) 10/19/2019       Lab Results   Component Value Date    INR 1.0 10/09/2019    INR 1.0 10/09/2019    INR 1.0 02/28/2015       Significant  Imaging:  Reviewed pertinent radiology findings.       Assessment/Plan:     Jessica Gorman is a 69 y.o. female with history of metastatic recurrent serous endometrial carcinoma was admitted for chief complaint of intractable nausea, vomiting.    Problem List:  1. Nausea and vomiting  2. Metastatic endometrial carcinoma  3. Duodenal stenosis    Previous ERCP notable for acquired duodenal stenosis, likely from tumor burden which is the most likely cause of her symptoms. Esophagus, stomach otherwise normal on recent endoscopy. She would benefit from a duodenal stent. Should note that these stents are permanent, usually palliative intent, with possible relief of symptoms for weeks to months. Patient may still not be able to tolerate a regular diet (rather liquid) even with the stent, and there is potential for tumor in-growth in the future.      Plan:  1. Supportive care - antiemetics prn  2. Pending further discussions between patient and primary team, as well as cardiac workup, please call Advanced Endoscopy if still needed for duodenal stent placement. Can discuss risks v benefits with patient at that time.    Thank you for involving us in the care of Jessica Gorman. Please call with any additional questions, concerns or changes in the patient's clinical status.    El Townsend MD  Gastroenterology Fellow PGY IV   [facility]

## 2019-10-20 NOTE — ASSESSMENT & PLAN NOTE
Troponin elevated to 0.17 -> two repeat values are flat. Etiology likely 2/2 demand ischemia. Low concern for ACS.  - Recommend stop trending troponin levels  - Maintain on telemetry  - Treat underlying medical issue

## 2019-10-20 NOTE — ASSESSMENT & PLAN NOTE
Patient is presenting with sinus tachycardia likely 2/2 decreased PO intake and hypovolemia 2/2 vomiting. On exam, the patient is dry with delayed capillary refill and dry mucous membranes. Currently receiving 3L of IVF.   - Recommend repleting with IVF, treat underlying medical issue (nausea symptomatically)  - Will order TTE

## 2019-10-20 NOTE — ED PROVIDER NOTES
"Encounter Date: 10/19/2019       History     Chief Complaint   Patient presents with    Emesis     Pt c/o of vomting times 3 days. Pt states "i just can't keep anything down." Pt denies any abdominal pain or other complaints. Hx CA with mets     69yoF w/endometrial cancer w/mets to the liver presenting with nausea and NBNB vomiting x3 days. Denies abdominal pain. Last BM was yesterday and normal brown stool. Denies history of SBO. Was recently admitted for similar symptoms and also found to have biliary obstruction at that time with biliary drain placement. Was sent home on po reglan as zofran no longer works and she is allergic to phenergan.        Review of patient's allergies indicates:   Allergen Reactions    Gramicidin d Shortness Of Breath    Hydrochlorothiazide Other (See Comments)     Burning and tingling sensation throughoutt her body    Lisinopril Shortness Of Breath    Promethazine Shortness Of Breath and Nausea And Vomiting    Neosporin [neomycin-bacitracnzn-polymyxnb] Swelling    Polymyxin [bacitracin-polymyxin b] Swelling     Past Medical History:   Diagnosis Date    Abnormal Pap smear     1984    Anorexia 8/30/2019    Anxiety 10/11/2019    Cancer     Chemotherapy-induced neutropenia 4/1/2015    Dehydration 10/7/2019    Elevated cancer antigen 125 (CA-125) 9/5/2018    Encounter for blood transfusion     FH: breast cancer in relative when <45 years old 3/17/2016    Hyperbilirubinemia 10/8/2019    Hypertension     Hypokalemia 9/9/2019    Other complicated headache syndrome 10/4/2019    Other screening mammogram 9/17/2015    Postmenopausal bleeding     Pulmonary nodules/lesions, multiple 4/25/2019    Rash 9/17/2015    Uterine cancer     Weight loss 12/17/2015    Well woman exam with routine gynecological exam 9/14/2016     Past Surgical History:   Procedure Laterality Date    COLPOSCOPY      DILATION AND CURETTAGE OF UTERUS  10/17/14    ENDOSCOPIC ULTRASOUND OF UPPER " GASTROINTESTINAL TRACT N/A 10/8/2019    Procedure: ULTRASOUND, UPPER GI TRACT, ENDOSCOPIC;  Surgeon: Gabe Aguirre MD;  [facility]  Service: Endoscopy;  Laterality: N/A;    ERCP N/A 10/8/2019    Procedure: ERCP (ENDOSCOPIC RETROGRADE CHOLANGIOPANCREATOGRAPHY);  Surgeon: Gabe Aguirre MD;  [facility] ([facility]);  Service: Endoscopy;  Laterality: N/A;    HYSTERECTOMY  12/4/2014    radiacl hysterectomy    [place] REMOVAL OF OVARY/TUBE(S)       Family History   Problem Relation Age of Onset    Hypertension Mother     Cataracts Mother     No Known Problems Father     Hypertension Brother     Hypertension Brother     Cancer Son     Hypertension Son     Heart attack Son     Cataracts Brother     Breast cancer Cousin         maternal cousin. early 30's when diagnosed.     Breast cancer Other         sister's child. diagnosed in her 30s.     Breast cancer Maternal Grandmother     Breast cancer Maternal Aunt     Cataracts Maternal Aunt     Lung cancer Maternal Uncle     Colon cancer Neg Hx     Ovarian cancer Neg Hx     Uterine cancer Neg Hx      Social History     Tobacco Use    Smoking status: Never Smoker    Smokeless tobacco: Never Used   Substance Use Topics    Alcohol use: No    Drug use: No     Review of Systems   Constitutional: Positive for fatigue. Negative for chills and fever.   HENT: Negative for ear pain and sore throat.    Eyes: Negative for pain and visual disturbance.   Respiratory: Negative for cough and shortness of breath.    Cardiovascular: Negative for chest pain and leg swelling.   Gastrointestinal: Positive for nausea and vomiting. Negative for abdominal pain.   Genitourinary: Negative for dysuria and hematuria.   Musculoskeletal: Negative for back pain and neck pain.   Skin: Negative for pallor and rash.   Allergic/Immunologic: Negative for environmental allergies and food allergies.   Neurological: Negative for weakness and numbness.   Hematological:  Does not bruise/bleed easily.   Psychiatric/Behavioral: Negative for agitation and confusion.       Physical Exam     Initial Vitals [10/19/19 2042]   BP Pulse Resp Temp SpO2   (!) 132/58 (!) 152 20 98.4 °F (36.9 °C) 95 %      MAP       --         Physical Exam    Nursing note and vitals reviewed.  Constitutional:   Cachectic female, no acute distress   HENT:   Head: Normocephalic and atraumatic.   Eyes: Conjunctivae and EOM are normal. Pupils are equal, round, and reactive to light.   Neck: Normal range of motion. Neck supple.   Cardiovascular: Regular rhythm, normal heart sounds and intact distal pulses.   Tachycardic to 150, appears sinus on the monitor   Pulmonary/Chest: Breath sounds normal. No respiratory distress. She has no wheezes. She has no rhonchi. She has no rales. She exhibits no tenderness.   Abdominal: Soft. Bowel sounds are normal. She exhibits distension (mild). She exhibits no mass. There is no tenderness. There is no rebound and no guarding.   Biliary drain with bilious fluid to mid-abdomen, dressing c/d/i   Neurological: She is alert and oriented to person, place, and time.   Skin: Skin is warm and dry.   Psychiatric: She has a normal mood and affect.         ED Course   External Jugular IV  Date/Time: 10/20/2019 4:44 PM  Performed by: Dl Peterson MD  Authorized by: Dl Peterson MD   Consent Done: Yes  Consent: Verbal consent obtained. Written consent not obtained.  Risks and benefits: risks, benefits and alternatives were discussed  Consent given by: patient  Patient understanding: patient states understanding of the procedure being performed  Patient consent: the patient's understanding of the procedure matches consent given  Location (Ext Jugular): Left.  Area Prepped With: Chlorohexidine.  Catheter Size: 20 ga.  Catheter Type: Jelco.  Number of attempts: 1  Fixation/Dressing: Taped in place and Tegaderm.  Patient tolerance: Patient tolerated the procedure well with no  immediate complications        Labs Reviewed   CBC W/ AUTO DIFFERENTIAL - Abnormal; Notable for the following components:       Result Value    RBC 3.86 (*)     Hemoglobin 10.6 (*)     Hematocrit 32.9 (*)     RDW 20.3 (*)     Immature Granulocytes 1.2 (*)     Gran # (ANC) 8.1 (*)     Immature Grans (Abs) 0.11 (*)     Lymph # 0.5 (*)     nRBC 1 (*)     Gran% 85.6 (*)     Lymph% 5.1 (*)     All other components within normal limits   LACTIC ACID, PLASMA - Abnormal; Notable for the following components:    Lactate (Lactic Acid) 2.3 (*)     All other components within normal limits   TROPONIN I - Abnormal; Notable for the following components:    Troponin I 0.156 (*)     All other components within normal limits   COMPREHENSIVE METABOLIC PANEL - Abnormal; Notable for the following components:    CO2 19 (*)     Calcium 8.0 (*)     Total Protein 5.5 (*)     Albumin 1.7 (*)     Total Bilirubin 2.5 (*)     Alkaline Phosphatase 335 (*)      (*)     ALT 61 (*)     All other components within normal limits   LIPASE - Abnormal; Notable for the following components:    Lipase 82 (*)     All other components within normal limits   URINALYSIS, REFLEX TO URINE CULTURE - Abnormal; Notable for the following components:    Appearance, UA Hazy (*)     Protein, UA 1+ (*)     Ketones, UA Trace (*)     Occult Blood UA 1+ (*)     Leukocytes, UA Trace (*)     All other components within normal limits    Narrative:     Preferred Collection Type->Urine, Clean Catch   URINALYSIS MICROSCOPIC - Abnormal; Notable for the following components:    WBC, UA 13 (*)     Bacteria Few (*)     Hyaline Casts, UA 12 (*)     All other components within normal limits    Narrative:     Preferred Collection Type->Urine, Clean Catch   TROPONIN I - Abnormal; Notable for the following components:    Troponin I 0.171 (*)     All other components within normal limits   CULTURE, URINE   B-TYPE NATRIURETIC PEPTIDE        ECG Results          EKG 12-lead (Final  result)  Result time 10/20/19 11:09:23    Final result by Interface, Lab In [facility] (10/20/19 11:09:23)                 Narrative:    Test Reason : R00.0    Vent. Rate : 146 BPM     Atrial Rate : 146 BPM     P-R Int : 122 ms          QRS Dur : 076 ms      QT Int : 342 ms       P-R-T Axes : 056 044 048 degrees     QTc Int : 532 ms    Sinus tachycardia  Nonspecific T wave abnormality  Abnormal ECG  When compared with ECG of 19-OCT-2019 21:23,  No significant change was found  Confirmed by HOLLY BUITRAGO MD ([id number]) on 10/20/2019 11:09:13 AM    Referred By: [email or web address]   SELF           Confirmed By:HOLLY BUITRAGO MD                             EKG 12-lead (Final result)  Result time 10/20/19 11:09:09    Final result by Interface, Lab In [facility] (10/20/19 11:09:09)                 Narrative:    Test Reason : R00.0,    Vent. Rate : 146 BPM     Atrial Rate : 146 BPM     P-R Int : 122 ms          QRS Dur : 076 ms      QT Int : 268 ms       P-R-T Axes : 054 046 056 degrees     QTc Int : 417 ms    Sinus tachycardia  Normal ECG  When compared with ECG of 19-OCT-2019 21:10,  No significant change was found  Confirmed by HOLLY BUITRAGO MD ([id number]) on 10/20/2019 11:08:55 AM    Referred By: [email or web address]   SELF           Confirmed By:HOLLY BUITRAGO MD                             EKG 12-lead (Final result)  Result time 10/20/19 11:08:23    Final result by Interface, Lab In [facility] (10/20/19 11:08:23)                 Narrative:    Test Reason : R00.0,    Vent. Rate : 151 BPM     Atrial Rate : 151 BPM     P-R Int : 112 ms          QRS Dur : 066 ms      QT Int : 264 ms       P-R-T Axes : 034 051 063 degrees     QTc Int : 418 ms    Normal sinus rhythm  otherwise unreadable  please repeat  Confirmed by HOLLY BUITRAGO MD ([id number]) on 10/20/2019 11:08:07 AM    Referred By: [email or web address]   SELF           Confirmed By:HOLLY BUITRAGO MD                             EKG 12-lead (Final result)  Result time 10/20/19 12:50:40    Final result by Interface, Lab In [facility] (10/20/19  12:50:40)                 Narrative:    Test Reason :     Vent. Rate : 154 BPM     Atrial Rate : 154 BPM     P-R Int : 120 ms          QRS Dur : 068 ms      QT Int : 250 ms       P-R-T Axes : 043 014 064 degrees     QTc Int : 400 ms    Sinus tachycardia  Nonspecific ST abnormality  Abnormal ECG    When compared with ECG of 11-OCT-2019 05:09,  Nonspecific T wave abnormality no longer evident in Inferior leads  Confirmed by HOLLY BUITRAGO MD ([id number]) on 10/20/2019 12:50:25 PM    Referred By: [email or web address]   SELF           Confirmed By:HOLLY BUITRAGO MD                            Imaging Results          CT Abdomen Pelvis  Without Contrast (Final result)  Result time 10/20/19 00:32:28   Procedure changed from CT Abdomen Pelvis With Contrast     Final result by Hector De Paz MD (10/20/19 00:32:28)                 Impression:      Moderate distension of the stomach but no findings of small bowel obstruction.    Moderate volume loculated ascites and mesenteric/omental nodularity in keeping with peritoneal metastatic disease.    Large right and moderate left pleural effusions with associated adjacent atelectasis, mildly increased from prior exam.    Interval placement of an external-internal biliary drain with a small amount of expected pneumobilia.    Grossly stable appearance of hepatic lesions and pelvic adenopathy.    Worsening anasarca.    Additional stable findings, as above.    Electronically signed by resident: Carol Fernandez  Date:    10/19/2019  Time:    23:34    Electronically signed by: Hector De Paz MD  Date:    10/20/2019  Time:    00:32             Narrative:    EXAMINATION:  CT ABDOMEN PELVIS WITHOUT CONTRAST    CLINICAL HISTORY:  hx uterine cancer, intractable vomiting, concern for bowel obstruction;    TECHNIQUE:  5.0 mm axial CT images of the abdomen and pelvis were obtained via helical, multi detector CT technique.  No intravenous contrast material was administered.    COMPARISON:  CTA chest 10/12/2019,  chest abdomen pelvis 10/03/2019.  Abdominal MRI, 10/07/2019.    FINDINGS:  Heart: No cardiomegaly or pericardial effusion.    Lung Bases: Large right and moderate left pleural effusions, mildly increased from prior exam, with associated adjacent compressive atelectasis.    Liver: Normal in size with multiple large ill-defined hypodense lesions scattered throughout, better characterized on recent abdominal MRI.  Punctate foci of air within the liver are likely biliary related to recent biliary drain placement.    Gallbladder: Nondistended and therefore not well evaluated.  Contains a punctate focus of air, likely related to biliary drain placement.    Bile Ducts: Interval placement of an external-internal biliary drain with a small amount of expected pneumobilia.  No significant biliary ductal dilatation.    Pancreas: No pancreatic mass lesion or peripancreatic inflammatory change.    Spleen: Unremarkable.    Adrenals: Unremarkable.    Genitourinary: Normal in size and location.  No obvious renal masses, nephrolithiasis, or hydroureteronephrosis.  Bladder demonstrates smooth contours without bladder wall thickening.    Reproductive organs: Status post hysterectomy.    GI tract/Mesentery: Stomach is moderately distended and overall appears similar to recent prior studies.  Hyperdense material scattered throughout the colon, likely related to PO contrast given for prior CT 10/03/2019.  No evidence of small-bowel obstruction.  Evaluation for bowel inflammation is significantly limited by absence of IV contrast and the presence of diffuse edema and ascites.    Peritoneal Space: Moderate volume of mildly loculated ascites, similar to prior exam.  Stable peritoneal and omental nodularity suggestive of peritoneal metastatic disease.  No intraperitoneal free air.    Retroperitoneum: No significant adenopathy.    Abdominal wall: A fluid density lesion is again noted in the left inguinal region which could represent a necrotic  lymph node or fluid within a hernia defect.  This lesion measures 2.9 x 2.3 cm in axial dimensions and is overall stable when compared with the recent prior study.  Additional prominent inguinal lymph nodes bilaterally, stable from prior exam.  Mildly enlarged left pelvic sidewall lymph node measuring up to 1.3 cm in short axis.  Diffuse body wall edema, slightly increased from prior exam.    Vasculature: Abdominal aorta is normal in caliber without significant calcific atherosclerosis.    Bones: Mild degenerative changes.  No acute acute fracture or bony destructive process.                                       APC / Resident Notes:   HO5 MDM:  69yoF w/endometrial cancer with mets to the liver s/p recent biliary drain presenting with NBNB vomiting x3 days. She is tachycardic to 150, appears dry but otherwise not in distress with biliary drainage from drain and benign abdominal exam. DDX includes bowel obstruction, bowel perforation, uncontrolled nausea of malignancy, dehydration, ACS. Will order labs and CTAP, give IVF and reglan for nausea and reassess.  Madelaine Brizuela MD  [employer] Emergency Medicine/Internal Medicine PGY-5  10/19/2019 10:50 PM    HO5 Update:  Nausea did improve. HR improved to 130s after 2L IVF. Lactate initially 2.3. Trop 0.156, suspect demand ischemia as patient not having any chest pain. CTAP shows no acute abnormality. However, givenn persistent tachycardia and uncontrolled vomiting at home, admitted to gyn onc for further management.  Madelaine Brizuela MD  [employer] Emergency Medicine/Internal Medicine PGY-5  10/20/2019 5:27 AM             Attending Attestation:   Physician Attestation Statement for Resident:  As the supervising MD   Physician Attestation Statement: I have personally seen and examined this patient.   I agree with the above history. -:   As the supervising MD I agree with the above PE.   -: No acute distress or AMS.     As the supervising MD I agree with the above treatment, course, plan,  and disposition.  I have reviewed and agree with the residents interpretation of the following: lab data, CT scans and EKG.                       Clinical Impression:       ICD-10-CM ICD-9-CM   1. Sinus tachycardia R00.0 427.89   2. Tachycardia R00.0 785.0   3. Hypotension, unspecified hypotension type  I95.9 458.9   4. Dehydration E86.0 276.51   5. Non-intractable vomiting with nausea, unspecified vomiting type R11.2 787.01   6. Nausea and vomiting, intractability of vomiting not specified, unspecified vomiting type R11.2 787.01   7. Dehydration  E86.0 276.51         Disposition:   Disposition: Admitted  Condition: Stable         Level of Complexity:  High, level 5.               Madelaine Brizuela MD  Resident  10/20/19 0528       Dl Peterson MD  10/20/19 3708

## 2019-10-20 NOTE — ED NOTES
Pt placed on telemetry box [id number] . Rhythm and rate confirmed by telemetry technician: Sinus Tach at a rate of 141

## 2019-10-20 NOTE — EKG INTERPRETATIONS - EMERGENCY DEPT.
Independently interpreted by MD:  Rate 154, NSR, no stemi, no ectopy, no hypertrophy, tachycardia, low voltage

## 2019-10-20 NOTE — SUBJECTIVE & OBJECTIVE
Past Medical History:   Diagnosis Date    Abnormal Pap smear     1984    Anorexia 8/30/2019    Anxiety 10/11/2019    Cancer     Chemotherapy-induced neutropenia 4/1/2015    Dehydration 10/7/2019    Elevated cancer antigen 125 (CA-125) 9/5/2018    Encounter for blood transfusion     FH: breast cancer in relative when <45 years old 3/17/2016    Hyperbilirubinemia 10/8/2019    Hypertension     Hypokalemia 9/9/2019    Other complicated headache syndrome 10/4/2019    Other screening mammogram 9/17/2015    Postmenopausal bleeding     Pulmonary nodules/lesions, multiple 4/25/2019    Rash 9/17/2015    Uterine cancer     Weight loss 12/17/2015    Well woman exam with routine gynecological exam 9/14/2016       Past Surgical History:   Procedure Laterality Date    COLPOSCOPY      DILATION AND CURETTAGE OF UTERUS  10/17/14    ENDOSCOPIC ULTRASOUND OF UPPER GASTROINTESTINAL TRACT N/A 10/8/2019    Procedure: ULTRASOUND, UPPER GI TRACT, ENDOSCOPIC;  Surgeon: Gabe Aguirre MD;  Location: [address]);  Service: Endoscopy;  Laterality: N/A;    ERCP N/A 10/8/2019    Procedure: ERCP (ENDOSCOPIC RETROGRADE CHOLANGIOPANCREATOGRAPHY);  Surgeon: Gabe Aguirre MD;  Location: [address]);  Service: Endoscopy;  Laterality: N/A;    HYSTERECTOMY  12/4/2014    radiacl hysterectomy    [place] REMOVAL OF OVARY/TUBE(S)         Review of patient's allergies indicates:   Allergen Reactions    Gramicidin d Shortness Of Breath    Hydrochlorothiazide Other (See Comments)     Burning and tingling sensation throughoutt her body    Lisinopril Shortness Of Breath    Promethazine Shortness Of Breath and Nausea And Vomiting    Neosporin [neomycin-bacitracnzn-polymyxnb] Swelling    Polymyxin [bacitracin-polymyxin b] Swelling       No current facility-administered medications on file prior to encounter.      Current Outpatient Medications on File Prior to Encounter   Medication Sig    guaifenesin-codeine 100-10 mg/5  ml (TUSSI-ORGANIDIN NR)  mg/5 mL syrup Take 5 mLs by mouth every 6 (six) hours as needed for Cough.    metoclopramide HCl (REGLAN) 10 MG tablet Take 1 tablet (10 mg total) by mouth 2 (two) times daily.    ALPRAZolam (XANAX) 0.5 MG tablet Take 1 tablet (0.5 mg total) by mouth 3 (three) times daily as needed for Anxiety (breakthrough anxiety not relieved by ativan).    amLODIPine (NORVASC) 10 MG tablet Take 1 tablet (10 mg total) by mouth once daily.    dexAMETHasone (DECADRON) 4 MG Tab Take 5 tablets every 6 hours for 24 hours prior to chemotherapy    megestrol (MEGACE ES) 625 mg/5 mL Susp Take 5 mLs (625 mg total) by mouth once daily.    multivitamin (ONE DAILY MULTIVITAMIN) per tablet Take 1 tablet by mouth once daily.    ondansetron (ZOFRAN-ODT) 8 MG TbDL Take 1 tablet (8 mg total) by mouth every 12 (twelve) hours as needed.    oxyCODONE-acetaminophen (PERCOCET) 5-325 mg per tablet Take 1 tablet by mouth every 4 (four) hours as needed for Pain.    potassium bicarbonate & potassium chloride (K-LYTE CL) 25 mEq TbEF Take 1 tablet (25 mEq total) by mouth once daily.    potassium chloride SA (K-DUR,KLOR-CON) 20 MEQ tablet Take 1 tablet (20 mEq total) by mouth once daily.    ranitidine (ZANTAC) 150 MG tablet Take 1 tablet (150 mg total) by mouth 2 (two) times daily.    simethicone (MYLICON) 80 MG chewable tablet Take 1 tablet (80 mg total) by mouth 3 (three) times daily as needed for Flatulence.     Family History     Problem Relation (Age of Onset)    Breast cancer Cousin, Other, Maternal Grandmother, Maternal Aunt    Cancer Son    Cataracts Mother, Brother, Maternal Aunt    Heart attack Son    Hypertension Mother, Brother, Brother, Son    Lung cancer Maternal Uncle    No Known Problems Father        Tobacco Use    Smoking status: Never Smoker    Smokeless tobacco: Never Used   Substance and Sexual Activity    Alcohol use: No    Drug use: No    Sexual activity: Not Currently     Birth  control/protection: Post-menopausal     Review of Systems   Constitution: Positive for chills, decreased appetite and malaise/fatigue.   HENT: Negative.    Eyes: Negative.    Cardiovascular: Negative for chest pain, dyspnea on exertion and palpitations.   Respiratory: Negative.    Endocrine: Negative.    Hematologic/Lymphatic: Negative.    Skin: Negative.    Musculoskeletal: Negative.    Gastrointestinal: Negative.    Genitourinary: Negative.    Neurological: Negative.      Objective:     Vital Signs (Most Recent):  Temp: 97.4 °F (36.3 °C) (10/20/19 1226)  Pulse: (!) 141 (10/20/19 1226)  Resp: 16 (10/20/19 1226)  BP: 108/69 (10/20/19 1226)  SpO2: (!) 92 % (10/20/19 1226) Vital Signs (24h Range):  Temp:  [97.4 °F (36.3 °C)-98.4 °F (36.9 °C)] 97.4 °F (36.3 °C)  Pulse:  [129-152] 141  Resp:  [16-20] 16  SpO2:  [92 %-98 %] 92 %  BP: ()/(58-73) 108/69     Weight: 62.6 kg (138 lb)  Body mass index is 25.24 kg/m².    SpO2: (!) 92 %  O2 Device (Oxygen Therapy): room air      Intake/Output Summary (Last 24 hours) at 10/20/2019 1310  Last data filed at 10/19/2019 2340  Gross per 24 hour   Intake 1000 ml   Output --   Net 1000 ml       Lines/Drains/Airways     Drain                 Biliary Tube 10/10/19 1500 RLQ 9 days          Peripheral Intravenous Line                 Peripheral IV - Single Lumen 10/19/19 2200 20 G less than 1 day                Physical Exam   Constitutional: She is oriented to person, place, and time. She appears well-developed and well-nourished.   HENT:   Head: Normocephalic and atraumatic.   Eyes: Conjunctivae are normal.   Dry mucous membranes   Neck: Neck supple. No JVD present.   Cardiovascular: Regular rhythm and intact distal pulses.   No murmur heard.  Tachycardiac rate   Pulmonary/Chest: Effort normal.   Decreased breath sounds at bilateral bases   Abdominal: She exhibits distension.   Biliary drain in place   Musculoskeletal: Normal range of motion.   2+ pitting edema bilaterally to  thighs, has delayed capillary refill (>3 seconds)    Neurological: She is alert and oriented to person, place, and time.   Skin: Skin is warm and dry.   Psychiatric: She has a normal mood and affect.       Significant Labs:   CMP   Recent Labs   Lab 10/19/19  2330      K 3.5      CO2 19*      BUN 20   CREATININE 0.8   CALCIUM 8.0*   PROT 5.5*   ALBUMIN 1.7*   BILITOT 2.5*   ALKPHOS 335*   *   ALT 61*   ANIONGAP 15   ESTGFRAFRICA >60.0   EGFRNONAA >60.0   , CBC   Recent Labs   Lab 10/19/19  2200   WBC 9.41   HGB 10.6*   HCT 32.9*       and Troponin   Recent Labs   Lab 10/19/19  2200 10/20/19  0438 10/20/19  1041   TROPONINI 0.156* 0.171* 0.173*  0.173*       Significant Imaging:   EKG: sinus tachycardia (HR of 154), prolonged QTc

## 2019-10-20 NOTE — ED NOTES
Assumed care for pt after recieving report from SHRUTHI Reese. Pt. resting in bed in NAD. RR e/u. Continuous cardiac, BP, and O2 monitoring in progress. VS being monitoring continuously per MD orders. Pt. offered bathroom assistance and denies need at this time. Explanation of care/wait provided. Bed in low, locked position with rails up and call bell in reach. Pt family at bedside. Will continue to monitor.

## 2019-10-20 NOTE — ED TRIAGE NOTES
"Jessica Gorman, a 69 y.o. female presents to the ED w/ complaint of emesis. Pt reports emesis x3 days; pt reports "thick" and "mucus" emesis. Pt reports nausea and inability to tolerate anything by mouth. Pt states she tried reglan at home and experienced AMS, confusion, and tremors to hands. Pt denies decreased appetite. Pt denies blood in vomit or stool. Pt reports generalized weakness and swelling to BLE; pt states swelling is new. Denies pain. Pt with PMH cancer with mets.     Triage note:  Chief Complaint   Patient presents with    Emesis     Pt c/o of vomting times 3 days. Pt states "i just can't keep anything down." Pt denies any abdominal pain or other complaints. Hx CA with mets     Review of patient's allergies indicates:   Allergen Reactions    Gramicidin d Shortness Of Breath    Hydrochlorothiazide Other (See Comments)     Burning and tingling sensation throughoutt her body    Lisinopril Shortness Of Breath    Promethazine Shortness Of Breath and Nausea And Vomiting    Neosporin [neomycin-bacitracnzn-polymyxnb] Swelling    Polymyxin [bacitracin-polymyxin b] Swelling     Past Medical History:   Diagnosis Date    Abnormal Pap smear     1984    Anorexia 8/30/2019    Anxiety 10/11/2019    Cancer     Chemotherapy-induced neutropenia 4/1/2015    Dehydration 10/7/2019    Elevated cancer antigen 125 (CA-125) 9/5/2018    Encounter for blood transfusion     FH: breast cancer in relative when <45 years old 3/17/2016    Hyperbilirubinemia 10/8/2019    Hypertension     Hypokalemia 9/9/2019    Other complicated headache syndrome 10/4/2019    Other screening mammogram 9/17/2015    Postmenopausal bleeding     Pulmonary nodules/lesions, multiple 4/25/2019    Rash 9/17/2015    Uterine cancer     Weight loss 12/17/2015    Well woman exam with routine gynecological exam 9/14/2016     Adult Physical Assessment  LOC: Jessica Gorman, 69 y.o. female verified via two identifiers.  The patient is " awake, alert, oriented and speaking appropriately at this time.  APPEARANCE: Patient appears ill.  SKIN:The skin is warm and dry, color consistent with ethnicity, poor skin turgor and dry mucus membranes noted. Pt with incision to upper abdomen midline sp/ biliary drain placement; site clean, dry, intact; no redness/swelling noted.  MUSCULOSKELETAL: Patient with generalized weakness.  RESPIRATORY: Airway is open and patent, respirations are spontaneous, patient has a normal effort and rate, no accessory muscle use noted. Pt denies SOB.  CARDIAC: Patient has a normal rate and rhythm, capillary refill < 3 seconds in all extremities, pt denies CP. Dependent edema noted to BLE.  ABDOMEN: Soft and non tender to palpation, no abdominal distention noted. Bowel sounds present in all four quadrants. Pt reports N/V. Denies diarrhea; denies hematemesis.   NEUROLOGIC: Eyes open spontaneously, behavior appropriate to situation, follows commands, facial expression symmetrical, bilateral hand grasp equal and even, purposeful motor response noted, normal sensation in all extremities when touched with a finger.

## 2019-10-20 NOTE — ED NOTES
Patient requesting second dose of Reglan and use of the bedpan.  Assisted with bedpan, bedside cleaning done.  Updated patient and family on bed status.  Equal rise and fall of chest noted.  All necessary monitoring equipment in place. No other needs at this time.  Will continue to monitor.

## 2019-10-20 NOTE — ED NOTES
Report called to RN on [facility]. Transport delayed due to malfunctioning telemetry box. Telemetry room notified, new boxes en route to ED. Transport cancelled, will request again

## 2019-10-20 NOTE — HPI
69 y.o. 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 Patient has presented with similar complaints in the past with most recent admission from 10/7-10/15 at which time a percutaneous biliary drain was placed for biliary obstruction; during that time, the patient was also tachycardiac. Upon arrival to the ER, the patient was found to be tachycardiac with HRs in the 150s --> 130s after 2L of IVF. Troponin was 0.156 -> 0.173. EKG notable for sinus tachycardia. Previously underwent CTA chest on 10/12 which was negative for acute PE. Given elevated troponin and tachycardia, Cardiology was consulted for further recommendations.     Per the patient, she has not eaten well since this past Thursday when her nausea/vomiting first developed. Reports that she has been vomiting 2-3 times daily. Denies shortness of breath, palpitations, chest pain, nausea, diaphoresis.

## 2019-10-20 NOTE — CONSULTS
[facility]  Obstetrics & Gynecology  Consult Note    Patient Name: Jessica Gorman  MRN: [id number]  Admission Date: 10/19/2019  Hospital Length of Stay: 0 days  Code Status: Prior  Primary Care Provider: Rico Amaro MD  Principal Problem: Nausea and vomiting    Inpatient consult to Gynecologic Oncology  Consult performed by: Christen Mackey MD  Consult ordered by: Madelaine Brizuela MD        Subjective:     Chief Complaint: nausea and vomiting    History of Present Illness:   Jessica Gorman is a 69 y.o. 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 Patient has presented with similar complaints in the past with most recent admission from 10/7-10/15 at which time a percutaneous biliary drain was placed for biliary obstruction. She states that several days after discharge, she experienced nausea and vomiting and has not been able to tolerate any PO intake including fluids. She reports that she has experienced 2-3 episodes of clear mucous appearing vomiting/day which did not improve overtime, prompting her to come today for evaluation. She was prescribed reglan on discharge from her most recent hospitalization however, did not take this medication as it causes her to be sedated (she is allergic to phenergan and reports zofran does not help with her symptoms). Her drain has had good output with approximately 250-300 ccs of bilious output each day however states that over the past 24 hours, the output has decreased. On arrival, VSS however, patient was noted to have elevated hear rate to the 150s which improved but remained elevated in the 130s-140s despite 2 L of IV fluids. Troponin was 0.156. CBC: 9.4/10.6/32.9, CMP: electrolytes WNL, AST/ALT: 161/69. UA: trace leukocytes, negative nitrites, specific gravity WNL. CT abdomen performed showed no evidence of SBO. Given concern for  mildly elevated troponin on setting of tachycardia, patient was admitted to the gyn onc service for further evaluation.     Otherwise, patient denies any fevers or chills. No chest pain, palpitations or SOB. Mild dry cough for which patient has been taking cough syrup for with some relief. No abdominal pain. Denies dysuria or hematuria. No constipation or diarrhea. No sick contacts.     Oncology History:   Dec 2014: She was operated on by me at [facility] on Dec 4, 2014 for uterine papillary serous carcinoma. 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  CT scan of the abdomen and pelvis after surgery showed: 1.Postsurgical changes seen in the pelvis from hysterectomy and bilateral salpingo-oophorectomy 2.A fluid collection is seen posterior to the cervix and likely represents a seroma. However, abscess cannot be ruled out.3.Enlarged left inguinal lymph node identified       Ultrasound of this inguinal node was done and showed:    Limited sonographic imaging was performed in an area of a palpable abnormality within the patient's left inguinal region. Within this region, a lymph node is seen that measures 2.4 x 0.9 x 2.8 cm. While enlarged, this lymph node does   demonstrate a fatty hilum and is otherwise normal in morphology.    The patient reports that this palpable abnormality has been getting smaller over time. I discussed with the patient the need to biopsy this lesion to fully exclude any recurrent malignancy however at this time the patient deferred a biopsy attempt.    If this lesion does not resolve, a ultrasound guided biopsy could be rescheduled.        Patient completed vaginal cuff radiation.    She completed 6 cycles of taxol and carboplatin in May 2015.    June 2015: CT  scan at completion of treatment shows no evidence for disease. No adenopathy.  was 7.       Sept 2018:  was 47. CT scan of CAP showed numerous bilateral pulmonary nodules and enlarged peripancreatic and gonzalo hepatis lymph nodes with soft tissue infiltration of the mesentery at the region.    3.4 cm left renal mass along the lower pole of the left kidney measures greater than water density.  Differential would include solid renal mass including primary renal neoplasm or metastatic lesion versus is a complex cyst.      Jan 2019: completed 6 cycles of taxol and carboplatin.      Feb 2019:  Completion :16.  Completion CT scan: partial response      April 2019:  Completed 9 cycles of Taxol and carboplatin.  CT scan with persistent pulmonary nodules. PET: multiple  hypermetabolic mesenteric/retroperitoneal lymph nodes and a hypermetabolic left lung nodule concerning for metastatic disease      June 2019: started Avastin maintenance.     August 2019: completed 3 cycles of Avastin. PET showed progressive disease in lung, liver and abdomen. Aug 2019: : 155 (up from 20). Not interested in clinical trial. Wanted to resume taxol and carboplatin.     September 2019: Admitted 9/13-9/15 for IVF and electrolyte repletion secondary to nausea/vomiting    No current facility-administered medications on file prior to encounter.      Current Outpatient Medications on File Prior to Encounter   Medication Sig    guaifenesin-codeine 100-10 mg/5 ml (TUSSI-ORGANIDIN NR)  mg/5 mL syrup Take 5 mLs by mouth every 6 (six) hours as needed for Cough.    metoclopramide HCl (REGLAN) 10 MG tablet Take 1 tablet (10 mg total) by mouth 2 (two) times daily.    ALPRAZolam (XANAX) 0.5 MG tablet Take 1 tablet (0.5 mg total) by mouth 3 (three) times daily as needed for Anxiety (breakthrough anxiety not relieved by ativan).    amLODIPine (NORVASC) 10 MG tablet Take 1 tablet (10 mg total) by mouth once daily.     dexAMETHasone (DECADRON) 4 MG Tab Take 5 tablets every 6 hours for 24 hours prior to chemotherapy    megestrol (MEGACE ES) 625 mg/5 mL Susp Take 5 mLs (625 mg total) by mouth once daily.    multivitamin (ONE DAILY MULTIVITAMIN) per tablet Take 1 tablet by mouth once daily.    ondansetron (ZOFRAN-ODT) 8 MG TbDL Take 1 tablet (8 mg total) by mouth every 12 (twelve) hours as needed.    oxyCODONE-acetaminophen (PERCOCET) 5-325 mg per tablet Take 1 tablet by mouth every 4 (four) hours as needed for Pain.    potassium bicarbonate & potassium chloride (K-LYTE CL) 25 mEq TbEF Take 1 tablet (25 mEq total) by mouth once daily.    potassium chloride SA (K-DUR,KLOR-CON) 20 MEQ tablet Take 1 tablet (20 mEq total) by mouth once daily.    ranitidine (ZANTAC) 150 MG tablet Take 1 tablet (150 mg total) by mouth 2 (two) times daily.    simethicone (MYLICON) 80 MG chewable tablet Take 1 tablet (80 mg total) by mouth 3 (three) times daily as needed for Flatulence.       Review of patient's allergies indicates:   Allergen Reactions    Gramicidin d Shortness Of Breath    Hydrochlorothiazide Other (See Comments)     Burning and tingling sensation throughoutt her body    Lisinopril Shortness Of Breath    Promethazine Shortness Of Breath and Nausea And Vomiting    Neosporin [neomycin-bacitracnzn-polymyxnb] Swelling    Polymyxin [bacitracin-polymyxin b] Swelling       Past Medical History:   Diagnosis Date    Abnormal Pap smear     1984    Anorexia 8/30/2019    Anxiety 10/11/2019    Cancer     Chemotherapy-induced neutropenia 4/1/2015    Dehydration 10/7/2019    Elevated cancer antigen 125 (CA-125) 9/5/2018    Encounter for blood transfusion     FH: breast cancer in relative when <45 years old 3/17/2016    Hyperbilirubinemia 10/8/2019    Hypertension     Hypokalemia 9/9/2019    Other complicated headache syndrome 10/4/2019    Other screening mammogram 9/17/2015    Postmenopausal bleeding     Pulmonary  nodules/lesions, multiple 2019    Rash 2015    Uterine cancer     Weight loss 2015    Well woman exam with routine gynecological exam 2016     OB History    Para Term  AB Living   7 7 6 1 0 0   SAB TAB Ectopic Multiple Live Births   0 0 0 0 0      # Outcome Date GA Lbr James/2nd Weight Sex Delivery Anes PTL Lv   7 Term 88     Vag-Spont      6 Term 78     Vag-Spont      5  76     Vag-Spont      4 Term 75     Vag-Spont      3 Term 70     Vag-Spont      2 Term 68     Vag-Spont      1 Term              Past Surgical History:   Procedure Laterality Date    COLPOSCOPY      DILATION AND CURETTAGE OF UTERUS  10/17/14    ENDOSCOPIC ULTRASOUND OF UPPER GASTROINTESTINAL TRACT N/A 10/8/2019    Procedure: ULTRASOUND, UPPER GI TRACT, ENDOSCOPIC;  Surgeon: Gabe Aguirre MD;  Location: [facility] ([address] [place] [address]);  Service: Endoscopy;  Laterality: N/A;    ERCP N/A 10/8/2019    Procedure: ERCP (ENDOSCOPIC RETROGRADE CHOLANGIOPANCREATOGRAPHY);  Surgeon: Gabe Aguirre MD;  Location: [facility] ([address] [place] [address]);  Service: Endoscopy;  Laterality: N/A;    HYSTERECTOMY  2014    radiacl hysterectomy    [place] REMOVAL OF OVARY/TUBE(S)       Family History     Problem Relation (Age of Onset)    Breast cancer Cousin, Other, Maternal Grandmother, Maternal Aunt    Cancer Son    Cataracts Mother, Brother, Maternal Aunt    Heart attack Son    Hypertension Mother, Brother, Brother, Son    Lung cancer Maternal Uncle    No Known Problems Father        Tobacco Use    Smoking status: Never Smoker    Smokeless tobacco: Never Used   Substance and Sexual Activity    Alcohol use: No    Drug use: No    Sexual activity: Not Currently     Birth control/protection: Post-menopausal     Review of Systems   Constitutional: Negative for appetite change, chills, fatigue and fever.   Respiratory: Negative for cough, shortness of breath and wheezing.    Cardiovascular: Negative for chest  pain, palpitations and leg swelling.   Gastrointestinal: Positive for nausea and vomiting. Negative for abdominal pain and diarrhea.   Genitourinary: Negative for dysuria, hematuria, pelvic pain, vaginal bleeding, vaginal discharge and vaginal dryness.   Musculoskeletal: Negative for back pain.     Objective:     Vital Signs (Most Recent):  Temp: 98.4 °F (36.9 °C) (10/19/19 2042)  Pulse: (!) 137 (10/20/19 0142)  Resp: 18 (10/19/19 2121)  BP: 111/73 (10/20/19 0131)  SpO2: 96 % (10/19/19 2301) Vital Signs (24h Range):  Temp:  [98.4 °F (36.9 °C)] 98.4 °F (36.9 °C)  Pulse:  [129-152] 137  Resp:  [18-20] 18  SpO2:  [95 %-98 %] 96 %  BP: ()/(58-73) 111/73     Weight: 62.6 kg (138 lb)  Body mass index is 25.24 kg/m².  No LMP recorded. Patient has had a hysterectomy.    Physical Exam:   Constitutional: She is oriented to person, place, and time. She appears well-developed and well-nourished. No distress.       Cardiovascular: Regular rhythm.    Tachycardic to the 140s    Pulmonary/Chest: Effort normal. No respiratory distress.        Abdominal: Soft. She exhibits no distension and no abdominal incision.   LLQ biliary drain in place with sterile dressing which appears clean/dry/intact. Approx 5 ccs of bilious drainage present in bag.                  Neurological: She is alert and oriented to person, place, and time. No cranial nerve deficit.    Skin: Skin is warm and dry. She is not diaphoretic.    Psychiatric: She has a normal mood and affect. Her behavior is normal. Judgment and thought content normal.     Laboratory:  CBC:   Recent Labs   Lab 10/19/19  2200   WBC 9.41   RBC 3.86*   HGB 10.6*   HCT 32.9*      MCV 85   MCH 27.5   MCHC 32.2     CMP:   Recent Labs   Lab 10/19/19  2330      CALCIUM 8.0*   ALBUMIN 1.7*   PROT 5.5*      K 3.5   CO2 19*      BUN 20   CREATININE 0.8   ALKPHOS 335*   ALT 61*   *   BILITOT 2.5*     LFTs:   Recent Labs   Lab 10/19/19  2330   ALT 61*   *    ALKPHOS 335*   BILITOT 2.5*   PROT 5.5*   ALBUMIN 1.7*     Recent Labs   Lab 10/20/19  0129   COLORU Aggie   SPECGRAV 1.015   PHUR 5.0   PROTEINUA 1+*   BACTERIA Few*   NITRITE Negative   LEUKOCYTESUR Trace*   HYALINECASTS 12*     Diagnostic Results:  Imaging Results          CT Abdomen Pelvis  Without Contrast (Final result)  Result time 10/20/19 00:32:28   Procedure changed from CT Abdomen Pelvis With Contrast     Final result by Hector De Paz MD (10/20/19 00:32:28)                 Impression:      Moderate distension of the stomach but no findings of small bowel obstruction.    Moderate volume loculated ascites and mesenteric/omental nodularity in keeping with peritoneal metastatic disease.    Large right and moderate left pleural effusions with associated adjacent atelectasis, mildly increased from prior exam.    Interval placement of an external-internal biliary drain with a small amount of expected pneumobilia.    Grossly stable appearance of hepatic lesions and pelvic adenopathy.    Worsening anasarca.    Additional stable findings, as above.    Electronically signed by resident: Carol Fernandez  Date:    10/19/2019  Time:    23:34    Electronically signed by: Hector De Paz MD  Date:    10/20/2019  Time:    00:32             Narrative:    EXAMINATION:  CT ABDOMEN PELVIS WITHOUT CONTRAST    CLINICAL HISTORY:  hx uterine cancer, intractable vomiting, concern for bowel obstruction;    TECHNIQUE:  5.0 mm axial CT images of the abdomen and pelvis were obtained via helical, multi detector CT technique.  No intravenous contrast material was administered.    COMPARISON:  CTA chest 10/12/2019, chest abdomen pelvis 10/03/2019.  Abdominal MRI, 10/07/2019.    FINDINGS:  Heart: No cardiomegaly or pericardial effusion.    Lung Bases: Large right and moderate left pleural effusions, mildly increased from prior exam, with associated adjacent compressive atelectasis.    Liver: Normal in size with multiple large  ill-defined hypodense lesions scattered throughout, better characterized on recent abdominal MRI.  Punctate foci of air within the liver are likely biliary related to recent biliary drain placement.    Gallbladder: Nondistended and therefore not well evaluated.  Contains a punctate focus of air, likely related to biliary drain placement.    Bile Ducts: Interval placement of an external-internal biliary drain with a small amount of expected pneumobilia.  No significant biliary ductal dilatation.    Pancreas: No pancreatic mass lesion or peripancreatic inflammatory change.    Spleen: Unremarkable.    Adrenals: Unremarkable.    Genitourinary: Normal in size and location.  No obvious renal masses, nephrolithiasis, or hydroureteronephrosis.  Bladder demonstrates smooth contours without bladder wall thickening.    Reproductive organs: Status post hysterectomy.    GI tract/Mesentery: Stomach is moderately distended and overall appears similar to recent prior studies.  Hyperdense material scattered throughout the colon, likely related to PO contrast given for prior CT 10/03/2019.  No evidence of small-bowel obstruction.  Evaluation for bowel inflammation is significantly limited by absence of IV contrast and the presence of diffuse edema and ascites.    Peritoneal Space: Moderate volume of mildly loculated ascites, similar to prior exam.  Stable peritoneal and omental nodularity suggestive of peritoneal metastatic disease.  No intraperitoneal free air.    Retroperitoneum: No significant adenopathy.    Abdominal wall: A fluid density lesion is again noted in the left inguinal region which could represent a necrotic lymph node or fluid within a hernia defect.  This lesion measures 2.9 x 2.3 cm in axial dimensions and is overall stable when compared with the recent prior study.  Additional prominent inguinal lymph nodes bilaterally, stable from prior exam.  Mildly enlarged left pelvic sidewall lymph node measuring up to 1.3 cm  in short axis.  Diffuse body wall edema, slightly increased from prior exam.    Vasculature: Abdominal aorta is normal in caliber without significant calcific atherosclerosis.    Bones: Mild degenerative changes.  No acute acute fracture or bony destructive process.                                  Assessment/Plan:     Active Diagnoses:    Diagnosis Date Noted POA    PRINCIPAL PROBLEM:  Nausea and vomiting [R11.2] 10/20/2019 Unknown      Problems Resolved During this Admission:     1. Nausea and vomiting  - Patient with known recurrent serous endometrial cancer with mets to lungs/liver who presents with nausea and vomiting over past 3 days  - History of multiple admissions for this problem with most recent admission from 10/7-10/15, s/p percutaneous biliary drain  - VSS  However tachycardic to the 140s despite 2 L fluids in ED  - Symptoms improved with IV Reglan 10 mg x1    - Patient has been able to tolerate fluids in the ED without any symptoms   - CBC: 9.4/10.6/32.9/283, CMP: electrolytes WNL, AST/ALT:161/69   - UA: trace leukocytes, negative nitrites; specific gravity 1.015  - CT: no evidence of SBO  - Will start IVF at 75 cc/hr  - Diet: NPO  - Reglan 10 mg IV BID PRN, alternative choice zofran q6 PRN  - Will consult GI given decreased duct output and increasing LTFs on setting of continued nausea and vomiting  - Continue to monitor closely     2. Tachycardia  - Asymptomatic  - Pulse:  [129-152] 135  - Patient with known tachycardia however, baseline in the 120s on previous admissions  - Mildly elevated troponin: 0.156   - Will trend q6 hours  - 2D echo ordered, stat   - Will consult cardiology for further evaluation    Dispo: admit patient to telemetry floor for further observation/ follow up of tachycardia    Christen Mackey MD  Obstetrics & Gynecology  [facility]

## 2019-10-20 NOTE — CARE UPDATE
"RAPID RESPONSE NURSE PROACTIVE ROUNDING NOTE     Time of Visit: 1510    Admit Date: 10/19/2019  LOS: 0  Code Status: Full Code   Date of Visit: 10/20/2019  : 1950  Age: 69 y.o.  Sex: female  Race: Black or   Bed: 1006/1006 A:   MRN: [id number]  Was the patient discharged from an ICU this admission? no   Was the patient discharged from a PACU within last 24 hours?  no  Did the patient receive conscious sedation/general anesthesia in last 24 hours?  no  Was the patient in the ED within the past 24 hours?  yes  Was the patient started on NIPPV within the past 24 hours?  no  Attending Physician: Nithin Becerril MD  Primary Service: Networked reference to record PCT     ASSESSMENT     Diagnosis: Nausea and vomiting    Abnormal Vital Signs: /69 (BP Location: Right arm, Patient Position: Lying)   Pulse (!) 141   Temp 97.4 °F (36.3 °C) (Oral)   Resp 16   Ht 5' 2" (1.575 m)   Wt 62.6 kg (138 lb)   SpO2 (!) 92%   Breastfeeding? No   BMI 25.24 kg/m²      Clinical Issues: Dysrythmia    Patient  has a past medical history of Abnormal Pap smear, Anorexia, Anxiety, Cancer, Chemotherapy-induced neutropenia, Dehydration, Elevated cancer antigen 125 (CA-125), Encounter for blood transfusion, FH: breast cancer in relative when <45 years old, Hyperbilirubinemia, Hypertension, Hypokalemia, Other complicated headache syndrome, Other screening mammogram, Postmenopausal bleeding, Pulmonary nodules/lesions, multiple, Rash, Uterine cancer, Weight loss, and Well woman exam with routine gynecological exam.    Pt is admitted for excessive nausea/vomitting and populated on mews list for tachycardia. HR is usually 130's - 140's and will jump to 160 when ambulating. Pt is resting comfortably on room air. EKG done, shows sinus tach. Pt denies any n/v currently.  /72 (83) and HR currently 140, sp02 95% on RA. Skin is dry with reduced turgor.      INTERVENTIONS/ RECOMMENDATIONS     Continue care per primary " team, follow up with volume replacement and HR improvement.     Discussed plan of care with RNYulissa    PHYSICIAN ESCALATION     Yes/No  no    Orders received and case discussed with NA.    Disposition: Remain in room 1006.    FOLLOW-UP     Call back the Rapid Response Nurse, Gisselle Davies RN at [phone] for additional questions or concerns.

## 2019-10-20 NOTE — CONSULTS
[facility]  Cardiology  Consult Note    Patient Name: Jessica Gorman  MRN: [id number]  Admission Date: 10/19/2019  Hospital Length of Stay: 0 days  Code Status: Full Code   Attending Provider: Nithin Becerril MD   Consulting Provider: Rashmi Potts MD  Primary Care Physician: Rico Amaro MD  Principal Problem:Nausea and vomiting    Patient information was obtained from patient, relative(s) and ER records.     Inpatient consult to Cardiology  Consult performed by: Rashmi Potts MD  Consult ordered by: Christen Mackey MD        Subjective:     Chief Complaint:  Nausea    HPI:    69 y.o.  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 Patient has presented with similar complaints in the past with most recent admission from 10/7-10/15 at which time a percutaneous biliary drain was placed for biliary obstruction; during that time, the patient was also tachycardiac. Upon arrival to the ER, the patient was found to be tachycardiac with HRs in the 150s --> 130s after 2L of IVF. Troponin was 0.156 -> 0.173. EKG notable for sinus tachycardia. Previously underwent CTA chest on 10/12 which was negative for acute PE. Given elevated troponin and tachycardia, Cardiology was consulted for further recommendations.     Per the patient, she has not eaten well since this past Thursday when her nausea/vomiting first developed. Reports that she has been vomiting 2-3 times daily. Denies shortness of breath, palpitations, chest pain, nausea, diaphoresis.     Past Medical History:   Diagnosis Date    Abnormal Pap smear     1984    Anorexia 8/30/2019    Anxiety 10/11/2019    Cancer     Chemotherapy-induced neutropenia 4/1/2015    Dehydration 10/7/2019    Elevated cancer antigen 125 (CA-125) 9/5/2018    Encounter for blood transfusion     FH: breast cancer in relative when  <45 years old 3/17/2016    Hyperbilirubinemia 10/8/2019    Hypertension     Hypokalemia 9/9/2019    Other complicated headache syndrome 10/4/2019    Other screening mammogram 9/17/2015    Postmenopausal bleeding     Pulmonary nodules/lesions, multiple 4/25/2019    Rash 9/17/2015    Uterine cancer     Weight loss 12/17/2015    Well woman exam with routine gynecological exam 9/14/2016       Past Surgical History:   Procedure Laterality Date    COLPOSCOPY      DILATION AND CURETTAGE OF UTERUS  10/17/14    ENDOSCOPIC ULTRASOUND OF UPPER GASTROINTESTINAL TRACT N/A 10/8/2019    Procedure: ULTRASOUND, UPPER GI TRACT, ENDOSCOPIC;  Surgeon: Gabe Aguirre MD;  [facility]  Service: Endoscopy;  Laterality: N/A;    ERCP N/A 10/8/2019    Procedure: ERCP (ENDOSCOPIC RETROGRADE CHOLANGIOPANCREATOGRAPHY);  Surgeon: Gabe Aguirre MD;  [facility] ([facility]  Service: Endoscopy;  Laterality: N/A;    HYSTERECTOMY  12/4/2014    radiacl hysterectomy    DC REMOVAL OF OVARY/TUBE(S)         Review of patient's allergies indicates:   Allergen Reactions    Gramicidin d Shortness Of Breath    Hydrochlorothiazide Other (See Comments)     Burning and tingling sensation throughoutt her body    Lisinopril Shortness Of Breath    Promethazine Shortness Of Breath and Nausea And Vomiting    Neosporin [neomycin-bacitracnzn-polymyxnb] Swelling    Polymyxin [bacitracin-polymyxin b] Swelling       No current facility-administered medications on file prior to encounter.      Current Outpatient Medications on File Prior to Encounter   Medication Sig    guaifenesin-codeine 100-10 mg/5 ml (TUSSI-ORGANIDIN NR)  mg/5 mL syrup Take 5 mLs by mouth every 6 (six) hours as needed for Cough.    metoclopramide HCl (REGLAN) 10 MG tablet Take 1 tablet (10 mg total) by mouth 2 (two) times daily.    ALPRAZolam (XANAX) 0.5 MG tablet Take 1 tablet (0.5 mg total) by mouth 3 (three) times daily as needed for  Anxiety (breakthrough anxiety not relieved by ativan).    amLODIPine (NORVASC) 10 MG tablet Take 1 tablet (10 mg total) by mouth once daily.    dexAMETHasone (DECADRON) 4 MG Tab Take 5 tablets every 6 hours for 24 hours prior to chemotherapy    megestrol (MEGACE ES) 625 mg/5 mL Susp Take 5 mLs (625 mg total) by mouth once daily.    multivitamin (ONE DAILY MULTIVITAMIN) per tablet Take 1 tablet by mouth once daily.    ondansetron (ZOFRAN-ODT) 8 MG TbDL Take 1 tablet (8 mg total) by mouth every 12 (twelve) hours as needed.    oxyCODONE-acetaminophen (PERCOCET) 5-325 mg per tablet Take 1 tablet by mouth every 4 (four) hours as needed for Pain.    potassium bicarbonate & potassium chloride (K-LYTE CL) 25 mEq TbEF Take 1 tablet (25 mEq total) by mouth once daily.    potassium chloride SA (K-DUR,KLOR-CON) 20 MEQ tablet Take 1 tablet (20 mEq total) by mouth once daily.    ranitidine (ZANTAC) 150 MG tablet Take 1 tablet (150 mg total) by mouth 2 (two) times daily.    simethicone (MYLICON) 80 MG chewable tablet Take 1 tablet (80 mg total) by mouth 3 (three) times daily as needed for Flatulence.     Family History     Problem Relation (Age of Onset)    Breast cancer Cousin, Other, Maternal Grandmother, Maternal Aunt    Cancer Son    Cataracts Mother, Brother, Maternal Aunt    Heart attack Son    Hypertension Mother, Brother, Brother, Son    Lung cancer Maternal Uncle    No Known Problems Father        Tobacco Use    Smoking status: Never Smoker    Smokeless tobacco: Never Used   Substance and Sexual Activity    Alcohol use: No    Drug use: No    Sexual activity: Not Currently     Birth control/protection: Post-menopausal     Review of Systems   Constitution: Positive for chills, decreased appetite and malaise/fatigue.   HENT: Negative.    Eyes: Negative.    Cardiovascular: Negative for chest pain, dyspnea on exertion and palpitations.   Respiratory: Negative.    Endocrine: Negative.    Hematologic/Lymphatic:  Negative.    Skin: Negative.    Musculoskeletal: Negative.    Gastrointestinal: Negative.    Genitourinary: Negative.    Neurological: Negative.      Objective:     Vital Signs (Most Recent):  Temp: 97.4 °F (36.3 °C) (10/20/19 1226)  Pulse: (!) 141 (10/20/19 1226)  Resp: 16 (10/20/19 1226)  BP: 108/69 (10/20/19 1226)  SpO2: (!) 92 % (10/20/19 1226) Vital Signs (24h Range):  Temp:  [97.4 °F (36.3 °C)-98.4 °F (36.9 °C)] 97.4 °F (36.3 °C)  Pulse:  [129-152] 141  Resp:  [16-20] 16  SpO2:  [92 %-98 %] 92 %  BP: ()/(58-73) 108/69     Weight: 62.6 kg (138 lb)  Body mass index is 25.24 kg/m².    SpO2: (!) 92 %  O2 Device (Oxygen Therapy): room air      Intake/Output Summary (Last 24 hours) at 10/20/2019 1310  Last data filed at 10/19/2019 2340  Gross per 24 hour   Intake 1000 ml   Output --   Net 1000 ml       Lines/Drains/Airways     Drain                 Biliary Tube 10/10/19 1500 RLQ 9 days          Peripheral Intravenous Line                 Peripheral IV - Single Lumen 10/19/19 2200 20 G less than 1 day                Physical Exam   Constitutional: She is oriented to person, place, and time. She appears well-developed and well-nourished.   HENT:   Head: Normocephalic and atraumatic.   Eyes: Conjunctivae are normal.   Dry mucous membranes   Neck: Neck supple. No JVD present.   Cardiovascular: Regular rhythm and intact distal pulses.   No murmur heard.  Tachycardiac rate   Pulmonary/Chest: Effort normal.   Decreased breath sounds at bilateral bases   Abdominal: She exhibits distension.   Biliary drain in place   Musculoskeletal: Normal range of motion.   2+ pitting edema bilaterally to thighs, has delayed capillary refill (>3 seconds)    Neurological: She is alert and oriented to person, place, and time.   Skin: Skin is warm and dry.   Psychiatric: She has a normal mood and affect.       Significant Labs:   CMP   Recent Labs   Lab 10/19/19  2330      K 3.5      CO2 19*      BUN 20   CREATININE  0.8   CALCIUM 8.0*   PROT 5.5*   ALBUMIN 1.7*   BILITOT 2.5*   ALKPHOS 335*   *   ALT 61*   ANIONGAP 15   ESTGFRAFRICA >60.0   EGFRNONAA >60.0   , CBC   Recent Labs   Lab 10/19/19  2200   WBC 9.41   HGB 10.6*   HCT 32.9*       and Troponin   Recent Labs   Lab 10/19/19  2200 10/20/19  0438 10/20/19  1041   TROPONINI 0.156* 0.171* 0.173*  0.173*       Significant Imaging:   EKG: sinus tachycardia (HR of 154), prolonged QTc    Assessment and Plan:     Elevated troponin  Troponin elevated to 0.17 -> two repeat values are flat. Etiology likely 2/2 demand ischemia. Low concern for ACS.  - Recommend stop trending troponin levels  - Maintain on telemetry  - Treat underlying medical issue    Sinus tachycardia  Patient is presenting with sinus tachycardia likely 2/2 decreased PO intake and hypovolemia 2/2 vomiting. On exam, the patient is dry with delayed capillary refill and dry mucous membranes. Currently receiving 3L of IVF.   - Recommend repleting with IVF, treat underlying medical issue (nausea symptomatically)  - Will order TTE        VTE Risk Mitigation (From admission, onward)         Ordered     heparin (porcine) injection 5,000 Units  Every 8 hours      10/20/19 0848     Place sequential compression device  Until discontinued      10/20/19 0246     IP VTE HIGH RISK PATIENT  Once      10/20/19 0246     Place ASH hose  Until discontinued      10/20/19 0246                Thank you for your consult. I will sign off. Please contact us if you have any additional questions.    Rashmi Potts MD  Cardiology   [facility]

## 2019-10-20 NOTE — ED NOTES
Spoke with Gyn/Onc service regarding patient's continued elevated heart rate.  Per house supervisor, the floors will not take the patient with her current heart rate.  Patient denies chest pain, shortness of breath or palpitations.  She has been receiving fluids throughout her stay in the ED and has only requested Reglan for nausea.  Gyn/Onc resident states that she will be rounding with the attending around 8am today and that a decision would be made then.  ED charge advised of same. Patient updated on plan of care.

## 2019-10-21 PROBLEM — K83.1 BILIARY OBSTRUCTION: Status: ACTIVE | Noted: 2019-10-21

## 2019-10-21 PROBLEM — E88.09 HYPOALBUMINEMIA: Status: ACTIVE | Noted: 2019-10-21

## 2019-10-21 PROBLEM — T45.1X5A CHEMOTHERAPY INDUCED NAUSEA AND VOMITING: Status: ACTIVE | Noted: 2019-10-20

## 2019-10-21 PROBLEM — J90 PLEURAL EFFUSION: Status: ACTIVE | Noted: 2019-10-21

## 2019-10-21 LAB
ALBUMIN SERPL BCP-MCNC: 1.8 G/DL (ref 3.5–5.2)
ALP SERPL-CCNC: 324 U/L (ref 55–135)
ALT SERPL W/O P-5'-P-CCNC: 57 U/L (ref 10–44)
ANION GAP SERPL CALC-SCNC: 16 MMOL/L (ref 8–16)
APTT BLDCRRT: 35.8 SEC (ref 21–32)
AST SERPL-CCNC: 217 U/L (ref 10–40)
BACTERIA UR CULT: ABNORMAL
BASOPHILS # BLD AUTO: 0.02 K/UL (ref 0–0.2)
BASOPHILS # BLD AUTO: 0.02 K/UL (ref 0–0.2)
BASOPHILS NFR BLD: 0.2 % (ref 0–1.9)
BASOPHILS NFR BLD: 0.2 % (ref 0–1.9)
BILIRUB SERPL-MCNC: 3 MG/DL (ref 0.1–1)
BUN SERPL-MCNC: 19 MG/DL (ref 8–23)
CALCIUM SERPL-MCNC: 7.8 MG/DL (ref 8.7–10.5)
CHLORIDE SERPL-SCNC: 109 MMOL/L (ref 95–110)
CO2 SERPL-SCNC: 15 MMOL/L (ref 23–29)
CREAT SERPL-MCNC: 0.9 MG/DL (ref 0.5–1.4)
DIFFERENTIAL METHOD: ABNORMAL
DIFFERENTIAL METHOD: ABNORMAL
EOSINOPHIL # BLD AUTO: 0 K/UL (ref 0–0.5)
EOSINOPHIL # BLD AUTO: 0 K/UL (ref 0–0.5)
EOSINOPHIL NFR BLD: 0 % (ref 0–8)
EOSINOPHIL NFR BLD: 0 % (ref 0–8)
ERYTHROCYTE [DISTWIDTH] IN BLOOD BY AUTOMATED COUNT: 20.4 % (ref 11.5–14.5)
ERYTHROCYTE [DISTWIDTH] IN BLOOD BY AUTOMATED COUNT: 20.4 % (ref 11.5–14.5)
EST. GFR  (AFRICAN AMERICAN): >60 ML/MIN/1.73 M^2
EST. GFR  (NON AFRICAN AMERICAN): >60 ML/MIN/1.73 M^2
GLUCOSE SERPL-MCNC: 97 MG/DL (ref 70–110)
HCT VFR BLD AUTO: 37.8 % (ref 37–48.5)
HCT VFR BLD AUTO: 37.8 % (ref 37–48.5)
HGB BLD-MCNC: 11.8 G/DL (ref 12–16)
HGB BLD-MCNC: 11.8 G/DL (ref 12–16)
IMM GRANULOCYTES # BLD AUTO: 0.11 K/UL (ref 0–0.04)
IMM GRANULOCYTES # BLD AUTO: 0.11 K/UL (ref 0–0.04)
IMM GRANULOCYTES NFR BLD AUTO: 0.9 % (ref 0–0.5)
IMM GRANULOCYTES NFR BLD AUTO: 0.9 % (ref 0–0.5)
INR PPP: 1.5 (ref 0.8–1.2)
INR PPP: 1.6 (ref 0.8–1.2)
LYMPHOCYTES # BLD AUTO: 0.3 K/UL (ref 1–4.8)
LYMPHOCYTES # BLD AUTO: 0.3 K/UL (ref 1–4.8)
LYMPHOCYTES NFR BLD: 2.3 % (ref 18–48)
LYMPHOCYTES NFR BLD: 2.3 % (ref 18–48)
MAGNESIUM SERPL-MCNC: 1.8 MG/DL (ref 1.6–2.6)
MAGNESIUM SERPL-MCNC: 1.8 MG/DL (ref 1.6–2.6)
MCH RBC QN AUTO: 27.3 PG (ref 27–31)
MCH RBC QN AUTO: 27.3 PG (ref 27–31)
MCHC RBC AUTO-ENTMCNC: 31.2 G/DL (ref 32–36)
MCHC RBC AUTO-ENTMCNC: 31.2 G/DL (ref 32–36)
MCV RBC AUTO: 88 FL (ref 82–98)
MCV RBC AUTO: 88 FL (ref 82–98)
MONOCYTES # BLD AUTO: 0.7 K/UL (ref 0.3–1)
MONOCYTES # BLD AUTO: 0.7 K/UL (ref 0.3–1)
MONOCYTES NFR BLD: 5.5 % (ref 4–15)
MONOCYTES NFR BLD: 5.5 % (ref 4–15)
NEUTROPHILS # BLD AUTO: 11.4 K/UL (ref 1.8–7.7)
NEUTROPHILS # BLD AUTO: 11.4 K/UL (ref 1.8–7.7)
NEUTROPHILS NFR BLD: 91.1 % (ref 38–73)
NEUTROPHILS NFR BLD: 91.1 % (ref 38–73)
NRBC BLD-RTO: 1 /100 WBC
NRBC BLD-RTO: 1 /100 WBC
PHOSPHATE SERPL-MCNC: 4.1 MG/DL (ref 2.7–4.5)
PHOSPHATE SERPL-MCNC: 4.1 MG/DL (ref 2.7–4.5)
PLATELET # BLD AUTO: 291 K/UL (ref 150–350)
PLATELET # BLD AUTO: 291 K/UL (ref 150–350)
PMV BLD AUTO: 11.4 FL (ref 9.2–12.9)
PMV BLD AUTO: 11.4 FL (ref 9.2–12.9)
POCT GLUCOSE: 142 MG/DL (ref 70–110)
POTASSIUM SERPL-SCNC: 3.9 MMOL/L (ref 3.5–5.1)
PROT SERPL-MCNC: 6.1 G/DL (ref 6–8.4)
PROTHROMBIN TIME: 14.9 SEC (ref 9–12.5)
PROTHROMBIN TIME: 15.3 SEC (ref 9–12.5)
RBC # BLD AUTO: 4.32 M/UL (ref 4–5.4)
RBC # BLD AUTO: 4.32 M/UL (ref 4–5.4)
SODIUM SERPL-SCNC: 140 MMOL/L (ref 136–145)
T4 FREE SERPL-MCNC: 1.07 NG/DL (ref 0.71–1.51)
TROPONIN I SERPL DL<=0.01 NG/ML-MCNC: 0.19 NG/ML (ref 0–0.03)
TSH SERPL DL<=0.005 MIU/L-ACNC: 0.39 UIU/ML (ref 0.4–4)
WBC # BLD AUTO: 12.49 K/UL (ref 3.9–12.7)
WBC # BLD AUTO: 12.49 K/UL (ref 3.9–12.7)

## 2019-10-21 PROCEDURE — 85730 THROMBOPLASTIN TIME PARTIAL: CPT

## 2019-10-21 PROCEDURE — 85610 PROTHROMBIN TIME: CPT

## 2019-10-21 PROCEDURE — 84443 ASSAY THYROID STIM HORMONE: CPT

## 2019-10-21 PROCEDURE — 27000221 HC OXYGEN, UP TO 24 HOURS

## 2019-10-21 PROCEDURE — 20000000 HC ICU ROOM

## 2019-10-21 PROCEDURE — 84484 ASSAY OF TROPONIN QUANT: CPT

## 2019-10-21 PROCEDURE — 63600175 PHARM REV CODE 636 W HCPCS: Performed by: STUDENT IN AN ORGANIZED HEALTH CARE EDUCATION/TRAINING PROGRAM

## 2019-10-21 PROCEDURE — 93005 ELECTROCARDIOGRAM TRACING: CPT

## 2019-10-21 PROCEDURE — 99223 PR INITIAL HOSPITAL CARE,LEVL III: ICD-10-PCS | Mod: ,,, | Performed by: CLINICAL NURSE SPECIALIST

## 2019-10-21 PROCEDURE — 99223 PR INITIAL HOSPITAL CARE,LEVL III: ICD-10-PCS | Mod: GC,,, | Performed by: INTERNAL MEDICINE

## 2019-10-21 PROCEDURE — 84100 ASSAY OF PHOSPHORUS: CPT

## 2019-10-21 PROCEDURE — 87040 BLOOD CULTURE FOR BACTERIA: CPT | Mod: 59

## 2019-10-21 PROCEDURE — 63600175 PHARM REV CODE 636 W HCPCS: Performed by: NURSE PRACTITIONER

## 2019-10-21 PROCEDURE — 25000003 PHARM REV CODE 250: Performed by: STUDENT IN AN ORGANIZED HEALTH CARE EDUCATION/TRAINING PROGRAM

## 2019-10-21 PROCEDURE — 84439 ASSAY OF FREE THYROXINE: CPT

## 2019-10-21 PROCEDURE — 83735 ASSAY OF MAGNESIUM: CPT

## 2019-10-21 PROCEDURE — 99223 1ST HOSP IP/OBS HIGH 75: CPT | Mod: ,,, | Performed by: CLINICAL NURSE SPECIALIST

## 2019-10-21 PROCEDURE — 25000003 PHARM REV CODE 250: Performed by: EMERGENCY MEDICINE

## 2019-10-21 PROCEDURE — 93010 ELECTROCARDIOGRAM REPORT: CPT | Mod: ,,, | Performed by: INTERNAL MEDICINE

## 2019-10-21 PROCEDURE — P9047 ALBUMIN (HUMAN), 25%, 50ML: HCPCS | Mod: JG | Performed by: STUDENT IN AN ORGANIZED HEALTH CARE EDUCATION/TRAINING PROGRAM

## 2019-10-21 PROCEDURE — 93010 EKG 12-LEAD: ICD-10-PCS | Mod: ,,, | Performed by: INTERNAL MEDICINE

## 2019-10-21 PROCEDURE — 85025 COMPLETE CBC W/AUTO DIFF WBC: CPT

## 2019-10-21 PROCEDURE — 36600 WITHDRAWAL OF ARTERIAL BLOOD: CPT

## 2019-10-21 PROCEDURE — 80053 COMPREHEN METABOLIC PANEL: CPT

## 2019-10-21 PROCEDURE — 87077 CULTURE AEROBIC IDENTIFY: CPT

## 2019-10-21 PROCEDURE — 36410 PR VENIPUNC NEED PHYS SKILL,DX OR RX: ICD-10-PCS | Mod: ,,, | Performed by: NURSE PRACTITIONER

## 2019-10-21 PROCEDURE — 99233 PR SUBSEQUENT HOSPITAL CARE,LEVL III: ICD-10-PCS | Mod: GC,,, | Performed by: OBSTETRICS & GYNECOLOGY

## 2019-10-21 PROCEDURE — 63600175 PHARM REV CODE 636 W HCPCS: Performed by: EMERGENCY MEDICINE

## 2019-10-21 PROCEDURE — 99233 SBSQ HOSP IP/OBS HIGH 50: CPT | Mod: GC,,, | Performed by: OBSTETRICS & GYNECOLOGY

## 2019-10-21 PROCEDURE — 94761 N-INVAS EAR/PLS OXIMETRY MLT: CPT

## 2019-10-21 PROCEDURE — 99223 1ST HOSP IP/OBS HIGH 75: CPT | Mod: GC,,, | Performed by: INTERNAL MEDICINE

## 2019-10-21 PROCEDURE — 87186 SC STD MICRODIL/AGAR DIL: CPT

## 2019-10-21 PROCEDURE — 82803 BLOOD GASES ANY COMBINATION: CPT

## 2019-10-21 PROCEDURE — 36410 VNPNXR 3YR/> PHY/QHP DX/THER: CPT | Mod: ,,, | Performed by: NURSE PRACTITIONER

## 2019-10-21 PROCEDURE — 99900035 HC TECH TIME PER 15 MIN (STAT)

## 2019-10-21 RX ORDER — ENOXAPARIN SODIUM 100 MG/ML
40 INJECTION SUBCUTANEOUS EVERY 24 HOURS
Status: DISCONTINUED | OUTPATIENT
Start: 2019-10-21 | End: 2019-10-24 | Stop reason: HOSPADM

## 2019-10-21 RX ORDER — MAGNESIUM SULFATE HEPTAHYDRATE 40 MG/ML
2 INJECTION, SOLUTION INTRAVENOUS ONCE
Status: COMPLETED | OUTPATIENT
Start: 2019-10-21 | End: 2019-10-21

## 2019-10-21 RX ORDER — ENOXAPARIN SODIUM 100 MG/ML
60 INJECTION SUBCUTANEOUS ONCE
Status: COMPLETED | OUTPATIENT
Start: 2019-10-21 | End: 2019-10-21

## 2019-10-21 RX ORDER — ENOXAPARIN SODIUM 100 MG/ML
60 INJECTION SUBCUTANEOUS
Status: DISCONTINUED | OUTPATIENT
Start: 2019-10-21 | End: 2019-10-21

## 2019-10-21 RX ORDER — METOCLOPRAMIDE 5 MG/1
10 TABLET ORAL EVERY 6 HOURS PRN
Status: DISCONTINUED | OUTPATIENT
Start: 2019-10-21 | End: 2019-10-21

## 2019-10-21 RX ORDER — ACETAMINOPHEN 325 MG/1
650 TABLET ORAL EVERY 6 HOURS PRN
Status: DISCONTINUED | OUTPATIENT
Start: 2019-10-21 | End: 2019-10-24 | Stop reason: HOSPADM

## 2019-10-21 RX ORDER — IPRATROPIUM BROMIDE 0.5 MG/2.5ML
0.5 SOLUTION RESPIRATORY (INHALATION) EVERY 6 HOURS PRN
Status: DISCONTINUED | OUTPATIENT
Start: 2019-10-21 | End: 2019-10-24 | Stop reason: HOSPADM

## 2019-10-21 RX ORDER — ALBUMIN HUMAN 250 G/1000ML
12.5 SOLUTION INTRAVENOUS ONCE
Status: COMPLETED | OUTPATIENT
Start: 2019-10-21 | End: 2019-10-21

## 2019-10-21 RX ADMIN — FAMOTIDINE 20 MG: 20 TABLET ORAL at 08:10

## 2019-10-21 RX ADMIN — METOCLOPRAMIDE 10 MG: 5 TABLET ORAL at 09:10

## 2019-10-21 RX ADMIN — ENOXAPARIN SODIUM 40 MG: 100 INJECTION SUBCUTANEOUS at 05:10

## 2019-10-21 RX ADMIN — FAMOTIDINE 20 MG: 20 TABLET ORAL at 09:10

## 2019-10-21 RX ADMIN — ALBUMIN (HUMAN) 12.5 G: 25 SOLUTION INTRAVENOUS at 09:10

## 2019-10-21 RX ADMIN — ENOXAPARIN SODIUM 60 MG: 100 INJECTION SUBCUTANEOUS at 12:10

## 2019-10-21 RX ADMIN — MAGNESIUM SULFATE IN WATER 2 G: 40 INJECTION, SOLUTION INTRAVENOUS at 05:10

## 2019-10-21 RX ADMIN — FAMOTIDINE 20 MG: 20 TABLET ORAL at 12:10

## 2019-10-21 RX ADMIN — SODIUM CHLORIDE, SODIUM LACTATE, POTASSIUM CHLORIDE, AND CALCIUM CHLORIDE 1000 ML: .6; .31; .03; .02 INJECTION, SOLUTION INTRAVENOUS at 03:10

## 2019-10-21 NOTE — NURSING
Right AC IV noted to be reddened and warm to touch. IV extravasated in CT during IV contrast. IV removed upon admission. Cold pack applied and arm elevated. Will continue to assess site q 1 hour.

## 2019-10-21 NOTE — CONSULTS
Reviewed case and imaging with staff. Will tentatively plan for thoracentesis today pending schedule availability. Patient can continue current diet. Please hold any nonvital anticoagulation. Will place procedure order now.    Dhaval Heart M.D.  PGY-4 Radiology

## 2019-10-21 NOTE — PROCEDURES
"Jessica Gorman is a 69 y.o. female patient.    Temp: 98 °F (36.7 °C) (10/21/19 0039)  Pulse: (!) 155 (10/21/19 0039)  Resp: 16 (10/21/19 0039)  BP: 109/70 (10/21/19 0039)  SpO2: 99 % (10/21/19 0039)  Weight: 62.6 kg (138 lb) (10/20/19 1800)  Height: 5' 2" (157.5 cm) (10/20/19 1800)       Insert peripheral IV  Date/Time: 10/21/2019 1:16 AM  Performed by: Gauri Ritchie NP  Authorized by: Gauri Ritchie NP   Consent: Verbal consent obtained.  Risks and benefits: risks, benefits and alternatives were discussed  Consent given by: patient  Patient understanding: patient states understanding of the procedure being performed  Patient consent: the patient's understanding of the procedure matches consent given  Imaging studies: imaging studies available  Required items: required blood products, implants, devices, and special equipment available  Patient identity confirmed: verbally with patient  Local anesthesia used: no    Anesthesia:  Local anesthesia used: no    Sedation:  Patient sedated: no    Patient tolerance: Patient tolerated the procedure well with no immediate complications  Comments: 18g PIV inserted into RAC using seldinger technique under ultrasound guidance on 3rd attempt.          Gauri Ritchie  10/21/2019  "

## 2019-10-21 NOTE — ASSESSMENT & PLAN NOTE
"- MRCP done MRCP shows   "Cholelithiasis, mild intrahepatic biliary dilatation, and mild dilatation of the common bile duct with relatively abrupt tapering at the level of the pancreatic head.  No filling defects identified or obstructing lesion in the pancreatic head, noting evaluation limited without IV contrast/ MRCP protocol., multiple liver lesions, large amount of ascites."  - AES consulted in 10/09 ERCP performed and unable to advance scope into CBD.  IR placed PTC 10/09  "

## 2019-10-21 NOTE — ASSESSMENT & PLAN NOTE
- Chronic sinus tachycardia, been seen by Dr. Winters 09/17 which her HR been 110's - 130's, TSH wnl and CTA negative for PE, ECG shows sinus tach which is felt is due to a compensatory response to volume depletion (hyperadrenergic state) her anti hypertensive was held. Pt was also seen by PCP 09/24 who though it was related to chemotherapy.    - Pt was tachycardic since admission ~150's with increased in trops > cardiology consulted.    Exam: ~ 87% on RA > 92% on 1 L NC-  L.E pitting edema.  Labs: Wbc 12 - UA >13 wbc - Echo showed NL EF with local wall abonromality   DDx: PE vs Volume depletion vs infection .  - S/p ~4,500 cc of IVF.    Plan:    1- Continue therapeutic heparin until PE r/o.  2- Follow up infectious work up with blood cultures.  3- Strict In/outs.  4- Follow up TSH level.  5- Follow up U/S L.E > if negative consider CTA chest.

## 2019-10-21 NOTE — PLAN OF CARE
CM met with patient and family at the bedside to discuss D/C POC needs. Patient AAO x's 3 and able to verify demographics in the chart are correct. CM name and contact number listed on the patient's white board.  CM provided explanation of discharge plan process. CM left blue folder at the bedside with explanation of qualification for placement and facility resources. Patient/family expressed understanding. CM remains available for any further patient needs or concerns.     Rico Amaro MD  [address] [address] / [facility] [address]      [id number] - [place], [place] - [address] AT [facility] [address]  [address] [place] [address]  Phone: [phone] Fax: [phone]      Extended Emergency Contact Information  Primary Emergency Contact: Patricia Gorman   [place]  Mobile Phone: [phone]  Relation: Daughter  Secondary Emergency Contact: Alanna Gorman   [place]  Mobile Phone: [phone]  Relation: Daughter       10/21/19 1216   Discharge Assessment   Assessment Type Discharge Planning Assessment   Confirmed/corrected address and phone number on facesheet? Yes   Assessment information obtained from? Patient;Other  (daughter at bedside)   Communicated expected length of stay with patient/caregiver no   Prior to hospitilization cognitive status: Alert/Oriented   Prior to hospitalization functional status: Partially Dependent   Current cognitive status: Alert/Oriented   Current Functional Status: Partially Dependent;Assistive Equipment   Facility Arrived From: ED   Lives With alone  (daughter lives next door)   Able to Return to Prior Arrangements other (see comments)  (TBD)   Is patient able to care for self after discharge? Unable to determine at this time (comments)   Readmission Within the Last 30 Days previous discharge plan unsuccessful  ([facility] [place]   If yes, most recent facility name[facility]    Patient currently being followed by outpatient case management? No   Patient currently receives any other outside agency services? Yes  (Family  [phone])   Equipment Currently Used at Home walker, rolling;commode;other (see comments)  (lift chair)   Do you have any problems affording any of your prescribed medications? No   Is the patient taking medications as prescribed? yes   Does the patient have transportation home? Yes   Transportation Anticipated family or friend will provide   Discharge Plan A Home Health   Discharge Plan B Home with family   DME Needed Upon Discharge  other (see comments)  (TBD)   Patient/Family in Agreement with Plan yes   Readmission Questionnaire   At the time of your discharge, did someone talk to you about what your health problems were? Yes   At the time of discharge, did someone talk to you about what to watch out for regarding worsening of your health problem? Yes   At the time of discharge, did someone talk to you about what to do if you experienced worsening of your health problem? Yes   At the time of discharge, did someone talk to you about which medication to take when you left the hospital and which ones to stop taking? Yes   At the time of discharge, did someone talk to you about when and where to follow up with a doctor after you left the hospital? Yes   What do you believe caused you to be sick enough to be re-admitted? dehydration   Do you have problems taking your medications as prescribed? No   Do you have any problems affording any of  your prescribed medications? No   Do you have problems obtaining/receiving your medications? No   Does the patient have transportation to healthcare appointments? Yes   Living Arrangements house   Does the patient have family/friends to help with healtcare needs after discharge? yes

## 2019-10-21 NOTE — NURSING
Report received from RN. Pt transported to SICU [id number] with portable telemetry. Pt connected to ICU monitor Wall O2. Critical care team called and made aware of patient arrival. New orders received and implemented. Pt assessed, immediate needs met. Family brought to bedside, updated on the patient's current condition and PoC for remainder of shift. Family educated on visiting hours. All questions answered, emotional support provided.     Admit Skin Note: Pressure injury noted to gluteal cleft. Foam dressing applied.

## 2019-10-21 NOTE — H&P
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    Pt had no complaints, her N/V had improved since admission, she was able to eat and tolerated it -  Denies shortness of breath, palpitations, chest pain, nausea, diaphoresis.     Per daughter, pt was Dx in 2004 with endometrial Ca- she had complete hysterectomy with chemotherapy and radiation, On 09/2019 she found out that she had mets to the liver and lung - she started Chemotherapy and last round was 09/13/209- following the chemotherapy she has been admitted for intractable N/V Patient has presented with similar complaints in the past with most recent admission from 10/7-10/15 at which time a percutaneous biliary drain was placed for biliary obstruction. Previously underwent CTA chest on 10/12 which was negative for acute PE.        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/carboplatin after avastin  PSHx: ex lap, hysterectomy  Social: never smoked, no alcohol use    Hospital/ICU Course:  Pt was admitted to MICU 10/20 given heparin SQ full dose and CTA was ordered to r/o PE - was not able to finish it as contrast extravasated- Pt refused CL for access as she had poor PIV, 10/21 U/S lower Ext to r/o DVT        Past Medical History:   Diagnosis Date    Abnormal Pap smear     1984    Anorexia 8/30/2019    Anxiety 10/11/2019    Cancer     Chemotherapy-induced neutropenia 4/1/2015    Dehydration 10/7/2019    Elevated  cancer antigen 125 (CA-125) 9/5/2018    Encounter for blood transfusion     FH: breast cancer in relative when <45 years old 3/17/2016    Hyperbilirubinemia 10/8/2019    Hypertension     Hypokalemia 9/9/2019    Other complicated headache syndrome 10/4/2019    Other screening mammogram 9/17/2015    Postmenopausal bleeding     Pulmonary nodules/lesions, multiple 4/25/2019    Rash 9/17/2015    Uterine cancer     Weight loss 12/17/2015    Well woman exam with routine gynecological exam 9/14/2016       Past Surgical History:   Procedure Laterality Date    COLPOSCOPY      DILATION AND CURETTAGE OF UTERUS  10/17/14    ENDOSCOPIC ULTRASOUND OF UPPER GASTROINTESTINAL TRACT N/A 10/8/2019    Procedure: ULTRASOUND, UPPER GI TRACT, ENDOSCOPIC;  Surgeon: Gabe Aguirre MD;  Location: [facility] ([address] [place] [address]);  Service: Endoscopy;  Laterality: N/A;    ERCP N/A 10/8/2019    Procedure: ERCP (ENDOSCOPIC RETROGRADE CHOLANGIOPANCREATOGRAPHY);  Surgeon: Gabe Aguirre MD;  Location: [facility] ([address] [place] [address]);  Service: Endoscopy;  Laterality: N/A;    HYSTERECTOMY  12/4/2014    radiacl hysterectomy    DE REMOVAL OF OVARY/TUBE(S)         Review of patient's allergies indicates:   Allergen Reactions    Gramicidin d Shortness Of Breath    Hydrochlorothiazide Other (See Comments)     Burning and tingling sensation throughoutt her body    Lisinopril Shortness Of Breath    Promethazine Shortness Of Breath and Nausea And Vomiting    Neosporin [neomycin-bacitracnzn-polymyxnb] Swelling    Polymyxin [bacitracin-polymyxin b] Swelling       Family History     Problem Relation (Age of Onset)    Breast cancer Cousin, Other, Maternal Grandmother, Maternal Aunt    Cancer Son    Cataracts Mother, Brother, Maternal Aunt    Heart attack Son    Hypertension Mother, Brother, Brother, Son    Lung cancer Maternal Uncle    No Known Problems Father        Tobacco Use    Smoking status: Never Smoker    Smokeless tobacco: Never Used   Substance and  Sexual Activity    Alcohol use: No    Drug use: No    Sexual activity: Not Currently     Birth control/protection: Post-menopausal      Review of Systems   Constitutional: Positive for activity change. Negative for appetite change, chills and fever.   HENT: Negative for congestion.    Eyes: Negative for pain and itching.   Respiratory: Negative for cough, chest tightness and shortness of breath.    Cardiovascular: Positive for palpitations and leg swelling. Negative for chest pain.   Gastrointestinal: Negative for abdominal pain, diarrhea, nausea and vomiting.   Endocrine: Negative for polyphagia and polyuria.   Genitourinary: Negative for dysuria and frequency.   Musculoskeletal: Negative for back pain.   Neurological: Negative for numbness and headaches.   Psychiatric/Behavioral: Negative for agitation and behavioral problems. The patient is nervous/anxious.      Objective:     Vital Signs (Most Recent):  Temp: 98 °F (36.7 °C) (10/21/19 0039)  Pulse: (!) 155 (10/21/19 0039)  Resp: 16 (10/21/19 0039)  BP: 109/70 (10/21/19 0039)  SpO2: 99 % (10/21/19 0039) Vital Signs (24h Range):  Temp:  [97.4 °F (36.3 °C)-98.4 °F (36.9 °C)] 98 °F (36.7 °C)  Pulse:  [135-165] 155  Resp:  [16-20] 16  SpO2:  [87 %-99 %] 99 %  BP: (100-135)/(60-91) 109/70   Weight: 62.6 kg (138 lb)  Body mass index is 25.24 kg/m².      Intake/Output Summary (Last 24 hours) at 10/21/2019 0043  Last data filed at 10/20/2019 1826  Gross per 24 hour   Intake 3207.5 ml   Output --   Net 3207.5 ml       Physical Exam   Constitutional: She is oriented to person, place, and time. She appears well-developed.   HENT:   Head: Normocephalic.   Neck: Normal range of motion.   Left IJ   Cardiovascular: Regular rhythm and normal heart sounds.   No murmur heard.  tachycardic    Pulmonary/Chest: Effort normal and breath sounds normal.   Abdominal: Soft. Bowel sounds are normal. She exhibits no distension. There is no tenderness.   RUQ drain   Musculoskeletal:  Normal range of motion. She exhibits edema.   Neurological: She is alert and oriented to person, place, and time.   Skin: Skin is warm and dry.   Psychiatric: She has a normal mood and affect. Her behavior is normal.       Vents:     Lines/Drains/Airways     Drain                 Biliary Tube 10/10/19 1500 RLQ 10 days          Peripheral Intravenous Line                 Peripheral IV - Single Lumen 10/19/19 2200 20 G 1 day              Significant Labs:    CBC/Anemia Profile:  Recent Labs   Lab 10/19/19  2200   WBC 9.41   HGB 10.6*   HCT 32.9*      MCV 85   RDW 20.3*        Chemistries:  Recent Labs   Lab 10/19/19  2330 10/20/19  1041     --    K 3.5  --      --    CO2 19*  --    BUN 20  --    CREATININE 0.8  --    CALCIUM 8.0*  --    ALBUMIN 1.7*  --    PROT 5.5*  --    BILITOT 2.5*  --    ALKPHOS 335*  --    ALT 61*  --    *  --    MG  --  1.4*   PHOS  --  3.8       Blood Culture: No results for input(s): LABBLOO in the last 48 hours.  BMP:   Recent Labs   Lab 10/19/19  2330 10/20/19  1041     --      --    K 3.5  --      --    CO2 19*  --    BUN 20  --    CREATININE 0.8  --    CALCIUM 8.0*  --    MG  --  1.4*     CMP:   Recent Labs   Lab 10/19/19  2330      K 3.5      CO2 19*      BUN 20   CREATININE 0.8   CALCIUM 8.0*   PROT 5.5*   ALBUMIN 1.7*   BILITOT 2.5*   ALKPHOS 335*   *   ALT 61*   ANIONGAP 15   EGFRNONAA >60.0     Lactic Acid:   Recent Labs   Lab 10/19/19  2200   LACTATE 2.3*     POCT Glucose: No results for input(s): POCTGLUCOSE in the last 48 hours.  Troponin:   Recent Labs   Lab 10/19/19  2200 10/20/19  0438 10/20/19  1041   TROPONINI 0.156* 0.171* 0.173*  0.173*     Urine Culture: No results for input(s): LABURIN in the last 48 hours.  Urine Studies:   Recent Labs   Lab 10/20/19  0129   COLORU Aggie   APPEARANCEUA Hazy*   PHUR 5.0   SPECGRAV 1.015   PROTEINUA 1+*   GLUCUA Negative   KETONESU Trace*   BILIRUBINUA Negative    OCCULTUA 1+*   NITRITE Negative   LEUKOCYTESUR Trace*   RBCUA 1   WBCUA 13*   BACTERIA Few*   SQUAMEPITHEL 1   HYALINECASTS 12*     All pertinent labs within the past 24 hours have been reviewed.    Significant Imaging: I have reviewed and interpreted all pertinent imaging results/findings within the past 24 hours.    Assessment/Plan:     Pulmonary  Pleural effusion  - Echo with NL EF and no DD.  - BNP 29   - Most likely 2/2 hypoalbuminemia     Cardiac/Vascular  * Sinus tachycardia  - Chronic sinus tachycardia, been seen by Dr. Winters 09/17 which her HR been 110's - 130's, TSH wnl and CTA negative for PE, ECG shows sinus tach which is felt is due to a compensatory response to volume depletion (hyperadrenergic state) her anti hypertensive was held. Pt was also seen by PCP 09/24 who though it was related to chemotherapy.    - Pt was tachycardic since admission ~150's with increased in trops > cardiology consulted.    Exam: ~ 87% on RA > 92% on 1 L NC-  L.E pitting edema.  Labs: Wbc 12 - UA >13 wbc - Echo showed NL EF with local wall abonromality   DDx: PE vs Volume depletion vs infection .  - S/p ~4,500 cc of IVF.    Plan:    1- Continue therapeutic heparin until PE r/o.  2- Follow up infectious work up with blood cultures.  3- Strict In/outs.  4- Follow up TSH level.  5- Follow up U/S L.E > if negative consider CTA chest.    Elevated troponin  - 2/2 demand ischemia.  - Cardiology consulted by gyn/onc appreciate their help.  - Echo done 10/21  · Normal left ventricular systolic function. The estimated ejection fraction is 60% with sinus rate in upper 140s.  · Local segmental wall motion abnormalities.  · Normal LV diastolic function.  · Normal right ventricular systolic function.  · Mild tricuspid regurgitation.    Oncology  Endometrial carcinoma, serous  - Last saw her oncologist kline 09/30  Per Notes,  Sept 2018:  was 47. CT scan of CAP showed numerous bilateral pulmonary nodules and enlarged  "peripancreatic and gonzalo hepatis lymph nodes with soft tissue infiltration of the mesentery at the region.    3.4 cm left renal mass along the lower pole of the left kidney measures greater than water density.  Differential would include solid renal mass including primary renal neoplasm or metastatic lesion versus is a complex cyst.      Jan 2019: completed 6 cycles of taxol and carboplatin.      Feb 2019:  Completion :16.  Completion CT scan: partial response      April 2019:  Completed 9 cycles of Taxol and carboplatin.  CT scan with persistent pulmonary nodules. PET: multiple  hypermetabolic mesenteric/retroperitoneal lymph nodes and a hypermetabolic left lung nodule concerning for metastatic disease      June 2019: started Avastin maintenance.     August 2019: completed 3 cycles of Avastin. PET showed progressive disease in lung, liver and abdomen. Aug 2019: : 155 (up from 20). Not interested in clinical trial. Wanted to resume taxol and carboplatin.     GI  Biliary obstruction  - MRCP done MRCP shows   "Cholelithiasis, mild intrahepatic biliary dilatation, and mild dilatation of the common bile duct with relatively abrupt tapering at the level of the pancreatic head.  No filling defects identified or obstructing lesion in the pancreatic head, noting evaluation limited without IV contrast/ MRCP protocol., multiple liver lesions, large amount of ascites."  - AES consulted in 10/09 ERCP performed and unable to advance scope into CBD.  IR placed PTC 10/09    Other  Hypoalbuminemia  - Albumin 1.8.  - Continue boost.        Critical Care Daily Checklist:    A: Awake: RASS Goal/Actual Goal:    Actual: Alejandre Agitation Sedation Scale (RASS): Alert and calm   B: Spontaneous Breathing Trial Performed?     C: SAT & SBT Coordinated?  NA                      D: Delirium: CAM-ICU Overall CAM-ICU: Negative   E: Early Mobility Performed? Yes   F: Feeding Goal:    Status:     Current Diet Order   Procedures    " Diet NPO      AS: Analgesia/Sedation NA   T: Thromboembolic Prophylaxis enxoparin    H: HOB > 300 Yes   U: Stress Ulcer Prophylaxis (if needed) NA   G: Glucose Control POCT glucose   B: Bowel Function Stool Occurrence: 0   I: Indwelling Catheter (Lines & Vance) Necessity NA   D: De-escalation of Antimicrobials/Pharmacotherapies NA    Plan for the day/ETD NA    Code Status:  Family/Goals of Care: Full Code  NA       Critical secondary to Patient has a condition that poses threat to life and bodily function: tachycardia.     Critical care was time spent personally by me on the following activities: development of treatment plan with patient or surrogate and bedside caregivers, discussions with consultants, evaluation of patient's response to treatment, examination of patient, ordering and performing treatments and interventions, ordering and review of laboratory studies, ordering and review of radiographic studies, pulse oximetry, re-evaluation of patient's condition. This critical care time did not overlap with that of any other provider or involve time for any procedures.     Payam Peñaloza MD  Critical Care Medicine  [facility]

## 2019-10-21 NOTE — CONSULTS
Palliative Care Acknowledgement of Consult - .date    Consult received. Palliative Care Provider:_ GINA Liriano will touch base with team prior to seeing patient. Full consult to follow.    Thank you for allowing us to be a part of the care of this patient.          Khushboo Vegas LCSW, ACHP-SW

## 2019-10-21 NOTE — HPI
HPI obtained from chart review:     Mrs. Gorman is a 68 yo lady with hx of stage IV endometrial cancer with lung and liver mets. S/p chemotherapy and radiation. [place] [facility] [place] and [place].        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  Endometrial cancer dx in 2004 - s/p VILLA, chemotherapy and radiation. Metastasis discovered 9/2019 and started on chemotherapy.  Daughter reports she last received taxol and carboplatin with last dose being 9/13/19.  Has been hospitalized twice with intractable nausea and vomiting most recently 10/7-10/15. At this time she was was found to have a biliary obstruction s/p percutaneous biliary drain.      She was transferred to critical care services for evaluation of tachycardia.   CCS work up for tachycardia: at baseline heart rated 100-130 with an  Asymptomatic epidsode of  overnight.  Stable at this time. PE study 10/12 indicated no evidence of PE.  Oxygen requirements are unchanged as well, TTE w/o RH strain and patient is normotensive, making PE less likely. No infectious source or sx. Thyroid studies previously normal. S/p 4L IVF w/o significant improvement in HR.   - Patient is refusing further attempts at IV access as well as thoracentesis, which is reasonable as PE seems less likely, she is getting full anticoagulation and she does not have increased oxygen requirements or dyspnea    Mrs. Gorman states she is very worried about her son who is also in the critical care unit.  Daughter reports concerns that worry and anxiety may be contributing to the tachycardia.  Daughter also expresses some concern for extrapyramidal side effects of the nausea medicines.      Palliative medicine consulted for symptom management - nausea.   At time of this assessment Mrs. Gorman has no complaints of N/V  tolerating regular diet  pain, shortness of breath and is in no acute distress. Will likely be stepped down to the floor.

## 2019-10-21 NOTE — CARE UPDATE
Radiology Contrast Extravasation:    During power injection of contrast for CTA PE protocol, approximately 74 cc of omnipaque contrast extravasated into the soft tissues of the pt's right antecubital fossa.     Patient remained neurovascularly intact with no pain at the site of extravasation.     Pulse was easily palpable in the right arm. Note was made however of asymmetric coolness to the right upper extremity however, patient and family member stated that this was not new. Patient had full sensation and range of motion in the right upper extremity.    Cold pack was ordered to be placed for 1 hour on, 1 hour off for 4 cycles to be completed in total.     The right upper extemity is to be elevated until the local swelling subsides. The arm is to be assessed Q30min x2 hours, then Q4H until discontinued.     Continue to monitor for increased swelling, pain, loss of ROM, or loss of sensation.     Jacki Sheehan, PGY-3

## 2019-10-21 NOTE — CARE UPDATE
MD to bedside for assessment given nursing staff concern for wheezing.    At approx 2000, MD was notified that patient experienced wheezing when she woke up from a nap. She states that as she tried to go to the bathroom, she subjectively heard herself wheezing, which was sudden onset and new to her. Patient does state she has been productive cough over the past several days for which she has been taking cough syrup for however, has never had wheezing. She denies any chest pain, palpitations or SOB. No light headedness, dizziness or weakness.     Temp:  [97.4 °F (36.3 °C)-98.4 °F (36.9 °C)] 98.4 °F (36.9 °C)  Pulse:  [129-165] 165  Resp:  [16-20] 20  SpO2:  [87 %-99 %] 99 %  BP: ()/(59-91) 135/91    Physical Exam   Constitutional: She is oriented to person, place, and time and well-developed, well-nourished, and in no distress. No distress.   On 2L NC    HENT:   Head: Normocephalic and atraumatic.   Cardiovascular: Regular rhythm and normal heart sounds.   Tachycardic in the 150s   Pulmonary/Chest: No respiratory distress. She has no wheezes.   Tachypneic with mild use of accessory muscles. Not in acute respiratory distress. Mild decreased breath sounds in the lung bases bilaterally however, no wheezing heard on exam   Musculoskeletal:   TEDs/SCDs in place. Lower extremities appear symmetrical and without evidence of erythema or swelling. No calf tenderness.    Neurological: She is alert and oriented to person, place, and time. No cranial nerve deficit.   Skin: Skin is warm and dry. She is not diaphoretic. No erythema.   Psychiatric: Mood, memory, affect and judgment normal.   Vitals reviewed.    ASSESSMENT/PLAN:  - Patient with sudden onset wheezing this evening on setting of known malignancy and metastasis  - No wheezing heard on exam however patient is tachypneic with mild accessory muscle use   - Wells score: approx 5.5-7   - ABG and CTA, STAT; Radiology and nursing staff notified of concern for potential  PE   - Cr on admission, 0.8  - Already on heparin 5000 TID for DVT ppx  - Continue TEDs/SCDs  - D/C fluids  - Code status discussed with patient and daughter who is at bedside   - States she has never talked to someone before regarding code status however currently wishes FULL CODE status if an event were to occur overnight  - Continue to monitor patient closely     Christen Mackey MD  OB/GYN  PGY-2

## 2019-10-21 NOTE — CONSULTS
Patient evaluated by and admitted to Critical Care Medicine. Full H&P to follow.    Gauri Ritchie NP  Critical Care Medicine

## 2019-10-21 NOTE — SUBJECTIVE & OBJECTIVE
Past Medical History:   Diagnosis Date    Abnormal Pap smear     1984    Anorexia 8/30/2019    Anxiety 10/11/2019    Cancer     Chemotherapy-induced neutropenia 4/1/2015    Dehydration 10/7/2019    Elevated cancer antigen 125 (CA-125) 9/5/2018    Encounter for blood transfusion     FH: breast cancer in relative when <45 years old 3/17/2016    Hyperbilirubinemia 10/8/2019    Hypertension     Hypokalemia 9/9/2019    Other complicated headache syndrome 10/4/2019    Other screening mammogram 9/17/2015    Postmenopausal bleeding     Pulmonary nodules/lesions, multiple 4/25/2019    Rash 9/17/2015    Uterine cancer     Weight loss 12/17/2015    Well woman exam with routine gynecological exam 9/14/2016       Past Surgical History:   Procedure Laterality Date    COLPOSCOPY      DILATION AND CURETTAGE OF UTERUS  10/17/14    ENDOSCOPIC ULTRASOUND OF UPPER GASTROINTESTINAL TRACT N/A 10/8/2019    Procedure: ULTRASOUND, UPPER GI TRACT, ENDOSCOPIC;  Surgeon: Gabe Aguirre MD;  Location: [address]);  Service: Endoscopy;  Laterality: N/A;    ERCP N/A 10/8/2019    Procedure: ERCP (ENDOSCOPIC RETROGRADE CHOLANGIOPANCREATOGRAPHY);  Surgeon: Gabe Aguirre MD;  Location: [address]);  Service: Endoscopy;  Laterality: N/A;    HYSTERECTOMY  12/4/2014    radiacl hysterectomy    TX REMOVAL OF OVARY/TUBE(S)         Review of patient's allergies indicates:   Allergen Reactions    Gramicidin d Shortness Of Breath    Hydrochlorothiazide Other (See Comments)     Burning and tingling sensation throughoutt her body    Lisinopril Shortness Of Breath    Promethazine Shortness Of Breath and Nausea And Vomiting    Neosporin [neomycin-bacitracnzn-polymyxnb] Swelling    Polymyxin [bacitracin-polymyxin b] Swelling       Family History     Problem Relation (Age of Onset)    Breast cancer Cousin, Other, Maternal Grandmother, Maternal Aunt    Cancer Son    Cataracts Mother, Brother, Maternal Aunt    Heart  attack Son    Hypertension Mother, Brother, Brother, Son    Lung cancer Maternal Uncle    No Known Problems Father        Tobacco Use    Smoking status: Never Smoker    Smokeless tobacco: Never Used   Substance and Sexual Activity    Alcohol use: No    Drug use: No    Sexual activity: Not Currently     Birth control/protection: Post-menopausal      Review of Systems   Constitutional: Positive for activity change. Negative for appetite change, chills and fever.   HENT: Negative for congestion.    Eyes: Negative for pain and itching.   Respiratory: Negative for cough, chest tightness and shortness of breath.    Cardiovascular: Positive for palpitations and leg swelling. Negative for chest pain.   Gastrointestinal: Negative for abdominal pain, diarrhea, nausea and vomiting.   Endocrine: Negative for polyphagia and polyuria.   Genitourinary: Negative for dysuria and frequency.   Musculoskeletal: Negative for back pain.   Neurological: Negative for numbness and headaches.   Psychiatric/Behavioral: Negative for agitation and behavioral problems. The patient is nervous/anxious.      Objective:     Vital Signs (Most Recent):  Temp: 98 °F (36.7 °C) (10/21/19 0039)  Pulse: (!) 155 (10/21/19 0039)  Resp: 16 (10/21/19 0039)  BP: 109/70 (10/21/19 0039)  SpO2: 99 % (10/21/19 0039) Vital Signs (24h Range):  Temp:  [97.4 °F (36.3 °C)-98.4 °F (36.9 °C)] 98 °F (36.7 °C)  Pulse:  [135-165] 155  Resp:  [16-20] 16  SpO2:  [87 %-99 %] 99 %  BP: (100-135)/(60-91) 109/70   Weight: 62.6 kg (138 lb)  Body mass index is 25.24 kg/m².      Intake/Output Summary (Last 24 hours) at 10/21/2019 0043  Last data filed at 10/20/2019 1826  Gross per 24 hour   Intake 3207.5 ml   Output --   Net 3207.5 ml       Physical Exam   Constitutional: She is oriented to person, place, and time. She appears well-developed.   HENT:   Head: Normocephalic.   Neck: Normal range of motion.   Left IJ   Cardiovascular: Regular rhythm and normal heart sounds.   No  murmur heard.  tachycardic    Pulmonary/Chest: Effort normal and breath sounds normal.   Abdominal: Soft. Bowel sounds are normal. She exhibits no distension. There is no tenderness.   RUQ drain   Musculoskeletal: Normal range of motion. She exhibits edema.   Neurological: She is alert and oriented to person, place, and time.   Skin: Skin is warm and dry.   Psychiatric: She has a normal mood and affect. Her behavior is normal.       Vents:     Lines/Drains/Airways     Drain                 Biliary Tube 10/10/19 1500 RLQ 10 days          Peripheral Intravenous Line                 Peripheral IV - Single Lumen 10/19/19 2200 20 G 1 day              Significant Labs:    CBC/Anemia Profile:  Recent Labs   Lab 10/19/19  2200   WBC 9.41   HGB 10.6*   HCT 32.9*      MCV 85   RDW 20.3*        Chemistries:  Recent Labs   Lab 10/19/19  2330 10/20/19  1041     --    K 3.5  --      --    CO2 19*  --    BUN 20  --    CREATININE 0.8  --    CALCIUM 8.0*  --    ALBUMIN 1.7*  --    PROT 5.5*  --    BILITOT 2.5*  --    ALKPHOS 335*  --    ALT 61*  --    *  --    MG  --  1.4*   PHOS  --  3.8       Blood Culture: No results for input(s): LABBLOO in the last 48 hours.  BMP:   Recent Labs   Lab 10/19/19  2330 10/20/19  1041     --      --    K 3.5  --      --    CO2 19*  --    BUN 20  --    CREATININE 0.8  --    CALCIUM 8.0*  --    MG  --  1.4*     CMP:   Recent Labs   Lab 10/19/19  2330      K 3.5      CO2 19*      BUN 20   CREATININE 0.8   CALCIUM 8.0*   PROT 5.5*   ALBUMIN 1.7*   BILITOT 2.5*   ALKPHOS 335*   *   ALT 61*   ANIONGAP 15   EGFRNONAA >60.0     Lactic Acid:   Recent Labs   Lab 10/19/19  2200   LACTATE 2.3*     POCT Glucose: No results for input(s): POCTGLUCOSE in the last 48 hours.  Troponin:   Recent Labs   Lab 10/19/19  2200 10/20/19  0438 10/20/19  1041   TROPONINI 0.156* 0.171* 0.173*  0.173*     Urine Culture: No results for input(s): LABURIN in  the last 48 hours.  Urine Studies:   Recent Labs   Lab 10/20/19  0129   COLORU Aggie   APPEARANCEUA Hazy*   PHUR 5.0   SPECGRAV 1.015   PROTEINUA 1+*   GLUCUA Negative   KETONESU Trace*   BILIRUBINUA Negative   OCCULTUA 1+*   NITRITE Negative   LEUKOCYTESUR Trace*   RBCUA 1   WBCUA 13*   BACTERIA Few*   SQUAMEPITHEL 1   HYALINECASTS 12*     All pertinent labs within the past 24 hours have been reviewed.    Significant Imaging: I have reviewed and interpreted all pertinent imaging results/findings within the past 24 hours.

## 2019-10-21 NOTE — CARE UPDATE
Rapid Response Nurse Chart Check     Chart check completed, abnormal VS noted. Bedside RN Kirk [phone] contacted, no concerns verbalized at this time, instructed to call [phone] for further concerns or assistance.

## 2019-10-21 NOTE — PLAN OF CARE
"Dx: Sinus tachycardia    Shift Events: VSS at this time. -150s. Afebrile. Sinus rhythm. 1 L NC with O2 sats 98%<. 1 L LR given. Plan to place midline and have a CTA performed once access has been gained. US lower extremities performed. Right AC IV extravasated. Extravasation protocol followed. Arm elevated and cold pack applied. 2 g IV Magnesium given. Troponin levels trending up. Plan of care reviewed with patient and daughter Patricia. Questions and concerns addressed. Will continue to monitor.      Neuro: AAO x4, Follows Commands and Moves All Extremities    Vital Signs: BP (!) 142/63 (BP Location: Left arm, Patient Position: Lying)   Pulse (!) 147   Temp 98.3 °F (36.8 °C) (Oral)   Resp (!) 23   Ht 5' 2" (1.575 m)   Wt 62.6 kg (138 lb)   SpO2 98%   Breastfeeding? No   BMI 25.24 kg/m²     Diet: NPO    Urine Output: Voids Spontaneously 2 urine occurrences and 100 cc/shift    Drains: Biliary Drain with 0 cc output    Labs/Accuchecks: Daily labs.    Skin: Pressure injury noted to gluteal cleft. Foam dressing applied.   "

## 2019-10-21 NOTE — CARE UPDATE
Rapid Response Nurse Chart Check     Chart check completed, abnormal VS noted. Unable to assess patient at this time due to being with transport to complete CT. Bedside RN Kirk contacted, no concerns verbalized at this time, instructed to call [phone] for further concerns or assistance.

## 2019-10-21 NOTE — CARE UPDATE
"RAPID RESPONSE NURSE PROACTIVE ROUNDING NOTE     Time of Visit: 0020    Admit Date: 10/19/2019  LOS: 1  Code Status: Full Code   Date of Visit: 10/21/2019  : 1950  Age: 69 y.o.  Sex: female  Race: Black or   Bed: 1006/1006 A:   MRN: [id number]  Was the patient discharged from an ICU this admission? no   Was the patient discharged from a PACU within last 24 hours?  no  Did the patient receive conscious sedation/general anesthesia in last 24 hours?  no  Was the patient in the ED within the past 24 hours?  no  Was the patient started on NIPPV within the past 24 hours?  no  Attending Physician: Quinton Luna MD  Primary Service: Networked reference to record PCT     ASSESSMENT     Diagnosis: Sinus tachycardia    Abnormal Vital Signs: /70 (BP Location: Left arm, Patient Position: Sitting)   Pulse (!) 155   Temp 98 °F (36.7 °C) (Oral)   Resp 16   Ht 5' 2" (1.575 m)   Wt 62.6 kg (138 lb)   SpO2 99%   Breastfeeding? No   BMI 25.24 kg/m²      Clinical Issues: Circulatory    Patient  has a past medical history of Abnormal Pap smear, Anorexia, Anxiety, Cancer, Chemotherapy-induced neutropenia, Dehydration, Elevated cancer antigen 125 (CA-125), Encounter for blood transfusion, FH: breast cancer in relative when <45 years old, Hyperbilirubinemia, Hypertension, Hypokalemia, Other complicated headache syndrome, Other screening mammogram, Postmenopausal bleeding, Pulmonary nodules/lesions, multiple, Rash, Uterine cancer, Weight loss, and Well woman exam with routine gynecological exam.    Patient with sustained -160s after numerous fluid boluses. Upon assessment, patient is sitting in chair, AAOx4, denying HA/CP/SOB, asymptomatic of tachycardia. CCM at bedside. CTA STAT, Lovenox injection, and transfer orders to ICU placed.     INTERVENTIONS/ RECOMMENDATIONS     Transfer to ICU for HLOC.    Discussed plan of care with RNKirk.    PHYSICIAN ESCALATION     Yes/No  yes    Orders " received and case discussed with Gauri Ritchie NP.    Disposition: Tx in ICU bed Awaiting bed placement.    FOLLOW-UP     Call back the Rapid Response Nurse, Tamia Simmons RN at [phone] for additional questions or concerns.

## 2019-10-21 NOTE — ASSESSMENT & PLAN NOTE
- Last saw her oncologist kline 09/30  Per Notes,  Sept 2018:  was 47. CT scan of CAP showed numerous bilateral pulmonary nodules and enlarged peripancreatic and gonzalo hepatis lymph nodes with soft tissue infiltration of the mesentery at the region.    3.4 cm left renal mass along the lower pole of the left kidney measures greater than water density.  Differential would include solid renal mass including primary renal neoplasm or metastatic lesion versus is a complex cyst.      Jan 2019: completed 6 cycles of taxol and carboplatin.      Feb 2019:  Completion :16.  Completion CT scan: partial response      April 2019:  Completed 9 cycles of Taxol and carboplatin.  CT scan with persistent pulmonary nodules. PET: multiple  hypermetabolic mesenteric/retroperitoneal lymph nodes and a hypermetabolic left lung nodule concerning for metastatic disease      June 2019: started Avastin maintenance.     August 2019: completed 3 cycles of Avastin. PET showed progressive disease in lung, liver and abdomen. Aug 2019: : 155 (up from 20). Not interested in clinical trial. Wanted to resume taxol and carboplatin.

## 2019-10-21 NOTE — CARE UPDATE
Rapid Response Nurse AI Alert     AI alert received, chart check completed abnormal VS noted. Bedside RNKirk contacted, no concerns verbalized at this time, instructed to call [phone] for further concerns or assistance.

## 2019-10-21 NOTE — PROGRESS NOTES
[facility]  Obstetrics & Gynecology  Progress Note    Patient Name: Jessica Gorman  MRN: [id number]  Admission Date: 10/19/2019  Primary Care Provider: Rico Amaro MD  Principal Problem: Sinus tachycardia    Subjective:     Interval History:   Patient complained of subjective wheezing overnight and continued to have HR in the 150s-160s. Attempts were made for additional IV access given patient only has EJ as access and contrast for attempted CTA could not be given, however were unsuccessful. Patient initially refused any other attempts for IV access. Critical care was consulted for further management and patient was transferred to the ICU. Eventually, peripheral IV access was obtained and patient was taken down for CTA however, contrast extravasated into patient during attempt.     This AM, patient reports that she no longer is experiencing any wheezing. She denies any fevers, chills, nausea or vomiting. She has been able to tolerate regular diet - she had gumbo and turkey sandwich last night without issue. She has been ambulating and urinating. No dysuria or hematuria. Passing flatus, no BMs. No other complaints.     Scheduled Meds:   enoxaparin  60 mg Subcutaneous Q12H    famotidine  20 mg Oral BID    magnesium sulfate IVPB  2 g Intravenous Once     Continuous Infusions:  PRN Meds:acetaminophen, ibuprofen, iohexol, ipratropium, metoclopramide HCl, ondansetron, ondansetron, oxyCODONE-acetaminophen, sodium chloride 0.9%    Review of patient's allergies indicates:   Allergen Reactions    Gramicidin d Shortness Of Breath    Hydrochlorothiazide Other (See Comments)     Burning and tingling sensation throughoutt her body    Lisinopril Shortness Of Breath    Promethazine Shortness Of Breath and Nausea And Vomiting    Neosporin [neomycin-bacitracnzn-polymyxnb] Swelling    Polymyxin [bacitracin-polymyxin b] Swelling       Objective:     Vital Signs (Most Recent):  Temp: 98.3 °F (36.8 °C)  (10/21/19 0244)  Pulse: (!) 152 (10/21/19 0604)  Resp: (!) 24 (10/21/19 0604)  BP: 114/65 (10/21/19 0600)  SpO2: 98 % (10/21/19 0604) Vital Signs (24h Range):  Temp:  [97.4 °F (36.3 °C)-98.4 °F (36.9 °C)] 98.3 °F (36.8 °C)  Pulse:  [135-165] 152  Resp:  [16-31] 24  SpO2:  [87 %-100 %] 98 %  BP: (100-142)/(56-91) 114/65     Weight: 62.6 kg (138 lb)  Body mass index is 25.24 kg/m².  No LMP recorded. Patient has had a hysterectomy.    I&O (Last 24H):    Intake/Output Summary (Last 24 hours) at 10/21/2019 0617  Last data filed at 10/21/2019 0400  Gross per 24 hour   Intake 4567.5 ml   Output 100 ml   Net 4467.5 ml       Physical Exam:   Constitutional: She is oriented to person, place, and time. She appears well-developed and well-nourished.   Patient laying in bed comfortably with 1 L NC in place. Appears somewhat drowsy    HENT:   Head: Normocephalic and atraumatic.      Cardiovascular: Regular rhythm.    Tachycardic in the 150s    Pulmonary/Chest: Effort normal. No respiratory distress.        Abdominal: Soft. She exhibits no distension and no abdominal incision.             Musculoskeletal: Normal range of motion and moves all extremeties.   Lower extremity, 2+ edema bilaterally. No calf tenderness or erythema       Neurological: She is alert and oriented to person, place, and time. No cranial nerve deficit.    Skin: Skin is warm.    Psychiatric: She has a normal mood and affect. Her behavior is normal. Judgment and thought content normal.     Laboratory:  ABGs:   Recent Labs   Lab 10/20/19  2044   PH 7.369   PCO2 30.6*   PO2 94   HCO3 17.6*   POCSATURATED 97   BE -8     CBC:   Recent Labs   Lab 10/21/19  0315   WBC 12.49  12.49   RBC 4.32  4.32   HGB 11.8*  11.8*   HCT 37.8  37.8     291   MCV 88  88   MCH 27.3  27.3   MCHC 31.2*  31.2*     CMP:   Recent Labs   Lab 10/21/19  0315   GLU 97   CALCIUM 7.8*   ALBUMIN 1.8*   PROT 6.1      K 3.9   CO2 15*      BUN 19   CREATININE 0.9   ALKPHOS  324*   ALT 57*   *   BILITOT 3.0*     Diagnostic Results:  Imaging Results          CT Abdomen Pelvis  Without Contrast (Final result)  Result time 10/20/19 00:32:28   Procedure changed from CT Abdomen Pelvis With Contrast     Final result by Hector De Paz MD (10/20/19 00:32:28)                 Impression:      Moderate distension of the stomach but no findings of small bowel obstruction.    Moderate volume loculated ascites and mesenteric/omental nodularity in keeping with peritoneal metastatic disease.    Large right and moderate left pleural effusions with associated adjacent atelectasis, mildly increased from prior exam.    Interval placement of an external-internal biliary drain with a small amount of expected pneumobilia.    Grossly stable appearance of hepatic lesions and pelvic adenopathy.    Worsening anasarca.    Additional stable findings, as above.    Electronically signed by resident: Carol Fernandez  Date:    10/19/2019  Time:    23:34    Electronically signed by: Hector De Paz MD  Date:    10/20/2019  Time:    00:32             Narrative:    EXAMINATION:  CT ABDOMEN PELVIS WITHOUT CONTRAST    CLINICAL HISTORY:  hx uterine cancer, intractable vomiting, concern for bowel obstruction;    TECHNIQUE:  5.0 mm axial CT images of the abdomen and pelvis were obtained via helical, multi detector CT technique.  No intravenous contrast material was administered.    COMPARISON:  CTA chest 10/12/2019, chest abdomen pelvis 10/03/2019.  Abdominal MRI, 10/07/2019.    FINDINGS:  Heart: No cardiomegaly or pericardial effusion.    Lung Bases: Large right and moderate left pleural effusions, mildly increased from prior exam, with associated adjacent compressive atelectasis.    Liver: Normal in size with multiple large ill-defined hypodense lesions scattered throughout, better characterized on recent abdominal MRI.  Punctate foci of air within the liver are likely biliary related to recent biliary drain  placement.    Gallbladder: Nondistended and therefore not well evaluated.  Contains a punctate focus of air, likely related to biliary drain placement.    Bile Ducts: Interval placement of an external-internal biliary drain with a small amount of expected pneumobilia.  No significant biliary ductal dilatation.    Pancreas: No pancreatic mass lesion or peripancreatic inflammatory change.    Spleen: Unremarkable.    Adrenals: Unremarkable.    Genitourinary: Normal in size and location.  No obvious renal masses, nephrolithiasis, or hydroureteronephrosis.  Bladder demonstrates smooth contours without bladder wall thickening.    Reproductive organs: Status post hysterectomy.    GI tract/Mesentery: Stomach is moderately distended and overall appears similar to recent prior studies.  Hyperdense material scattered throughout the colon, likely related to PO contrast given for prior CT 10/03/2019.  No evidence of small-bowel obstruction.  Evaluation for bowel inflammation is significantly limited by absence of IV contrast and the presence of diffuse edema and ascites.    Peritoneal Space: Moderate volume of mildly loculated ascites, similar to prior exam.  Stable peritoneal and omental nodularity suggestive of peritoneal metastatic disease.  No intraperitoneal free air.    Retroperitoneum: No significant adenopathy.    Abdominal wall: A fluid density lesion is again noted in the left inguinal region which could represent a necrotic lymph node or fluid within a hernia defect.  This lesion measures 2.9 x 2.3 cm in axial dimensions and is overall stable when compared with the recent prior study.  Additional prominent inguinal lymph nodes bilaterally, stable from prior exam.  Mildly enlarged left pelvic sidewall lymph node measuring up to 1.3 cm in short axis.  Diffuse body wall edema, slightly increased from prior exam.    Vasculature: Abdominal aorta is normal in caliber without significant calcific atherosclerosis.    Bones:  Mild degenerative changes.  No acute acute fracture or bony destructive process.                                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 Nausea and vomiting  - Patient presenting with nausea and vomiting over the past 3 days, unable to tolerate solid foods however, tolerating liquids without difficulty  - Currently on IV reglan 10 mg PRN, well controlled; tolerating PO without any issue  - CMP: electrolytes WNL, Mag/Phos: WNL  - AST/ALT: 204/61>217/57  - INR: 1.5  - s/p 4 grams Mag replacement, stable this AM  - GI was consulted given patient currently has percutaneous biliary stent who recommended possible duodenal stent placement with AES   - Will discuss with staff regarding further management as patient is currently stable from nausea/vomiting standpoint    2. Tachycardia  - Asymptomatic; patient denies any shortness of breath, palpitations or chest pain  - Pulse:  [135-165] 147  - s/p 5 L fluids since admission; currently held   - CT abdomen performed in ED: patient with   - CT not performed as contrast extravasated and no access except EJ at this time  - Will attempt midline for further acces   - Per critical care team: LAKEISHA GAMBOA in process, currently in attempts to gain midline to undergo CT angio  - Strict I/Os to be charted by nursing staff  - Encouraged use of IS  - PT/OT orders placed    Christen Mackey MD  Obstetrics & Gynecology  [facility]

## 2019-10-21 NOTE — HPI
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    Pt had no complaints, her N/V had improved since admission, she was able to eat and tolerated it -  Denies shortness of breath, palpitations, chest pain, nausea, diaphoresis.     Per daughter, pt was Dx in 2004 with endometrial Ca- she had complete hysterectomy with chemotherapy and radiation, On 09/2019 she found out that she had mets to the liver and lung - she started Chemotherapy and last round was 09/13/209- following the chemotherapy she has been admitted for intractable N/V Patient has presented with similar complaints in the past with most recent admission from 10/7-10/15 at which time a percutaneous biliary drain was placed for biliary obstruction. Previously underwent CTA chest on 10/12 which was negative for acute PE.        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

## 2019-10-21 NOTE — PLAN OF CARE
Discussed risks/benefits of thoracentesis with patient who declined procedure at this time. Patient saturating well on minimal supplemental oxygen and without discomfort. If patient worsens and agrees to procedure, please re-consult [facility] M.D.  PGY-4 Radiology

## 2019-10-21 NOTE — PROGRESS NOTES
MD to bedside for afternoon assessment.    Patient continues to be asymptomatic. She denies fevers and chills. Her vomiting has not recurred since admission and she has been able to tolerate red beans and rice for lunch. She did have reglan x1 for pre-emptive nausea earlier today. No abdominal pain, no dysuria or hematuria. Ambulating to the restroom independently. No constipation or diarrhea; last BM several days prior to admission.     Temp:  [98 °F (36.7 °C)-98.8 °F (37.1 °C)] 98.7 °F (37.1 °C)  Pulse:  [135-165] 135  Resp:  [16-34] 22  SpO2:  [87 %-100 %] 100 %  BP: ()/(49-91) 125/63    Physical Exam   Constitutional: She is well-developed, well-nourished, and in no distress. No distress.   HENT:   Right EJ in place   Cardiovascular: Regular rhythm.   Tachycardic in the 140s   Pulmonary/Chest: Effort normal and breath sounds normal. No respiratory distress.   Abdominal: Soft. She exhibits no distension. There is no tenderness.   Musculoskeletal:   Swelling, mild bruising and peeling of skin over right antecubital fossa   Neurological:   TEDs/SCDs in place. Bilateral lower extremities with no evidence of swelling   Skin: She is not diaphoretic.   Psychiatric: Mood, memory, affect and judgment normal.   AO x3 however somewhat drowsy today on exam     ASSESSMENT/PLAN:  - Patient stable from nausea and vomiting standpoint, well controlled on reglan and now able to tolerate PO intake without difficulty  - Currently HR in 140s however asymptomatic  - CT attempted last night however, extravasation of contrast did not result in full study  - Attempts to obtain midline today in left arm however, line blew shortly after placement  - At this time, patient refuses further attempts as IV placement  - Also refuses CTA to r/o PE or thoracentesis for known pleural effusions, which are believed to be contributing to incr RR and HR despite explanation of risks/benefits/alternatives  - Partially occlusive thrombus of left  greater saphenous vein seen on LE US    - Already on anticoagulation per critical care team  - Continues to desire FULL CODE status  - Palliative care did see patient today, full note not yet written however appreciate reccs  - Will talk to staff regarding stepping patient down from ICU given no needs to fulfill currently in ICU unit    Christen Mackey MD  OB/GYN  PGY-2

## 2019-10-21 NOTE — HOSPITAL COURSE
Pt was admitted to MICU 10/20 given heparin SQ full dose and CTA was ordered to r/o PE - was not able to finish it as contrast extravasated- Pt refused CL for access as she had poor PIV, 10/21 U/S lower ext showing partially occlusive thrombus of the left greater saphenous vein.     10/21 overnight patient was aphasic and was possibly hallucinating. Stroke code called. MRI brain, MRA brain, MRA neck on 10/22 all showing no acute intracranial abnormality identified, allowing for motion limitations.  No detrimental change when compared with 10/13/2019. No large vessel occlusion identified in the head or neck arteries. Patient returned to baseline and AAOx4 soon after. EEG results pending. Patient deferring IV access for labs and medications. She and her daughter would like to hold reglan as they believe it has caused her to have hallucinations in the past. Palliative care seeing patient. Patient with gastric outlet obstruction. Gyn Onc reported nothing to offer for patient and recommending hospice. DNR/DNI was brought up by Gyn Onc, and patient wants to think about this overnight.     Palliative Care was consulted and had a thorough goals of care discussion with patient's family. Ultimately her daughters were resistant to DNR status but desired comfort care and hospice for Ms. Gorman. They decided [facility]. They want their mother to go home. They did not want her to receive any IVs as this was uncomfortable for the patient.

## 2019-10-21 NOTE — NURSING
Dr. Payam Peñaloza made aware of patient's K+ 3.9 and Mg 1.8. Orders given to give 2 g Magnesium. Will continue to monitor.

## 2019-10-21 NOTE — PLAN OF CARE
VSS, HR remains ST at 130-140s. CC team is aware of increased HR. Pt received 12.5 g of albumin during shift per MD order. Pt remains on 1 L NC for comfort, O2: %. No Gtts. Biliary tube: 0 ml output.  UOP: 175 ml + 1 occurrence. No BM during shift. Pt is handling a regular diet well, PRN metoclopramide given for nausea per MD order. Pt has not c/o any pain, just weakness. Pt is AAO x 4, can move all extremities and follow all commands. Pt stated she did not want a thoracentesis, CTA, adam and/or a central line placed. The CC team is aware of the treatments/tests she desires not to have. Plan of care reviewed with the pt and family and all questions and concerns were addressed. Will continue to monitor pt.         Problem: Fall Injury Risk  Goal: Absence of Fall and Fall-Related Injury  Outcome: Ongoing, Progressing     Problem: Adult Inpatient Plan of Care  Goal: Plan of Care Review  Description  PMHx: Endometrial cancer with liver/lung metastasis, dehydration    10/19: ED nausea and vomiting. -160s. 3 L NS bolus.   10/20: worsening tachypnea and wheezing  10/21: admit to SICU. 1 L LR.      Nursing:   MAP >65     Outcome: Ongoing, Progressing  Goal: Patient-Specific Goal (Individualization)  Outcome: Ongoing, Progressing  Goal: Optimal Comfort and Wellbeing  Outcome: Ongoing, Progressing     Problem: Coping Ineffective  Goal: Effective Coping  Outcome: Ongoing, Progressing     Problem: Skin Injury Risk Increased  Goal: Skin Health and Integrity  Outcome: Ongoing, Progressing

## 2019-10-21 NOTE — ASSESSMENT & PLAN NOTE
- 2/2 demand ischemia.  - Cardiology consulted by gyn/onc appreciate their help.  - Echo done 10/21  · Normal left ventricular systolic function. The estimated ejection fraction is 60% with sinus rate in upper 140s.  · Local segmental wall motion abnormalities.  · Normal LV diastolic function.  · Normal right ventricular systolic function.  · Mild tricuspid regurgitation.

## 2019-10-21 NOTE — PLAN OF CARE
AAOX4. On tele running Sinus tachycardia. VSS at this time ex HR 150s-170s. Sinus rhythm. Wheezing heard on auscultation of lungs, paged on call for Dr. Becerril  orders given to place pt on 2 L NC with O2 sats 99% and to get stat EKGs, with a one time 60 mg of Lovenox. CTA not performed because access has not been gained efficiently enough to obtain contrast. CT attempted, but 18 G R AC infiltrated so limited CT done. Arm elevated. Plan of care reviewed with patient and daughter. Biliary drain output 0. Will continue to monitor.

## 2019-10-22 PROBLEM — R41.82 ALTERED MENTAL STATUS: Status: ACTIVE | Noted: 2019-10-22

## 2019-10-22 PROCEDURE — 99231 SBSQ HOSP IP/OBS SF/LOW 25: CPT | Mod: GC,,, | Performed by: OBSTETRICS & GYNECOLOGY

## 2019-10-22 PROCEDURE — 99231 PR SUBSEQUENT HOSPITAL CARE,LEVL I: ICD-10-PCS | Mod: GC,,, | Performed by: OBSTETRICS & GYNECOLOGY

## 2019-10-22 PROCEDURE — 25000003 PHARM REV CODE 250: Performed by: STUDENT IN AN ORGANIZED HEALTH CARE EDUCATION/TRAINING PROGRAM

## 2019-10-22 PROCEDURE — 20000000 HC ICU ROOM

## 2019-10-22 PROCEDURE — 95819 EEG AWAKE AND ASLEEP: CPT | Mod: 26,,, | Performed by: PSYCHIATRY & NEUROLOGY

## 2019-10-22 PROCEDURE — 95816 EEG AWAKE AND DROWSY: CPT

## 2019-10-22 PROCEDURE — 99233 SBSQ HOSP IP/OBS HIGH 50: CPT | Mod: GC,,, | Performed by: INTERNAL MEDICINE

## 2019-10-22 PROCEDURE — 27000221 HC OXYGEN, UP TO 24 HOURS

## 2019-10-22 PROCEDURE — 63600175 PHARM REV CODE 636 W HCPCS: Performed by: EMERGENCY MEDICINE

## 2019-10-22 PROCEDURE — 99900035 HC TECH TIME PER 15 MIN (STAT)

## 2019-10-22 PROCEDURE — 94761 N-INVAS EAR/PLS OXIMETRY MLT: CPT

## 2019-10-22 PROCEDURE — 99221 PR INITIAL HOSPITAL CARE,LEVL I: ICD-10-PCS | Mod: ICN,CMP,, | Performed by: PSYCHIATRY & NEUROLOGY

## 2019-10-22 PROCEDURE — 99221 1ST HOSP IP/OBS SF/LOW 40: CPT | Mod: ICN,CMP,, | Performed by: PSYCHIATRY & NEUROLOGY

## 2019-10-22 PROCEDURE — 95819 PR EEG,W/AWAKE & ASLEEP RECORD: ICD-10-PCS | Mod: 26,,, | Performed by: PSYCHIATRY & NEUROLOGY

## 2019-10-22 PROCEDURE — 99233 PR SUBSEQUENT HOSPITAL CARE,LEVL III: ICD-10-PCS | Mod: GC,,, | Performed by: INTERNAL MEDICINE

## 2019-10-22 RX ADMIN — FAMOTIDINE 20 MG: 20 TABLET ORAL at 08:10

## 2019-10-22 RX ADMIN — ENOXAPARIN SODIUM 40 MG: 100 INJECTION SUBCUTANEOUS at 05:10

## 2019-10-22 NOTE — PROGRESS NOTES
Patient transferred to the MRI bed, tele, O2 sensor, and BP cuff applied, placed MRI, tolerating well,, WCTM

## 2019-10-22 NOTE — PROGRESS NOTES
I met with patient and her daughter.   STRATA testing for targeted therapy shows no mutations for targeted therapy.     I have once again discussed DNR and hospice with her. I told them that I have nothing to offer in regard to treatment.   She is still deciding.     KANDI CRUZ MD

## 2019-10-22 NOTE — PROGRESS NOTES
[facility]  Critical Care Medicine  Progress Note    Patient Name: Jessica Gorman  MRN: [id number]  Admission Date: 10/19/2019  Hospital Length of Stay: 2 days  Code Status: Full Code  Attending Provider: Quinton Luna MD  Primary Care Provider: Rico Amaro MD   Principal Problem: Sinus tachycardia    Subjective:     HPI:  This is a 68 y/o female with hx of stage 4 endometrial Ca with lung and liver mets s/p chemotherapy and radiation admitted 10/19 with intractable N/V- MICU consulted 10/20 for persistent tachycardia.    Pt had no complaints, her N/V had improved since admission, she was able to eat and tolerated it -  Denies shortness of breath, palpitations, chest pain, nausea, diaphoresis.     Per daughter, pt was Dx in 2004 with endometrial Ca- she had complete hysterectomy with chemotherapy and radiation, On 09/2019 she found out that she had mets to the liver and lung - she started Chemotherapy and last round was 09/13/209- following the chemotherapy she has been admitted for intractable N/V Patient has presented with similar complaints in the past with most recent admission from 10/7-10/15 at which time a percutaneous biliary drain was placed for biliary obstruction. Previously underwent CTA chest on 10/12 which was negative for acute PE.        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     10/21 overnight patient was aphasic and was possibly hallucinating. Stroke code called. MRI brain, MRA brain, MRA neck on 10/22 all showing no acute intracranial  abnormality identified, allowing for motion limitations.  No detrimental change when compared with 10/13/2019. No large vessel occlusion identified in the head or neck arteries. Patient returned to baseline and AAOx4 soon after. EEG results pending. Patient deferring IV access for labs and medications. She and her daughter would like to hold reglan as they believe it has caused her to have hallucinations in the past. Palliative care seeing patient. Patient with gastric outlet obstruction. Gyn Onc reported nothing to offer for patient and recommending hospice. DNR/DNI was brought up by Gyn Onc, and patient wants to think about this overnight.       Interval History/Significant Events: Aphasic overnight, stroke code called. MRI/MRA negative. Glucose normal at the time of the event. Back to baseline afterwards. CT head with findings, but MRI brain, MRA brain, and MRA neck without acute findings. Patient deferring IVs for labwork and medications. She states she would like to be kept comfortable.     Review of Systems   Constitutional: Positive for activity change. Negative for appetite change, chills and fever.   HENT: Negative for congestion.    Eyes: Negative for pain and itching.   Respiratory: Negative for cough, chest tightness and shortness of breath.    Cardiovascular: Positive for palpitations and leg swelling. Negative for chest pain.   Gastrointestinal: Negative for abdominal pain, diarrhea, nausea and vomiting.   Endocrine: Negative for polyphagia and polyuria.   Genitourinary: Negative for dysuria and frequency.   Musculoskeletal: Negative for back pain.   Neurological: Negative for numbness and headaches.   Psychiatric/Behavioral: Negative for agitation and behavioral problems. The patient is nervous/anxious.      Objective:     Vital Signs (Most Recent):  Temp: 98.8 °F (37.1 °C) (10/22/19 0700)  Pulse: (!) 143 (10/22/19 0917)  Resp: (!) 31 (10/22/19 0917)  BP: (!) 119/91 (10/22/19 0700)  SpO2: 96 %  (10/22/19 0917) Vital Signs (24h Range):  Temp:  [98.7 °F (37.1 °C)-100.2 °F (37.9 °C)] 98.8 °F (37.1 °C)  Pulse:  [] 143  Resp:  [19-36] 31  SpO2:  [93 %-100 %] 96 %  BP: ()/(49-97) 119/91   Weight: 72 kg (158 lb 11.7 oz)  Body mass index is 29.03 kg/m².      Intake/Output Summary (Last 24 hours) at 10/22/2019 0920  Last data filed at 10/22/2019 0700  Gross per 24 hour   Intake --   Output 245 ml   Net -245 ml       Physical Exam   Constitutional: She is oriented to person, place, and time. She appears well-developed.   HENT:   Head: Normocephalic.   Neck: Normal range of motion.   Left IJ   Cardiovascular: Regular rhythm and normal heart sounds.   No murmur heard.  tachycardic    Pulmonary/Chest: Effort normal and breath sounds normal.   Abdominal: Soft. Bowel sounds are normal. She exhibits no distension. There is no tenderness.   RUQ drain   Musculoskeletal: Normal range of motion. She exhibits edema.   Neurological: She is alert and oriented to person, place, and time. No cranial nerve deficit or sensory deficit.   5/5 strength throughout   Skin: Skin is warm and dry.   Psychiatric: She has a normal mood and affect. Her behavior is normal.       Vents:  Oxygen Concentration (%): 24 (10/21/19 0604)  Lines/Drains/Airways     Drain                 Biliary Tube RLQ -- days         Biliary Tube 10/10/19 1500 RLQ 11 days          Peripheral Intravenous Line                 Peripheral IV - Single Lumen 10/19/19 2200 20 G 2 days              Significant Labs:    CBC/Anemia Profile:  Recent Labs   Lab 10/21/19  0315   WBC 12.49  12.49   HGB 11.8*  11.8*   HCT 37.8  37.8     291   MCV 88  88   RDW 20.4*  20.4*        Chemistries:  Recent Labs   Lab 10/20/19  1041 10/21/19  0315   NA  --  140   K  --  3.9   CL  --  109   CO2  --  15*   BUN  --  19   CREATININE  --  0.9   CALCIUM  --  7.8*   ALBUMIN  --  1.8*   PROT  --  6.1   BILITOT  --  3.0*   ALKPHOS  --  324*   ALT  --  57*   AST  --  217*    MG 1.4* 1.8  1.8   PHOS 3.8 4.1  4.1       All pertinent labs within the past 24 hours have been reviewed.    Significant Imaging:  I have reviewed and interpreted all pertinent imaging results/findings within the past 24 hours.      ABG  Recent Labs   Lab 10/20/19  2044   PH 7.369   PO2 94   PCO2 30.6*   HCO3 17.6*   BE -8     Assessment/Plan:     Neuro  Altered mental status  - follow up on EEG performed on 10/22    Pulmonary  Pleural effusion  - Echo with NL EF and no DD.  - BNP 29   - Most likely 2/2 hypoalbuminemia     Cardiac/Vascular  * Sinus tachycardia  - Chronic sinus tachycardia, been seen by Dr. Winters 09/17 which her HR been 110's - 130's, TSH wnl and CTA negative for PE, ECG shows sinus tach which is felt is due to a compensatory response to volume depletion (hyperadrenergic state) her anti hypertensive was held. Pt was also seen by PCP 09/24 who though it was related to chemotherapy.    - Pt was tachycardic since admission ~150's with increased in trops > cardiology consulted.    Exam: ~ 87% on RA > 92% on 1 L NC-  L.E pitting edema.  Labs: Wbc 12 - UA >13 wbc - Echo showed NL EF with local wall abonromality   DDx: PE vs Volume depletion vs infection .  - S/p ~4,500 cc of IVF.  US LE showing partially occlusive saphenous vein thrombus. CTA attempted but PIV extravasated with failed additional attempts at PIVs. Patient does not want a central line or repeat CTA attempts.     Plan:    1- On lovenox 40 ppx  2- Follow up infectious work up with blood cultures.  3- Strict In/outs.  4- Follow up TSH level.    Elevated troponin  - 2/2 demand ischemia.  - Cardiology consulted by gyn/onc appreciate their help.  - Echo done 10/21  · Normal left ventricular systolic function. The estimated ejection fraction is 60% with sinus rate in upper 140s.  · Local segmental wall motion abnormalities.  · Normal LV diastolic function.  · Normal right ventricular systolic function.  · Mild tricuspid  "regurgitation.    Oncology  Endometrial carcinoma, serous  - Last saw her oncologist kline 09/30  Per Notes,  Sept 2018:  was 47. CT scan of CAP showed numerous bilateral pulmonary nodules and enlarged peripancreatic and gonzalo hepatis lymph nodes with soft tissue infiltration of the mesentery at the region.    3.4 cm left renal mass along the lower pole of the left kidney measures greater than water density.  Differential would include solid renal mass including primary renal neoplasm or metastatic lesion versus is a complex cyst.      Jan 2019: completed 6 cycles of taxol and carboplatin.      Feb 2019:  Completion :16.  Completion CT scan: partial response      April 2019:  Completed 9 cycles of Taxol and carboplatin.  CT scan with persistent pulmonary nodules. PET: multiple  hypermetabolic mesenteric/retroperitoneal lymph nodes and a hypermetabolic left lung nodule concerning for metastatic disease      June 2019: started Avastin maintenance.     August 2019: completed 3 cycles of Avastin. PET showed progressive disease in lung, liver and abdomen. Aug 2019: : 155 (up from 20). Not interested in clinical trial. Wanted to resume taxol and carboplatin.     GI  Biliary obstruction  - MRCP done MRCP shows   "Cholelithiasis, mild intrahepatic biliary dilatation, and mild dilatation of the common bile duct with relatively abrupt tapering at the level of the pancreatic head.  No filling defects identified or obstructing lesion in the pancreatic head, noting evaluation limited without IV contrast/ MRCP protocol., multiple liver lesions, large amount of ascites."  - AES consulted in 10/09 ERCP performed and unable to advance scope into CBD.  IR placed PTC 10/09  - Gyn Onc and Palliative Care recommending g-tube - patient states she needs time to think about it    Other  Goals of care, counseling/discussion  Palliative care and gyn onc have both had ongoing discussions with patient about goals of care. " Patient reported to ICU team she wants to be comfortable and does not want any IVs for labs or medications. She did not want central line for CTA after first attempt at CTA resulted in extravasation. Patient still undecided about DNR/DNI and hospice.   - gyn onc, ICU team, and palliative care plan to further discuss DNR/DNI, goals of care, hospice in the AM  - palliative care recommending the following:  - Change  reglan to 10 mg by mouth before meals - will hold for now due to patient and family's request (they believe it has caused hallucinations in the past)  - Continue ondansetron as ordered   - Decadron 4 mg by mouth every 12 hrs - will hold for now due to concern for altered mental status but will reassess in the AM  - Lorazepam 1 mg by mouth every 8 hrs as needed for nausea. And or anxiety   Lorazepam has been found to be effective in treating nausea associated with chemotherapy and  Is helpful in lowering  anxiety  - holding at this time due to concern for altered mental status but will reassess in the AM    Hypoalbuminemia  - Albumin 1.8.  - Continue boost.       Critical Care Daily Checklist:    A: Awake: RASS Goal/Actual Goal:    Actual: Alejandre Agitation Sedation Scale (RASS): Alert and calm   B: Spontaneous Breathing Trial Performed?     C: SAT & SBT Coordinated?  N/A                      D: Delirium: CAM-ICU Overall CAM-ICU: Negative   E: Early Mobility Performed? No   F: Feeding Goal:    Status:     Current Diet Order   Procedures    Diet Adult Regular (IDDSI Level 7)      AS: Analgesia/Sedation Acetaminophen PRN, oxycodone PRn   T: Thromboebolic Prophylaxis SCDs, lovenox   H: HOB > 300 Yes   U: Stress Ulcer Prophylaxis (if needed) famotidine   G: Glucose Control --   B: Bowel Function Stool Occurrence: 0   I: Indwelling Catheter (Lines & Vance) Necessity Per nursing, left EJ that is not fully functional   D: De-escalation of Antimicrobials/Pharmacotherapies --    Plan for the day/ETD Follow up on  EEG, goals of care    Code Status:  Family/Goals of Care: Full Code  --       Critical secondary to Patient has a condition that poses threat to life and bodily function:  tachycardia     Critical care was time spent personally by me on the following activities: development of treatment plan with patient or surrogate and bedside caregivers, discussions with consultants, evaluation of patient's response to treatment, examination of patient, ordering and performing treatments and interventions, ordering and review of laboratory studies, ordering and review of radiographic studies, pulse oximetry, re-evaluation of patient's condition. This critical care time did not overlap with that of any other provider or involve time for any procedures.     Deirdre Ramos MD  Critical Care Medicine  [facility]

## 2019-10-22 NOTE — ASSESSMENT & PLAN NOTE
68 y/o female who was Dx in 2004 with endometrial Ca had episode of altered mental status last night. Code stroke was called and patient taken to CT. CT completed with concern of hypoattenuation in the right gil sudha. MRI recommended for further stroke work up.    MRI completed - motion limited but no concern for acute intracranial abnormality identified, MRA head with no high-grade stenosis noted.     EEG completed - results pending     No further vascular neurology needed at this time. At this time we will sign off. Please call with any concerns or questions.

## 2019-10-22 NOTE — ASSESSMENT & PLAN NOTE
Palliative care and gyn onc have both had ongoing discussions with patient about goals of care. Patient reported to ICU team she wants to be comfortable and does not want any IVs for labs or medications. She did not want central line for CTA after first attempt at CTA resulted in extravasation. Patient still undecided about DNR/DNI and hospice.   - gyn onc, ICU team, and palliative care plan to further discuss DNR/DNI, goals of care, hospice in the AM  - palliative care recommending the following:  - Change  reglan to 10 mg by mouth before meals - will hold for now due to patient and family's request (they believe it has caused hallucinations in the past)  - Continue ondansetron as ordered   - Decadron 4 mg by mouth every 12 hrs - will hold for now due to concern for altered mental status but will reassess in the AM  - Lorazepam 1 mg by mouth every 8 hrs as needed for nausea. And or anxiety   Lorazepam has been found to be effective in treating nausea associated with chemotherapy and  Is helpful in lowering  anxiety - holding at this time due to concern for altered mental status but will reassess in the AM

## 2019-10-22 NOTE — SIGNIFICANT EVENT
- Called by the nurse regarding change in patient mental status as her daughter heard the patient in discomfort and she was not following commands. Last seen normal ~ dinner time.    On arrival, pt was closing her eyes, not following commands, withdraw from pain to all extremities - her pupil were equal and reactive - aphasic    Labs: BS ~145 - ABG ordered and CTH STAT.    Stroke code called -     Last AC was enoxaparin  1mg/kg @17:30.    DDX: could be hemorraghic stroke vs SE from metoclopramide (as daughter reported a history of hallucination at home)    - will continue to monitor, appreciate stroke team help.  - D/C metoclopramide.    Update:    CTH done was negative for bleed or stroke- followed by MRI brain and MRA neck that was negative.    - Pt back to baseline, will monitor for now and consider EEG in AM.        Payam Peñaloza MD  Internal Medicine  PGY-3

## 2019-10-22 NOTE — ASSESSMENT & PLAN NOTE
Palliative medicine consulted for symptom management   Palliative medicine APRN met with patient and family at bedside. Palliative medicine introduced.    Impression: Mrs. Gorman is a 68 yo lady admitted with intractable nausea and vomiting.  Has hx of metastatic endometrial cancer.  S/P chemotherapy 9/19.  As per patient chemotherapy is on hold at this time.  Transferred to critical care service for evaluation of tachycardia.  She is awake, alert and oriented to person, place time and situation.  At this time is tolerating regular diet with no complaints of nausea, pain, dyspnea. Daughter at bedside concerned tachycardia may be related to anxiety as Mrs. Gorman' son is critically ill and is current hospitalized. distress. Significant event over night - EEG in progress at this time.  Unable to talk with patient.     Symptom Management  Nausea - secondary to chemotherapy for endometrial cancer  Current orders  Metoclopramide 10 mg by mouth every 6 hrs as needed for nausea - 1st choice  - patient's family is concerned with extrapyramidal effects of reglan.    - no tremors or movement effects noted at this time   Ondansetron 4 mg ODT every 6 hrs as needed - second choice.   - Patient reports zofran lost it effectiveness over time.   Recommendation:   - Change  reglan to 10 mg by mouth before meals   - Continue ondansetron as ordered   - Decadron 4 mg by mouth every 12 hrs   Anxiety  - Lorazepam 1 mg by mouth every 8 hrs as needed for nausea. And or anxiety   Lorazepam has been found to be effective in treating nausea associated with chemotherapy and  Is helpful in lowering  anxiety     Advance Care Planning     -no advanced directives have been received.   - Mrs. Gorman is able to make own decisions.   - Adult children are next of kin for medical decisions, if she is unable.   - Mrs. Gorman appears to have good  Understanding of illness  - Full code per primary team. Patient and family in agreement       Goals of  Care  - Patient reports her chemotherapy is on hold now.  MRI  Indicates  gastric outlet obstruction no additional chemotherapy possible.   - Dr. Watt had ambar discussion with patient and family regarding clinical condition.  Informed patient she would not receive additional chemo and cancer is now end stage.  As per  Dr. Watt patient is actively dying and transition to hospice is appropriate.   - Palliative care has introduced hospice to goals of care when discussing care with daughter.  Mrs. Gorman having EEG at this time and is unable to participate in conversation.   -  Palliative care also discussing placement of venting g-tube  - Palliative care will return to have discussion with patient     Plan/Recommendation  - see above symptom management recommendations.   - Obtain advanced directives  - Palliative medicine will continue to follow.     Above goals of care and recommendations discussed with primary team.

## 2019-10-22 NOTE — CONSULTS
[facility]  Palliative Medicine  Consult Note    Patient Name: Jessica Gorman  MRN: [id number]  Admission Date: 10/19/2019  Hospital Length of Stay: 1 days  Code Status: Full Code   Attending Provider: Quinton Luna MD  Consulting Provider: EDI Kidd  Primary Care Physician: Rico Amaro MD  Principal Problem:Sinus tachycardia    Patient information was obtained from patient, relative(s), past medical records and ER records.      Consults  Assessment/Plan:     Palliative care encounter  Palliative medicine consulted for symptom management   Palliative medicine APRN met with patient and family at bedside. Palliative medicine introduced.    Impression: Mrs. Gorman is a 68 yo lady admitted with intractable nausea and vomiting.  Has hx of metastatic endometrial cancer.  S/P chemotherapy 9/19.  As per patient chemotherapy is on hold at this time.  Transferred to critical care service for evaluation of tachycardia.  She is awake, alert and oriented to person, place time and situation.  At this time is tolerating regular diet with no complaints of nausea, pain, dyspnea. Daughter at bedside concerned tachycardia may be related to anxiety as Mrs. Gorman' son is critically ill and is current hospitalized. distress.     Symptom Management  Nausea - secondary to chemotherapy for endometrial cancer  Current orders  Metoclopramide 10 mg by mouth every 6 hrs as needed for nausea - 1st choice  - patient's family is concerned with extrapyramidal effects of reglan.    - no tremors or movement effects noted at this time   Ondansetron 4 mg ODT every 6 hrs as needed - second choice.   - Patient reports zofran lost it effectiveness over time.   Recommendation:   - Consider changing reglan to 10 mg by mouth before meals   - Continue ondansetron as ordered   - Consider addition of Lorazepam 1 mg by mouth every 8 hrs as needed for nausea.  Lorazepam has been found to be effective in treating  nausea associated with chemotherapy and lower anxiety     Bowel Management:   - Patient has opiate pain medications and side effect associated with ondansetron is constipation.   - no bowel management orders   - recommend senna one tab by mouth daily as needed for constipation    Advance Care Planning     -no advanced directives have been received.   - Mrs. Gorman is able to make own decisions.   - Adult children are next of kin for medical decisions, if she is unable.   - Mrs. Gorman appears to have good  Understanding of illness  - Full code per primary team. Patient and family in agreement      Goals of Care  - Patient reports her chemotherapy is on hold now.  It is her hope that when the nausea is resolved she will continue with her chemotherapy.     Plan/Recommendation  - see above symptom management recommendations.   - Obtain advanced directives  - Palliative medicine building rapport with patient and family.  Will continue to follow for symptom management and advanced care planning.     Above goals of care and recommendations to be discussed with primary team.          Thank you for your consult. I will follow-up with patient. Please contact us if you have any additional questions.    Subjective:     HPI:   HPI obtained from chart review:     Mrs. 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 S/p chemotherapy and radiation. Admitted to [facility] with intractable nausea and vomiting.        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  Endometrial cancer dx in 2004 - s/p VILLA, chemotherapy and radiation. Metastasis discovered 9/2019 and started on chemotherapy.  Daughter reports she last received taxol and carboplatin with last dose being 9/13/19.  Has been hospitalized twice with intractable nausea and vomiting most  recently 10/7-10/15. At this time she was was found to have a biliary obstruction s/p percutaneous biliary drain.      She was transferred to critical care services for evaluation of tachycardia.   CCS work up for tachycardia: at baseline heart rated 100-130 with an  Asymptomatic epidsode of  overnight.  Stable at this time. PE study 10/12 indicated no evidence of PE.  Oxygen requirements are unchanged as well, TTE w/o RH strain and patient is normotensive, making PE less likely. No infectious source or sx. Thyroid studies previously normal. S/p 4L IVF w/o significant improvement in HR.   - Patient is refusing further attempts at IV access as well as thoracentesis, which is reasonable as PE seems less likely, she is getting full anticoagulation and she does not have increased oxygen requirements or dyspnea    Mrs. Gorman states she is very worried about her son who is also in the critical care unit.  Daughter reports concerns that worry and anxiety may be contributing to the tachycardia.  Daughter also expresses some concern for extrapyramidal side effects of the nausea medicines.      Palliative medicine consulted for symptom management - nausea.   At time of this assessment Mrs. Gorman has no complaints of N/V tolerating regular diet  pain, shortness of breath and is in no acute distress. Will likely be stepped down to the floor.            Hospital Course:  No notes on file    Interval History:     Past Medical History:   Diagnosis Date    Abnormal Pap smear     1984    Anorexia 8/30/2019    Anxiety 10/11/2019    Cancer     Chemotherapy-induced neutropenia 4/1/2015    Dehydration 10/7/2019    Elevated cancer antigen 125 (CA-125) 9/5/2018    Encounter for blood transfusion     FH: breast cancer in relative when <45 years old 3/17/2016    Hyperbilirubinemia 10/8/2019    Hypertension     Hypokalemia 9/9/2019    Other complicated headache syndrome 10/4/2019    Other screening mammogram 9/17/2015     Postmenopausal bleeding     Pulmonary nodules/lesions, multiple 4/25/2019    Rash 9/17/2015    Uterine cancer     Weight loss 12/17/2015    Well woman exam with routine gynecological exam 9/14/2016       Past Surgical History:   Procedure Laterality Date    COLPOSCOPY      DILATION AND CURETTAGE OF UTERUS  10/17/14    ENDOSCOPIC ULTRASOUND OF UPPER GASTROINTESTINAL TRACT N/A 10/8/2019    Procedure: ULTRASOUND, UPPER GI TRACT, ENDOSCOPIC;  Surgeon: Gabe Aguirre MD;  Location: [facility] ([address] [place] [address]);  Service: Endoscopy;  Laterality: N/A;    ERCP N/A 10/8/2019    Procedure: ERCP (ENDOSCOPIC RETROGRADE CHOLANGIOPANCREATOGRAPHY);  Surgeon: Gabe Aguirre MD;  [facility]  Service: Endoscopy;  Laterality: N/A;    HYSTERECTOMY  12/4/2014    radiacl hysterectomy    VA REMOVAL OF OVARY/TUBE(S)         Review of patient's allergies indicates:   Allergen Reactions    Gramicidin d Shortness Of Breath    Hydrochlorothiazide Other (See Comments)     Burning and tingling sensation throughoutt her body    Lisinopril Shortness Of Breath    Promethazine Shortness Of Breath and Nausea And Vomiting    Neosporin [neomycin-bacitracnzn-polymyxnb] Swelling    Polymyxin [bacitracin-polymyxin b] Swelling       Medications:  Continuous Infusions:  Scheduled Meds:   enoxaparin  40 mg Subcutaneous Daily    famotidine  20 mg Oral BID     PRN Meds:acetaminophen, ibuprofen, iohexol, ipratropium, metoclopramide HCl, ondansetron, ondansetron, oxyCODONE-acetaminophen, sodium chloride 0.9%    Family History     Problem Relation (Age of Onset)    Breast cancer Cousin, Other, Maternal Grandmother, Maternal Aunt    Cancer Son    Cataracts Mother, Brother, Maternal Aunt    Heart attack Son    Hypertension Mother, Brother, Brother, Son    Lung cancer Maternal Uncle    No Known Problems Father        Tobacco Use    Smoking status: Never Smoker    Smokeless tobacco: Never Used   Substance and Sexual Activity     Alcohol use: No    Drug use: No    Sexual activity: Not Currently     Birth control/protection: Post-menopausal       Review of Systems   Constitutional: Positive for appetite change.   Eyes: Negative.    Gastrointestinal: Positive for abdominal distention, nausea and vomiting.   Musculoskeletal: Negative.    Neurological: Negative.    Psychiatric/Behavioral: Negative.      Objective:     Vital Signs (Most Recent):  Temp: 98.9 °F (37.2 °C) (10/21/19 1500)  Pulse: (!) 141 (10/21/19 1800)  Resp: (!) 23 (10/21/19 1800)  BP: 107/78 (10/21/19 1800)  SpO2: 99 % (10/21/19 1800) Vital Signs (24h Range):  Temp:  [98 °F (36.7 °C)-98.9 °F (37.2 °C)] 98.9 °F (37.2 °C)  Pulse:  [135-165] 141  Resp:  [16-34] 23  SpO2:  [90 %-100 %] 99 %  BP: ()/(49-78) 107/78     Weight: 62.6 kg (138 lb)  Body mass index is 25.24 kg/m².    Review of Symptoms  Symptom Assessment (ESAS 0-10 scale)   ESAS 0 1 2 3 4 5 6 7 8 9 10   Pain x             Dyspnea x             Anxiety   x           Nausea x             Depression  x             Anorexia x             Fatigue x             Insomnia x             Restlessness  x             Agitation x             CAM / Delirium __ --  ___+   Constipation     __ --  ___+   Diarrhea           __ --  ___+  Bowel Management Plan (BMP): No    Comments: no c/o pain, nausea, vomiting or shortness of breath    Pain Assessment: no pain     OME in 24 hours:0    Performance Status: 50    ECOG Performance Status Grade: 1 - Ambulates, capable of light work    Physical Exam   Constitutional: She is oriented to person, place, and time. She appears well-developed. No distress.   Cardiovascular: Regular rhythm and normal heart sounds.   Mild tachycardia    Pulmonary/Chest: Effort normal and breath sounds normal. No respiratory distress.   Abdominal: Soft. Bowel sounds are normal. She exhibits no distension. There is no tenderness.   Neurological: She is alert and oriented to person, place, and time.   Skin: Skin is  warm and dry.   Psychiatric: She has a normal mood and affect. Her behavior is normal. Judgment and thought content normal.       Significant Labs: All pertinent labs within the past 24 hours have been reviewed.  CBC:   Recent Labs   Lab 10/21/19  0315   WBC 12.49  12.49   HGB 11.8*  11.8*   HCT 37.8  37.8   MCV 88  88     291     BMP:  Recent Labs   Lab 10/21/19  0315   GLU 97      K 3.9      CO2 15*   BUN 19   CREATININE 0.9   CALCIUM 7.8*   MG 1.8  1.8     LFT:  Lab Results   Component Value Date     (H) 10/21/2019    ALKPHOS 324 (H) 10/21/2019    BILITOT 3.0 (H) 10/21/2019     Albumin:   Albumin   Date Value Ref Range Status   10/21/2019 1.8 (L) 3.5 - 5.2 g/dL Final     Protein:   Total Protein   Date Value Ref Range Status   10/21/2019 6.1 6.0 - 8.4 g/dL Final     Lactic acid:   Lab Results   Component Value Date    LACTATE 2.3 (H) 10/19/2019    LACTATE 1.0 09/13/2019       Significant Imaging: I have reviewed all pertinent imaging results/findings within the past 24 hours.    Advance Care Planning   Advanced Directives::  Living Will: No  LaPOST: No  Do Not Resuscitate Status: No  Medical Power of : No    Decision-Making Capacity: Patient answered questions       Living Arrangements: Lives with family    Psychosocial/Cultural: ,  Three adult children.  Lives with family       Spiritual:     F- Gisel and Belief: Christain    I - Importance:   .  C - Community:     A - Address in Care: amenable to  visits       > 50% of 70  min visit spent in chart review, face to face discussion of goals of care,  symptom assessment, coordination of care and emotional support.    Urvashi Herman, CNS  Palliative Medicine  [facility]

## 2019-10-22 NOTE — SUBJECTIVE & OBJECTIVE
Interval History:     Past Medical History:   Diagnosis Date    Abnormal Pap smear     1984    Anorexia 8/30/2019    Anxiety 10/11/2019    Cancer     Chemotherapy-induced neutropenia 4/1/2015    Dehydration 10/7/2019    Elevated cancer antigen 125 (CA-125) 9/5/2018    Encounter for blood transfusion     FH: breast cancer in relative when <45 years old 3/17/2016    Hyperbilirubinemia 10/8/2019    Hypertension     Hypokalemia 9/9/2019    Other complicated headache syndrome 10/4/2019    Other screening mammogram 9/17/2015    Postmenopausal bleeding     Pulmonary nodules/lesions, multiple 4/25/2019    Rash 9/17/2015    Uterine cancer     Weight loss 12/17/2015    Well woman exam with routine gynecological exam 9/14/2016       Past Surgical History:   Procedure Laterality Date    COLPOSCOPY      DILATION AND CURETTAGE OF UTERUS  10/17/14    ENDOSCOPIC ULTRASOUND OF UPPER GASTROINTESTINAL TRACT N/A 10/8/2019    Procedure: ULTRASOUND, UPPER GI TRACT, ENDOSCOPIC;  Surgeon: Gabe Aguirre MD;  Location: [facility] ([address] [place] [address]);  Service: Endoscopy;  Laterality: N/A;    ERCP N/A 10/8/2019    Procedure: ERCP (ENDOSCOPIC RETROGRADE CHOLANGIOPANCREATOGRAPHY);  Surgeon: Gabe Aguirre MD;  Location: [address]);  Service: Endoscopy;  Laterality: N/A;    HYSTERECTOMY  12/4/2014    radiacl hysterectomy    NM REMOVAL OF OVARY/TUBE(S)         Review of patient's allergies indicates:   Allergen Reactions    Gramicidin d Shortness Of Breath    Hydrochlorothiazide Other (See Comments)     Burning and tingling sensation throughoutt her body    Lisinopril Shortness Of Breath    Promethazine Shortness Of Breath and Nausea And Vomiting    Neosporin [neomycin-bacitracnzn-polymyxnb] Swelling    Polymyxin [bacitracin-polymyxin b] Swelling       Medications:  Continuous Infusions:  Scheduled Meds:   enoxaparin  40 mg Subcutaneous Daily    famotidine  20 mg Oral BID     PRN Meds:acetaminophen, ibuprofen,  iohexol, ipratropium, ondansetron, ondansetron, oxyCODONE-acetaminophen, sodium chloride 0.9%    Family History     Problem Relation (Age of Onset)    Breast cancer Cousin, Other, Maternal Grandmother, Maternal Aunt    Cancer Son    Cataracts Mother, Brother, Maternal Aunt    Heart attack Son    Hypertension Mother, Brother, Brother, Son    Lung cancer Maternal Uncle    No Known Problems Father        Tobacco Use    Smoking status: Never Smoker    Smokeless tobacco: Never Used   Substance and Sexual Activity    Alcohol use: No    Drug use: No    Sexual activity: Not Currently     Birth control/protection: Post-menopausal       Review of Systems   Constitutional: Positive for appetite change.   Eyes: Negative.    Gastrointestinal: Positive for abdominal distention, nausea and vomiting.   Musculoskeletal: Negative.    Neurological: Negative.    Psychiatric/Behavioral: Negative.      Objective:     Vital Signs (Most Recent):  Temp: 98.4 °F (36.9 °C) (10/22/19 1100)  Pulse: (!) 146 (10/22/19 1230)  Resp: (!) 35 (10/22/19 1215)  BP: 113/78 (10/22/19 1200)  SpO2: 98 % (10/22/19 1230) Vital Signs (24h Range):  Temp:  [98.4 °F (36.9 °C)-100.2 °F (37.9 °C)] 98.4 °F (36.9 °C)  Pulse:  [] 146  Resp:  [19-36] 35  SpO2:  [93 %-100 %] 98 %  BP: ()/(61-97) 113/78     Weight: 72 kg (158 lb 11.7 oz)  Body mass index is 29.03 kg/m².    Review of Symptoms  Symptom Assessment (ESAS 0-10 scale)   ESAS 0 1 2 3 4 5 6 7 8 9 10   Pain x             Dyspnea x             Anxiety   x           Nausea x             Depression  x             Anorexia x             Fatigue x             Insomnia x             Restlessness  x             Agitation x             CAM / Delirium __ --  ___+   Constipation     __ --  ___+   Diarrhea           __ --  ___+  Bowel Management Plan (BMP): No    Comments: no c/o pain, nausea, vomiting or shortness of breath    Pain Assessment: no pain     OME in 24 hours:0    Performance Status: 50    ECOG  Performance Status Grade: 1 - Ambulates, capable of light work    Physical Exam   Constitutional: She is oriented to person, place, and time. She appears well-developed. No distress.   Cardiovascular: Regular rhythm and normal heart sounds.   Mild tachycardia    Pulmonary/Chest: Effort normal and breath sounds normal. No respiratory distress.   Abdominal: Soft. Bowel sounds are normal. She exhibits no distension. There is no tenderness.   Neurological: She is alert and oriented to person, place, and time.   Skin: Skin is warm and dry.   Psychiatric: She has a normal mood and affect. Her behavior is normal. Judgment and thought content normal.       Significant Labs: All pertinent labs within the past 24 hours have been reviewed.  CBC:   Recent Labs   Lab 10/21/19  0315   WBC 12.49  12.49   HGB 11.8*  11.8*   HCT 37.8  37.8   MCV 88  88     291     BMP:  No results for input(s): GLU, NA, K, CL, CO2, BUN, CREATININE, CALCIUM, MG in the last 24 hours.  LFT:  Lab Results   Component Value Date     (H) 10/21/2019    ALKPHOS 324 (H) 10/21/2019    BILITOT 3.0 (H) 10/21/2019     Albumin:   Albumin   Date Value Ref Range Status   10/21/2019 1.8 (L) 3.5 - 5.2 g/dL Final     Protein:   Total Protein   Date Value Ref Range Status   10/21/2019 6.1 6.0 - 8.4 g/dL Final     Lactic acid:   Lab Results   Component Value Date    LACTATE 2.3 (H) 10/19/2019    LACTATE 1.0 09/13/2019       Significant Imaging: I have reviewed all pertinent imaging results/findings within the past 24 hours.    Advance Care Planning   Advanced Directives::  Living Will: No  LaPOST: No  Do Not Resuscitate Status: No  Medical Power of : No    Decision-Making Capacity: Patient answered questions       Living Arrangements: Lives with family    Psychosocial/Cultural: ,  Three adult children.  Lives with family       Spiritual:     F- Gisel and Belief: Christain    I - Importance:   .  C - Community:     A - Address in Care:  amenable to  visits

## 2019-10-22 NOTE — PLAN OF CARE
Pt to MRI and CT scan for acute change in mental status.   HR 130s-140  O2 sat >95  Map >65  No early morning labs due to poor venous access. MD notified.   Afebrile  skin breakdown to sacrum. Cleaned with soap and water, foam changed.   No gtts.   Patient stable and now AAOx4.  WCTM

## 2019-10-22 NOTE — PROGRESS NOTES
[facility]  Palliative Medicine  Progress Note    Patient Name: Jessica Gorman  MRN: [id number]  Admission Date: 10/19/2019  Hospital Length of Stay: 2 days  Code Status: Full Code   Attending Provider: Quinton Luna MD  Consulting Provider: EDI Kidd  Primary Care Physician: Rico Amaro MD  Principal Problem:Sinus tachycardia    Patient information was obtained from relative(s), past medical records and ER records.      Assessment/Plan:     Palliative care encounter  Palliative medicine consulted for symptom management   Palliative medicine APRN met with patient and family at bedside. Palliative medicine introduced.    Impression: Mrs. Gorman is a 68 yo lady admitted with intractable nausea and vomiting.  Has hx of metastatic endometrial cancer.  S/P chemotherapy 9/19.  As per patient chemotherapy is on hold at this time.  Transferred to critical care service for evaluation of tachycardia.  She is awake, alert and oriented to person, place time and situation.  At this time is tolerating regular diet with no complaints of nausea, pain, dyspnea. Daughter at bedside concerned tachycardia may be related to anxiety as Mrs. Gorman' son is critically ill and is current hospitalized. distress. Significant event over night - EEG in progress at this time.  Unable to talk with patient.     Symptom Management  Nausea - secondary to chemotherapy for endometrial cancer  Current orders  Metoclopramide 10 mg by mouth every 6 hrs as needed for nausea - 1st choice  - patient's family is concerned with extrapyramidal effects of reglan.    - no tremors or movement effects noted at this time   Ondansetron 4 mg ODT every 6 hrs as needed - second choice.   - Patient reports zofran lost it effectiveness over time.   Recommendation:   - Change  reglan to 10 mg by mouth before meals   - Continue ondansetron as ordered   - Decadron 4 mg by mouth every 12 hrs   Anxiety  - Lorazepam 1 mg by mouth  every 8 hrs as needed for nausea. And or anxiety   Lorazepam has been found to be effective in treating nausea associated with chemotherapy and  Is helpful in lowering  anxiety     Advance Care Planning   Advance Care Planning       -no advanced directives have been received.   - Mrs. Gorman is able to make own decisions.   - Adult children are next of kin for medical decisions, if she is unable.   - Mrs. Gorman has been amenable to completing advanced directives. States her children will make decisions  - Mrs. Gorman appears to have good  Understanding of illness  - Full code per primary team. Patient and family in agreement not to make any changes to resuscitation status.  -            Goals of Care  - Patient reports her chemotherapy is on hold now.  MRI  Indicates  gastric outlet obstruction no additional chemotherapy possible.   - Dr. Watt had ambar discussion with patient and family regarding clinical condition.  Informed patient she would not receive additional chemo and cancer is now end stage.  As per  Dr. Watt patient is actively dying and transition to hospice is appropriate.   - Palliative care has introduced hospice to goals of care when discussing care with daughter.  -  Mrs. Gorman having EEG at this time and is unable to participate in conversation.   - Daughter appeared receptive to conversation regarding  Hospice.    -  Palliative care provided follow up regarding placement of venting g-tube.     - Palliative care will return to have discussion with patient     1:45 PM Palliative medicine APRN returned to bedside for additional goals of care conversation. Daughter and son-in-law at bedside.   - Mrs. Gorman and daughter acknowledged conversation with Dr. Watt this AM.    - Palliative care gently discussed appropriateness of transition to hospice in relationship to clinical condition.  - Appears patient and family are not accepting of this situation which is understandable.   -  Patient states she  "does not want to discuss hospice at this time. Daughter asked palliative care to please return when additional family are present.  Another daughter will be arriving in the evening 10/23/19.    - Daughter requested to have symptom management options at home. Discussed medical management and  the benefits of venting g tube for nausea management.   - Patient and family not amenable to g tube at this time.  - Discussed the benefits of having a comfort plan of care at time of discharge.    - Will contact Gyn Onc  for assistance       Plan/Recommendation  - see above symptom management recommendations.   - Obtain advanced directives  - Palliative medicine will continue to follow.   - If patient is discharged and remains opposed to hospice, recommend home palliative care in addition to home health.     Above goals of care and recommendations discussed with primary team.          I will follow-up with patient. Please contact us if you have any additional questions.    Subjective:     Chief Complaint:   Chief Complaint   Patient presents with    Emesis     Pt c/o of vomting times 3 days. Pt states "i just can't keep anything down." Pt denies any abdominal pain or other complaints. Hx CA with mets       HPI:   HPI obtained from chart review:     Mrs. 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 S/p chemotherapy and radiation. Ad[facility] with intractable nausea and vomiting.        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  Endometrial cancer dx in 2004 - s/p VILLA, chemotherapy and radiation. Metastasis discovered 9/2019 and started on chemotherapy.  Daughter reports she last received taxol and carboplatin with last dose being 9/13/19.  Has been hospitalized twice with intractable nausea and vomiting most " recently 10/7-10/15. At this time she was was found to have a biliary obstruction s/p percutaneous biliary drain.      She was transferred to critical care services for evaluation of tachycardia.   CCS work up for tachycardia: at baseline heart rated 100-130 with an  Asymptomatic epidsode of  overnight.  Stable at this time. PE study 10/12 indicated no evidence of PE.  Oxygen requirements are unchanged as well, TTE w/o RH strain and patient is normotensive, making PE less likely. No infectious source or sx. Thyroid studies previously normal. S/p 4L IVF w/o significant improvement in HR.   - Patient is refusing further attempts at IV access as well as thoracentesis, which is reasonable as PE seems less likely, she is getting full anticoagulation and she does not have increased oxygen requirements or dyspnea    Mrs. Gorman states she is very worried about her son who is also in the critical care unit.  Daughter reports concerns that worry and anxiety may be contributing to the tachycardia.  Daughter also expresses some concern for extrapyramidal side effects of the nausea medicines.      Palliative medicine consulted for symptom management - nausea.   At time of this assessment Mrs. Gorman has no complaints of N/V tolerating regular diet  pain, shortness of breath and is in no acute distress. Will likely be stepped down to the floor.            Hospital Course:  No notes on file    Interval History:     Past Medical History:   Diagnosis Date    Abnormal Pap smear     1984    Anorexia 8/30/2019    Anxiety 10/11/2019    Cancer     Chemotherapy-induced neutropenia 4/1/2015    Dehydration 10/7/2019    Elevated cancer antigen 125 (CA-125) 9/5/2018    Encounter for blood transfusion     FH: breast cancer in relative when <45 years old 3/17/2016    Hyperbilirubinemia 10/8/2019    Hypertension     Hypokalemia 9/9/2019    Other complicated headache syndrome 10/4/2019    Other screening mammogram 9/17/2015     Postmenopausal bleeding     Pulmonary nodules/lesions, multiple 4/25/2019    Rash 9/17/2015    Uterine cancer     Weight loss 12/17/2015    Well woman exam with routine gynecological exam 9/14/2016       Past Surgical History:   Procedure Laterality Date    COLPOSCOPY      DILATION AND CURETTAGE OF UTERUS  10/17/14    ENDOSCOPIC ULTRASOUND OF UPPER GASTROINTESTINAL TRACT N/A 10/8/2019    Procedure: ULTRASOUND, UPPER GI TRACT, ENDOSCOPIC;  Surgeon: Gabe Aguirre MD;  Location: [facility] ([address] [place] [address]);  Service: Endoscopy;  Laterality: N/A;    ERCP N/A 10/8/2019    Procedure: ERCP (ENDOSCOPIC RETROGRADE CHOLANGIOPANCREATOGRAPHY);  Surgeon: Gabe Aguirre MD;  [facility]  Service: Endoscopy;  Laterality: N/A;    HYSTERECTOMY  12/4/2014    radiacl hysterectomy    ID REMOVAL OF OVARY/TUBE(S)         Review of patient's allergies indicates:   Allergen Reactions    Gramicidin d Shortness Of Breath    Hydrochlorothiazide Other (See Comments)     Burning and tingling sensation throughoutt her body    Lisinopril Shortness Of Breath    Promethazine Shortness Of Breath and Nausea And Vomiting    Neosporin [neomycin-bacitracnzn-polymyxnb] Swelling    Polymyxin [bacitracin-polymyxin b] Swelling       Medications:  Continuous Infusions:  Scheduled Meds:   enoxaparin  40 mg Subcutaneous Daily    famotidine  20 mg Oral BID     PRN Meds:acetaminophen, ibuprofen, iohexol, ipratropium, ondansetron, ondansetron, oxyCODONE-acetaminophen, sodium chloride 0.9%    Family History     Problem Relation (Age of Onset)    Breast cancer Cousin, Other, Maternal Grandmother, Maternal Aunt    Cancer Son    Cataracts Mother, Brother, Maternal Aunt    Heart attack Son    Hypertension Mother, Brother, Brother, Son    Lung cancer Maternal Uncle    No Known Problems Father        Tobacco Use    Smoking status: Never Smoker    Smokeless tobacco: Never Used   Substance and Sexual Activity    Alcohol use: No     Drug use: No    Sexual activity: Not Currently     Birth control/protection: Post-menopausal       Review of Systems   Constitutional: Positive for appetite change.   Eyes: Negative.    Gastrointestinal: Positive for abdominal distention, nausea and vomiting.   Musculoskeletal: Negative.    Neurological: Negative.    Psychiatric/Behavioral: Negative.      Objective:     Vital Signs (Most Recent):  Temp: 98.4 °F (36.9 °C) (10/22/19 1100)  Pulse: (!) 146 (10/22/19 1230)  Resp: (!) 35 (10/22/19 1215)  BP: 113/78 (10/22/19 1200)  SpO2: 98 % (10/22/19 1230) Vital Signs (24h Range):  Temp:  [98.4 °F (36.9 °C)-100.2 °F (37.9 °C)] 98.4 °F (36.9 °C)  Pulse:  [] 146  Resp:  [19-36] 35  SpO2:  [93 %-100 %] 98 %  BP: ()/(61-97) 113/78     Weight: 72 kg (158 lb 11.7 oz)  Body mass index is 29.03 kg/m².    Review of Symptoms  Symptom Assessment (ESAS 0-10 scale)   ESAS 0 1 2 3 4 5 6 7 8 9 10   Pain x             Dyspnea x             Anxiety   x           Nausea x             Depression  x             Anorexia x             Fatigue x             Insomnia x             Restlessness  x             Agitation x             CAM / Delirium __ --  ___+   Constipation     __ --  ___+   Diarrhea           __ --  ___+  Bowel Management Plan (BMP): No    Comments: no c/o pain, nausea, vomiting or shortness of breath    Pain Assessment: no pain     OME in 24 hours:0    Performance Status: 50    ECOG Performance Status Grade: 1 - Ambulates, capable of light work    Physical Exam   Constitutional: She is oriented to person, place, and time. She appears well-developed. No distress.   Cardiovascular: Regular rhythm and normal heart sounds.   Mild tachycardia    Pulmonary/Chest: Effort normal and breath sounds normal. No respiratory distress.   Abdominal: Soft. Bowel sounds are normal. She exhibits no distension. There is no tenderness.   Neurological: She is alert and oriented to person, place, and time.   Skin: Skin is warm and  dry.   Psychiatric: She has a normal mood and affect. Her behavior is normal. Judgment and thought content normal.       Significant Labs: All pertinent labs within the past 24 hours have been reviewed.  CBC:   Recent Labs   Lab 10/21/19  0315   WBC 12.49  12.49   HGB 11.8*  11.8*   HCT 37.8  37.8   MCV 88  88     291     BMP:  No results for input(s): GLU, NA, K, CL, CO2, BUN, CREATININE, CALCIUM, MG in the last 24 hours.  LFT:  Lab Results   Component Value Date     (H) 10/21/2019    ALKPHOS 324 (H) 10/21/2019    BILITOT 3.0 (H) 10/21/2019     Albumin:   Albumin   Date Value Ref Range Status   10/21/2019 1.8 (L) 3.5 - 5.2 g/dL Final     Protein:   Total Protein   Date Value Ref Range Status   10/21/2019 6.1 6.0 - 8.4 g/dL Final     Lactic acid:   Lab Results   Component Value Date    LACTATE 2.3 (H) 10/19/2019    LACTATE 1.0 09/13/2019       Significant Imaging: I have reviewed all pertinent imaging results/findings within the past 24 hours.    Advance Care Planning   Advanced Directives::  Living Will: No  LaPOST: No  Do Not Resuscitate Status: No  Medical Power of : No    Decision-Making Capacity: Patient answered questions       Living Arrangements: Lives with family    Psychosocial/Cultural: ,  Three adult children.  Lives with family       Spiritual:     F- Gisel and Belief: Christain    I - Importance:   .  C - Community:     A - Address in Care: amenable to  visits       > 50% of 90  min visit spent in chart review, face to face discussion of goals of care,  symptom assessment, coordination of care and emotional support.    GINA Liriano, ACNS-BC  Palliative Medicine  [facility]  Ext [phone]

## 2019-10-22 NOTE — HPI
68 y/o female who  was Dx in 2004 with endometrial Ca- she had complete hysterectomy with chemotherapy and radiation, On 09/2019 she found out that she had mets to the liver and lung - she started Chemotherapy and last round was 09/13/209- following the chemotherapy she has been admitted for intractable N/V Patient has presented with similar complaints in the past with most recent admission from 10/7-10/15 at which time a percutaneous biliary drain was placed for biliary obstruction. Previously underwent CTA chest on 10/12 which was negative for acute PE. Was transferred to MICU due sinus tachycardia.   Tonight around 2235 patient with AMS and non verbal, moving all extremities. Stroke code called upon arrival patient more with it and talking some once CT scan complete patient back to normal and at baseline. Did no do CTA head and neck multiphase as patient back to baseline.   Discussed case with Dr Harrison and as CT scan came back with possible hypodensity in the right gil sudha recommendation is to get MRI/MRA brain and neck    Risk factors cancer with mets, sinus tachycardia, HTN

## 2019-10-22 NOTE — ASSESSMENT & PLAN NOTE
Palliative medicine consulted for symptom management   Palliative medicine APRN met with patient and family at bedside. Palliative medicine introduced.    Impression: Mrs. Gorman is a 70 yo lady admitted with intractable nausea and vomiting.  Has hx of metastatic endometrial cancer.  S/P chemotherapy 9/19.  As per patient chemotherapy is on hold at this time.  Transferred to critical care service for evaluation of tachycardia.  She is awake, alert and oriented to person, place time and situation.  At this time is tolerating regular diet with no complaints of nausea, pain, dyspnea. Daughter at bedside concerned tachycardia may be related to anxiety as Mrs. Gorman' son is critically ill and is current hospitalized. distress.     Symptom Management  Nausea - secondary to chemotherapy for endometrial cancer  Current orders  Metoclopramide 10 mg by mouth every 6 hrs as needed for nausea - 1st choice  - patient's family is concerned with extrapyramidal effects of reglan.    - no tremors or movement effects noted at this time   Ondansetron 4 mg ODT every 6 hrs as needed - second choice.   - Patient reports zofran lost it effectiveness over time.   Recommendation:   - Consider changing reglan to 10 mg by mouth before meals   - Continue ondansetron as ordered   - Consider addition of Lorazepam 1 mg by mouth every 8 hrs as needed for nausea.  Lorazepam has been found to be effective in treating nausea associated with chemotherapy and lower anxiety     Bowel Management:   - Patient has opiate pain medications and side effect associated with ondansetron is constipation.   - no bowel management orders   - recommend senna one tab by mouth daily as needed for constipation    Advance Care Planning     -no advanced directives have been received.   - Mrs. Gorman is able to make own decisions.   - Adult children are next of kin for medical decisions, if she is unable.   - Mrs. Gorman appears to have good  Understanding of illness  -  Full code per primary team. Patient and family in agreement       Goals of Care  - Patient reports her chemotherapy is on hold now.  It is her hope that when the nausea is resolved she will continue with her chemotherapy.     Plan/Recommendation  - see above symptom management recommendations.   - Obtain advanced directives  - Palliative medicine building rapport with patient and family.  Will continue to follow for symptom management and advanced care planning.     Above goals of care and recommendations to be discussed with primary team.

## 2019-10-22 NOTE — CONSULTS
[facility]  Vascular Neurology  Comprehensive Stroke Center  Consult Note    Consults  Assessment/Plan:     Patient is a 69 y.o. year old female with:    Altered mental status  68 y/o female who was Dx in 2004 with endometrial Ca had episode of altered mental status last night. Code stroke was called and patient taken to CT. CT completed with concern of hypoattenuation in the right gil sudha. MRI recommended for further stroke work up.    MRI completed - motion limited but no concern for acute intracranial abnormality identified, MRA head with no high-grade stenosis noted.     EEG completed - results pending     No further vascular neurology needed at this time. At this time we will sign off. Please call with any concerns or questions.     Palliative care encounter  Palliative consulted  Patient refusing further interventions           STROKE DOCUMENTATION     Acute Stroke Times   Last Known Normal Date: 10/21/19  Last Known Normal Time: 2240  Symptom Onset Date: 10/21/19  Symptom Onset Time: 2240  Stroke Team Called Date: 10/21/19  Stroke Team Called Time: 2247  Stroke Team Arrival Date: 10/21/19  Stroke Team Arrival Time: 1050    NIH Scale:  1a. Level of Consciousness: 0-->Alert, keenly responsive  1b. LOC Questions: 2-->Answers neither question correctly  1c. LOC Commands: 1-->Performs one task correctly  2. Best Gaze: 0-->Normal  3. Visual: 0-->No visual loss  4. Facial Palsy: 0-->Normal symmetrical movements  5a. Motor Arm, Left: 1-->Drift, limb holds 90 (or 45) degrees, but drifts down before full 10 seconds, does not hit bed or other support  5b. Motor Arm, Right: 1-->Drift, limb holds 90 (or 45) degrees, but drifts down before full 10 secs, does not hit bed or other support  6a. Motor Leg, Left: 1-->Drift, leg falls by the end of the 5-sec period but does not hit bed  6b. Motor Leg, Right: 1-->Drift, leg falls by the end of the 5-sec period but does not hit bed  7. Limb Ataxia:  0-->Absent  8. Sensory: 0-->Normal, no sensory loss  9. Best Language: 1-->Mild-to-moderate aphasia, some obvious loss of fluency or facility of comprehension, without significant limitation on ideas expressed or form of expression. Reduction of speech and/or comprehension, however, makes conversation. . . (see row details)  10. Dysarthria: 0-->Normal  11. Extinction and Inattention (formerly Neglect): 0-->No abnormality  Total (NIH Stroke Scale): 8    Modified Holcomb Score: 3  [place] Coma Scale:    ABCD2 Score:    [id number] Score:   HAS -BLED Score:   ICH Score:   Hunt & Parmar Classification:       Thrombolysis Candidate? No, Strong suspicion for stroke mimic or alternative diagnosis     Delays to Thrombolysis?  No    Interventional Revascularization Candidate?   Is the patient eligible for mechanical endovascular reperfusion (LEN)?  No; No significant neurological deficit      Hemorrhagic change of an Ischemic Stroke: Does this patient have an ischemic stroke with hemorrhagic changes? No     Subjective:     History of Present Illness:  68 y/o female who  was Dx in 2004 with endometrial Ca- she had complete hysterectomy with chemotherapy and radiation, On 09/2019 she found out that she had mets to the liver and lung - she started Chemotherapy and last round was 09/13/209- following the chemotherapy she has been admitted for intractable N/V Patient has presented with similar complaints in the past with most recent admission from 10/7-10/15 at which time a percutaneous biliary drain was placed for biliary obstruction. Previously underwent CTA chest on 10/12 which was negative for acute PE. Was transferred to MICU due sinus tachycardia.   Tonight around 2235 patient with AMS and non verbal, moving all extremities. Stroke code called upon arrival patient more with it and talking some once CT scan complete patient back to normal and at baseline. Did no do CTA head and neck multiphase as patient back to baseline.    Discussed case with Dr Harrison and as CT scan came back with possible hypodensity in the right gil sudha recommendation is to get MRI/MRA brain and neck    Risk factors cancer with mets, sinus tachycardia, HTN        Past Medical History:   Diagnosis Date    Abnormal Pap smear     1984    Anorexia 8/30/2019    Anxiety 10/11/2019    Cancer     Chemotherapy-induced neutropenia 4/1/2015    Dehydration 10/7/2019    Elevated cancer antigen 125 (CA-125) 9/5/2018    Encounter for blood transfusion     FH: breast cancer in relative when <45 years old 3/17/2016    Hyperbilirubinemia 10/8/2019    Hypertension     Hypokalemia 9/9/2019    Other complicated headache syndrome 10/4/2019    Other screening mammogram 9/17/2015    Postmenopausal bleeding     Pulmonary nodules/lesions, multiple 4/25/2019    Rash 9/17/2015    Uterine cancer     Weight loss 12/17/2015    Well woman exam with routine gynecological exam 9/14/2016     Past Surgical History:   Procedure Laterality Date    COLPOSCOPY      DILATION AND CURETTAGE OF UTERUS  10/17/14    ENDOSCOPIC ULTRASOUND OF UPPER GASTROINTESTINAL TRACT N/A 10/8/2019    Procedure: ULTRASOUND, UPPER GI TRACT, ENDOSCOPIC;  Surgeon: Gabe Aguirre MD;  Location: [facility] ([address] [place] [address]);  Service: Endoscopy;  Laterality: N/A;    ERCP N/A 10/8/2019    Procedure: ERCP (ENDOSCOPIC RETROGRADE CHOLANGIOPANCREATOGRAPHY);  Surgeon: Gabe Aguirre MD;  Location: [facility] ([address] [place] [address]);  Service: Endoscopy;  Laterality: N/A;    HYSTERECTOMY  12/4/2014    radiacl hysterectomy    MI REMOVAL OF OVARY/TUBE(S)       Family History   Problem Relation Age of Onset    Hypertension Mother     Cataracts Mother     No Known Problems Father     Hypertension Brother     Hypertension Brother     Cancer Son     Hypertension Son     Heart attack Son     Cataracts Brother     Breast cancer Cousin         maternal cousin. early 30's when diagnosed.     Breast cancer Other         sister's  child. diagnosed in her 30s.     Breast cancer Maternal Grandmother     Breast cancer Maternal Aunt     Cataracts Maternal Aunt     Lung cancer Maternal Uncle     Colon cancer Neg Hx     Ovarian cancer Neg Hx     Uterine cancer Neg Hx      Social History     Tobacco Use    Smoking status: Never Smoker    Smokeless tobacco: Never Used   Substance Use Topics    Alcohol use: No    Drug use: No     Review of patient's allergies indicates:   Allergen Reactions    Gramicidin d Shortness Of Breath    Hydrochlorothiazide Other (See Comments)     Burning and tingling sensation throughoutt her body    Lisinopril Shortness Of Breath    Promethazine Shortness Of Breath and Nausea And Vomiting    Neosporin [neomycin-bacitracnzn-polymyxnb] Swelling    Polymyxin [bacitracin-polymyxin b] Swelling       Medications: I have reviewed the current medication administration record.    Medications Prior to Admission   Medication Sig Dispense Refill Last Dose    guaifenesin-codeine 100-10 mg/5 ml (TUSSI-ORGANIDIN NR)  mg/5 mL syrup Take 5 mLs by mouth every 6 (six) hours as needed for Cough. 236 mL 0 10/19/2019    metoclopramide HCl (REGLAN) 10 MG tablet Take 1 tablet (10 mg total) by mouth 2 (two) times daily. 120 tablet 1 10/19/2019    ALPRAZolam (XANAX) 0.5 MG tablet Take 1 tablet (0.5 mg total) by mouth 3 (three) times daily as needed for Anxiety (breakthrough anxiety not relieved by ativan). 30 tablet 1     amLODIPine (NORVASC) 10 MG tablet Take 1 tablet (10 mg total) by mouth once daily. 90 tablet 0 More than a month at Unknown time    dexAMETHasone (DECADRON) 4 MG Tab Take 5 tablets every 6 hours for 24 hours prior to chemotherapy 20 tablet 4 More than a month at Unknown time    megestrol (MEGACE ES) 625 mg/5 mL Susp Take 5 mLs (625 mg total) by mouth once daily. 150 mL 11 More than a month at Unknown time    multivitamin (ONE DAILY MULTIVITAMIN) per tablet Take 1 tablet by mouth once daily.   10/6/2019  at Unknown time    ondansetron (ZOFRAN-ODT) 8 MG TbDL Take 1 tablet (8 mg total) by mouth every 12 (twelve) hours as needed. 30 tablet 1 10/7/2019 at Unknown time    oxyCODONE-acetaminophen (PERCOCET) 5-325 mg per tablet Take 1 tablet by mouth every 4 (four) hours as needed for Pain. 30 tablet 0     potassium bicarbonate & potassium chloride (K-LYTE CL) 25 mEq TbEF Take 1 tablet (25 mEq total) by mouth once daily. 30 tablet 3 Unknown at Unknown time    potassium chloride SA (K-DUR,KLOR-CON) 20 MEQ tablet Take 1 tablet (20 mEq total) by mouth once daily. 30 tablet 3 Past Week at Unknown time    ranitidine (ZANTAC) 150 MG tablet Take 1 tablet (150 mg total) by mouth 2 (two) times daily. 60 tablet 11     simethicone (MYLICON) 80 MG chewable tablet Take 1 tablet (80 mg total) by mouth 3 (three) times daily as needed for Flatulence.  0        Review of Systems   Constitutional: Negative for chills and fever.   HENT: Negative for ear discharge and ear pain.    Eyes: Negative for pain and itching.   Respiratory: Positive for cough and shortness of breath.    Gastrointestinal: Positive for nausea and vomiting.   Genitourinary: Negative for difficulty urinating and dyspareunia.   Neurological: Positive for speech difficulty.     Objective:     Vital Signs (Most Recent):  Temp: 98.9 °F (37.2 °C) (10/21/19 1500)  Pulse: (!) 146 (10/22/19 0040)  Resp: (!) 24 (10/22/19 0011)  BP: 120/67 (10/22/19 0040)  SpO2: 95 % (10/22/19 0040)    Vital Signs Range (Last 24H):  Temp:  [98.3 °F (36.8 °C)-98.9 °F (37.2 °C)]   Pulse:  [135-158]   Resp:  [20-34]   BP: ()/(49-93)   SpO2:  [90 %-100 %]     Physical Exam   Constitutional: She is oriented to person, place, and time.   Ill looking female   HENT:   Head: Normocephalic and atraumatic.   Eyes: Pupils are equal, round, and reactive to light. EOM are normal.   Neck: Normal range of motion.   Cardiovascular:   tacycardia   Pulmonary/Chest: Effort normal.   Abdominal: Soft.    Neurological: She is alert and oriented to person, place, and time.   Nursing note and vitals reviewed.      Neurological Exam:   LOC: alert  Attention Span: Good   Language: No aphasia  Articulation: No dysarthria  Orientation: Person, Place, Time   Visual Fields: Full  EOM (CN III, IV, VI): Full/intact  Pupils (CN II, III): PERRL  Facial Sensation (CN V): Normal  Facial Movement (CN VII): Symmetric facial expression    Gag Reflex: present  Reflexes: flexor plantar responses bilaterally  Motor: Arm left  Normal 5/5  Leg left  Normal 5/5  Arm right  Normal 5/5  Leg right Normal 5/5  Cebellar: No evidence of appendicular or axial ataxia  Sensation: Intact to light touch, temperature and vibration  Tone: Normal tone throughout      Laboratory:  CMP:   Recent Labs   Lab 10/21/19  0315   CALCIUM 7.8*   ALBUMIN 1.8*   PROT 6.1      K 3.9   CO2 15*      BUN 19   CREATININE 0.9   ALKPHOS 324*   ALT 57*   *   BILITOT 3.0*     CBC:   Recent Labs   Lab 10/21/19  0315   WBC 12.49  12.49   RBC 4.32  4.32   HGB 11.8*  11.8*   HCT 37.8  37.8     291   MCV 88  88   MCH 27.3  27.3   MCHC 31.2*  31.2*     Lipid Panel: No results for input(s): CHOL, LDLCALC, HDL, TRIG in the last 168 hours.  Coagulation:   Recent Labs   Lab 10/21/19  0315   INR 1.5*   APTT 35.8*     Hgb A1C: No results for input(s): HGBA1C in the last 168 hours.  TSH:   Recent Labs   Lab 10/21/19  0315   TSH 0.391*       Diagnostic Results:      Brain imaging:  MRI brain and MRA head/neck  No acute intracranial abnormality identified, allowing for motion limitations.  No detrimental change when compared with 10/13/2019.    No large vessel occlusion identified in the head or neck arteries.    CT head w/o contrast 10-21-19 results:  Vague area of hypoattenuation within the right gil-sudha.  This is concerning for an area of age-indeterminate infarction.  MRI of the brain is suggested for further evaluation.    Vessel  Imaging:      Cardiac Evaluation:   EKG 1-21-19 results:  Sinus tachycardia  Low voltage QRS  Nonspecific T wave abnormality  Abnormal ECG  When compared with ECG of 19-OCT-2019 21:24,  No significant change was found  Confirmed by HOLLY BUITRAGO MD ([id number]) on 10/21/2019 9:58:27 AM    2D Echo 10-20-19 results:  · Normal left ventricular systolic function. The estimated ejection fraction is 60% with sinus rate in upper 140s.  · Local segmental wall motion abnormalities.  · Normal LV diastolic function.  · Normal right ventricular systolic function.  · Mild tricuspid regurgitation.      Marian Nesbitt NP  Comprehensive Stroke Center  Department of Vascular Neurology   [facility]

## 2019-10-22 NOTE — PROGRESS NOTES
MD to bedside for afternoon assessment.    Patient laying comfortably in bed, AOX3. She states that she has been comfortable since assessment this morning. She was much more conversant and was able to verbalize answers without difficulty. No chest pain, shortness of breath or palpitations. She reports no abdominal pain. Has not been eating very much after conversation with staff attending regarding palliative care however denies nausea or vomiting. Has been ambulating to the restroom with assistance. No dysuria or hematuria.    Temp:  [98.4 °F (36.9 °C)-100.2 °F (37.9 °C)] 98.4 °F (36.9 °C)  Pulse:  [] 144  Resp:  [19-44] 31  SpO2:  [91 %-100 %] 100 %  BP: ()/(61-97) 111/73    Physical Exam   Constitutional: She is well-developed, well-nourished, and in no distress. No distress.   HENT:   EJ in place   Pulmonary/Chest: Effort normal and breath sounds normal. No respiratory distress.   Abdominal: Soft. She exhibits no distension. There is no tenderness.   Skin: Skin is warm and dry. She is not diaphoretic. No erythema.   Right sided antecubital swelling and skin peeling from contrast extravasation during CT   Psychiatric: Mood, memory, affect and judgment normal.     ASSESSMENT/PLAN:  - Patient comfortable, in stable condition.   - Still undecided regarding goals of care > discussion with staff today regarding patient's status and need to transition to hospice as patient is actively dying   - EEG results, pending   - Palliative care yet to see patient today, appreciate recommendations and assistance with end of life care as full examination appears to not be able to be performed earlier today as patient was completing EEG  - As in agreement with critical care team, will step patient down to unit later today  - Continue to monitor     Christen Mackey MD  OB/GYN  PGY-2

## 2019-10-22 NOTE — ASSESSMENT & PLAN NOTE
"- MRCP done MRCP shows   "Cholelithiasis, mild intrahepatic biliary dilatation, and mild dilatation of the common bile duct with relatively abrupt tapering at the level of the pancreatic head.  No filling defects identified or obstructing lesion in the pancreatic head, noting evaluation limited without IV contrast/ MRCP protocol., multiple liver lesions, large amount of ascites."  - AES consulted in 10/09 ERCP performed and unable to advance scope into CBD.  IR placed PTC 10/09  - Gyn Onc and Palliative Care recommending g-tube - patient states she needs time to think about it  "

## 2019-10-22 NOTE — ASSESSMENT & PLAN NOTE
- Chronic sinus tachycardia, been seen by Dr. Winters 09/17 which her HR been 110's - 130's, TSH wnl and CTA negative for PE, ECG shows sinus tach which is felt is due to a compensatory response to volume depletion (hyperadrenergic state) her anti hypertensive was held. Pt was also seen by PCP 09/24 who though it was related to chemotherapy.    - Pt was tachycardic since admission ~150's with increased in trops > cardiology consulted.    Exam: ~ 87% on RA > 92% on 1 L NC-  L.E pitting edema.  Labs: Wbc 12 - UA >13 wbc - Echo showed NL EF with local wall abonromality   DDx: PE vs Volume depletion vs infection .  - S/p ~4,500 cc of IVF.  US LE showing partially occlusive saphenous vein thrombus. CTA attempted but PIV extravasated with failed additional attempts at PIVs. Patient does not want a central line or repeat CTA attempts.     Plan:    1- On lovenox 40 ppx  2- Follow up infectious work up with blood cultures.  3- Strict In/outs.  4- Follow up TSH level.

## 2019-10-22 NOTE — PROGRESS NOTES
[facility]  Obstetrics & Gynecology  Progress Note    Patient Name: Jessica Gorman  MRN: [id number]  Admission Date: 10/19/2019  Primary Care Provider: Rico Amaro MD  Principal Problem: Sinus tachycardia    Subjective:     Interval History:   Patient with acute mental status change last night at approx 2245. Patient's daughter heard her making noises in her sleep and when she attempted to check on her, noticed that her mother was not responding. Code rapid was called on the patient. When the team arrived at bedside, patient was vocal and had returned to baseline mental status. BG at the time was 142. Given concern for stroke, MRI of brain and MRA of neck was performed. The patient again did become nonverbal on the way to the scan however, was AOX4 when she returned.    This AM, patient continues to be disoriented. Aphasic and unable to answer commands. She is able to making moaning noises in attempts to respond to questions however is nonverbal. She is not oriented to person, place or time. Attempting to open eyes when asked however unable. Withdrawals to pain. Vitals stable, O2 sat 100% on 2L  NC.     Scheduled Meds:   enoxaparin  40 mg Subcutaneous Daily    famotidine  20 mg Oral BID     Continuous Infusions:  PRN Meds:acetaminophen, ibuprofen, iohexol, ipratropium, ondansetron, ondansetron, oxyCODONE-acetaminophen, sodium chloride 0.9%    Review of patient's allergies indicates:   Allergen Reactions    Gramicidin d Shortness Of Breath    Hydrochlorothiazide Other (See Comments)     Burning and tingling sensation throughoutt her body    Lisinopril Shortness Of Breath    Promethazine Shortness Of Breath and Nausea And Vomiting    Neosporin [neomycin-bacitracnzn-polymyxnb] Swelling    Polymyxin [bacitracin-polymyxin b] Swelling       Objective:     Vital Signs (Most Recent):  Temp: 100.2 °F (37.9 °C) (10/22/19 0300)  Pulse: (!) 136 (10/22/19 0500)  Resp: 20 (10/22/19 0500)  BP:  114/65 (10/22/19 0500)  SpO2: 98 % (10/22/19 0500) Vital Signs (24h Range):  Temp:  [98.7 °F (37.1 °C)-100.2 °F (37.9 °C)] 100.2 °F (37.9 °C)  Pulse:  [] 136  Resp:  [19-34] 20  SpO2:  [93 %-100 %] 98 %  BP: ()/(49-97) 114/65     Weight: 72 kg (158 lb 11.7 oz)  Body mass index is 29.03 kg/m².  No LMP recorded. Patient has had a hysterectomy.    I&O (Last 24H):    Intake/Output Summary (Last 24 hours) at 10/22/2019 0556  Last data filed at 10/22/2019 0500  Gross per 24 hour   Intake --   Output 195 ml   Net -195 ml       Physical Exam:   Constitutional: She is oriented to person, place, and time. She appears well-developed and well-nourished.   Patient laying in bed comfortably with 2 L NC in place    HENT:   Head: Normocephalic and atraumatic.      Cardiovascular: Regular rhythm.    Tachycardic in the 140s    Pulmonary/Chest: Effort normal. No respiratory distress.        Abdominal: Soft. She exhibits no distension and no abdominal incision.   Appears to be more distended and tense compared to previous examinations             Musculoskeletal: Normal range of motion and moves all extremeties.   Lower extremity, 2+ edema bilaterally. No calf tenderness or erythema       Neurological: She is alert and oriented to person, place, and time. No cranial nerve deficit.    Skin: Skin is warm.    Psychiatric:   Not oriented to person, place or time. Alert with prompting however, non-verbal. Attempting to make moaning noises in response to questions. Withdraws to painful stimuli.     Laboratory:  ABGs:   Recent Labs   Lab 10/20/19  2044   PH 7.369   PCO2 30.6*   PO2 94   HCO3 17.6*   POCSATURATED 97   BE -8     CBC:   Recent Labs   Lab 10/21/19  0315   WBC 12.49  12.49   RBC 4.32  4.32   HGB 11.8*  11.8*   HCT 37.8  37.8     291   MCV 88  88   MCH 27.3  27.3   MCHC 31.2*  31.2*     CMP:   Recent Labs   Lab 10/21/19  0315   GLU 97   CALCIUM 7.8*   ALBUMIN 1.8*   PROT 6.1      K 3.9   CO2 15*       BUN 19   CREATININE 0.9   ALKPHOS 324*   ALT 57*   *   BILITOT 3.0*     Diagnostic Results:  Imaging Results          CT Abdomen Pelvis  Without Contrast (Final result)  Result time 10/20/19 00:32:28   Procedure changed from CT Abdomen Pelvis With Contrast     Final result by Hector De Paz MD (10/20/19 00:32:28)                 Impression:      Moderate distension of the stomach but no findings of small bowel obstruction.    Moderate volume loculated ascites and mesenteric/omental nodularity in keeping with peritoneal metastatic disease.    Large right and moderate left pleural effusions with associated adjacent atelectasis, mildly increased from prior exam.    Interval placement of an external-internal biliary drain with a small amount of expected pneumobilia.    Grossly stable appearance of hepatic lesions and pelvic adenopathy.    Worsening anasarca.    Additional stable findings, as above.    Electronically signed by resident: Carol Fernandez  Date:    10/19/2019  Time:    23:34    Electronically signed by: Hector De Paz MD  Date:    10/20/2019  Time:    00:32             Narrative:    EXAMINATION:  CT ABDOMEN PELVIS WITHOUT CONTRAST    CLINICAL HISTORY:  hx uterine cancer, intractable vomiting, concern for bowel obstruction;    TECHNIQUE:  5.0 mm axial CT images of the abdomen and pelvis were obtained via helical, multi detector CT technique.  No intravenous contrast material was administered.    COMPARISON:  CTA chest 10/12/2019, chest abdomen pelvis 10/03/2019.  Abdominal MRI, 10/07/2019.    FINDINGS:  Heart: No cardiomegaly or pericardial effusion.    Lung Bases: Large right and moderate left pleural effusions, mildly increased from prior exam, with associated adjacent compressive atelectasis.    Liver: Normal in size with multiple large ill-defined hypodense lesions scattered throughout, better characterized on recent abdominal MRI.  Punctate foci of air within the liver are  likely biliary related to recent biliary drain placement.    Gallbladder: Nondistended and therefore not well evaluated.  Contains a punctate focus of air, likely related to biliary drain placement.    Bile Ducts: Interval placement of an external-internal biliary drain with a small amount of expected pneumobilia.  No significant biliary ductal dilatation.    Pancreas: No pancreatic mass lesion or peripancreatic inflammatory change.    Spleen: Unremarkable.    Adrenals: Unremarkable.    Genitourinary: Normal in size and location.  No obvious renal masses, nephrolithiasis, or hydroureteronephrosis.  Bladder demonstrates smooth contours without bladder wall thickening.    Reproductive organs: Status post hysterectomy.    GI tract/Mesentery: Stomach is moderately distended and overall appears similar to recent prior studies.  Hyperdense material scattered throughout the colon, likely related to PO contrast given for prior CT 10/03/2019.  No evidence of small-bowel obstruction.  Evaluation for bowel inflammation is significantly limited by absence of IV contrast and the presence of diffuse edema and ascites.    Peritoneal Space: Moderate volume of mildly loculated ascites, similar to prior exam.  Stable peritoneal and omental nodularity suggestive of peritoneal metastatic disease.  No intraperitoneal free air.    Retroperitoneum: No significant adenopathy.    Abdominal wall: A fluid density lesion is again noted in the left inguinal region which could represent a necrotic lymph node or fluid within a hernia defect.  This lesion measures 2.9 x 2.3 cm in axial dimensions and is overall stable when compared with the recent prior study.  Additional prominent inguinal lymph nodes bilaterally, stable from prior exam.  Mildly enlarged left pelvic sidewall lymph node measuring up to 1.3 cm in short axis.  Diffuse body wall edema, slightly increased from prior exam.    Vasculature: Abdominal aorta is normal in caliber without  significant calcific atherosclerosis.    Bones: Mild degenerative changes.  No acute acute fracture or bony destructive process.                                Assessment/Plan:     Active Diagnoses:    Diagnosis Date Noted POA    PRINCIPAL PROBLEM:  Sinus tachycardia [R00.0] 09/14/2019 Yes    Hypoalbuminemia [E88.09] 10/21/2019 Yes    Pleural effusion [J90] 10/21/2019 Yes    Biliary obstruction [K83.1] 10/21/2019 Yes    Goals of care, counseling/discussion [Z71.89]  Not Applicable    Advanced care planning/counseling discussion [Z71.89]  Not Applicable    Chemotherapy induced nausea and vomiting [R11.2, T45.1X5A] 10/20/2019 Yes    Elevated troponin [R79.89] 10/20/2019 No    Palliative care encounter [Z51.5] 09/14/2016 Not Applicable    Endometrial carcinoma, 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 Nausea and vomiting  - Patient presenting with nausea and vomiting over the past 3 days, unable to tolerate solid foods however, tolerating liquids without difficulty  - Was IV reglan 10 mg PRN, well controlled; tolerating PO without any issue   - Medication was discontinued due to altered mental status. Please see below  - CMP: electrolytes WNL, Mag/Phos: WNL  - AST/ALT: 204/61>217/57; INR: 1.5  - s/p 4 grams Mag replacement, stable   - GI was consulted given patient currently has percutaneous biliary stent who recommended possible duodenal stent placement with AES   - Will discuss with staff regarding further management; while patient has not vomited, she has per this AMs exam had more distension in abdomen and has had decreased drain output (only approx 10 ccs since admission) on setting of altered mental status    2. Tachycardia  - Asymptomatic; patient denies any shortness of breath, palpitations or chest pain  - Pulse:  [] 147  - s/p 5 L fluids since admission; currently held   - CT abdomen performed in ED which showed bilateral pleural effusions; no evidence  of SBO   - CT not performed as contrast extravasated and no access except EJ at this time  - Will attempt midline for further access however patient currently refuses  - Per critical care team: LE US showed partially occlusive thrombus of L greater saphenous vein   - Currently on lovenox 40 mg daily as concern for hemorrhagic stroke     - Previously on lovenox 60 mg BID for concern for PE  - Strict I/Os to be charted by nursing staff  - Encouraged use of IS  - PT/OT orders placed    3. Altered mental status  - Patient with acute change in mental status yesterday at approx 2245  - BG was 142 at the time of event  - Patient last had metoclopramide at approx 1000 on 10/21  - Electrolytes as of yesterday were WNL   - Consider repeat labs to look for electrolyte changes   - MRI and MRA was negative  - Will continue to monitor for changes in status    Christen Mackey MD  Obstetrics & Gynecology  [facility]        I have seen and examined the patient.   I have reviewed the MR as I have been out since her admission.     I had a ambar discussion with patient and her daughters.   I discussed with them that she has gastric outlet obstruction. Thus preventing further chemotherapy.   Her performance status is poor.     I have recommended hospice care. She initially agreed to this but then wanted to think more about this.  I also discussed g-tube placement and she wants to think about this.     I told them that in my opinion she was actively dying.   I recommended hospice/palliative to them.     They will discuss and let me know that decision they make.     KANDI CRUZ MD

## 2019-10-22 NOTE — SUBJECTIVE & OBJECTIVE
Interval History/Significant Events: Aphasic overnight, stroke code called. MRI/MRA negative. Glucose normal at the time of the event. Back to baseline afterwards. CT head with findings, but MRI brain, MRA brain, and MRA neck without acute findings. Patient deferring IVs for labwork and medications. She states she would like to be kept comfortable.     Review of Systems   Constitutional: Positive for activity change. Negative for appetite change, chills and fever.   HENT: Negative for congestion.    Eyes: Negative for pain and itching.   Respiratory: Negative for cough, chest tightness and shortness of breath.    Cardiovascular: Positive for palpitations and leg swelling. Negative for chest pain.   Gastrointestinal: Negative for abdominal pain, diarrhea, nausea and vomiting.   Endocrine: Negative for polyphagia and polyuria.   Genitourinary: Negative for dysuria and frequency.   Musculoskeletal: Negative for back pain.   Neurological: Negative for numbness and headaches.   Psychiatric/Behavioral: Negative for agitation and behavioral problems. The patient is nervous/anxious.      Objective:     Vital Signs (Most Recent):  Temp: 98.8 °F (37.1 °C) (10/22/19 0700)  Pulse: (!) 143 (10/22/19 0917)  Resp: (!) 31 (10/22/19 0917)  BP: (!) 119/91 (10/22/19 0700)  SpO2: 96 % (10/22/19 0917) Vital Signs (24h Range):  Temp:  [98.7 °F (37.1 °C)-100.2 °F (37.9 °C)] 98.8 °F (37.1 °C)  Pulse:  [] 143  Resp:  [19-36] 31  SpO2:  [93 %-100 %] 96 %  BP: ()/(49-97) 119/91   Weight: 72 kg (158 lb 11.7 oz)  Body mass index is 29.03 kg/m².      Intake/Output Summary (Last 24 hours) at 10/22/2019 0920  Last data filed at 10/22/2019 0700  Gross per 24 hour   Intake --   Output 245 ml   Net -245 ml       Physical Exam   Constitutional: She is oriented to person, place, and time. She appears well-developed.   HENT:   Head: Normocephalic.   Neck: Normal range of motion.   Left IJ   Cardiovascular: Regular rhythm and normal heart  sounds.   No murmur heard.  tachycardic    Pulmonary/Chest: Effort normal and breath sounds normal.   Abdominal: Soft. Bowel sounds are normal. She exhibits no distension. There is no tenderness.   RUQ drain   Musculoskeletal: Normal range of motion. She exhibits edema.   Neurological: She is alert and oriented to person, place, and time. No cranial nerve deficit or sensory deficit.   5/5 strength throughout   Skin: Skin is warm and dry.   Psychiatric: She has a normal mood and affect. Her behavior is normal.       Vents:  Oxygen Concentration (%): 24 (10/21/19 0604)  Lines/Drains/Airways     Drain                 Biliary Tube RLQ -- days         Biliary Tube 10/10/19 1500 RLQ 11 days          Peripheral Intravenous Line                 Peripheral IV - Single Lumen 10/19/19 2200 20 G 2 days              Significant Labs:    CBC/Anemia Profile:  Recent Labs   Lab 10/21/19  0315   WBC 12.49  12.49   HGB 11.8*  11.8*   HCT 37.8  37.8     291   MCV 88  88   RDW 20.4*  20.4*        Chemistries:  Recent Labs   Lab 10/20/19  1041 10/21/19  0315   NA  --  140   K  --  3.9   CL  --  109   CO2  --  15*   BUN  --  19   CREATININE  --  0.9   CALCIUM  --  7.8*   ALBUMIN  --  1.8*   PROT  --  6.1   BILITOT  --  3.0*   ALKPHOS  --  324*   ALT  --  57*   AST  --  217*   MG 1.4* 1.8  1.8   PHOS 3.8 4.1  4.1       All pertinent labs within the past 24 hours have been reviewed.    Significant Imaging:  I have reviewed and interpreted all pertinent imaging results/findings within the past 24 hours.

## 2019-10-22 NOTE — PLAN OF CARE
VSS, HR remains ST at 130-140s. Pt remains on room air with an O2 sat of %. No Gtts. Biliary tube: 0 ml output.  UOP: 100 ml + 2 occurrences. No BM during shift. Pt is handling a regular diet well and has not c/o any nausea or vomiting . Pt has not c/o any pain, just weakness. Pt is AAO x 4, can move all extremities and follow all commands. Pt intermittently cannot express herself, but when asks questions can answer then appropriately. Pt continues to state she does not want a thoracentesis, CTA, adam and/or a central line placed. The CC team is aware of the treatments/tests she desires not to have. Palliative team is consulted and had a discuss about plan of care with the pt and her family today. The pt and her family plan to think about what they want, continue hospital care or hospice, and give the team an answer tomorrow.  Plan of care was reviewed with the pt and family and all questions and concerns were addressed. Will continue to monitor pt.                Problem: Fall Injury Risk  Goal: Absence of Fall and Fall-Related Injury  Outcome: Ongoing, Progressing     Problem: Adult Inpatient Plan of Care  Goal: Plan of Care Review  Description  PMHx: Endometrial cancer with liver/lung metastasis, dehydration    10/19: ED nausea and vomiting. -160s. 3 L NS bolus.   10/20: worsening tachypnea and wheezing  10/21: admit to SICU. 1 L LR.    10/22: CT and MRI    Nursing:   MAP >65      Outcome: Ongoing, Progressing  Goal: Optimal Comfort and Wellbeing  Outcome: Ongoing, Progressing  Goal: Readiness for Transition of Care  Outcome: Ongoing, Progressing  Goal: Rounds/Family Conference  Outcome: Ongoing, Progressing     Problem: Coping Ineffective  Goal: Effective Coping  Outcome: Ongoing, Progressing     Problem: Skin Injury Risk Increased  Goal: Skin Health and Integrity  Outcome: Ongoing, Progressing

## 2019-10-22 NOTE — CODE/ RAPID DOCUMENTATION
RAPID RESPONSE NURSE STROKE CODE NOTE     Admit Date: 10/19/2019  LOS: 1  Code Status: Full Code   Date of Consult: 10/21/2019  : 1950  Age: 69 y.o.  Weight:   Wt Readings from Last 1 Encounters:   10/20/19 62.6 kg (138 lb)     Sex: female  Race: Black or    Bed: 48797 CVICU/02269 CVICU A:   MRN: [id number]  Time Rapid Response Team page Received: 5  Time Rapid Response Team at Bedside:   Time Rapid Response Team left Bedside:   Was the patient discharged from an ICU this admission?   no  Was the patient discharged from a PACU within last 24 hours?  no  Did the patient receive conscious sedation/general anesthesia within last 24 hours?  no  Was the patient in the ED within the past 24 hours?  no  Was the patient started on NIPPV within the past 24 hours?  no  Did this progress into an ARC or CPA:  no  Attending Physician: Quinton Luna MD  Primary Service: Networked reference to record PCT   Consult Requested By: Quinton Luna MD     SITUATION     Reason for Call: Stroke code initiated d/t change in mental status per pts family and primary RN. Pt was AAOx4, and following commands at baseline. Pts family noticed pt was nonverbal and notified primary RN. On arrival to room, SICU providers at bedside. Pt vocal at this time w/ no aphasia noted. No other stroke symptoms noted. . Liscum, NP at bedside and CTH ordered. On the way to scan, pt nonverbal again. After transfer to CT table and after scan, pt AAOx4 and following commands. Pt transported back to [facility].     Called to evaluate the patient for Neuro    BACKGROUND     Why is the patient in the hospital?: Sinus tachycardia  Patient has a past medical history of Abnormal Pap smear, Anorexia, Anxiety, Cancer, Chemotherapy-induced neutropenia, Dehydration, Elevated cancer antigen 125 (CA-125), Encounter for blood transfusion, FH: breast cancer in relative when <45 years old, Hyperbilirubinemia, Hypertension,  Hypokalemia, Other complicated headache syndrome, Other screening mammogram, Postmenopausal bleeding, Pulmonary nodules/lesions, multiple, Rash, Uterine cancer, Weight loss, and Well woman exam with routine gynecological exam.    ASSESSMENT     Last know well time: 2230    Glucose time: 2248   Glucose result: 142    Time Stroke Code initiated: 2245  Stroke team Arrival time: 2247  Stroke Code activation triggers: nonverbal, not following commands   Vascular Neurology provider you spoke with: Vanita Denise NP    Time arrived at CT: 2305  Time CT completed: 2308    VAN positive or negative: negative  VAN Test    tPA decision: no  tPA bolus (mg and time):  tPA infusion (mg and time):  tPA end time:    IR decision: no  IR arrival:  IR end time:     RECOMMENDATIONS     We recommend: continue monitoring neuro status and notify provider if an acute changes are noted    FOLLOW-UP/CONTINGENCY PLAN     Patient needs a second visit at : n/a - in ICU    Call the Rapid Response Nurse, Wil Almonte, RN at [phone] for additional questions or concerns.      PHYSICIAN ESCALATION     Orders received and case discussed with BLAKE Denise    Disposition: Remain in room 56722.

## 2019-10-22 NOTE — SUBJECTIVE & OBJECTIVE
Interval History:     Past Medical History:   Diagnosis Date    Abnormal Pap smear     1984    Anorexia 8/30/2019    Anxiety 10/11/2019    Cancer     Chemotherapy-induced neutropenia 4/1/2015    Dehydration 10/7/2019    Elevated cancer antigen 125 (CA-125) 9/5/2018    Encounter for blood transfusion     FH: breast cancer in relative when <45 years old 3/17/2016    Hyperbilirubinemia 10/8/2019    Hypertension     Hypokalemia 9/9/2019    Other complicated headache syndrome 10/4/2019    Other screening mammogram 9/17/2015    Postmenopausal bleeding     Pulmonary nodules/lesions, multiple 4/25/2019    Rash 9/17/2015    Uterine cancer     Weight loss 12/17/2015    Well woman exam with routine gynecological exam 9/14/2016       Past Surgical History:   Procedure Laterality Date    COLPOSCOPY      DILATION AND CURETTAGE OF UTERUS  10/17/14    ENDOSCOPIC ULTRASOUND OF UPPER GASTROINTESTINAL TRACT N/A 10/8/2019    Procedure: ULTRASOUND, UPPER GI TRACT, ENDOSCOPIC;  Surgeon: Gabe Aguirre MD;  Location: [facility] ([address] [place] [address]);  Service: Endoscopy;  Laterality: N/A;    ERCP N/A 10/8/2019    Procedure: ERCP (ENDOSCOPIC RETROGRADE CHOLANGIOPANCREATOGRAPHY);  Surgeon: Gabe Aguirre MD;  Location: [address]);  Service: Endoscopy;  Laterality: N/A;    HYSTERECTOMY  12/4/2014    radiacl hysterectomy    CO REMOVAL OF OVARY/TUBE(S)         Review of patient's allergies indicates:   Allergen Reactions    Gramicidin d Shortness Of Breath    Hydrochlorothiazide Other (See Comments)     Burning and tingling sensation throughoutt her body    Lisinopril Shortness Of Breath    Promethazine Shortness Of Breath and Nausea And Vomiting    Neosporin [neomycin-bacitracnzn-polymyxnb] Swelling    Polymyxin [bacitracin-polymyxin b] Swelling       Medications:  Continuous Infusions:  Scheduled Meds:   enoxaparin  40 mg Subcutaneous Daily    famotidine  20 mg Oral BID     PRN Meds:acetaminophen, ibuprofen,  iohexol, ipratropium, metoclopramide HCl, ondansetron, ondansetron, oxyCODONE-acetaminophen, sodium chloride 0.9%    Family History     Problem Relation (Age of Onset)    Breast cancer Cousin, Other, Maternal Grandmother, Maternal Aunt    Cancer Son    Cataracts Mother, Brother, Maternal Aunt    Heart attack Son    Hypertension Mother, Brother, Brother, Son    Lung cancer Maternal Uncle    No Known Problems Father        Tobacco Use    Smoking status: Never Smoker    Smokeless tobacco: Never Used   Substance and Sexual Activity    Alcohol use: No    Drug use: No    Sexual activity: Not Currently     Birth control/protection: Post-menopausal       Review of Systems   Constitutional: Positive for appetite change.   Eyes: Negative.    Gastrointestinal: Positive for abdominal distention, nausea and vomiting.   Musculoskeletal: Negative.    Neurological: Negative.    Psychiatric/Behavioral: Negative.      Objective:     Vital Signs (Most Recent):  Temp: 98.9 °F (37.2 °C) (10/21/19 1500)  Pulse: (!) 141 (10/21/19 1800)  Resp: (!) 23 (10/21/19 1800)  BP: 107/78 (10/21/19 1800)  SpO2: 99 % (10/21/19 1800) Vital Signs (24h Range):  Temp:  [98 °F (36.7 °C)-98.9 °F (37.2 °C)] 98.9 °F (37.2 °C)  Pulse:  [135-165] 141  Resp:  [16-34] 23  SpO2:  [90 %-100 %] 99 %  BP: ()/(49-78) 107/78     Weight: 62.6 kg (138 lb)  Body mass index is 25.24 kg/m².    Review of Symptoms  Symptom Assessment (ESAS 0-10 scale)   ESAS 0 1 2 3 4 5 6 7 8 9 10   Pain x             Dyspnea x             Anxiety   x           Nausea x             Depression  x             Anorexia x             Fatigue x             Insomnia x             Restlessness  x             Agitation x             CAM / Delirium __ --  ___+   Constipation     __ --  ___+   Diarrhea           __ --  ___+  Bowel Management Plan (BMP): No    Comments: no c/o pain, nausea, vomiting or shortness of breath    Pain Assessment: no pain     OME in 24 hours:0    Performance  Status: 50    ECOG Performance Status Grade: 1 - Ambulates, capable of light work    Physical Exam   Constitutional: She is oriented to person, place, and time. She appears well-developed. No distress.   Cardiovascular: Regular rhythm and normal heart sounds.   Mild tachycardia    Pulmonary/Chest: Effort normal and breath sounds normal. No respiratory distress.   Abdominal: Soft. Bowel sounds are normal. She exhibits no distension. There is no tenderness.   Neurological: She is alert and oriented to person, place, and time.   Skin: Skin is warm and dry.   Psychiatric: She has a normal mood and affect. Her behavior is normal. Judgment and thought content normal.       Significant Labs: All pertinent labs within the past 24 hours have been reviewed.  CBC:   Recent Labs   Lab 10/21/19  0315   WBC 12.49  12.49   HGB 11.8*  11.8*   HCT 37.8  37.8   MCV 88  88     291     BMP:  Recent Labs   Lab 10/21/19  0315   GLU 97      K 3.9      CO2 15*   BUN 19   CREATININE 0.9   CALCIUM 7.8*   MG 1.8  1.8     LFT:  Lab Results   Component Value Date     (H) 10/21/2019    ALKPHOS 324 (H) 10/21/2019    BILITOT 3.0 (H) 10/21/2019     Albumin:   Albumin   Date Value Ref Range Status   10/21/2019 1.8 (L) 3.5 - 5.2 g/dL Final     Protein:   Total Protein   Date Value Ref Range Status   10/21/2019 6.1 6.0 - 8.4 g/dL Final     Lactic acid:   Lab Results   Component Value Date    LACTATE 2.3 (H) 10/19/2019    LACTATE 1.0 09/13/2019       Significant Imaging: I have reviewed all pertinent imaging results/findings within the past 24 hours.    Advance Care Planning   Advanced Directives::  Living Will: No  LaPOST: No  Do Not Resuscitate Status: No  Medical Power of : No    Decision-Making Capacity: Patient answered questions       Living Arrangements: Lives with family    Psychosocial/Cultural: ,  Three adult children.  Lives with family       Spiritual:     F- Gisel and Belief: Christain    I -  Importance:   .  C - Community:     A - Address in Care: amenable to  visits

## 2019-10-22 NOTE — PROGRESS NOTES
I have seen and examined the patient.   I have reviewed the MR as I have been out since her admission.     I had a ambar discussion with patient and her daughters.   I discussed with them that she has gastric outlet obstruction. Thus preventing further chemotherapy.   Her performance status is poor.     I have recommended hospice care. She initially agreed to this but then wanted to think more about this.  I also discussed g-tube placement and she wants to think about this.     I told them that in my opinion she was actively dying.   I recommended hospice/palliative to them.     They will discuss and let me know that decision they make.     KANDI CRUZ MD

## 2019-10-22 NOTE — SUBJECTIVE & OBJECTIVE
Past Medical History:   Diagnosis Date    Abnormal Pap smear     1984    Anorexia 8/30/2019    Anxiety 10/11/2019    Cancer     Chemotherapy-induced neutropenia 4/1/2015    Dehydration 10/7/2019    Elevated cancer antigen 125 (CA-125) 9/5/2018    Encounter for blood transfusion     FH: breast cancer in relative when <45 years old 3/17/2016    Hyperbilirubinemia 10/8/2019    Hypertension     Hypokalemia 9/9/2019    Other complicated headache syndrome 10/4/2019    Other screening mammogram 9/17/2015    Postmenopausal bleeding     Pulmonary nodules/lesions, multiple 4/25/2019    Rash 9/17/2015    Uterine cancer     Weight loss 12/17/2015    Well woman exam with routine gynecological exam 9/14/2016     Past Surgical History:   Procedure Laterality Date    COLPOSCOPY      DILATION AND CURETTAGE OF UTERUS  10/17/14    ENDOSCOPIC ULTRASOUND OF UPPER GASTROINTESTINAL TRACT N/A 10/8/2019    Procedure: ULTRASOUND, UPPER GI TRACT, ENDOSCOPIC;  Surgeon: Gabe Aguirre MD;  Location: [facility] ([address] [place] [address]);  Service: Endoscopy;  Laterality: N/A;    ERCP N/A 10/8/2019    Procedure: ERCP (ENDOSCOPIC RETROGRADE CHOLANGIOPANCREATOGRAPHY);  Surgeon: Gabe Aguirre MD;  Location: [facility] ([address] [place] [address]);  Service: Endoscopy;  Laterality: N/A;    HYSTERECTOMY  12/4/2014    radiacl hysterectomy    GA REMOVAL OF OVARY/TUBE(S)       Family History   Problem Relation Age of Onset    Hypertension Mother     Cataracts Mother     No Known Problems Father     Hypertension Brother     Hypertension Brother     Cancer Son     Hypertension Son     Heart attack Son     Cataracts Brother     Breast cancer Cousin         maternal cousin. early 30's when diagnosed.     Breast cancer Other         sister's child. diagnosed in her 30s.     Breast cancer Maternal Grandmother     Breast cancer Maternal Aunt     Cataracts Maternal Aunt     Lung cancer Maternal Uncle     Colon cancer Neg Hx     Ovarian cancer Neg Hx      Uterine cancer Neg Hx      Social History     Tobacco Use    Smoking status: Never Smoker    Smokeless tobacco: Never Used   Substance Use Topics    Alcohol use: No    Drug use: No     Review of patient's allergies indicates:   Allergen Reactions    Gramicidin d Shortness Of Breath    Hydrochlorothiazide Other (See Comments)     Burning and tingling sensation throughoutt her body    Lisinopril Shortness Of Breath    Promethazine Shortness Of Breath and Nausea And Vomiting    Neosporin [neomycin-bacitracnzn-polymyxnb] Swelling    Polymyxin [bacitracin-polymyxin b] Swelling       Medications: I have reviewed the current medication administration record.    Medications Prior to Admission   Medication Sig Dispense Refill Last Dose    guaifenesin-codeine 100-10 mg/5 ml (TUSSI-ORGANIDIN NR)  mg/5 mL syrup Take 5 mLs by mouth every 6 (six) hours as needed for Cough. 236 mL 0 10/19/2019    metoclopramide HCl (REGLAN) 10 MG tablet Take 1 tablet (10 mg total) by mouth 2 (two) times daily. 120 tablet 1 10/19/2019    ALPRAZolam (XANAX) 0.5 MG tablet Take 1 tablet (0.5 mg total) by mouth 3 (three) times daily as needed for Anxiety (breakthrough anxiety not relieved by ativan). 30 tablet 1     amLODIPine (NORVASC) 10 MG tablet Take 1 tablet (10 mg total) by mouth once daily. 90 tablet 0 More than a month at Unknown time    dexAMETHasone (DECADRON) 4 MG Tab Take 5 tablets every 6 hours for 24 hours prior to chemotherapy 20 tablet 4 More than a month at Unknown time    megestrol (MEGACE ES) 625 mg/5 mL Susp Take 5 mLs (625 mg total) by mouth once daily. 150 mL 11 More than a month at Unknown time    multivitamin (ONE DAILY MULTIVITAMIN) per tablet Take 1 tablet by mouth once daily.   10/6/2019 at Unknown time    ondansetron (ZOFRAN-ODT) 8 MG TbDL Take 1 tablet (8 mg total) by mouth every 12 (twelve) hours as needed. 30 tablet 1 10/7/2019 at Unknown time    oxyCODONE-acetaminophen (PERCOCET) 5-325 mg per  tablet Take 1 tablet by mouth every 4 (four) hours as needed for Pain. 30 tablet 0     potassium bicarbonate & potassium chloride (K-LYTE CL) 25 mEq TbEF Take 1 tablet (25 mEq total) by mouth once daily. 30 tablet 3 Unknown at Unknown time    potassium chloride SA (K-DUR,KLOR-CON) 20 MEQ tablet Take 1 tablet (20 mEq total) by mouth once daily. 30 tablet 3 Past Week at Unknown time    ranitidine (ZANTAC) 150 MG tablet Take 1 tablet (150 mg total) by mouth 2 (two) times daily. 60 tablet 11     simethicone (MYLICON) 80 MG chewable tablet Take 1 tablet (80 mg total) by mouth 3 (three) times daily as needed for Flatulence.  0        Review of Systems   Constitutional: Negative for chills and fever.   HENT: Negative for ear discharge and ear pain.    Eyes: Negative for pain and itching.   Respiratory: Positive for cough and shortness of breath.    Gastrointestinal: Positive for nausea and vomiting.   Genitourinary: Negative for difficulty urinating and dyspareunia.   Neurological: Positive for speech difficulty.     Objective:     Vital Signs (Most Recent):  Temp: 98.9 °F (37.2 °C) (10/21/19 1500)  Pulse: (!) 146 (10/22/19 0040)  Resp: (!) 24 (10/22/19 0011)  BP: 120/67 (10/22/19 0040)  SpO2: 95 % (10/22/19 0040)    Vital Signs Range (Last 24H):  Temp:  [98.3 °F (36.8 °C)-98.9 °F (37.2 °C)]   Pulse:  [135-158]   Resp:  [20-34]   BP: ()/(49-93)   SpO2:  [90 %-100 %]     Physical Exam   Constitutional: She is oriented to person, place, and time.   Ill looking female   HENT:   Head: Normocephalic and atraumatic.   Eyes: Pupils are equal, round, and reactive to light. EOM are normal.   Neck: Normal range of motion.   Cardiovascular:   tacycardia   Pulmonary/Chest: Effort normal.   Abdominal: Soft.   Neurological: She is alert and oriented to person, place, and time.   Nursing note and vitals reviewed.      Neurological Exam:   LOC: alert  Attention Span: Good   Language: No aphasia  Articulation: No  dysarthria  Orientation: Person, Place, Time   Visual Fields: Full  EOM (CN III, IV, VI): Full/intact  Pupils (CN II, III): PERRL  Facial Sensation (CN V): Normal  Facial Movement (CN VII): Symmetric facial expression    Gag Reflex: present  Reflexes: flexor plantar responses bilaterally  Motor: Arm left  Normal 5/5  Leg left  Normal 5/5  Arm right  Normal 5/5  Leg right Normal 5/5  Cebellar: No evidence of appendicular or axial ataxia  Sensation: Intact to light touch, temperature and vibration  Tone: Normal tone throughout      Laboratory:  CMP:   Recent Labs   Lab 10/21/19  0315   CALCIUM 7.8*   ALBUMIN 1.8*   PROT 6.1      K 3.9   CO2 15*      BUN 19   CREATININE 0.9   ALKPHOS 324*   ALT 57*   *   BILITOT 3.0*     CBC:   Recent Labs   Lab 10/21/19  0315   WBC 12.49  12.49   RBC 4.32  4.32   HGB 11.8*  11.8*   HCT 37.8  37.8     291   MCV 88  88   MCH 27.3  27.3   MCHC 31.2*  31.2*     Lipid Panel: No results for input(s): CHOL, LDLCALC, HDL, TRIG in the last 168 hours.  Coagulation:   Recent Labs   Lab 10/21/19  0315   INR 1.5*   APTT 35.8*     Hgb A1C: No results for input(s): HGBA1C in the last 168 hours.  TSH:   Recent Labs   Lab 10/21/19  0315   TSH 0.391*       Diagnostic Results:      Brain imaging:  CT head w/o contrast 10-21-19 results:  Vague area of hypoattenuation within the right gil-sudha.  This is concerning for an area of age-indeterminate infarction.  MRI of the brain is suggested for further evaluation.    Vessel Imaging:      Cardiac Evaluation:   EKG 1-21-19 results:  Sinus tachycardia  Low voltage QRS  Nonspecific T wave abnormality  Abnormal ECG  When compared with ECG of 19-OCT-2019 21:24,  No significant change was found  Confirmed by HOLLY BUITRAGO MD ([id number]) on 10/21/2019 9:58:27 AM    2D Echo 10-20-19 results:  · Normal left ventricular systolic function. The estimated ejection fraction is 60% with sinus rate in upper 140s.  · Local segmental wall motion  abnormalities.  · Normal LV diastolic function.  · Normal right ventricular systolic function.  · Mild tricuspid regurgitation.

## 2019-10-23 PROCEDURE — 99233 PR SUBSEQUENT HOSPITAL CARE,LEVL III: ICD-10-PCS | Mod: GC,,, | Performed by: INTERNAL MEDICINE

## 2019-10-23 PROCEDURE — 63600175 PHARM REV CODE 636 W HCPCS: Performed by: STUDENT IN AN ORGANIZED HEALTH CARE EDUCATION/TRAINING PROGRAM

## 2019-10-23 PROCEDURE — 25000003 PHARM REV CODE 250: Performed by: STUDENT IN AN ORGANIZED HEALTH CARE EDUCATION/TRAINING PROGRAM

## 2019-10-23 PROCEDURE — 99231 SBSQ HOSP IP/OBS SF/LOW 25: CPT | Mod: GC,,, | Performed by: OBSTETRICS & GYNECOLOGY

## 2019-10-23 PROCEDURE — 99231 PR SUBSEQUENT HOSPITAL CARE,LEVL I: ICD-10-PCS | Mod: GC,,, | Performed by: OBSTETRICS & GYNECOLOGY

## 2019-10-23 PROCEDURE — 20000000 HC ICU ROOM

## 2019-10-23 PROCEDURE — 99233 SBSQ HOSP IP/OBS HIGH 50: CPT | Mod: GC,,, | Performed by: INTERNAL MEDICINE

## 2019-10-23 PROCEDURE — 63600175 PHARM REV CODE 636 W HCPCS: Performed by: EMERGENCY MEDICINE

## 2019-10-23 PROCEDURE — 94761 N-INVAS EAR/PLS OXIMETRY MLT: CPT

## 2019-10-23 RX ORDER — ONDANSETRON 8 MG/1
8 TABLET, ORALLY DISINTEGRATING ORAL EVERY 8 HOURS PRN
Qty: 30 TABLET | Refills: 1 | Status: SHIPPED | OUTPATIENT
Start: 2019-10-23 | End: 2020-10-22

## 2019-10-23 RX ORDER — IPRATROPIUM BROMIDE 0.5 MG/2.5ML
500 SOLUTION RESPIRATORY (INHALATION) EVERY 6 HOURS PRN
Qty: 62.5 ML | Refills: 0 | Status: SHIPPED | OUTPATIENT
Start: 2019-10-23 | End: 2020-10-22

## 2019-10-23 RX ORDER — ALPRAZOLAM 0.25 MG/1
0.25 TABLET ORAL ONCE AS NEEDED
Status: COMPLETED | OUTPATIENT
Start: 2019-10-23 | End: 2019-10-23

## 2019-10-23 RX ORDER — LORAZEPAM 1 MG/1
1 TABLET ORAL EVERY 8 HOURS PRN
Qty: 30 TABLET | Refills: 0 | Status: SHIPPED | OUTPATIENT
Start: 2019-10-23 | End: 2019-11-22

## 2019-10-23 RX ADMIN — ALPRAZOLAM 0.25 MG: 0.25 TABLET ORAL at 06:10

## 2019-10-23 RX ADMIN — LORAZEPAM 0.5 MG: 2 INJECTION INTRAMUSCULAR; INTRAVENOUS at 09:10

## 2019-10-23 NOTE — ASSESSMENT & PLAN NOTE
Family meeting with gyn oncology & palliative care earlier today. See hospital summary. Daughters ultimately decided on home with hospice but were resistant to DNR status.

## 2019-10-23 NOTE — ASSESSMENT & PLAN NOTE
- Chronic sinus tachycardia, been seen by Dr. Winters 09/17 which her HR been 110's - 130's, TSH wnl and CTA negative for PE, ECG shows sinus tach which is felt is due to a compensatory response to volume depletion (hyperadrenergic state) her anti hypertensive was held. Pt was also seen by PCP 09/24 who though it was related to chemotherapy.    - Pt was tachycardic since admission ~150's with increased in trops > cardiology consulted.    Exam: ~ 87% on RA > 92% on 1 L NC-  L.E pitting edema.  Labs: Wbc 12 - UA >13 wbc - Echo showed NL EF with local wall abonromality   DDx: PE vs Volume depletion vs infection .  - S/p ~4,500 cc of IVF.  US LE showing partially occlusive saphenous vein thrombus. CTA attempted but PIV extravasated with failed additional attempts at PIVs. Patient does not want a central line or repeat CTA attempts.     Plan:    1- On lovenox 40 ppx  2- Follow up infectious work up with blood cultures. 10/21 blood culture with Staph aureus. Patient without fevers & rigors. Likely contaminant. Further discussion pending about antibiotics and goals of care.   3- Strict In/outs.  4- TSH of 0.391 but T4 1.47    10/21 Blood culture of Staph aureus was discussed with patient's family along with goals of care. It was discussed that the organism is most likely a contaminant. They did not want Ms. Gorman to have any IVs or receive any antibiotics through an IV. Ms. Gorman is also having very poor oral intake with nausea & vomiting. It was discussed that oral antibiotics could exacerbate her symptoms and even make her worse.

## 2019-10-23 NOTE — PLAN OF CARE
Received a call from Yas Leone, RN liaison for [facility], informing SW that pt is current w/[facility] and will need to be discharged from home health before pt can go on hospice services.     Jameson Augustin LMSW  [facility]  X [phone]

## 2019-10-23 NOTE — ASSESSMENT & PLAN NOTE
Palliative care and gyn onc have both had ongoing discussions with patient about goals of care. Patient reported to ICU team she wants to be comfortable and does not want any IVs for labs or medications. She did not want central line for CTA after first attempt at CTA resulted in extravasation. Patient still undecided about DNR/DNI and hospice.   - gyn onc, ICU team, and palliative care plan to further discuss DNR/DNI, goals of care, hospice in the AM  - palliative care recommending the following:  - Change  reglan to 10 mg by mouth before meals - will hold for now due to patient and family's request (they believe it has caused hallucinations in the past)  - Continue ondansetron as ordered   - Decadron 4 mg by mouth every 12 hrs - will hold for now due to concern for altered mental status but will reassess in the AM. And patient does not want IV.   - Lorazepam 1 mg by mouth every 8 hrs as needed for nausea. And or anxiety   Lorazepam has been found to be effective in treating nausea associated with chemotherapy and  Is helpful in lowering  anxiety - holding at this time due to concern for altered mental status but will reassess in the AM

## 2019-10-23 NOTE — PROGRESS NOTES
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    Pt had no complaints, her N/V had improved since admission, she was able to eat and tolerated it -  Denies shortness of breath, palpitations, chest pain, nausea, diaphoresis.     Per daughter, pt was Dx in 2004 with endometrial Ca- she had complete hysterectomy with chemotherapy and radiation, On 09/2019 she found out that she had mets to the liver and lung - she started Chemotherapy and last round was 09/13/209- following the chemotherapy she has been admitted for intractable N/V Patient has presented with similar complaints in the past with most recent admission from 10/7-10/15 at which time a percutaneous biliary drain was placed for biliary obstruction. Previously underwent CTA chest on 10/12 which was negative for acute PE.        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     10/21 overnight patient was aphasic and was possibly hallucinating. Stroke code called. MRI brain, MRA brain, MRA neck on 10/22 all showing no acute intracranial  abnormality identified, allowing for motion limitations.  No detrimental change when compared with 10/13/2019. No large vessel occlusion identified in the head or neck arteries. Patient returned to baseline and AAOx4 soon after. EEG results pending. Patient deferring IV access for labs and medications. She and her daughter would like to hold reglan as they believe it has caused her to have hallucinations in the past. Palliative care seeing patient. Patient with gastric outlet obstruction. Gyn Onc reported nothing to offer for patient and recommending hospice. DNR/DNI was brought up by Gyn Onc, and patient wants to think about this overnight.       Interval History/Significant Events: Ongoing goals of care discussion with daughters. Patient had noted difficulty while eating her breakfast this morning. She had anxiety overnight for which she got xanax.     Review of Systems   Constitutional: Positive for activity change. Negative for appetite change, chills and fever.   HENT: Negative for congestion.    Eyes: Negative for pain and itching.   Respiratory: Negative for cough, chest tightness and shortness of breath.    Cardiovascular: Positive for palpitations and leg swelling. Negative for chest pain.   Gastrointestinal: Negative for abdominal pain, diarrhea, nausea and vomiting.   Endocrine: Negative for polyphagia and polyuria.   Genitourinary: Negative for dysuria and frequency.   Musculoskeletal: Negative for back pain.   Neurological: Negative for numbness and headaches.   Psychiatric/Behavioral: Negative for agitation and behavioral problems. The patient is nervous/anxious.      Objective:     Vital Signs (Most Recent):  Temp: 99.9 °F (37.7 °C) (10/23/19 1100)  Pulse: (!) 145 (10/23/19 1215)  Resp: (!) 27 (10/23/19 1215)  BP: (!) 101/59 (10/23/19 1215)  SpO2: 97 % (10/23/19 1215) Vital Signs (24h Range):  Temp:  [97.6 °F (36.4 °C)-99.9 °F (37.7 °C)] 99.9 °F (37.7 °C)  Pulse:  [141-161] 145  Resp:  [21-50]  27  SpO2:  [91 %-100 %] 97 %  BP: ()/(53-79) 101/59   Weight: 76.3 kg (168 lb 3.4 oz)  Body mass index is 30.77 kg/m².      Intake/Output Summary (Last 24 hours) at 10/23/2019 1311  Last data filed at 10/23/2019 0701  Gross per 24 hour   Intake --   Output 0 ml   Net 0 ml       Physical Exam   Constitutional: She is oriented to person, place, and time. She appears well-developed.   HENT:   Head: Normocephalic.   Neck: Normal range of motion.   Left IJ   Cardiovascular: Regular rhythm and normal heart sounds.   No murmur heard.  tachycardic    Pulmonary/Chest: Effort normal and breath sounds normal.   Abdominal: Soft. Bowel sounds are normal. She exhibits no distension. There is no tenderness.   RUQ drain   Musculoskeletal: Normal range of motion. She exhibits edema.   Neurological: She is alert and oriented to person, place, and time. No cranial nerve deficit or sensory deficit.   5/5 strength throughout   Skin: Skin is warm and dry.   Psychiatric: She has a normal mood and affect. Her behavior is normal.       Vents:  Oxygen Concentration (%): 24 (10/21/19 0604)  Lines/Drains/Airways     Drain                 Biliary Tube RLQ -- days         Biliary Tube 10/10/19 1500 RLQ 12 days          Peripheral Intravenous Line                 Peripheral IV - Single Lumen 10/19/19 2200 20 G 3 days              Significant Labs:    CBC/Anemia Profile:  No results for input(s): WBC, HGB, HCT, PLT, MCV, RDW, IRON, FERRITIN, RETIC, FOLATE, [id number], OCCULTBLOOD in the last 48 hours.     Chemistries:  No results for input(s): NA, K, CL, CO2, BUN, CREATININE, CALCIUM, ALBUMIN, PROT, BILITOT, ALKPHOS, ALT, AST, GLUCOSE, MG, PHOS in the last 48 hours.    All pertinent labs within the past 24 hours have been reviewed.    Significant Imaging:  I have reviewed and interpreted all pertinent imaging results/findings within the past 24 hours.      AB  Recent Labs   Lab 10/20/19  2044   PH 7.369   PO2 94   PCO2 30.6*   HCO3 17.6*    BE -8     Assessment/Plan:     Neuro  Altered mental status  - follow up on EEG performed on 10/22    Pulmonary  Pleural effusion  - Echo with NL EF and no DD.  - BNP 29   - Most likely 2/2 hypoalbuminemia     Cardiac/Vascular  * Sinus tachycardia  - Chronic sinus tachycardia, been seen by Dr. Winters 09/17 which her HR been 110's - 130's, TSH wnl and CTA negative for PE, ECG shows sinus tach which is felt is due to a compensatory response to volume depletion (hyperadrenergic state) her anti hypertensive was held. Pt was also seen by PCP 09/24 who though it was related to chemotherapy.    - Pt was tachycardic since admission ~150's with increased in trops > cardiology consulted.    Exam: ~ 87% on RA > 92% on 1 L NC-  L.E pitting edema.  Labs: Wbc 12 - UA >13 wbc - Echo showed NL EF with local wall abonromality   DDx: PE vs Volume depletion vs infection .  - S/p ~4,500 cc of IVF.  US LE showing partially occlusive saphenous vein thrombus. CTA attempted but PIV extravasated with failed additional attempts at PIVs. Patient does not want a central line or repeat CTA attempts.     Plan:    1- On lovenox 40 ppx  2- Follow up infectious work up with blood cultures. 10/21 blood culture with Staph aureus. Patient without fevers & rigors. Likely contaminant. Further discussion pending about antibiotics and goals of care.   3- Strict In/outs.  4- TSH of 0.391 but T4 1.47    Elevated troponin  - 2/2 demand ischemia.  - Cardiology consulted by gyn/onc appreciate their help.  - Echo done 10/21  · Normal left ventricular systolic function. The estimated ejection fraction is 60% with sinus rate in upper 140s.  · Local segmental wall motion abnormalities.  · Normal LV diastolic function.  · Normal right ventricular systolic function.  · Mild tricuspid regurgitation.    Oncology  Endometrial carcinoma, serous  - Last saw her oncologist kline 09/30  Per Notes,  Sept 2018:  was 47. CT scan of CAP showed numerous bilateral  "pulmonary nodules and enlarged peripancreatic and gonzalo hepatis lymph nodes with soft tissue infiltration of the mesentery at the region.    3.4 cm left renal mass along the lower pole of the left kidney measures greater than water density.  Differential would include solid renal mass including primary renal neoplasm or metastatic lesion versus is a complex cyst.      Jan 2019: completed 6 cycles of taxol and carboplatin.      Feb 2019:  Completion :16.  Completion CT scan: partial response      April 2019:  Completed 9 cycles of Taxol and carboplatin.  CT scan with persistent pulmonary nodules. PET: multiple  hypermetabolic mesenteric/retroperitoneal lymph nodes and a hypermetabolic left lung nodule concerning for metastatic disease      June 2019: started Avastin maintenance.     August 2019: completed 3 cycles of Avastin. PET showed progressive disease in lung, liver and abdomen. Aug 2019: : 155 (up from 20). Not interested in clinical trial. Wanted to resume taxol and carboplatin.     GI  Biliary obstruction  - MRCP done MRCP shows   "Cholelithiasis, mild intrahepatic biliary dilatation, and mild dilatation of the common bile duct with relatively abrupt tapering at the level of the pancreatic head.  No filling defects identified or obstructing lesion in the pancreatic head, noting evaluation limited without IV contrast/ MRCP protocol., multiple liver lesions, large amount of ascites."  - AES consulted in 10/09 ERCP performed and unable to advance scope into CBD.  IR placed PTC 10/09  - Gyn Onc and Palliative Care recommending g-tube - patient states she needs time to think about it    Other  Goals of care, counseling/discussion  Palliative care and gyn onc have both had ongoing discussions with patient about goals of care. Patient reported to ICU team she wants to be comfortable and does not want any IVs for labs or medications. She did not want central line for CTA after first attempt at CTA " resulted in extravasation. Patient still undecided about DNR/DNI and hospice.   - gyn onc, ICU team, and palliative care plan to further discuss DNR/DNI, goals of care, hospice in the AM  - palliative care recommending the following:  - Change  reglan to 10 mg by mouth before meals - will hold for now due to patient and family's request (they believe it has caused hallucinations in the past)  - Continue ondansetron as ordered   - Decadron 4 mg by mouth every 12 hrs - will hold for now due to concern for altered mental status but will reassess in the AM  - Lorazepam 1 mg by mouth every 8 hrs as needed for nausea. And or anxiety   Lorazepam has been found to be effective in treating nausea associated with chemotherapy and  Is helpful in lowering  anxiety - holding at this time due to concern for altered mental status but will reassess in the AM    Hypoalbuminemia  - Albumin 1.8.  - Continue boost.    Palliative care encounter  Family meeting scheduled for 2:30pm today.     Critical secondary to Patient has a condition that poses threat to life and bodily function: tachycardia      Critical care was time spent personally by me on the following activities: development of treatment plan with patient or surrogate and bedside caregivers, discussions with consultants, evaluation of patient's response to treatment, examination of patient, ordering and performing treatments and interventions, ordering and review of laboratory studies, ordering and review of radiographic studies, pulse oximetry, re-evaluation of patient's condition. This critical care time did not overlap with that of any other provider or involve time for any procedures.     Karen Hollins MD  Critical Care Medicine  [facility]

## 2019-10-23 NOTE — PHYSICIAN QUERY
PT Name: Jessica Gorman  MR #: [id number]     CDS/: NANI Greene,RNC-MNN      Contact information:[email or web address]  This form is a permanent document in the medical record.     Query Date: October 23, 2019    Physician Query - Neurological Condition Clarification    By submitting this query, we are merely seeking further clarification of documentation to reflect the severity of illness of your patient. Please utilize your independent clinical judgment when addressing the question(s) below.    The Medical record reflects the following:     Indicators   Supporting Clinical Findings Location in Medical Record   X AMS, Confusion,  LOC, etc.  Altered mental status  -Continues to have fluctuations in mental status overnight Gyn Onc Progress note 10/23@808am    Acute / Chronic Illness     X             Medication      Treatment              Other       Encephalopathy- is a general term for any diffuse disease of the brain that alters brain function or structure. Treatment of the cognitive dysfunction varies but is ultimately dependent on the treatment of the underlying condition.    Major Symptoms of Encephalopathy - Decreased level of consciousness, fluctuating alertness/concentration, confusion, agitation, lethargy, somnolence, drowsiness, obtundation, stupor, or coma.         References: National Institutes of Healths (NIH) National [place] of Neurological Disorders and Strokes;  HCPro 2016; Advisory Board     Clinical Guidelines:   These guidelines will set system standards to assist providers in managing, documentation, and coding of encephalopathy. The intent of this document is to serve as a system guideline, not replace the providers clinical judgment:  Provider, please specify the diagnosis or diagnoses associated with above clinical findings.  [   ] Metabolic Encephalopathy - Due to electrolye imbalance, metabolic derangements, or infections processes, includes Septic Encephalopathy    [   ] Toxic Encephalopathy - Due to drugs, chemicals, or other toxic substances   [   ] Other Encephalopathy - Includes uremic encephalopathy   [   ] Unspecified Encephalopathy      [   ] Other Neurological Condition-  Includes Post-ictal altered mental status. (please specify condition): _________   [ {Click F2; select 'X' if Clinically Undetermined:96559}  ]  Clinically Undetermined     Please document in your progress notes daily for the duration of treatment until resolved, and include in your discharge summary.

## 2019-10-23 NOTE — PROGRESS NOTES
Pharmacokinetic Initial Assessment: IV Vancomycin    Vancomycin initiated for 1/2 blood cultures growing GPCs resembling staph    Assessment/Plan:  1.  Vancomycin 1250 mg IV q24H  2.  Obtain trough prior to third dose of new regimen:  10/25 @ 0900  3.  Monitor RF and make adjustments as needed    Pharmacy will continue to follow and monitor vancomycin.      Thank you for the consult,   Cherie Rangel, PharmD, BCPS  [id number]       Patient brief summary:  Jessica Gorman is a 69 y.o. female initiated on antimicrobial therapy with IV Vancomycin for treatment of suspected bacteremia    Drug Allergies:   Review of patient's allergies indicates:   Allergen Reactions    Gramicidin d Shortness Of Breath    Hydrochlorothiazide Other (See Comments)     Burning and tingling sensation throughoutt her body    Lisinopril Shortness Of Breath    Promethazine Shortness Of Breath and Nausea And Vomiting    Neosporin [neomycin-bacitracnzn-polymyxnb] Swelling    Polymyxin [bacitracin-polymyxin b] Swelling       Actual Body Weight:   76.3 kg    Renal Function:   Estimated Creatinine Clearance: 56.4 mL/min (based on SCr of 0.9 mg/dL).,     CBC (last 72 hours):  Recent Labs   Lab Result Units 10/21/19  0315   WBC K/uL 12.49  12.49   Hemoglobin g/dL 11.8*  11.8*   Hematocrit % 37.8  37.8   Platelets K/uL 291  291   Gran% % 91.1*  91.1*   Lymph% % 2.3*  2.3*   Mono% % 5.5  5.5   Eosinophil% % 0.0  0.0   Basophil% % 0.2  0.2   Differential Method  Automated  Automated       Metabolic Panel (last 72 hours):  Recent Labs   Lab Result Units 10/20/19  1041 10/21/19  0315   Sodium mmol/L  --  140   Potassium mmol/L  --  3.9   Chloride mmol/L  --  109   CO2 mmol/L  --  15*   Glucose mg/dL  --  97   BUN, Bld mg/dL  --  19   Creatinine mg/dL  --  0.9   Albumin g/dL  --  1.8*   Total Bilirubin mg/dL  --  3.0*   Alkaline Phosphatase U/L  --  324*   AST U/L  --  217*   ALT U/L  --  57*   Magnesium mg/dL 1.4* 1.8  1.8   Phosphorus mg/dL  3.8 4.1  4.1       Drug levels (last 3 results):  No results for input(s): VANCOMYCINRA, VANCOMYCINPE, VANCOMYCINTR in the last 72 hours.    Microbiologic Results:  Microbiology Results (last 7 days)     Procedure Component Value Units Date/Time    Blood culture [[id number]]     Order Status:  No result Specimen:  Blood     Blood culture [[id number]]     Order Status:  No result Specimen:  Blood     Blood culture [[id number]]  (Abnormal) Collected:  10/21/19 1220    Order Status:  Completed Specimen:  Blood from Peripheral, Antecubital, Left Updated:  10/23/19 0743     Blood Culture, Routine Gram stain claus bottle: Gram positive cocci in clusters resembling Staph       Results called to and read back by: Shani Arredondo RN  10/22/2019        14:47      STAPHYLOCOCCUS AUREUS  Susceptibility pending  ID consult required at [facility],[place],[facility] and [facility]   locations.      Blood culture [[id number]] Collected:  10/21/19 1228    Order Status:  Completed Specimen:  Blood from Peripheral, Forearm, Left Updated:  10/22/19 1612     Blood Culture, Routine No Growth to date      No Growth to date    Urine culture [[id number]]  (Abnormal) Collected:  10/20/19 0129    Order Status:  Completed Specimen:  Urine Updated:  10/21/19 1200     Urine Culture, Routine COAGULASE-NEGATIVE STAPHYLOCOCCUS SPECIES  50,000 - 99,999 cfu/ml  Susceptibility testing not routinely performed.  No other significant isolate      Narrative:       Preferred Collection Type->Urine, Clean Catch

## 2019-10-23 NOTE — PROGRESS NOTES
[facility]  Palliative Medicine  Progress Note    Patient Name: Jessica Gorman  MRN: [id number]  Admission Date: 10/19/2019  Hospital Length of Stay: 3 days  Code Status: Full Code   Attending Provider: Quinton Luna MD  Consulting Provider: EDI Kidd  Primary Care Physician: Rico Amaro MD  Principal Problem:Sinus tachycardia    Patient information was obtained from relative(s), past medical records and ER records.      Assessment/Plan:     Palliative care encounter  Impression: Mrs. Gorman is a 70 yo lady admitted with intractable nausea and vomiting.  Has hx of metastatic endometrial cancer.  S/P chemotherapy 9/19.  As per patient chemotherapy is on hold at this time.  Transferred to critical care service for evaluation of tachycardia.  Experienced acute change in Significant event over night 10/21 -  Acute change in mental status, stroke code called. MRI completed.EEG results pending. Experienced another acute change in mental status over night 10/22/19 - experiencing hallucinations.  At time of this assessment she appears lethargic and not participating in conversation or following commands.   Advance Care Planning   - no advanced directives have been received.  Initially Mrs. Gorman was able to make own decisions.  She has experienced a change in mental status  - full code per primary team  - Next of kin for medical decisions are her 4 adult children.  Family meeting this afternoon at 2:30 PM to discuss goals of care      Symptom Management  Nausea - secondary to chemotherapy for endometrial cancer  - appears comfortable at this time with no complaints of nausea  - family now with concerns metoclopramide may be contributing to the patient's hallucinations.   - Mrs. Gorman unable to take any thing by mouth    Recommendations:  - discontinue metoclopromide   - Continue ondansetron 4 mg ODT every 8 hrs as needed for nausea  - if IV access is obtained consider  changing ondansetron to 4 mg IVP every 8 hrs as needed for nausea   - If IV access is obtained consider dexamethazone 4 mg IVP every 12 hrs as needed for nausea      Anxiety  - appears comfortable and in no distress   - Son, currently hospitalized has been able to visit.  Family states Mrs. Gorman became more relaxed after his visit.   - If able to obtain IV access consider - Lorazepam 1 mg IVP h every 8 hrs as needed for anxiety and or nausea.      Goals of Care  - Patient reports her chemotherapy is on hold now.  MRI  Indicates  gastric outlet obstruction no additional chemotherapy possible.   - Dr. Watt had ambar discussion with patient and family regarding clinical condition.  Informed patient she would not receive additional chemo and cancer is now end stage.  As per  Dr. Watt patient is actively dying and transition to hospice is appropriate.   Mrs. Gorman does not currently have IV access.  Family has not been amenable to obtaining IV access, daughter states they are considering this.    - Palliative medicine explained having peripheral IV site may be beneficial for comfort medications   -During previous palliative care encounter patient and family were not amenable to discussion hospice or comfort care.  Daughter and son now appear more amenable to discussion comfort care and hospice care.   - Mrs. Gorman' other daughter will be arriving this afternoon.  Palliative medicine will return for 2:30 to meet with family.     Returned to beside at 2:30 PM for family meeting   - Mrs. Gorman is somnolent. She arouses to verbal and tactile stimuli.  Able to answer simple questions with short answers. -Does not appear she can make complex decisions.   - Family meeting held with 4 of her 5 children- Patricia, Alanna, Cal and Madan, patient's nurse Itzel and palliative care APRN  - Prior to the family meeting Dr. Luna provided family with clinical updates  - Family given ample time to ask and receive answers  "concerning clinical condition and plan of care.   - Family is in agreement that they want to take her home and for her to be comfortable.  Family very clear in goals of having her home as soon as possible.   - Family with no hospice experience.  Hospice education provided. Encouraged family to choose agency with access to inpatient services.  - Hospice resources provided and encouraged family to visit or review online. While in the conference room, nader Mondragon reviewed inpatient hospice agencies.  The family chose [facility] as it is the closest in proximity to Mrs. Gorman' home matches the family's goals.   - Lengthy conversation discussing risk and benefits of CPR in setting of critical illness.   - Palliative medicine does not recommend CPR for this patient and this has been conveyed to family   -Family is unable to make decision and patient remains full code  - DNR is not necessary to transition to hospice.  The hospice agency will continue to address code status            Plan/Recommendation  - see above symptom management recommendations.   - Family meeting today tentative time 2:30 PM to discuss goals of care   - consult  for hospice   -  Palliative medicine will continue to follow.   - Intense emotional support -  also consulted for emotional support     Above goals of care and recommendations discussed with primary team.   notified of patient's decision for hospice.         I will follow-up with patient. Please contact us if you have any additional questions.    Subjective:     Chief Complaint:   Chief Complaint   Patient presents with    Emesis     Pt c/o of vomting times 3 days. Pt states "i just can't keep anything down." Pt denies any abdominal pain or other complaints. Hx CA with mets       HPI:   HPI obtained from chart review:     Mrs. 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 S/p chemotherapy and radiation. " Admitted to [facility] with intractable nausea and vomiting.        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  Endometrial cancer dx in 2004 - s/p VILLA, chemotherapy and radiation. Metastasis discovered 9/2019 and started on chemotherapy.  Daughter reports she last received taxol and carboplatin with last dose being 9/13/19.  Has been hospitalized twice with intractable nausea and vomiting most recently 10/7-10/15. At this time she was was found to have a biliary obstruction s/p percutaneous biliary drain.      She was transferred to critical care services for evaluation of tachycardia.   CCS work up for tachycardia: at baseline heart rated 100-130 with an  Asymptomatic epidsode of  overnight.  Stable at this time. PE study 10/12 indicated no evidence of PE.  Oxygen requirements are unchanged as well, TTE w/o RH strain and patient is normotensive, making PE less likely. No infectious source or sx. Thyroid studies previously normal. S/p 4L IVF w/o significant improvement in HR.   - Patient is refusing further attempts at IV access as well as thoracentesis, which is reasonable as PE seems less likely, she is getting full anticoagulation and she does not have increased oxygen requirements or dyspnea    Mrs. Gorman states she is very worried about her son who is also in the critical care unit.  Daughter reports concerns that worry and anxiety may be contributing to the tachycardia.  Daughter also expresses some concern for extrapyramidal side effects of the nausea medicines.      Palliative medicine consulted for symptom management - nausea.   At time of this assessment Mrs. Gorman has no complaints of N/V tolerating regular diet  pain, shortness of breath and is in no acute distress. Will likely be stepped down to the floor.            Hospital Course:  No notes on  file    Interval History:     Past Medical History:   Diagnosis Date    Abnormal Pap smear     1984    Anorexia 8/30/2019    Anxiety 10/11/2019    Cancer     Chemotherapy-induced neutropenia 4/1/2015    Dehydration 10/7/2019    Elevated cancer antigen 125 (CA-125) 9/5/2018    Encounter for blood transfusion     FH: breast cancer in relative when <45 years old 3/17/2016    Hyperbilirubinemia 10/8/2019    Hypertension     Hypokalemia 9/9/2019    Other complicated headache syndrome 10/4/2019    Other screening mammogram 9/17/2015    Postmenopausal bleeding     Pulmonary nodules/lesions, multiple 4/25/2019    Rash 9/17/2015    Uterine cancer     Weight loss 12/17/2015    Well woman exam with routine gynecological exam 9/14/2016       Past Surgical History:   Procedure Laterality Date    COLPOSCOPY      DILATION AND CURETTAGE OF UTERUS  10/17/14    ENDOSCOPIC ULTRASOUND OF UPPER GASTROINTESTINAL TRACT N/A 10/8/2019    Procedure: ULTRASOUND, UPPER GI TRACT, ENDOSCOPIC;  Surgeon: Gabe Aguirre MD;  Location: [facility] ([address] [place] [address]);  Service: Endoscopy;  Laterality: N/A;    ERCP N/A 10/8/2019    Procedure: ERCP (ENDOSCOPIC RETROGRADE CHOLANGIOPANCREATOGRAPHY);  Surgeon: Gabe Aguirre MD;  Location: [facility] ([address] [place] [address]);  Service: Endoscopy;  Laterality: N/A;    HYSTERECTOMY  12/4/2014    radiacl hysterectomy    MN REMOVAL OF OVARY/TUBE(S)         Review of patient's allergies indicates:   Allergen Reactions    Gramicidin d Shortness Of Breath    Hydrochlorothiazide Other (See Comments)     Burning and tingling sensation throughoutt her body    Lisinopril Shortness Of Breath    Promethazine Shortness Of Breath and Nausea And Vomiting    Neosporin [neomycin-bacitracnzn-polymyxnb] Swelling    Polymyxin [bacitracin-polymyxin b] Swelling       Medications:  Continuous Infusions:  Scheduled Meds:   enoxaparin  40 mg Subcutaneous Daily    famotidine  20 mg Oral BID     PRN Meds:acetaminophen,  ibuprofen, iohexol, ipratropium, ondansetron, ondansetron, oxyCODONE-acetaminophen, sodium chloride 0.9%    Family History     Problem Relation (Age of Onset)    Breast cancer Cousin, Other, Maternal Grandmother, Maternal Aunt    Cancer Son    Cataracts Mother, Brother, Maternal Aunt    Heart attack Son    Hypertension Mother, Brother, Brother, Son    Lung cancer Maternal Uncle    No Known Problems Father        Tobacco Use    Smoking status: Never Smoker    Smokeless tobacco: Never Used   Substance and Sexual Activity    Alcohol use: No    Drug use: No    Sexual activity: Not Currently     Birth control/protection: Post-menopausal       Review of Systems   Constitutional: Positive for appetite change.   Eyes: Negative.    Gastrointestinal: Positive for abdominal distention, nausea and vomiting.   Musculoskeletal: Negative.    Neurological: Negative.    Psychiatric/Behavioral: Negative.      Objective:     Vital Signs (Most Recent):  Temp: 99.3 °F (37.4 °C) (10/23/19 0700)  Pulse: (!) 149 (10/23/19 1000)  Resp: (!) 27 (10/23/19 1000)  BP: (!) 88/59 (10/23/19 1000)  SpO2: (!) 94 % (10/23/19 1000) Vital Signs (24h Range):  Temp:  [97.6 °F (36.4 °C)-99.3 °F (37.4 °C)] 99.3 °F (37.4 °C)  Pulse:  [136-161] 149  Resp:  [21-50] 27  SpO2:  [91 %-100 %] 94 %  BP: ()/(56-79) 88/59     Weight: 76.3 kg (168 lb 3.4 oz)  Body mass index is 30.77 kg/m².    Review of Symptoms  Symptom Assessment (ESAS 0-10 scale)   ESAS 0 1 2 3 4 5 6 7 8 9 10   Pain x             Dyspnea x             Anxiety   x           Nausea x             Depression  x             Anorexia x             Fatigue x             Insomnia x             Restlessness  x             Agitation x             CAM / Delirium __ --  ___+   Constipation     __ --  ___+   Diarrhea           __ --  ___+  Bowel Management Plan (BMP): No    Comments: no c/o pain, nausea, vomiting or shortness of breath    Pain Assessment: no pain     OME in 24 hours:0    Performance  Status: 50    ECOG Performance Status Grade: 1 - Ambulates, capable of light work    Physical Exam   Constitutional: She appears well-developed. No distress.   Cardiovascular: Regular rhythm and normal heart sounds.   Mild tachycardia    Pulmonary/Chest: Effort normal and breath sounds normal. No respiratory distress.   Abdominal: Soft. Bowel sounds are normal. She exhibits no distension. There is no tenderness.   Neurological:   Acute change in mental status, responds to continued verbal and tactile stimuli    Skin: Skin is warm and dry.   Psychiatric: She has a normal mood and affect. Her behavior is normal. Judgment and thought content normal.       Significant Labs: All pertinent labs within the past 24 hours have been reviewed.  CBC:   Recent Labs   Lab 10/21/19  0315   WBC 12.49  12.49   HGB 11.8*  11.8*   HCT 37.8  37.8   MCV 88  88     291     BMP:  No results for input(s): GLU, NA, K, CL, CO2, BUN, CREATININE, CALCIUM, MG in the last 24 hours.  LFT:  Lab Results   Component Value Date     (H) 10/21/2019    ALKPHOS 324 (H) 10/21/2019    BILITOT 3.0 (H) 10/21/2019     Albumin:   Albumin   Date Value Ref Range Status   10/21/2019 1.8 (L) 3.5 - 5.2 g/dL Final     Protein:   Total Protein   Date Value Ref Range Status   10/21/2019 6.1 6.0 - 8.4 g/dL Final     Lactic acid:   Lab Results   Component Value Date    LACTATE 2.3 (H) 10/19/2019    LACTATE 1.0 09/13/2019       Significant Imaging: I have reviewed all pertinent imaging results/findings within the past 24 hours.    Advance Care Planning   Advanced Directives::  Living Will: No  LaPOST: No  Do Not Resuscitate Status: No  Medical Power of : No    Decision-Making Capacity: Patient answered questions       Living Arrangements: Lives with family    Psychosocial/Cultural: ,  Three adult children.  Lives with family       Spiritual:     F- Gisel and Belief: Christain    I - Importance:   .  C - Community:     A - Address in  Care: amenable to  visits       > 50% of 45 min visit spent in chart review, face to face discussion of goals of care,  symptom assessment, coordination of care and emotional support.  Returned to bedside for additional 65 minutes spent goals of care discussion    GINA Liriano, ACNS_BC      Palliative Medicine  [facility]

## 2019-10-23 NOTE — PROCEDURES
DATE: 10/22/19    EEG NUMBER:      This EEG was performed to assess for subclinical seizures      ELECTROENCEPHALOGRAM REPORT     METHODOLOGY:  Electroencephalographic (EEG) is recorded with electrodes placed according to the International 10-20 placement system.  Thirty two (32) channels of digital signal (sampling rate of 512/sec), including T1 and T2, were simultaneously recorded from the scalp and may include EKG, EMG, and/or eye monitors.  Recording band pass was 0.1 to 512 Hz.  Digital video recording of the patient is simultaneously recorded with the EEG.  The patient is instructed to report clinical symptoms which may occur during the recording session.  EEG and video recording are stored and archived in digital format.  Activation procedures, which include photic stimulation, hyperventilation and instructing patients to perform simple tasks, are done in selected patients.     The EEG is displayed on a monitor screen and can be reviewed using different montages.  Computer-assisted analysis is employed to detect spike and electrographic seizure activity.  The entire record is submitted for computer analysis.  The entire recording is visually reviewed, and the times identified by computer analysis as being spikes or seizures are reviewed again.     Compressed spectral analysis (CSA) is also performed on the activity recorded from each individual channel.  This is displayed as a power display of frequencies from 0 to 30 Hz over time.  The CSA is reviewed looking for asymmetries in power between homologous areas of the scalp, then compared with the original EEG recording.     [employer] software was also utilized in the review of this study.  This software suite analyzes the EEG recording in multiple domains.  Coherence and rhythmicity are computed to identify EEG sections which may contain organized seizures.  Each channel undergoes analysis to detect the presence of spike and sharp waves which have  special and morphological characteristics of epileptic activity.  The routine EEG recording is converted from special into frequency domain.  This is then displayed comparing homologous areas to identify areas of significant asymmetry.  Algorithm to identify non-cortically generated artifact is used to separate artifact from the EEG.     EEG FINDINGS:  The recording was obtained with a number of standard bipolar and referential montages during wakefulness, drowsiness and sleep.  In the alert state, the posterior background rhythm was a symmetric, well-modulated 7 Hz activity, which reacted symmetrically to eye opening. Mixed delta range slowing was noted. During drowsiness, the background rhythm waxed and waned and there were periods of slowing.  During stage II sleep, symmetric V waves and sleep spindles were noted. There were no interictal epileptiform abnormalities and no clinical or electrographic seizures were recorded.    The EKG channel revealed a sinus rhythm. Tachycardia was noted     IMPRESSION:  This is an abnormal EEG during wakefulness, drowsiness and sleep. Diffuse slowing was noted.      CLINICAL CORRELATION:  The patient is a 69 year-old female who is being evaluated for altered sensorium. The patient is currently not maintained on any antiseizure medications. This is an abnormal EEG during wakefulness, drowsiness and sleep.  The overall degree of disorganization and slowing for given age is suggestive of a mild encephalopathy, likely a toxic metabolic encephalopathy.  No seizures were recorded during this study.

## 2019-10-23 NOTE — SUBJECTIVE & OBJECTIVE
Interval History/Significant Events: Ongoing goals of care discussion with daughters. Patient had noted difficulty while eating her breakfast this morning. She had anxiety overnight for which she got xanax.     Review of Systems   Constitutional: Positive for activity change. Negative for appetite change, chills and fever.   HENT: Negative for congestion.    Eyes: Negative for pain and itching.   Respiratory: Negative for cough, chest tightness and shortness of breath.    Cardiovascular: Positive for palpitations and leg swelling. Negative for chest pain.   Gastrointestinal: Negative for abdominal pain, diarrhea, nausea and vomiting.   Endocrine: Negative for polyphagia and polyuria.   Genitourinary: Negative for dysuria and frequency.   Musculoskeletal: Negative for back pain.   Neurological: Negative for numbness and headaches.   Psychiatric/Behavioral: Negative for agitation and behavioral problems. The patient is nervous/anxious.      Objective:     Vital Signs (Most Recent):  Temp: 99.9 °F (37.7 °C) (10/23/19 1100)  Pulse: (!) 145 (10/23/19 1215)  Resp: (!) 27 (10/23/19 1215)  BP: (!) 101/59 (10/23/19 1215)  SpO2: 97 % (10/23/19 1215) Vital Signs (24h Range):  Temp:  [97.6 °F (36.4 °C)-99.9 °F (37.7 °C)] 99.9 °F (37.7 °C)  Pulse:  [141-161] 145  Resp:  [21-50] 27  SpO2:  [91 %-100 %] 97 %  BP: ()/(53-79) 101/59   Weight: 76.3 kg (168 lb 3.4 oz)  Body mass index is 30.77 kg/m².      Intake/Output Summary (Last 24 hours) at 10/23/2019 1311  Last data filed at 10/23/2019 0701  Gross per 24 hour   Intake --   Output 0 ml   Net 0 ml       Physical Exam   Constitutional: She is oriented to person, place, and time. She appears well-developed.   HENT:   Head: Normocephalic.   Neck: Normal range of motion.   Left IJ   Cardiovascular: Regular rhythm and normal heart sounds.   No murmur heard.  tachycardic    Pulmonary/Chest: Effort normal and breath sounds normal.   Abdominal: Soft. Bowel sounds are normal. She  exhibits no distension. There is no tenderness.   RUQ drain   Musculoskeletal: Normal range of motion. She exhibits edema.   Neurological: She is alert and oriented to person, place, and time. No cranial nerve deficit or sensory deficit.   5/5 strength throughout   Skin: Skin is warm and dry.   Psychiatric: She has a normal mood and affect. Her behavior is normal.       Vents:  Oxygen Concentration (%): 24 (10/21/19 0604)  Lines/Drains/Airways     Drain                 Biliary Tube RLQ -- days         Biliary Tube 10/10/19 1500 RLQ 12 days          Peripheral Intravenous Line                 Peripheral IV - Single Lumen 10/19/19 2200 20 G 3 days              Significant Labs:    CBC/Anemia Profile:  No results for input(s): WBC, HGB, HCT, PLT, MCV, RDW, IRON, FERRITIN, RETIC, FOLATE, [id number], OCCULTBLOOD in the last 48 hours.     Chemistries:  No results for input(s): NA, K, CL, CO2, BUN, CREATININE, CALCIUM, ALBUMIN, PROT, BILITOT, ALKPHOS, ALT, AST, GLUCOSE, MG, PHOS in the last 48 hours.    All pertinent labs within the past 24 hours have been reviewed.    Significant Imaging:  I have reviewed and interpreted all pertinent imaging results/findings within the past 24 hours.

## 2019-10-23 NOTE — DISCHARGE SUMMARY
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    Pt had no complaints, her N/V had improved since admission, she was able to eat and tolerated it -  Denies shortness of breath, palpitations, chest pain, nausea, diaphoresis.     Per daughter, pt was Dx in 2004 with endometrial Ca- she had complete hysterectomy with chemotherapy and radiation, On 09/2019 she found out that she had mets to the liver and lung - she started Chemotherapy and last round was 09/13/209- following the chemotherapy she has been admitted for intractable N/V Patient has presented with similar complaints in the past with most recent admission from 10/7-10/15 at which time a percutaneous biliary drain was placed for biliary obstruction. Previously underwent CTA chest on 10/12 which was negative for acute PE.        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/carboplatin after avastin  PSHx: ex lap, hysterectomy  Social: never smoked, no alcohol use    * No surgery found *    Indwelling Lines/Drains at Time of Discharge:   Lines/Drains/Airways     Drain                 Biliary Tube 10/10/19 1500 RLQ 13 days          Pressure Ulcer                 Pressure Injury 10/21/19 Right Gluteal cleft 2 days              Hospital Course:   Pt was admitted to MICU 10/20 given heparin SQ full dose and CTA was ordered to r/o PE - was not able to finish it as contrast extravasated-  Pt refused CL for access as she had poor PIV, 10/21 U/S lower ext showing partially occlusive thrombus of the left greater saphenous vein.     10/21 overnight patient was aphasic and was possibly hallucinating. Stroke code called. MRI brain, MRA brain, MRA neck on 10/22 all showing no acute intracranial abnormality identified, allowing for motion limitations.  No detrimental change when compared with 10/13/2019. No large vessel occlusion identified in the head or neck arteries. Patient returned to baseline and AAOx4 soon after. EEG results pending. Patient deferring IV access for labs and medications. She and her daughter would like to hold reglan as they believe it has caused her to have hallucinations in the past. Palliative care seeing patient. Patient with gastric outlet obstruction. Gyn Onc reported nothing to offer for patient and recommending hospice. DNR/DNI was brought up by Gyn Onc, and patient wants to think about this overnight.     Palliative Care was consulted and had a thorough goals of care discussion with patient's family. Ultimately her daughters were resistant to DNR status but desired comfort care and hospice for Ms. Gorman. They decided on [facility]. They want their mother to go home. They did not want her to receive any IVs as this was uncomfortable for the patient.     Consults (From admission, onward)        Status Ordering Provider     Inpatient consult to Cardiology  Once     Provider:  (Not yet assigned)    Completed GERMAN RODRIGUEZ     Inpatient consult to Critical Care Medicine  Once     Provider:  (Not yet assigned)    Completed GERMAN RODRIGUEZ     Inpatient consult to Gastroenterology  Once     Provider:  (Not yet assigned)    Completed GERMAN RODRIGUEZ     Inpatient consult to Gynecologic Oncology  Once     Provider:  (Not yet assigned)    Completed CHARLENE STEVENSON     Inpatient consult to Interventional Radiology  Once     Provider:  (Not yet assigned)    Completed  GERMAN RODRIGUEZ     Inpatient consult to Midline team  Once     Provider:  (Not yet assigned)    Completed SUKHWINDER GONZALEZ     Inpatient consult to Neuro Critical Care  Once     Provider:  (Not yet assigned)    Acknowledged GERMAN RODRIGUEZ     Inpatient consult to Palliative Care  Once     Provider:  (Not yet assigned)    Completed GERMAN RODRIGUEZ        Significant Labs:  Bilirubin:   Recent Labs   Lab 10/13/19  0426 10/14/19  0501 10/15/19  0446 10/19/19  2330 10/21/19  0315   BILITOT 3.7* 4.2* 3.0* 2.5* 3.0*     Blood Culture: No results for input(s): LABBLOO in the last 48 hours.  CMP: No results for input(s): NA, K, CL, CO2, GLU, BUN, CREATININE, CALCIUM, PROT, ALBUMIN, BILITOT, ALKPHOS, AST, ALT, ANIONGAP, EGFRNONAA in the last 48 hours.    Invalid input(s): ESTGFAFRICA  Coagulation: No results for input(s): PT, INR, APTT in the last 48 hours.  All pertinent labs within the past 24 hours have been reviewed.    Significant Imaging:  I have reviewed all pertinent imaging results/findings within the past 24 hours.    Pending Diagnostic Studies:     Procedure Component Value Units Date/Time    IR Thoracentesis with Imaging [[id number]]     Order Status:  Sent Lab Status:  No result         Final Active Diagnoses:    Diagnosis Date Noted POA    PRINCIPAL PROBLEM:  Sinus tachycardia [R00.0] 09/14/2019 Yes    Altered mental status [R41.82] 10/22/2019 No    Hypoalbuminemia [E88.09] 10/21/2019 Yes    Pleural effusion [J90] 10/21/2019 Yes    Biliary obstruction [K83.1] 10/21/2019 Yes    Goals of care, counseling/discussion [Z71.89]  Not Applicable    Advanced care planning/counseling discussion [Z71.89]  Not Applicable    Chemotherapy induced nausea and vomiting [R11.2, T45.1X5A] 10/20/2019 Yes    Elevated troponin [R79.89] 10/20/2019 No    Nausea and vomiting [R11.2] 09/13/2019 Unknown    Palliative care encounter [Z51.5] 09/14/2016 Not Applicable    Endometrial carcinoma, serous [C54.1]  11/06/2014 Yes      Problems Resolved During this Admission:     Neuro  Altered mental status  - follow up on EEG performed on 10/22    Pulmonary  Pleural effusion  - Echo with NL EF and no DD.  - BNP 29   - Most likely 2/2 hypoalbuminemia     Cardiac/Vascular  * Sinus tachycardia  - Chronic sinus tachycardia, been seen by Dr. Winters 09/17 which her HR been 110's - 130's, TSH wnl and CTA negative for PE, ECG shows sinus tach which is felt is due to a compensatory response to volume depletion (hyperadrenergic state) her anti hypertensive was held. Pt was also seen by PCP 09/24 who though it was related to chemotherapy.    - Pt was tachycardic since admission ~150's with increased in trops > cardiology consulted.    Exam: ~ 87% on RA > 92% on 1 L NC-  L.E pitting edema.  Labs: Wbc 12 - UA >13 wbc - Echo showed NL EF with local wall abonromality   DDx: PE vs Volume depletion vs infection .  - S/p ~4,500 cc of IVF.  US LE showing partially occlusive saphenous vein thrombus. CTA attempted but PIV extravasated with failed additional attempts at PIVs. Patient does not want a central line or repeat CTA attempts.     Plan:    1- On lovenox 40 ppx  2- Follow up infectious work up with blood cultures. 10/21 blood culture with Staph aureus. Patient without fevers & rigors. Likely contaminant. Further discussion pending about antibiotics and goals of care.   3- Strict In/outs.  4- TSH of 0.391 but T4 1.47    10/21 Blood culture of Staph aureus was discussed with patient's family along with goals of care. It was discussed that the organism is most likely a contaminant. They did not want Ms. Gorman to have any IVs or receive any antibiotics through an IV. Ms. Gorman is also having very poor oral intake with nausea & vomiting. It was discussed that oral antibiotics could exacerbate her symptoms and even make her worse.     Elevated troponin  - 2/2 demand ischemia.  - Cardiology consulted by gyn/onc appreciate their help.  - Echo  "done 10/21  · Normal left ventricular systolic function. The estimated ejection fraction is 60% with sinus rate in upper 140s.  · Local segmental wall motion abnormalities.  · Normal LV diastolic function.  · Normal right ventricular systolic function.  · Mild tricuspid regurgitation.    Oncology  Endometrial carcinoma, serous  - Last saw her oncologist kline 09/30  Per Notes,  Sept 2018:  was 47. CT scan of CAP showed numerous bilateral pulmonary nodules and enlarged peripancreatic and gonzalo hepatis lymph nodes with soft tissue infiltration of the mesentery at the region.    3.4 cm left renal mass along the lower pole of the left kidney measures greater than water density.  Differential would include solid renal mass including primary renal neoplasm or metastatic lesion versus is a complex cyst.      Jan 2019: completed 6 cycles of taxol and carboplatin.      Feb 2019:  Completion :16.  Completion CT scan: partial response      April 2019:  Completed 9 cycles of Taxol and carboplatin.  CT scan with persistent pulmonary nodules. PET: multiple  hypermetabolic mesenteric/retroperitoneal lymph nodes and a hypermetabolic left lung nodule concerning for metastatic disease      June 2019: started Avastin maintenance.     August 2019: completed 3 cycles of Avastin. PET showed progressive disease in lung, liver and abdomen. Aug 2019: : 155 (up from 20). Not interested in clinical trial. Wanted to resume taxol and carboplatin.     GI  Biliary obstruction  - MRCP done MRCP shows   "Cholelithiasis, mild intrahepatic biliary dilatation, and mild dilatation of the common bile duct with relatively abrupt tapering at the level of the pancreatic head.  No filling defects identified or obstructing lesion in the pancreatic head, noting evaluation limited without IV contrast/ MRCP protocol., multiple liver lesions, large amount of ascites."  - AES consulted in 10/09 ERCP performed and unable to advance scope into " CBD.  IR placed PTC 10/09      Other  Goals of care, counseling/discussion  Palliative care and gyn onc have both had ongoing discussions with patient about goals of care. Patient reported to ICU team she wants to be comfortable and does not want any IVs for labs or medications. She did not want central line for CTA after first attempt at CTA resulted in extravasation. Patient still undecided about DNR/DNI and hospice.   - gyn onc, ICU team, and palliative care plan to further discuss DNR/DNI, goals of care, hospice in the AM  - palliative care recommending the following:  - Change  reglan to 10 mg by mouth before meals - will hold for now due to patient and family's request (they believe it has caused hallucinations in the past)  - Continue ondansetron as ordered   - Decadron 4 mg by mouth every 12 hrs - will hold for now due to concern for altered mental status but will reassess in the AM. And patient does not want IV.   - Lorazepam 1 mg by mouth every 8 hrs as needed for nausea. And or anxiety   Lorazepam has been found to be effective in treating nausea associated with chemotherapy and  Is helpful in lowering  anxiety - holding at this time due to concern for altered mental status but will reassess in the AM    Hypoalbuminemia  - Albumin 1.8.  - Continue boost.    Palliative care encounter  Family meeting with gyn oncology & palliative care earlier today. See hospital summary. Daughters ultimately decided on home with hospice but were resistant to DNR status.        Discharged Condition: poor    Disposition:       Patient Instructions:   No discharge procedures on file.  Medications:  Reconciled Home Medications:      Medication List      START taking these medications    ipratropium 0.02 % nebulizer solution  Commonly known as:  ATROVENT  Take 2.5 mLs (500 mcg total) by nebulization every 6 (six) hours as needed (wheeze). Rescue     LORazepam 1 MG tablet  Commonly known as:  ATIVAN  Take 1 tablet (1 mg total)  by mouth every 8 (eight) hours as needed for Anxiety.        CHANGE how you take these medications    ondansetron 8 MG Tbdl  Commonly known as:  ZOFRAN-ODT  Dissolve 1 tablet (8 mg total) by mouth every 8 (eight) hours as needed.  What changed:  when to take this        CONTINUE taking these medications    ALPRAZolam 0.5 MG tablet  Commonly known as:  XANAX  Take 1 tablet (0.5 mg total) by mouth 3 (three) times daily as needed for Anxiety (breakthrough anxiety not relieved by ativan).     dexAMETHasone 4 MG Tab  Commonly known as:  DECADRON  Take 5 tablets every 6 hours for 24 hours prior to chemotherapy     guaifenesin-codeine 100-10 mg/5 ml  mg/5 mL syrup  Commonly known as:  TUSSI-ORGANIDIN NR  Take 5 mLs by mouth every 6 (six) hours as needed for Cough.     megestrol 625 mg/5 mL Susp  Commonly known as:  MEGACE ES  Take 5 mLs (625 mg total) by mouth once daily.     metoclopramide HCl 10 MG tablet  Commonly known as:  REGLAN  Take 1 tablet (10 mg total) by mouth 2 (two) times daily.     ONE DAILY MULTIVITAMIN per tablet  Generic drug:  multivitamin  Take 1 tablet by mouth once daily.     oxyCODONE-acetaminophen 5-325 mg per tablet  Commonly known as:  PERCOCET  Take 1 tablet by mouth every 4 (four) hours as needed for Pain.     potassium bicarbonate & potassium chloride 25 mEq Tbef  Commonly known as:  K-LYTE CL  Take 1 tablet (25 mEq total) by mouth once daily.     potassium chloride SA 20 MEQ tablet  Commonly known as:  K-DUR,KLOR-CON  Take 1 tablet (20 mEq total) by mouth once daily.     ranitidine 150 MG tablet  Commonly known as:  ZANTAC  Take 1 tablet (150 mg total) by mouth 2 (two) times daily.     simethicone 80 MG chewable tablet  Commonly known as:  MYLICON  Take 1 tablet (80 mg total) by mouth 3 (three) times daily as needed for Flatulence.        STOP taking these medications    amLODIPine 10 MG tablet  Commonly known as:  SERAFIN Hollins MD  Critical Care Medicine  [facility]

## 2019-10-23 NOTE — PROGRESS NOTES
Therapy with vancomycin complete and/or consult discontinued by provider.  Pharmacy will sign off, please re-consult as needed.    Cherie Rangel, PharmD, BCPS  [id number]

## 2019-10-23 NOTE — PLAN OF CARE
[facility]  Department of Hospital Medicine  [address] [place] [address]  [phone] after hours  [phone] fax    HOSPICE  ORDERS    10/23/2019    Admit to Hospice:  Home Service Inpatient Service: Home with Hospice: [facility]    Diagnoses:   Active Hospital Problems    Diagnosis  POA    *Sinus tachycardia [R00.0]  Yes     - Pulse:  [108-143] 119  - Persistently tachycardic since admission  - EKG ordered      Altered mental status [R41.82]  No    Hypoalbuminemia [E88.09]  Yes    Pleural effusion [J90]  Yes    Biliary obstruction [K83.1]  Yes    Goals of care, counseling/discussion [Z71.89]  Not Applicable    Advanced care planning/counseling discussion [Z71.89]  Not Applicable    Chemotherapy induced nausea and vomiting [R11.2, T45.1X5A]  Yes    Elevated troponin [R79.89]  No    Nausea and vomiting [R11.2]  Unknown    Palliative care encounter [Z51.5]  Not Applicable    Endometrial carcinoma, 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       Hospice Qualifying Diagnoses:        Patient has a life expectancy < 6 months due to: Stage 4 endometrial Ca with lung and liver metastasis    1) Primary Hospice Diagnosis: Metastatic endometrial cancer to Lung & liver   2) Comorbid Conditions Contributing to Decline: Biliary obstruction    Vital Signs: Routine per Hospice Protocol.    Code Status: FULL     Allergies:   Review of patient's allergies indicates:   Allergen Reactions    Gramicidin d Shortness Of Breath    Hydrochlorothiazide Other (See Comments)     Burning and tingling sensation throughoutt her body    Lisinopril Shortness Of Breath    Promethazine Shortness Of Breath and Nausea And Vomiting    Neosporin [neomycin-bacitracnzn-polymyxnb] Swelling    Polymyxin [bacitracin-polymyxin b] Swelling       Diet: As tolerated   Activities: As tolerated    Nursing: Per Hospice Routine.  Routine  Skin for Bedridden Patients: Apply moisture barrier cream to all skin folds and   wet areas in perineal area daily and after baths and all bowel movements.     Medications:      Sherif Jessicanayeli Yin   Home Medication Instructions DAMIEN:[id number]    Printed on:10/23/19 0241   Medication Information                      ALPRAZolam (XANAX) 0.5 MG tablet  Take 1 tablet (0.5 mg total) by mouth 3 (three) times daily as needed for Anxiety (breakthrough anxiety not relieved by ativan).             dexAMETHasone (DECADRON) 4 MG Tab  Take 5 tablets every 6 hours for 24 hours prior to chemotherapy             guaifenesin-codeine 100-10 mg/5 ml (TUSSI-ORGANIDIN NR)  mg/5 mL syrup  Take 5 mLs by mouth every 6 (six) hours as needed for Cough.             ipratropium (ATROVENT) 0.02 % nebulizer solution  Take 2.5 mLs (500 mcg total) by nebulization every 6 (six) hours as needed (wheeze). Rescue             LORazepam (ATIVAN) 1 MG tablet  Take 1 tablet (1 mg total) by mouth every 8 (eight) hours as needed for Anxiety.             megestrol (MEGACE ES) 625 mg/5 mL Susp  Take 5 mLs (625 mg total) by mouth once daily.             metoclopramide HCl (REGLAN) 10 MG tablet  Take 1 tablet (10 mg total) by mouth 2 (two) times daily.             multivitamin (ONE DAILY MULTIVITAMIN) per tablet  Take 1 tablet by mouth once daily.             ondansetron (ZOFRAN-ODT) 8 MG TbDL  Take 1 tablet (8 mg total) by mouth every 8 (eight) hours as needed.             oxyCODONE-acetaminophen (PERCOCET) 5-325 mg per tablet  Take 1 tablet by mouth every 4 (four) hours as needed for Pain.             potassium bicarbonate & potassium chloride (K-LYTE CL) 25 mEq TbEF  Take 1 tablet (25 mEq total) by mouth once daily.             potassium chloride SA (K-DUR,KLOR-CON) 20 MEQ tablet  Take 1 tablet (20 mEq total) by mouth once daily.             ranitidine (ZANTAC) 150 MG tablet  Take 1 tablet (150 mg total) by mouth 2 (two) times daily.              simethicone (MYLICON) 80 MG chewable tablet  Take 1 tablet (80 mg total) by mouth 3 (three) times daily as needed for Flatulence.                 Future Orders:  Hospice Medical Director may dictate new orders for comfortable care measures & sign death certificate.        _________________________________  Karen Hollins MD  10/23/2019

## 2019-10-23 NOTE — PROGRESS NOTES
MD to bedside for afternoon rounds.    Patient laying in bed comfortably with son and daughter at bedside. She continues to be nonverbal, only responding to painful stimuli. Not oriented to person, place or time. After discussing with family regarding ongoing care for patient, they seem more amenable to understanding what hospice or comfort care means for the patient going forward. They express interest in bringing her home, where she would be most comfortable. The family also states that the patient's other daughter will be coming into town later today (approx 1330) to join in discussion and wish to have palliative care at this time to discuss all options. They also inquired regarding starting antibiotics for recent blood cultures which grew S. Aureus.    Temp:  [97.6 °F (36.4 °C)-99.9 °F (37.7 °C)] 99.9 °F (37.7 °C)  Pulse:  [141-161] 145  Resp:  [21-50] 27  SpO2:  [92 %-100 %] 97 %  BP: ()/(53-79) 101/59    ASSESSMENT/PLAN:  - Gyn onc staff notified of family's new interest in learning more about home hospice or comfort care for patient  - Re-iterated our staff's discussion regarding underlying cancer as cause of mental status change  - Neuro critical care consulted   - Discussion regarding normal MRI and MRA results   - Advised considering consulting epilepsy for further recommendations and possible evaluation for seizure   - EEG however was negative for seizure like activity (mild encephalopathy likely due to toxic metabolic causes per note)   - Dr. Watt will see patient and family tomorrow during AM rounds, which the patient/family was notified of    - Palliative care to see patient and family at 1430  - Will defer to critical care team for initiation vs holding vancomycin as treatment for blood cultures  - Continue monitoring patient    Christen Mackey MD  OB/GYN  PGY-2

## 2019-10-23 NOTE — PROGRESS NOTES
[facility]  Obstetrics & Gynecology  Progress Note    Patient Name: Jessica Gorman  MRN: [id number]  Admission Date: 10/19/2019  Primary Care Provider: Rico Amaro MD  Principal Problem: Sinus tachycardia    Subjective:     Interval History:   Overnight, patient had multiple episodes where she awoke from sleep, extremely confused and aphasic. Per daughter, during these times, patient was not oriented to person, place or time. She denied any pain when prompted however, could not answer most questions. VSS during these episodes. This AM, when attempting to speak to patient, she continues to be not oriented to person, place or time. She continues to deny pain.    Prior to these episodes, daughter states that she was able to tolerate PO intake without difficulty. While she did not eat much due to recent events, she did eat more last night. She has been urinating in the bed pan. Not ambulating at this time.     Scheduled Meds:   enoxaparin  40 mg Subcutaneous Daily    famotidine  20 mg Oral BID     Continuous Infusions:  PRN Meds:acetaminophen, ibuprofen, iohexol, ipratropium, ondansetron, ondansetron, oxyCODONE-acetaminophen, sodium chloride 0.9%    Review of patient's allergies indicates:   Allergen Reactions    Gramicidin d Shortness Of Breath    Hydrochlorothiazide Other (See Comments)     Burning and tingling sensation throughoutt her body    Lisinopril Shortness Of Breath    Promethazine Shortness Of Breath and Nausea And Vomiting    Neosporin [neomycin-bacitracnzn-polymyxnb] Swelling    Polymyxin [bacitracin-polymyxin b] Swelling       Objective:     Vital Signs (Most Recent):  Temp: 98.9 °F (37.2 °C) (10/23/19 0300)  Pulse: (!) 144 (10/23/19 0600)  Resp: (!) 24 (10/23/19 0600)  BP: 92/63 (10/23/19 0600)  SpO2: 96 % (10/23/19 0600) Vital Signs (24h Range):  Temp:  [97.6 °F (36.4 °C)-98.9 °F (37.2 °C)] 98.9 °F (37.2 °C)  Pulse:  [135-156] 144  Resp:  [21-44] 24  SpO2:  [91 %-100 %]  96 %  BP: ()/(56-91) 92/63     Weight: 76.3 kg (168 lb 3.4 oz)  Body mass index is 30.77 kg/m².  No LMP recorded. Patient has had a hysterectomy.    I&O (Last 24H):    Intake/Output Summary (Last 24 hours) at 10/23/2019 0621  Last data filed at 10/22/2019 1900  Gross per 24 hour   Intake --   Output 100 ml   Net -100 ml       Physical Exam:   Constitutional: She is oriented to person, place, and time. She appears well-developed and well-nourished.    HENT:   Head: Normocephalic and atraumatic.      Cardiovascular: Regular rhythm.    Tachycardic in the 140s    Pulmonary/Chest: Effort normal. No respiratory distress.        Abdominal: Soft. She exhibits no distension and no abdominal incision.   Appears to be more distended and tense compared to previous examinations             Musculoskeletal: Normal range of motion and moves all extremeties.   Lower extremity, 2+ edema bilaterally. No calf tenderness or erythema       Neurological: She is alert and oriented to person, place, and time. No cranial nerve deficit.    Skin: Skin is warm.    Psychiatric:   Not oriented to person, place or time. Alert with prompting however, non-verbal. Attempting to make moaning noises in response to questions. Withdraws to painful stimuli but cannot make purposeful responses.      Laboratory:  ABGs:   No results for input(s): PH, PCO2, PO2, HCO3, POCSATURATED, BE in the last 48 hours.  CBC:   No results for input(s): WBC, RBC, HGB, HCT, PLT, MCV, MCH, MCHC in the last 48 hours.  CMP:   No results for input(s): GLU, CALCIUM, ALBUMIN, PROT, NA, K, CO2, CL, BUN, CREATININE, ALKPHOS, ALT, AST, BILITOT in the last 48 hours.  Diagnostic Results:  Imaging Results          CT Abdomen Pelvis  Without Contrast (Final result)  Result time 10/20/19 00:32:28   Procedure changed from CT Abdomen Pelvis With Contrast     Final result by Hector De Paz MD (10/20/19 00:32:28)                 Impression:      Moderate distension of the stomach  but no findings of small bowel obstruction.    Moderate volume loculated ascites and mesenteric/omental nodularity in keeping with peritoneal metastatic disease.    Large right and moderate left pleural effusions with associated adjacent atelectasis, mildly increased from prior exam.    Interval placement of an external-internal biliary drain with a small amount of expected pneumobilia.    Grossly stable appearance of hepatic lesions and pelvic adenopathy.    Worsening anasarca.    Additional stable findings, as above.    Electronically signed by resident: Carol Fernandez  Date:    10/19/2019  Time:    23:34    Electronically signed by: Hector De Paz MD  Date:    10/20/2019  Time:    00:32             Narrative:    EXAMINATION:  CT ABDOMEN PELVIS WITHOUT CONTRAST    CLINICAL HISTORY:  hx uterine cancer, intractable vomiting, concern for bowel obstruction;    TECHNIQUE:  5.0 mm axial CT images of the abdomen and pelvis were obtained via helical, multi detector CT technique.  No intravenous contrast material was administered.    COMPARISON:  CTA chest 10/12/2019, chest abdomen pelvis 10/03/2019.  Abdominal MRI, 10/07/2019.    FINDINGS:  Heart: No cardiomegaly or pericardial effusion.    Lung Bases: Large right and moderate left pleural effusions, mildly increased from prior exam, with associated adjacent compressive atelectasis.    Liver: Normal in size with multiple large ill-defined hypodense lesions scattered throughout, better characterized on recent abdominal MRI.  Punctate foci of air within the liver are likely biliary related to recent biliary drain placement.    Gallbladder: Nondistended and therefore not well evaluated.  Contains a punctate focus of air, likely related to biliary drain placement.    Bile Ducts: Interval placement of an external-internal biliary drain with a small amount of expected pneumobilia.  No significant biliary ductal dilatation.    Pancreas: No pancreatic mass lesion or  peripancreatic inflammatory change.    Spleen: Unremarkable.    Adrenals: Unremarkable.    Genitourinary: Normal in size and location.  No obvious renal masses, nephrolithiasis, or hydroureteronephrosis.  Bladder demonstrates smooth contours without bladder wall thickening.    Reproductive organs: Status post hysterectomy.    GI tract/Mesentery: Stomach is moderately distended and overall appears similar to recent prior studies.  Hyperdense material scattered throughout the colon, likely related to PO contrast given for prior CT 10/03/2019.  No evidence of small-bowel obstruction.  Evaluation for bowel inflammation is significantly limited by absence of IV contrast and the presence of diffuse edema and ascites.    Peritoneal Space: Moderate volume of mildly loculated ascites, similar to prior exam.  Stable peritoneal and omental nodularity suggestive of peritoneal metastatic disease.  No intraperitoneal free air.    Retroperitoneum: No significant adenopathy.    Abdominal wall: A fluid density lesion is again noted in the left inguinal region which could represent a necrotic lymph node or fluid within a hernia defect.  This lesion measures 2.9 x 2.3 cm in axial dimensions and is overall stable when compared with the recent prior study.  Additional prominent inguinal lymph nodes bilaterally, stable from prior exam.  Mildly enlarged left pelvic sidewall lymph node measuring up to 1.3 cm in short axis.  Diffuse body wall edema, slightly increased from prior exam.    Vasculature: Abdominal aorta is normal in caliber without significant calcific atherosclerosis.    Bones: Mild degenerative changes.  No acute acute fracture or bony destructive process.                                Assessment/Plan:     Active Diagnoses:    Diagnosis Date Noted POA    PRINCIPAL PROBLEM:  Sinus tachycardia [R00.0] 09/14/2019 Yes    Altered mental status [R41.82] 10/22/2019 No    Hypoalbuminemia [E88.09] 10/21/2019 Yes    Pleural  effusion [J90] 10/21/2019 Yes    Biliary obstruction [K83.1] 10/21/2019 Yes    Goals of care, counseling/discussion [Z71.89]  Not Applicable    Advanced care planning/counseling discussion [Z71.89]  Not Applicable    Chemotherapy induced nausea and vomiting [R11.2, T45.1X5A] 10/20/2019 Yes    Elevated troponin [R79.89] 10/20/2019 No    Palliative care encounter [Z51.5] 09/14/2016 Not Applicable    Endometrial carcinoma, 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 Nausea and vomiting  - Patient presenting with nausea and vomiting over the past 3 days, unable to tolerate solid foods however, tolerating liquids without difficulty  - Was IV reglan 10 mg PRN, well controlled; tolerating PO without any issue   - Medication was discontinued due to altered mental status. Please see below  - CMP: electrolytes WNL, Mag/Phos: WNL  - AST/ALT: 204/61>217/57; INR: 1.5  - s/p 4 grams Mag replacement, stable   - GI was consulted given patient currently has percutaneous biliary stent who recommended possible duodenal stent placement with AES   - Will discuss with staff regarding further management; while patient has not vomited since admission    2. Tachycardia  - Asymptomatic; patient denies any shortness of breath, palpitations or chest pain  - Pulse:  [135-161] 151  - s/p 5 L fluids since admission; currently held   - CT abdomen performed in ED which showed bilateral pleural effusions; no evidence of SBO   - CT not performed as contrast extravasated and no access except EJ at this time  - Will attempt midline for further access however patient currently refuses  - Per critical care team: LE US showed partially occlusive thrombus of L greater saphenous vein   - Currently on lovenox 40 mg daily as concern for hemorrhagic stroke     - Previously on lovenox 60 mg BID for concern for PE  - Strict I/Os to be charted by nursing staff  - Encouraged use of IS  - PT/OT orders placed    3.  Altered mental status  - Continues to have fluctuations in mental status overnight  - Patient last had metoclopramide at approx 1000 on 10/21  - Electrolytes last 10/21 were WNL   - Consider repeat labs to look for electrolyte changes   - MRI and MRA was negative  - Will consult neuro critical care for additional recommendations today  - Discussed with patient and family regarding change in mental status most likely indicative of progression of underlying cancer however currently no decision regarding code status   - Will continue to monitor for changes in status    Christen Mackey MD  Obstetrics & Gynecology  [facility]

## 2019-10-23 NOTE — PLAN OF CARE
"Dx: Sinus tachycardia    Shift Events: Goals of care discussed with palliative care during family conference. Bedside consult from hospice facility of family's choice. Plan to discharge patient to hospice care tomorrow morning.     Neuro: Arouses to Voice; Withdrawn/Nonverbal    Vital Signs: BP (!) 85/57 (BP Location: Left arm, Patient Position: Lying)   Pulse (!) 150   Temp (!) 100.7 °F (38.2 °C) (Axillary) Comment: MD notified  Resp (!) 27   Ht 5' 2" (1.575 m)   Wt 76.3 kg (168 lb 3.4 oz)   SpO2 96%   Breastfeeding? No   BMI 30.77 kg/m²     Diet: NPO    Urine Output: Voids Spontaneously     Drains: Biliary drain total output: 0 cc / shift    Skin: Stage 2 to sacrum cleansed and applied foam dressing. No additional breakdown noted.       "

## 2019-10-23 NOTE — PLAN OF CARE
Referral send through [facility] for home hospice care.    10/23/19 7946   Post-Acute Status   Post-Acute Authorization Placement   Post-Acute Placement Status Set-up Complete  ([facility] for Home Hospice)

## 2019-10-23 NOTE — ASSESSMENT & PLAN NOTE
Impression: Mrs. Gorman is a 68 yo lady admitted with intractable nausea and vomiting.  Has hx of metastatic endometrial cancer.  S/P chemotherapy 9/19.  As per patient chemotherapy is on hold at this time.  Transferred to critical care service for evaluation of tachycardia.  Experienced acute change in Significant event over night 10/21 -  Acute change in mental status, stroke code called. MRI completed.EEG results pending. Experienced another acute change in mental status over night 10/22/19 - experiencing hallucinations.  At time of this assessment she appears lethargic and not participating in conversation or following commands.   Advance Care Planning   - no advanced directives have been received.  Initially Mrs. Gorman was able to make own decisions.  She has experienced a change in mental status  - full code per primary team  - Next of kin for medical decisions are her 4 adult children.  Family meeting this afternoon at 2:30 PM to discuss goals of care       Symptom Management  Nausea - secondary to chemotherapy for endometrial cancer  - appears comfortable at this time with no complaints of nausea  - family now with concerns metoclopramide may be contributing to the patient's hallucinations.   - Mrs. Gorman unable to take any thing by mouth    Recommendations:  - discontinue metoclopromide   - Continue ondansetron 4 mg ODT every 8 hrs as needed for nausea  - if IV access is obtained consider changing ondansetron to 4 mg IVP every 8 hrs as needed for nausea   - If IV access is obtained consider dexamethazone 4 mg IVP every 12 hrs as needed for nausea      Anxiety  - appears comfortable and in no distress   - Son, currently hospitalized has been able to visit.  Family states Mrs. Gorman became more relaxed after his visit.   - If able to obtain IV access consider - Lorazepam 1 mg IVP h every 8 hrs as needed for anxiety and or nausea.      Goals of Care  - Patient reports her chemotherapy is on hold now.  MRI   Indicates  gastric outlet obstruction no additional chemotherapy possible.   - Dr. Watt had ambar discussion with patient and family regarding clinical condition.  Informed patient she would not receive additional chemo and cancer is now end stage.  As per  Dr. Watt patient is actively dying and transition to hospice is appropriate.   Mrs. Gorman does not currently have IV access.  Family has not been amenable to obtaining IV access, daughter states they are considering this.    - Palliative medicine explained having peripheral IV site may be beneficial for comfort medications   -During previous palliative care encounter patient and family were not amenable to discussion hospice or comfort care.  Daughter and son now appear more amenable to discussion comfort care and hospice care.   - Mrs. Gorman' other daughter will be arriving this afternoon.  Palliative medicine will return for 2:30 to meet with family.     Plan/Recommendation  - see above symptom management recommendations.   - Family meeting today tentative time 2:30 PM to discuss goals of care   -  Palliative medicine will continue to follow.   - Intense emotional support -  also consulted for emotional support     Above goals of care and recommendations discussed with primary team.

## 2019-10-23 NOTE — SUBJECTIVE & OBJECTIVE
Interval History:     Past Medical History:   Diagnosis Date    Abnormal Pap smear     1984    Anorexia 8/30/2019    Anxiety 10/11/2019    Cancer     Chemotherapy-induced neutropenia 4/1/2015    Dehydration 10/7/2019    Elevated cancer antigen 125 (CA-125) 9/5/2018    Encounter for blood transfusion     FH: breast cancer in relative when <45 years old 3/17/2016    Hyperbilirubinemia 10/8/2019    Hypertension     Hypokalemia 9/9/2019    Other complicated headache syndrome 10/4/2019    Other screening mammogram 9/17/2015    Postmenopausal bleeding     Pulmonary nodules/lesions, multiple 4/25/2019    Rash 9/17/2015    Uterine cancer     Weight loss 12/17/2015    Well woman exam with routine gynecological exam 9/14/2016       Past Surgical History:   Procedure Laterality Date    COLPOSCOPY      DILATION AND CURETTAGE OF UTERUS  10/17/14    ENDOSCOPIC ULTRASOUND OF UPPER GASTROINTESTINAL TRACT N/A 10/8/2019    Procedure: ULTRASOUND, UPPER GI TRACT, ENDOSCOPIC;  Surgeon: Gabe Aguirre MD;  Location: [facility] ([address] [place] [address]);  Service: Endoscopy;  Laterality: N/A;    ERCP N/A 10/8/2019    Procedure: ERCP (ENDOSCOPIC RETROGRADE CHOLANGIOPANCREATOGRAPHY);  Surgeon: Gabe Aguirre MD;  Location: [address]);  Service: Endoscopy;  Laterality: N/A;    HYSTERECTOMY  12/4/2014    radiacl hysterectomy    CO REMOVAL OF OVARY/TUBE(S)         Review of patient's allergies indicates:   Allergen Reactions    Gramicidin d Shortness Of Breath    Hydrochlorothiazide Other (See Comments)     Burning and tingling sensation throughoutt her body    Lisinopril Shortness Of Breath    Promethazine Shortness Of Breath and Nausea And Vomiting    Neosporin [neomycin-bacitracnzn-polymyxnb] Swelling    Polymyxin [bacitracin-polymyxin b] Swelling       Medications:  Continuous Infusions:  Scheduled Meds:   enoxaparin  40 mg Subcutaneous Daily    famotidine  20 mg Oral BID     PRN Meds:acetaminophen, ibuprofen,  iohexol, ipratropium, ondansetron, ondansetron, oxyCODONE-acetaminophen, sodium chloride 0.9%    Family History     Problem Relation (Age of Onset)    Breast cancer Cousin, Other, Maternal Grandmother, Maternal Aunt    Cancer Son    Cataracts Mother, Brother, Maternal Aunt    Heart attack Son    Hypertension Mother, Brother, Brother, Son    Lung cancer Maternal Uncle    No Known Problems Father        Tobacco Use    Smoking status: Never Smoker    Smokeless tobacco: Never Used   Substance and Sexual Activity    Alcohol use: No    Drug use: No    Sexual activity: Not Currently     Birth control/protection: Post-menopausal       Review of Systems   Constitutional: Positive for appetite change.   Eyes: Negative.    Gastrointestinal: Positive for abdominal distention, nausea and vomiting.   Musculoskeletal: Negative.    Neurological: Negative.    Psychiatric/Behavioral: Negative.      Objective:     Vital Signs (Most Recent):  Temp: 99.3 °F (37.4 °C) (10/23/19 0700)  Pulse: (!) 149 (10/23/19 1000)  Resp: (!) 27 (10/23/19 1000)  BP: (!) 88/59 (10/23/19 1000)  SpO2: (!) 94 % (10/23/19 1000) Vital Signs (24h Range):  Temp:  [97.6 °F (36.4 °C)-99.3 °F (37.4 °C)] 99.3 °F (37.4 °C)  Pulse:  [136-161] 149  Resp:  [21-50] 27  SpO2:  [91 %-100 %] 94 %  BP: ()/(56-79) 88/59     Weight: 76.3 kg (168 lb 3.4 oz)  Body mass index is 30.77 kg/m².    Review of Symptoms  Symptom Assessment (ESAS 0-10 scale)   ESAS 0 1 2 3 4 5 6 7 8 9 10   Pain x             Dyspnea x             Anxiety   x           Nausea x             Depression  x             Anorexia x             Fatigue x             Insomnia x             Restlessness  x             Agitation x             CAM / Delirium __ --  ___+   Constipation     __ --  ___+   Diarrhea           __ --  ___+  Bowel Management Plan (BMP): No    Comments: no c/o pain, nausea, vomiting or shortness of breath    Pain Assessment: no pain     OME in 24 hours:0    Performance Status:  50    ECOG Performance Status Grade: 1 - Ambulates, capable of light work    Physical Exam   Constitutional: She appears well-developed. No distress.   Cardiovascular: Regular rhythm and normal heart sounds.   Mild tachycardia    Pulmonary/Chest: Effort normal and breath sounds normal. No respiratory distress.   Abdominal: Soft. Bowel sounds are normal. She exhibits no distension. There is no tenderness.   Neurological:   Acute change in mental status, responds to continued verbal and tactile stimuli    Skin: Skin is warm and dry.   Psychiatric: She has a normal mood and affect. Her behavior is normal. Judgment and thought content normal.       Significant Labs: All pertinent labs within the past 24 hours have been reviewed.  CBC:   Recent Labs   Lab 10/21/19  0315   WBC 12.49  12.49   HGB 11.8*  11.8*   HCT 37.8  37.8   MCV 88  88     291     BMP:  No results for input(s): GLU, NA, K, CL, CO2, BUN, CREATININE, CALCIUM, MG in the last 24 hours.  LFT:  Lab Results   Component Value Date     (H) 10/21/2019    ALKPHOS 324 (H) 10/21/2019    BILITOT 3.0 (H) 10/21/2019     Albumin:   Albumin   Date Value Ref Range Status   10/21/2019 1.8 (L) 3.5 - 5.2 g/dL Final     Protein:   Total Protein   Date Value Ref Range Status   10/21/2019 6.1 6.0 - 8.4 g/dL Final     Lactic acid:   Lab Results   Component Value Date    LACTATE 2.3 (H) 10/19/2019    LACTATE 1.0 09/13/2019       Significant Imaging: I have reviewed all pertinent imaging results/findings within the past 24 hours.    Advance Care Planning   Advanced Directives::  Living Will: No  LaPOST: No  Do Not Resuscitate Status: No  Medical Power of : No    Decision-Making Capacity: Patient answered questions       Living Arrangements: Lives with family    Psychosocial/Cultural: ,  Three adult children.  Lives with family       Spiritual:     F- Gisel and Belief: Christain    I - Importance:   .  C - Community:     A - Address in Care:  amenable to  visits

## 2019-10-24 VITALS
BODY MASS INDEX: 30.95 KG/M2 | HEART RATE: 156 BPM | WEIGHT: 168.19 LBS | DIASTOLIC BLOOD PRESSURE: 57 MMHG | TEMPERATURE: 98 F | OXYGEN SATURATION: 95 % | SYSTOLIC BLOOD PRESSURE: 91 MMHG | RESPIRATION RATE: 40 BRPM | HEIGHT: 62 IN

## 2019-10-24 LAB
BACTERIA BLD CULT: ABNORMAL

## 2019-10-24 PROCEDURE — 94761 N-INVAS EAR/PLS OXIMETRY MLT: CPT

## 2019-10-24 PROCEDURE — 99231 PR SUBSEQUENT HOSPITAL CARE,LEVL I: ICD-10-PCS | Mod: GC,,, | Performed by: OBSTETRICS & GYNECOLOGY

## 2019-10-24 PROCEDURE — 99231 SBSQ HOSP IP/OBS SF/LOW 25: CPT | Mod: GC,,, | Performed by: OBSTETRICS & GYNECOLOGY

## 2019-10-24 NOTE — PLAN OF CARE
Patient discharged to home hospice.  Patient family available for discharge.       10/24/19 1129   Final Note   Assessment Type Final Discharge Note   Anticipated Discharge Disposition [facility]ospiceHome  ([facility])   What phone number can be called within the next 1-3 days to see how you are doing after discharge? [phone]   Discharge plans and expectations educations in teach back method with documentation complete? Yes   Right Care Referral Info   Post Acute Recommendation Home-care   Referral Type Hospice   Facility Name [facility]

## 2019-10-24 NOTE — PHYSICIAN QUERY
PT Name: Jessica Gorman  MR #: [id number]     Physician Query Form - Diagnosis Clarification      CDS/: NANI Greene,RNC-[email or web address]       Contact information:[email or web address]    This form is a permanent document in the medical record.     Query Date: October 24, 2019    By submitting this query, we are merely seeking further clarification of documentation.  Please utilize your independent clinical judgment when addressing the question(s) below.     The medical record contains the following:      Findings Supporting Clinical Information Location in Medical Record   Bilateral pleural effusions- asymptomatic. Volume vs malignant. Patient refusing drainage           female with hx of stage 4 endometrial Ca with lung and liver mets s/p chemotherapy and radiation admitted 10/19 with intractable N/V- MICU consulted 10/20 for persistent tachycardia    On 09/2019 she found out that she had mets to the liver and lung - she started Chemotherapy and last round was 09/13/209- following the chemotherapy she has been admitted for intractable N/V Patient has presented with similar complaints in the past with most recent admission from 10/7-10/15 at which time a percutaneous biliary drain was placed for biliary obstruction. Previously underwent CTA chest on 10/12 which was negative for acute PE.     Pleural effusion  - Echo with NL EF and no DD.  - BNP 29   - Most likely 2/2 hypoalbuminemia  H&P 10/21     Please clarify if the malignant pleural effusion diagnosis has been:    [ x ] Ruled In   [  ] Ruled In, Now Resolved   [  ] Ruled Out   [  ] Other/Clarification of findings (please specify):   [  ] Clinically insignificant     [  ] Clinically undetermined     Please document in your progress notes daily for the duration of treatment, until resolved, and include in your discharge summary.

## 2019-10-24 NOTE — PLAN OF CARE
Transportation has been arranged for stretcher transport for home.  Pt will be in hospice care. Family @ bedside (Patricia) and is agreeable to discharge.  [facility] has been notified of home transport arrangement.  Requested time of transport is 10:30am.    10/24/19 1921   Post-Acute Status   Post-Acute Authorization Home Health/Hospice   Post-Acute Placement Status Set-up Complete  ([facility] for home hospice care.)

## 2019-10-24 NOTE — PROGRESS NOTES
[facility]  Obstetrics & Gynecology  Progress Note    Patient Name: Jessica Gorman  MRN: [id number]  Admission Date: 10/19/2019  Primary Care Provider: Rico Amaro MD  Principal Problem: Sinus tachycardia    Subjective:     Interval History:   NAEON. Family has decided with assistance of palliative care that they desire home hospice for patient. She continues to be asleep most of the day and is non-responsive and non-verbal. Daughter came yesterday from [facility] Who was at bedside this AM and reports patient has not been able to recognize her however does admit she has been asleep most of the day. Able to make audible noises however, incoherent. Withdrawals from pain. Wearing diaper and urinating on bedpan when necessary. No other issues of note.     Scheduled Meds:   enoxaparin  40 mg Subcutaneous Daily    famotidine  20 mg Oral BID    sodium chloride 0.9%  500 mL Intravenous Once     Continuous Infusions:  PRN Meds:acetaminophen, ibuprofen, iohexol, ipratropium, ondansetron, ondansetron, oxyCODONE-acetaminophen, sodium chloride 0.9%    Review of patient's allergies indicates:   Allergen Reactions    Gramicidin d Shortness Of Breath    Hydrochlorothiazide Other (See Comments)     Burning and tingling sensation throughoutt her body    Lisinopril Shortness Of Breath    Promethazine Shortness Of Breath and Nausea And Vomiting    Neosporin [neomycin-bacitracnzn-polymyxnb] Swelling    Polymyxin [bacitracin-polymyxin b] Swelling       Objective:     Vital Signs (Most Recent):  Temp: 97.6 °F (36.4 °C) (10/24/19 0315)  Pulse: (!) 151 (10/24/19 0500)  Resp: (!) 30 (10/24/19 0500)  BP: 90/60 (10/24/19 0500)  SpO2: (!) 94 % (10/24/19 0500) Vital Signs (24h Range):  Temp:  [97.6 °F (36.4 °C)-100.7 °F (38.2 °C)] 97.6 °F (36.4 °C)  Pulse:  [144-161] 151  Resp:  [20-50] 30  SpO2:  [92 %-100 %] 94 %  BP: ()/(53-65) 90/60     Weight: 76.3 kg (168 lb 3.4 oz)  Body mass index is 30.77  kg/m².  No LMP recorded. Patient has had a hysterectomy.    I&O (Last 24H):    Intake/Output Summary (Last 24 hours) at 10/24/2019 0556  Last data filed at 10/23/2019 1500  Gross per 24 hour   Intake --   Output 0 ml   Net 0 ml       Physical Exam:   Constitutional: No distress.   Cachectic, frail       Cardiovascular: Regular rhythm.    Tachycardic in the 150s    Pulmonary/Chest: Effort normal. No respiratory distress.        Abdominal: Soft. She exhibits no distension and no abdominal incision.             Musculoskeletal: Normal range of motion and moves all extremeties.   Lower extremity, 2+ edema bilaterally. No calf tenderness or erythema       Neurological: No cranial nerve deficit.    Skin: Skin is warm.    Psychiatric:   Not oriented to person, place or time. Alert with prompting however, non-verbal. Attempting to make moaning noises in response to questions. Withdraws to painful stimuli but cannot make purposeful responses.      Laboratory:  ABGs:   No results for input(s): PH, PCO2, PO2, HCO3, POCSATURATED, BE in the last 48 hours.  CBC:   No results for input(s): WBC, RBC, HGB, HCT, PLT, MCV, MCH, MCHC in the last 48 hours.  CMP:   No results for input(s): GLU, CALCIUM, ALBUMIN, PROT, NA, K, CO2, CL, BUN, CREATININE, ALKPHOS, ALT, AST, BILITOT in the last 48 hours.  Diagnostic Results:  Imaging Results          CT Abdomen Pelvis  Without Contrast (Final result)  Result time 10/20/19 00:32:28   Procedure changed from CT Abdomen Pelvis With Contrast     Final result by Hector De Paz MD (10/20/19 00:32:28)                 Impression:      Moderate distension of the stomach but no findings of small bowel obstruction.    Moderate volume loculated ascites and mesenteric/omental nodularity in keeping with peritoneal metastatic disease.    Large right and moderate left pleural effusions with associated adjacent atelectasis, mildly increased from prior exam.    Interval placement of an external-internal  biliary drain with a small amount of expected pneumobilia.    Grossly stable appearance of hepatic lesions and pelvic adenopathy.    Worsening anasarca.    Additional stable findings, as above.    Electronically signed by resident: Carol Fernandez  Date:    10/19/2019  Time:    23:34    Electronically signed by: Hector De Paz MD  Date:    10/20/2019  Time:    00:32             Narrative:    EXAMINATION:  CT ABDOMEN PELVIS WITHOUT CONTRAST    CLINICAL HISTORY:  hx uterine cancer, intractable vomiting, concern for bowel obstruction;    TECHNIQUE:  5.0 mm axial CT images of the abdomen and pelvis were obtained via helical, multi detector CT technique.  No intravenous contrast material was administered.    COMPARISON:  CTA chest 10/12/2019, chest abdomen pelvis 10/03/2019.  Abdominal MRI, 10/07/2019.    FINDINGS:  Heart: No cardiomegaly or pericardial effusion.    Lung Bases: Large right and moderate left pleural effusions, mildly increased from prior exam, with associated adjacent compressive atelectasis.    Liver: Normal in size with multiple large ill-defined hypodense lesions scattered throughout, better characterized on recent abdominal MRI.  Punctate foci of air within the liver are likely biliary related to recent biliary drain placement.    Gallbladder: Nondistended and therefore not well evaluated.  Contains a punctate focus of air, likely related to biliary drain placement.    Bile Ducts: Interval placement of an external-internal biliary drain with a small amount of expected pneumobilia.  No significant biliary ductal dilatation.    Pancreas: No pancreatic mass lesion or peripancreatic inflammatory change.    Spleen: Unremarkable.    Adrenals: Unremarkable.    Genitourinary: Normal in size and location.  No obvious renal masses, nephrolithiasis, or hydroureteronephrosis.  Bladder demonstrates smooth contours without bladder wall thickening.    Reproductive organs: Status post hysterectomy.    GI  tract/Mesentery: Stomach is moderately distended and overall appears similar to recent prior studies.  Hyperdense material scattered throughout the colon, likely related to PO contrast given for prior CT 10/03/2019.  No evidence of small-bowel obstruction.  Evaluation for bowel inflammation is significantly limited by absence of IV contrast and the presence of diffuse edema and ascites.    Peritoneal Space: Moderate volume of mildly loculated ascites, similar to prior exam.  Stable peritoneal and omental nodularity suggestive of peritoneal metastatic disease.  No intraperitoneal free air.    Retroperitoneum: No significant adenopathy.    Abdominal wall: A fluid density lesion is again noted in the left inguinal region which could represent a necrotic lymph node or fluid within a hernia defect.  This lesion measures 2.9 x 2.3 cm in axial dimensions and is overall stable when compared with the recent prior study.  Additional prominent inguinal lymph nodes bilaterally, stable from prior exam.  Mildly enlarged left pelvic sidewall lymph node measuring up to 1.3 cm in short axis.  Diffuse body wall edema, slightly increased from prior exam.    Vasculature: Abdominal aorta is normal in caliber without significant calcific atherosclerosis.    Bones: Mild degenerative changes.  No acute acute fracture or bony destructive process.                                Assessment/Plan:     Active Diagnoses:    Diagnosis Date Noted POA    PRINCIPAL PROBLEM:  Sinus tachycardia [R00.0] 09/14/2019 Yes    Altered mental status [R41.82] 10/22/2019 No    Hypoalbuminemia [E88.09] 10/21/2019 Yes    Pleural effusion [J90] 10/21/2019 Yes    Biliary obstruction [K83.1] 10/21/2019 Yes    Goals of care, counseling/discussion [Z71.89]  Not Applicable    Advanced care planning/counseling discussion [Z71.89]  Not Applicable    Chemotherapy induced nausea and vomiting [R11.2, T45.1X5A] 10/20/2019 Yes    Elevated troponin [R79.89] 10/20/2019  No    Nausea and vomiting [R11.2] 09/13/2019 Unknown    Palliative care encounter [Z51.5] 09/14/2016 Not Applicable    Endometrial carcinoma, 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 Nausea and vomiting  - Patient presenting with nausea and vomiting over the past 3 days, unable to tolerate solid foods however, tolerating liquids without difficulty  - Was IV reglan 10 mg PRN, well controlled; tolerating PO without any issue   - Medication was discontinued due to altered mental status. Please see below  - CMP: electrolytes WNL, Mag/Phos: WNL  - AST/ALT: 204/61>217/57; INR: 1.5  - s/p 4 grams Mag replacement, stable   - GI was consulted given patient currently has percutaneous biliary stent who recommended possible duodenal stent placement with AES   - Given palliative care at this point, will forego any surgical treatment > consider continuation of anti-emetics with home hospice if/when necessary    2. Tachycardia  - Patient not able to verbalize symptoms however, appears in no distress   - Pulse:  [144-161] 151  - CT abdomen performed in ED which showed bilateral pleural effusions; no evidence of SBO   - CT chest not performed as contrast extravasated and no access except EJ at this time  - Will attempt midline for further access however patient currently refuses  - Per critical care team: LE US showed partially occlusive thrombus of L greater saphenous vein   - Currently on lovenox 40 mg daily as concern for hemorrhagic stroke     - Previously on lovenox 60 mg BID for concern for PE  - Strict I/Os to be charted by nursing staff    3. Altered mental status  - Continues to have fluctuations in mental status overnight  - Patient last had metoclopramide at approx 1000 on 10/21  - Electrolytes last 10/21 were WNL  - MRI and MRA was negative  - Most recently decided by family to forego further intervention in hospital and desires home hospice    - Appears that critical care  team has coordinated this with social work > anticipate discharge today   - Will have gyn onc staff also talk to patient this AM prior to discharge     Christen Mackey MD  Obstetrics & Gynecology  [facility]

## 2019-10-24 NOTE — PLAN OF CARE
No acute events this shift. Disoriented x4. Does not follow commands. Afebrile. HR: 150s. MAPs > 64.  RA, SpO2 92-94%.  Biliary drain output: 0 cc this shift  Plan to D/C to hospice this AM.

## 2019-10-26 LAB — BACTERIA BLD CULT: NORMAL

## 2019-10-31 ENCOUNTER — EXTERNAL HOME HEALTH (OUTPATIENT)
Dept: HOME HEALTH SERVICES | Facility: HOSPITAL | Age: 69
End: 2019-10-31
Payer: MEDICARE

## 2020-02-21 NOTE — PATIENT INSTRUCTIONS
Your exam was overall normal today.    Your blood pressure was good.    I will order routine fasting labs today - at least 6-8 hours of fasting.    We will send you home with the FIT stool test.    Return in 6 months - sooner if needed.  Please come at least 15-20 minutes before your scheduled appointment time.  
No

## 2020-07-14 ENCOUNTER — PATIENT OUTREACH (OUTPATIENT)
Dept: ADMINISTRATIVE | Facility: HOSPITAL | Age: 70
End: 2020-07-14

## 2020-07-27 ENCOUNTER — PES CALL (OUTPATIENT)
Dept: ADMINISTRATIVE | Facility: CLINIC | Age: 70
End: 2020-07-27

## 2020-08-14 ENCOUNTER — TELEPHONE (OUTPATIENT)
Dept: INTERNAL MEDICINE | Facility: CLINIC | Age: 70
End: 2020-08-14

## 2021-11-28 NOTE — PROGRESS NOTES
Spoke with patient. Recurring N&V. Vomiting several times a day. Weak.     Will admit for N&V and dehydration.     Will need labs and IVFs.     Will need MRI of brain after assessment of fluid status and renal function.   
[place]

## 2022-05-18 NOTE — SUBJECTIVE & OBJECTIVE
Interval History: Patient tolerated red beans and rice for lunch and rotisserie chicken with carrots for dinner. Tolerating liquids as well. Reglan decreased to BID and patient reports no episodes of confusion overnight. Resting comfortably. Biliary drain with 450 cc output overnight. Stable for discharge today with home health.. Remains tachycardic, but denies dyspnea, chest pain.     Scheduled Meds:   famotidine  20 mg Oral BID    metoclopramide HCl  10 mg Oral BID     Continuous Infusions:   sodium chloride 0.9% 15 mL/hr at 10/14/19 1545     PRN Meds:ALPRAZolam, calcium carbonate, docusate sodium, ibuprofen, LORazepam, ondansetron, oxyCODONE, oxyCODONE, simethicone, sodium chloride 0.9%    Review of patient's allergies indicates:   Allergen Reactions    Gramicidin d Shortness Of Breath    Hydrochlorothiazide Other (See Comments)     Burning and tingling sensation throughoutt her body    Lisinopril Shortness Of Breath    Promethazine Shortness Of Breath and Nausea And Vomiting    Neosporin [neomycin-bacitracnzn-polymyxnb] Swelling    Polymyxin [bacitracin-polymyxin b] Swelling       Objective:     Vital Signs (Most Recent):  Temp: 97.6 °F (36.4 °C) (10/15/19 0509)  Pulse: (!) 120 (10/15/19 0509)  Resp: 20 (10/15/19 0509)  BP: 109/67 (10/15/19 0509)  SpO2: (!) 94 % (10/15/19 0509) Vital Signs (24h Range):  Temp:  [97.4 °F (36.3 °C)-97.7 °F (36.5 °C)] 97.6 °F (36.4 °C)  Pulse:  [120-141] 120  Resp:  [17-20] 20  SpO2:  [94 %-95 %] 94 %  BP: (107-137)/(65-73) 109/67     Weight: 62.3 kg (137 lb 5.6 oz)  Body mass index is 25.12 kg/m².    Intake/Output - Last 3 Shifts       10/13 0700 - 10/14 0659 10/14 0700 - 10/15 0659    P.O.  1040    I.V. (mL/kg) 1692.1 (27.2) 202 (3.2)    Total Intake(mL/kg) 1692.1 (27.2) 1242 (19.9)    Urine (mL/kg/hr) 450 (0.3) 300 (0.2)    Drains 650 850    Stool 0     Total Output 1100 1150    Net +592.1 +92          Stool Occurrence 1 x              Physical Exam:   Constitutional:  Provider E-Visit time total (minutes): 3   She is oriented to person, place, and time. She appears well-developed and well-nourished. No distress.    HENT:   Head: Normocephalic and atraumatic.      Cardiovascular: Regular rhythm.    Tachycardic.    Pulmonary/Chest: Effort normal. No respiratory distress.        Abdominal: Soft. She exhibits distension (slightly more distended than exam yesterday). There is no tenderness. There is no rebound and no guarding.   Biliary drain present draining dark green/brown fluid.  Hypoactive bowel sounds.             Musculoskeletal: Normal range of motion. She exhibits no edema or tenderness.       Neurological: She is alert and oriented to person, place, and time.    Skin: Skin is warm and dry.    Psychiatric: She has a normal mood and affect. Her behavior is normal. Judgment and thought content normal.       Lines/Drains/Airways     Drain                 Biliary Tube 10/10/19 1500 RLQ 4 days          Peripheral Intravenous Line                 Peripheral IV - Single Lumen 10/14/19 1530 20 G;1 3/4 in Left Forearm less than 1 day                Laboratory:  CBC:   Recent Labs   Lab 10/14/19  0501 10/15/19  0446   WBC 6.98 6.80   HGB 11.6* 10.0*   HCT 33.8* 30.1*    315    and CMP:   Recent Labs   Lab 10/14/19  0501      K 3.8      CO2 18*   *   BUN 14   CREATININE 0.7   CALCIUM 8.4*   PROT 5.9*   ALBUMIN 2.1*   BILITOT 4.2*   ALKPHOS 390*   *   ALT 85*   ANIONGAP 14   EGFRNONAA >60.0     Imaging:  CTA, 10/12/19:  Narrative     EXAMINATION:  CTA CHEST NON CORONARY    CLINICAL HISTORY:  Tachycardic, tachypneic, rule out PE;    TECHNIQUE:  Low dose axial images, sagittal and coronal reformations were obtained from the thoracic inlet to the lung bases following the IV administration of 75 mL of Omnipaque 350.  Contrast timing was optimized to evaluate the pulmonary arteries.  MIP images were performed.    COMPARISON:  CT chest abdomen pelvis 10/03/2019.  CTA chest  09/15/2019.    FINDINGS:  Examination of the soft tissue and vascular structures at the base of the neck is unremarkable.    The thoracic aorta maintains normal caliber, contour, and course without significant atherosclerotic calcification.  There is no evidence of aneurysmal dilation or dissection.    The pulmonary arteries distribute normally without evidence of filling defect to indicate pulmonary thromboembolism.    The trachea and proximal airways are patent.    The lungs are symmetrically expanded.  Persistent bilateral moderate-sized pleural effusions with associated compressive atelectasis, similar to prior.  No pneumothorax.  Stable left upper lobe pulmonary nodules, the largest measuring approximately 5 mm (axial series 3, image 27).    The heart is not enlarged.  No pericardial effusion.    There is no axillary, mediastinal, or hilar lymph node enlargement.    The esophagus maintains a normal course and caliber.    Limited images of the upper abdomen obtained during the course of this dedicated thoracic CT is negative for acute findings.  Partial visualization of previously identified multiple hypodense hepatic lesions in this patient with metastatic uterine cancer.  Partially visualized biliary tube.  Small volume abdominal ascites.    The osseous structures are within expected limits.      Impression       No evidence of pulmonary thromboembolism.    Bilateral moderate-sized pleural effusions with associated compressive atelectasis, similar to prior.    Stable left upper lobe pulmonary nodules, the largest measuring approximately 5 mm.    Small volume abdominal ascites.    Partial visualization of previously identified multiple hypodense hepatic lesions in this patient with metastatic uterine cancer.  Partially visualized biliary tube.    Additional stable findings as above.         U/S lower extremities, 10/12/19:  Narrative     EXAMINATION:  US LOWER EXTREMITY VEINS BILATERAL    CLINICAL  HISTORY:  Rule out DVT;    TECHNIQUE:  Duplex and color flow Doppler and dynamic compression was performed of the bilateral lower extremity veins was performed.    COMPARISON:  None.    FINDINGS:  Right thigh veins: The common femoral, femoral, popliteal, upper greater saphenous, and deep femoral veins are patent and free of thrombus. The veins are normally compressible and have normal phasic flow and augmentation response.    Right calf veins: The visualized calf veins are patent.    Left thigh veins: The common femoral, femoral, popliteal, upper greater saphenous, and deep femoral veins are patent and free of thrombus. The veins are normally compressible and have normal phasic flow and augmentation response.    Left calf veins: The visualized calf veins are patent.    Miscellaneous: Mild bilateral lower extremity edema.      Impression       Nonspecific edema lower extremity soft tissues.  No evidence of deep venous thrombosis in either lower extremity.

## 2024-05-13 NOTE — TELEPHONE ENCOUNTER
Pt was having problems with getting amlodipine 10 mg filled, called [facility] [address] and they had a prescription on refill for 5 mg. The insurance company wouldn't approve the 10 mg prescription because the 5 mg was an automatic refill. I spoke with the pharmacist and she deleted the 5 mg prescription and is going to fill the 10 mg. 6/14 JOSHUA   Fall Risk
